# Patient Record
Sex: MALE | Race: WHITE | NOT HISPANIC OR LATINO
[De-identification: names, ages, dates, MRNs, and addresses within clinical notes are randomized per-mention and may not be internally consistent; named-entity substitution may affect disease eponyms.]

---

## 2017-01-03 ENCOUNTER — APPOINTMENT (OUTPATIENT)
Dept: ORTHOPEDIC SURGERY | Facility: CLINIC | Age: 52
End: 2017-01-03

## 2017-04-04 ENCOUNTER — EMERGENCY (EMERGENCY)
Facility: HOSPITAL | Age: 52
LOS: 1 days | Discharge: HOME | End: 2017-04-04
Attending: EMERGENCY MEDICINE
Payer: COMMERCIAL

## 2017-04-04 VITALS
RESPIRATION RATE: 20 BRPM | HEART RATE: 99 BPM | TEMPERATURE: 99 F | OXYGEN SATURATION: 96 % | SYSTOLIC BLOOD PRESSURE: 113 MMHG | DIASTOLIC BLOOD PRESSURE: 67 MMHG

## 2017-04-04 VITALS — WEIGHT: 175.05 LBS | HEIGHT: 67 IN

## 2017-04-04 DIAGNOSIS — J45.909 UNSPECIFIED ASTHMA, UNCOMPLICATED: ICD-10-CM

## 2017-04-04 DIAGNOSIS — F10.129 ALCOHOL ABUSE WITH INTOXICATION, UNSPECIFIED: ICD-10-CM

## 2017-04-04 DIAGNOSIS — R45.1 RESTLESSNESS AND AGITATION: ICD-10-CM

## 2017-04-04 DIAGNOSIS — F17.200 NICOTINE DEPENDENCE, UNSPECIFIED, UNCOMPLICATED: ICD-10-CM

## 2017-04-04 PROCEDURE — 82962 GLUCOSE BLOOD TEST: CPT

## 2017-04-04 PROCEDURE — 99285 EMERGENCY DEPT VISIT HI MDM: CPT | Mod: 25

## 2017-04-04 PROCEDURE — 99283 EMERGENCY DEPT VISIT LOW MDM: CPT

## 2017-04-04 RX ORDER — THIAMINE MONONITRATE (VIT B1) 100 MG
100 TABLET ORAL ONCE
Qty: 0 | Refills: 0 | Status: COMPLETED | OUTPATIENT
Start: 2017-04-04 | End: 2017-04-04

## 2017-04-04 RX ADMIN — Medication 100 MILLIGRAM(S): at 16:32

## 2017-04-04 RX ADMIN — Medication 1 TABLET(S): at 16:33

## 2017-04-04 NOTE — ED PROVIDER NOTE - OBJECTIVE STATEMENT
This is a 51F BIBEMS for alcohol intoxication. he was found with a pint of vodka and a bucket of chicken sitting down ont he side of the street. He admits to alcohol, denies other drugs. He denies any trauma and all physical complaints.
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
## 2017-04-04 NOTE — ED PROVIDER NOTE - PHYSICAL EXAMINATION
GEN: smells of EtOH, alert, oriented, answering questions  HEENT: NCAT, PERRL, EOMI, neck nonendter with painless ROM  CHEST: nontender, lungs CTA  ABD: soft, nontender  MSK: no spinal or extremity tenderness or deformity  SKIN: no bruising  NEURO: moving all extremities, slurring speech, unsteady c/w intoxication  PSYCH: mildly agitated but redirectable

## 2017-04-04 NOTE — ED PROVIDER NOTE - NS ED ROS FT
CONST: no fevers, no chills  EYES: no pain, no visual disturbances  ENT: no sore throat, no epistaxis, no rhinorhea, no hearing changes  CV: no chest pain, no palpitations, no orthopnea, no extremity pain or swelling  RESP: no shortness of breath, no cough, no sputum, no pleurisy, no wheezing  ABD: no abdominal pain, no nausea, no vomiting, no diarrhea, no black or bloody stool  : no dysuria, no hematuria, no frequency, no urgency  MSK: no trauma, no back pain, no neck pain, no extremity pain  NEURO: no headache  HEME: no easy bleeding or bruising

## 2017-04-04 NOTE — ED PROVIDER NOTE - MEDICAL DECISION MAKING DETAILS
Endorses EtOH, neurologically intact, mildly agitated but verbally deescalates. Eating food. No signs of trauma, benign exam. Will observe until clinically sober and reevaluate.

## 2017-04-04 NOTE — ED PROVIDER NOTE - PROGRESS NOTE DETAILS
pt sleeping comfortably. Signed out to [place]. Pt awake and alert.  Ambulatory in ED with steady gait with no acute signs of withdrawal.  Pt clinically sober for d/c at this time

## 2017-04-04 NOTE — ED ADULT NURSE REASSESSMENT NOTE - NS ED NURSE REASSESS COMMENT FT1
assumed care of pt  pt resting in hallway on stretcher, with covers over head.    answers to name  denies drinking today     yellow gown in place.   no complaints,.  cooperative pleasant currently  will cont to monitor
pt sleeping  with cover over head in hallway
pt stating he wants to go home feeling fine as per MD okay to go was given clothes to leave.
sandwich/juice given   pt has wallet (camo)_ and cell phone   "I have to be at work at 3AM"   pt on stretcher in [address], hagen=strength reassurance prov,
pt resting comfortable. pt tolerating po intake. pt in no alcohol withdrawal.

## 2017-04-06 ENCOUNTER — TRANSCRIPTION ENCOUNTER (OUTPATIENT)
Age: 52
End: 2017-04-06

## 2017-04-12 ENCOUNTER — APPOINTMENT (OUTPATIENT)
Dept: ORTHOPEDIC SURGERY | Facility: CLINIC | Age: 52
End: 2017-04-12

## 2017-05-22 ENCOUNTER — APPOINTMENT (OUTPATIENT)
Dept: ORTHOPEDIC SURGERY | Facility: CLINIC | Age: 52
End: 2017-05-22

## 2017-05-29 ENCOUNTER — EMERGENCY (EMERGENCY)
Facility: HOSPITAL | Age: 52
LOS: 1 days | Discharge: LEFT BEFORE DISCHARGE | End: 2017-05-29
Attending: EMERGENCY MEDICINE | Admitting: EMERGENCY MEDICINE
Payer: COMMERCIAL

## 2017-05-29 VITALS
HEART RATE: 89 BPM | SYSTOLIC BLOOD PRESSURE: 133 MMHG | WEIGHT: 139.99 LBS | TEMPERATURE: 98 F | DIASTOLIC BLOOD PRESSURE: 70 MMHG | RESPIRATION RATE: 16 BRPM | OXYGEN SATURATION: 97 %

## 2017-05-29 LAB — ETHANOL SERPL-MCNC: 311 MG/DL — SIGNIFICANT CHANGE UP

## 2017-05-29 PROCEDURE — 99284 EMERGENCY DEPT VISIT MOD MDM: CPT

## 2017-05-29 RX ORDER — HALOPERIDOL DECANOATE 100 MG/ML
2.5 INJECTION INTRAMUSCULAR ONCE
Qty: 0 | Refills: 0 | Status: COMPLETED | OUTPATIENT
Start: 2017-05-29 | End: 2017-05-29

## 2017-05-29 RX ADMIN — Medication 2 MILLIGRAM(S): at 20:12

## 2017-05-29 NOTE — ED PROVIDER NOTE - NS ED ROS FT
no fever  no chest pain  no SOB  no HA  + intox  no HI or SI  All other ROS negative except as per HPI
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
## 2017-05-29 NOTE — ED ADULT NURSE NOTE - CHPI ED SYMPTOMS NEG
no vomiting/no nausea/no disorientation/no confusion/no pain/no chills/no fever/no weakness/no abdominal distension/no abdominal pain
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
## 2017-05-29 NOTE — ED PROVIDER NOTE - PHYSICAL EXAMINATION
Constitutional: NAD.   ENT: Airway patent. Nose clear. Mouth with normal mucosa.   Head: Atraumatic.   Eyes: Clear bilaterally. PERRL.   Cardiac: Normal rate.   Respiratory: Breath sounds clear bilaterally.   GI: Abdomen soft, non-tender, no guarding.   : No CVA or bladder tenderness.   Musculoskeletal: FROM, no muscle or joint tenderness or swelling.   Neuro: alert and oriented, no focal deficits, no motor or sensory deficits.   Skin: Dry, intact, no rash. old abrasions noted, no active bleeding  Psych: normal mood and affect. no HI or SI
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
## 2017-05-30 VITALS
SYSTOLIC BLOOD PRESSURE: 154 MMHG | TEMPERATURE: 97 F | OXYGEN SATURATION: 97 % | DIASTOLIC BLOOD PRESSURE: 92 MMHG | RESPIRATION RATE: 24 BRPM | HEART RATE: 118 BPM

## 2017-05-30 LAB — ETHANOL SERPL-MCNC: <10 MG/DL — SIGNIFICANT CHANGE UP

## 2017-05-30 PROCEDURE — 96374 THER/PROPH/DIAG INJ IV PUSH: CPT

## 2017-05-30 PROCEDURE — 99285 EMERGENCY DEPT VISIT HI MDM: CPT | Mod: 25

## 2017-05-30 PROCEDURE — 96372 THER/PROPH/DIAG INJ SC/IM: CPT | Mod: XU

## 2017-05-30 PROCEDURE — 83735 ASSAY OF MAGNESIUM: CPT

## 2017-05-30 PROCEDURE — 96375 TX/PRO/DX INJ NEW DRUG ADDON: CPT

## 2017-05-30 PROCEDURE — 80053 COMPREHEN METABOLIC PANEL: CPT

## 2017-05-30 PROCEDURE — 80307 DRUG TEST PRSMV CHEM ANLYZR: CPT

## 2017-05-30 RX ORDER — PANTOPRAZOLE SODIUM 20 MG/1
40 TABLET, DELAYED RELEASE ORAL ONCE
Qty: 0 | Refills: 0 | Status: COMPLETED | OUTPATIENT
Start: 2017-05-30 | End: 2017-05-30

## 2017-05-30 RX ORDER — SODIUM CHLORIDE 9 MG/ML
2000 INJECTION INTRAMUSCULAR; INTRAVENOUS; SUBCUTANEOUS ONCE
Qty: 0 | Refills: 0 | Status: COMPLETED | OUTPATIENT
Start: 2017-05-30 | End: 2017-05-30

## 2017-05-30 RX ORDER — MAGNESIUM SULFATE 500 MG/ML
2 VIAL (ML) INJECTION ONCE
Qty: 0 | Refills: 0 | Status: COMPLETED | OUTPATIENT
Start: 2017-05-30 | End: 2017-05-30

## 2017-05-30 RX ADMIN — HALOPERIDOL DECANOATE 2.5 MILLIGRAM(S): 100 INJECTION INTRAMUSCULAR at 00:05

## 2017-05-30 RX ADMIN — PANTOPRAZOLE SODIUM 40 MILLIGRAM(S): 20 TABLET, DELAYED RELEASE ORAL at 09:45

## 2017-05-30 RX ADMIN — Medication 50 MILLIGRAM(S): at 08:21

## 2017-05-30 RX ADMIN — Medication 50 MILLIGRAM(S): at 06:03

## 2017-05-30 RX ADMIN — Medication 50 MILLIGRAM(S): at 05:47

## 2017-05-30 RX ADMIN — Medication 2 MILLIGRAM(S): at 00:05

## 2017-05-30 RX ADMIN — SODIUM CHLORIDE 2000 MILLILITER(S): 9 INJECTION INTRAMUSCULAR; INTRAVENOUS; SUBCUTANEOUS at 07:18

## 2017-05-30 RX ADMIN — Medication 50 GRAM(S): at 08:21

## 2017-05-30 NOTE — ED ADULT NURSE REASSESSMENT NOTE - TREMOR
4=moderate with patient's arms extended
7=severe, visible even with arms not extended
4=moderate with patient's arms extended

## 2017-05-30 NOTE — ED ADULT NURSE REASSESSMENT NOTE - NS ED NURSE REASSESS COMMENT FT1
Dr Isaac made aware of pt VSS, ordered additional librium and fluids, will hold discharge and will continue monitor.

## 2017-05-30 NOTE — ED ADULT NURSE REASSESSMENT NOTE - NS ED NURSE REASSESS COMMENT FT1
Pt received A&OX3, c/o headache.  Tachycardic, Pt received A&OX3, c/o headache.  Tachycardic.  CM in place.  CIWA of 11.  Clear bsb, abd soft nondistended, nontender, moving all ext well.  Pt requesting to go home.  Will continue to monitor.  Pt in yellow gown for safety and elopement risk.

## 2017-05-30 NOTE — ED ADULT NURSE REASSESSMENT NOTE - ORIENTATION
0=oriented and can do serial additions

## 2017-07-03 ENCOUNTER — EMERGENCY (EMERGENCY)
Facility: HOSPITAL | Age: 52
LOS: 1 days | Discharge: HOME | End: 2017-07-03
Attending: EMERGENCY MEDICINE | Admitting: EMERGENCY MEDICINE
Payer: COMMERCIAL

## 2017-07-03 VITALS
OXYGEN SATURATION: 99 % | DIASTOLIC BLOOD PRESSURE: 81 MMHG | HEART RATE: 90 BPM | RESPIRATION RATE: 18 BRPM | TEMPERATURE: 99 F | SYSTOLIC BLOOD PRESSURE: 125 MMHG

## 2017-07-03 VITALS
WEIGHT: 160.06 LBS | OXYGEN SATURATION: 98 % | TEMPERATURE: 98 F | HEART RATE: 107 BPM | RESPIRATION RATE: 16 BRPM | SYSTOLIC BLOOD PRESSURE: 131 MMHG | DIASTOLIC BLOOD PRESSURE: 87 MMHG

## 2017-07-03 PROCEDURE — 99283 EMERGENCY DEPT VISIT LOW MDM: CPT

## 2017-07-03 PROCEDURE — 99285 EMERGENCY DEPT VISIT HI MDM: CPT

## 2017-07-03 NOTE — ED ADULT NURSE REASSESSMENT NOTE - NS ED NURSE REASSESS COMMENT FT1
security at bedside, wallet and cash counted with witnesses and placed in valuables envelope. security at bedside, wallet and cash counted with witnesses and placed in valuables envelope. clothing removed and placed in belonging bag and locked up, patient still in possession of his cell phone.

## 2017-07-03 NOTE — ED PROVIDER NOTE - PROGRESS NOTE DETAILS
pt is awake and alert and oriented and not a danger to homself or others and will be dischargd pt is awake and alert and oriented and not a danger to himself or others and will be discharged

## 2017-07-03 NOTE — ED PROVIDER NOTE - OBJECTIVE STATEMENT
51 year old male with a long h/o alcohol abuse presents intoxicated. pt states that he drinks daily and did not fall. he deies any complaints

## 2017-07-03 NOTE — ED POST DISCHARGE NOTE - ADDITIONAL DOCUMENTATION
pt hemodynamically stable, refer to flowsheet and chart, pt to be discharged, pt understood discharge instructions and plan of care

## 2017-07-17 ENCOUNTER — EMERGENCY (EMERGENCY)
Facility: HOSPITAL | Age: 52
LOS: 1 days | Discharge: HOME | End: 2017-07-17
Attending: EMERGENCY MEDICINE
Payer: COMMERCIAL

## 2017-07-17 VITALS
OXYGEN SATURATION: 98 % | HEIGHT: 65 IN | HEART RATE: 78 BPM | RESPIRATION RATE: 18 BRPM | WEIGHT: 169.98 LBS | SYSTOLIC BLOOD PRESSURE: 117 MMHG | TEMPERATURE: 98 F | DIASTOLIC BLOOD PRESSURE: 76 MMHG

## 2017-07-17 LAB — ETHANOL SERPL-MCNC: 461 MG/DL — SIGNIFICANT CHANGE UP

## 2017-07-17 PROCEDURE — 96374 THER/PROPH/DIAG INJ IV PUSH: CPT

## 2017-07-17 PROCEDURE — 99284 EMERGENCY DEPT VISIT MOD MDM: CPT

## 2017-07-17 PROCEDURE — 80307 DRUG TEST PRSMV CHEM ANLYZR: CPT

## 2017-07-17 PROCEDURE — 36415 COLL VENOUS BLD VENIPUNCTURE: CPT

## 2017-07-17 PROCEDURE — 96372 THER/PROPH/DIAG INJ SC/IM: CPT | Mod: XU

## 2017-07-17 PROCEDURE — 99285 EMERGENCY DEPT VISIT HI MDM: CPT | Mod: 25

## 2017-07-17 RX ORDER — HALOPERIDOL DECANOATE 100 MG/ML
5 INJECTION INTRAMUSCULAR ONCE
Qty: 0 | Refills: 0 | Status: COMPLETED | OUTPATIENT
Start: 2017-07-17 | End: 2017-07-17

## 2017-07-17 RX ADMIN — HALOPERIDOL DECANOATE 5 MILLIGRAM(S): 100 INJECTION INTRAMUSCULAR at 21:49

## 2017-07-17 RX ADMIN — Medication 2 MILLIGRAM(S): at 21:49

## 2017-07-17 NOTE — ED PROVIDER NOTE - OBJECTIVE STATEMENT
This is a 50 y/o patient with Hx of asthma BIB EMS for alcohol intoxication. Pt is sleeping comfortably in bed, but easily arousable. Affect consistent with alcohol intoxication. No signs of respiratory distress or deformities. Pt is unable to provide a reliable history at this time. History is limited due to suspected intoxication. Pt awoke and was tearful and asking for help with possible detox.
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
## 2017-07-18 VITALS
DIASTOLIC BLOOD PRESSURE: 86 MMHG | SYSTOLIC BLOOD PRESSURE: 130 MMHG | RESPIRATION RATE: 20 BRPM | HEART RATE: 86 BPM | OXYGEN SATURATION: 100 %

## 2017-07-18 RX ORDER — HALOPERIDOL DECANOATE 100 MG/ML
5 INJECTION INTRAMUSCULAR ONCE
Qty: 0 | Refills: 0 | Status: COMPLETED | OUTPATIENT
Start: 2017-07-18 | End: 2017-07-18

## 2017-07-18 NOTE — ED ADULT NURSE REASSESSMENT NOTE - NS ED NURSE REASSESS COMMENT FT1
Pt able to ambulate safely and steadily w/out assistance, denies dizziness/weakness upon standing, belongings returned and preparing for d/c of IV and pt

## 2017-07-18 NOTE — ED ADULT NURSE REASSESSMENT NOTE - NS ED NURSE REASSESS COMMENT FT1
Pt resting comfortably in stretcher, resp even and unlabored, denies SOB, denies pain/n/v, will continue to monitor.

## 2017-08-19 ENCOUNTER — EMERGENCY (EMERGENCY)
Facility: HOSPITAL | Age: 52
LOS: 1 days | Discharge: HOME | End: 2017-08-19
Attending: EMERGENCY MEDICINE | Admitting: EMERGENCY MEDICINE
Payer: COMMERCIAL

## 2017-08-19 VITALS
HEART RATE: 97 BPM | SYSTOLIC BLOOD PRESSURE: 104 MMHG | HEIGHT: 68 IN | WEIGHT: 179.9 LBS | TEMPERATURE: 97 F | OXYGEN SATURATION: 96 % | RESPIRATION RATE: 16 BRPM | DIASTOLIC BLOOD PRESSURE: 66 MMHG

## 2017-08-19 PROCEDURE — 99285 EMERGENCY DEPT VISIT HI MDM: CPT | Mod: 25

## 2017-08-19 PROCEDURE — 99284 EMERGENCY DEPT VISIT MOD MDM: CPT

## 2017-08-19 PROCEDURE — 96372 THER/PROPH/DIAG INJ SC/IM: CPT

## 2017-08-19 RX ORDER — HALOPERIDOL DECANOATE 100 MG/ML
2.5 INJECTION INTRAMUSCULAR ONCE
Qty: 0 | Refills: 0 | Status: COMPLETED | OUTPATIENT
Start: 2017-08-19 | End: 2017-08-19

## 2017-08-19 RX ADMIN — HALOPERIDOL DECANOATE 2.5 MILLIGRAM(S): 100 INJECTION INTRAMUSCULAR at 18:55

## 2017-08-19 RX ADMIN — Medication 2 MILLIGRAM(S): at 18:55

## 2017-08-19 NOTE — ED PROVIDER NOTE - SKIN, MLM
Skin normal color for race, warm, dry and intact. No evidence of rash.except superficail abrasion rt cheek
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
## 2017-08-19 NOTE — ED ADULT NURSE NOTE - OBJECTIVE STATEMENT
pt with reports of drinking large amounts of alcohol today. crying on exam, ambulates with assistance. no obvious injury or trauma noted.

## 2017-08-20 VITALS
RESPIRATION RATE: 18 BRPM | TEMPERATURE: 98 F | HEART RATE: 71 BPM | OXYGEN SATURATION: 99 % | DIASTOLIC BLOOD PRESSURE: 71 MMHG | SYSTOLIC BLOOD PRESSURE: 117 MMHG
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
## 2017-10-01 ENCOUNTER — EMERGENCY (EMERGENCY)
Facility: HOSPITAL | Age: 52
LOS: 1 days | Discharge: HOME | End: 2017-10-01
Attending: EMERGENCY MEDICINE | Admitting: STUDENT IN AN ORGANIZED HEALTH CARE EDUCATION/TRAINING PROGRAM
Payer: MEDICAID

## 2017-10-01 VITALS
TEMPERATURE: 98 F | SYSTOLIC BLOOD PRESSURE: 140 MMHG | RESPIRATION RATE: 16 BRPM | HEART RATE: 88 BPM | OXYGEN SATURATION: 98 % | HEIGHT: 65 IN | WEIGHT: 175.05 LBS | DIASTOLIC BLOOD PRESSURE: 74 MMHG

## 2017-10-01 LAB
ANION GAP SERPL CALC-SCNC: 15 MMOL/L — SIGNIFICANT CHANGE UP (ref 5–17)
BUN SERPL-MCNC: 11 MG/DL — SIGNIFICANT CHANGE UP (ref 8–20)
CALCIUM SERPL-MCNC: 8.6 MG/DL — SIGNIFICANT CHANGE UP (ref 8.6–10.2)
CHLORIDE SERPL-SCNC: 104 MMOL/L — SIGNIFICANT CHANGE UP (ref 98–107)
CO2 SERPL-SCNC: 22 MMOL/L — SIGNIFICANT CHANGE UP (ref 22–29)
CREAT SERPL-MCNC: 0.8 MG/DL — SIGNIFICANT CHANGE UP (ref 0.5–1.3)
ETHANOL SERPL-MCNC: 415 MG/DL — SIGNIFICANT CHANGE UP
GLUCOSE SERPL-MCNC: 108 MG/DL — SIGNIFICANT CHANGE UP (ref 70–115)
POTASSIUM SERPL-MCNC: 3.6 MMOL/L — SIGNIFICANT CHANGE UP (ref 3.5–5.3)
POTASSIUM SERPL-SCNC: 3.6 MMOL/L — SIGNIFICANT CHANGE UP (ref 3.5–5.3)
SODIUM SERPL-SCNC: 141 MMOL/L — SIGNIFICANT CHANGE UP (ref 135–145)

## 2017-10-01 PROCEDURE — 99284 EMERGENCY DEPT VISIT MOD MDM: CPT

## 2017-10-01 PROCEDURE — 93010 ELECTROCARDIOGRAM REPORT: CPT

## 2017-10-01 RX ORDER — HALOPERIDOL DECANOATE 100 MG/ML
5 INJECTION INTRAMUSCULAR ONCE
Qty: 0 | Refills: 0 | Status: COMPLETED | OUTPATIENT
Start: 2017-10-01 | End: 2017-10-01

## 2017-10-01 RX ADMIN — Medication 2 MILLIGRAM(S): at 20:55

## 2017-10-01 RX ADMIN — HALOPERIDOL DECANOATE 5 MILLIGRAM(S): 100 INJECTION INTRAMUSCULAR at 20:55

## 2017-10-01 RX ADMIN — Medication 100 MILLIGRAM(S): at 20:07
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
## 2017-10-01 NOTE — ED ADULT NURSE REASSESSMENT NOTE - NS ED NURSE REASSESS COMMENT FT1
patient with a ciwa of 5, MDA, librium ordered and given, blanket and pillow given, emotional support given, patient states that he is homeless offered SW to see, patient denies

## 2017-10-01 NOTE — ED PROVIDER NOTE - UNABLE TO OBTAIN
Severe Illness/Injury ROS limited secondary to alcohol intoxication. HPI limited secondary to alcohol intoxication.

## 2017-10-01 NOTE — ED ADULT NURSE NOTE - OBJECTIVE STATEMENT
patient states that he has been drinking today and is shakey, patient is tearful when offered blanket and pillow, patient in yellow gown

## 2017-10-01 NOTE — ED ADULT NURSE REASSESSMENT NOTE - NS ED NURSE REASSESS COMMENT FT1
patient agitated, getting out of bed lying on floor, states that he is having chest pain, EKG completed normal, once patient knew he denies CP, patient given medication, still wanting to get up, urinal given

## 2017-10-01 NOTE — ED PROVIDER NOTE - PROGRESS NOTE DETAILS
Pt is crying, tearful, states he misses his mother. Pt agitated repeatedly getting up despite being able to walk straight, multiple attempts to redirect the pt has failed, pt has been medicated for pt and staff safety. Pt awake and alert.  Ambulatory in ED with steady gait.  No acute signs of EtOH withdrawal.  Pt stable for d/c

## 2017-10-01 NOTE — ED PROVIDER NOTE - PMH
Alcohol abuse    Alcohol abuse    Alcohol abuse    Asthma    GERD (gastroesophageal reflux disease)    High cholesterol    High cholesterol    HTN (hypertension)    Hypertension

## 2017-10-01 NOTE — ED PROVIDER NOTE - OBJECTIVE STATEMENT
51 y/o patient with Hx of alcohol abuse and HTN BIB EMS for alcohol intoxication. Pt is sleeping comfortably in bed, but easily arousable. Affect consistent with alcohol intoxication. No signs of respiratory distress or deformities. Pt is unable to provide a reliable history at this time. History is limited due to suspected intoxication.

## 2017-10-02 VITALS
SYSTOLIC BLOOD PRESSURE: 146 MMHG | RESPIRATION RATE: 19 BRPM | OXYGEN SATURATION: 98 % | DIASTOLIC BLOOD PRESSURE: 79 MMHG | HEART RATE: 89 BPM | TEMPERATURE: 99 F

## 2017-10-02 LAB — ETHANOL SERPL-MCNC: 228 MG/DL — SIGNIFICANT CHANGE UP

## 2017-10-02 PROCEDURE — 80307 DRUG TEST PRSMV CHEM ANLYZR: CPT

## 2017-10-02 PROCEDURE — 80048 BASIC METABOLIC PNL TOTAL CA: CPT

## 2017-10-02 PROCEDURE — 36415 COLL VENOUS BLD VENIPUNCTURE: CPT

## 2017-10-02 PROCEDURE — 93005 ELECTROCARDIOGRAM TRACING: CPT

## 2017-10-02 PROCEDURE — 96372 THER/PROPH/DIAG INJ SC/IM: CPT

## 2017-10-02 PROCEDURE — 99285 EMERGENCY DEPT VISIT HI MDM: CPT | Mod: 25

## 2017-11-01 ENCOUNTER — APPOINTMENT (OUTPATIENT)
Dept: INTERNAL MEDICINE | Facility: CLINIC | Age: 52
End: 2017-11-01
Payer: OTHER MISCELLANEOUS

## 2017-11-01 VITALS — WEIGHT: 159 LBS | BODY MASS INDEX: 27.14 KG/M2 | HEIGHT: 64 IN

## 2017-11-01 VITALS — SYSTOLIC BLOOD PRESSURE: 124 MMHG | DIASTOLIC BLOOD PRESSURE: 80 MMHG | HEART RATE: 78 BPM | RESPIRATION RATE: 12 BRPM

## 2017-11-01 DIAGNOSIS — F10.10 ALCOHOL ABUSE, UNCOMPLICATED: ICD-10-CM

## 2017-11-01 DIAGNOSIS — Z82.49 FAMILY HISTORY OF ISCHEMIC HEART DISEASE AND OTHER DISEASES OF THE CIRCULATORY SYSTEM: ICD-10-CM

## 2017-11-01 PROCEDURE — 99203 OFFICE O/P NEW LOW 30 MIN: CPT

## 2017-12-15 ENCOUNTER — EMERGENCY (EMERGENCY)
Facility: HOSPITAL | Age: 52
LOS: 1 days | Discharge: HOME | End: 2017-12-15
Attending: EMERGENCY MEDICINE
Payer: COMMERCIAL

## 2017-12-15 VITALS
DIASTOLIC BLOOD PRESSURE: 80 MMHG | HEART RATE: 90 BPM | OXYGEN SATURATION: 99 % | HEIGHT: 69 IN | TEMPERATURE: 98 F | SYSTOLIC BLOOD PRESSURE: 136 MMHG | RESPIRATION RATE: 18 BRPM | WEIGHT: 160.06 LBS

## 2017-12-15 VITALS
SYSTOLIC BLOOD PRESSURE: 122 MMHG | TEMPERATURE: 98 F | HEART RATE: 80 BPM | RESPIRATION RATE: 18 BRPM | OXYGEN SATURATION: 99 % | DIASTOLIC BLOOD PRESSURE: 80 MMHG

## 2017-12-15 PROCEDURE — 99284 EMERGENCY DEPT VISIT MOD MDM: CPT

## 2017-12-15 NOTE — ED ADULT NURSE NOTE - NS ED NURSE DC INFO COMPLEXITY
Returned Demonstration/Patient asked questions/Verbalized Understanding/Straightforward: Basic instructions, no meds, no home treatment

## 2017-12-15 NOTE — ED ADULT NURSE NOTE - OBJECTIVE STATEMENT
Pt received laying on stretcher does not want to talk. Sleeping. Pt arousabe to stimulus.. Neuro WNL. PERRLA. Lungs CTA, RR even unlabored. Ab soft non tender, + bowel sounds x 4quads. Denies Nausea, Vomiting, Diarrhea. Skin warm, dry, color appropriate for age and race.

## 2017-12-15 NOTE — ED PROVIDER NOTE - OBJECTIVE STATEMENT
51 yo wm pmh alcohol abuse comes to ed with aloob on breath denies head ,neck chest or abdominal pain

## 2017-12-15 NOTE — ED PROVIDER NOTE - PROGRESS NOTE DETAILS
Pt observed in ED.  Pt clinically sober c/o feeling anxious and shaky.  Pt with no acute signs of EtOH withdrawal.  Pt given Librium and stable for d/c
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
## 2017-12-16 PROCEDURE — 99285 EMERGENCY DEPT VISIT HI MDM: CPT

## 2017-12-16 RX ADMIN — Medication 100 MILLIGRAM(S): at 05:54

## 2017-12-17 ENCOUNTER — EMERGENCY (EMERGENCY)
Facility: HOSPITAL | Age: 52
LOS: 1 days | Discharge: HOME | End: 2017-12-17
Attending: EMERGENCY MEDICINE
Payer: COMMERCIAL

## 2017-12-17 VITALS
TEMPERATURE: 97 F | HEIGHT: 65 IN | HEART RATE: 106 BPM | WEIGHT: 164.91 LBS | DIASTOLIC BLOOD PRESSURE: 70 MMHG | RESPIRATION RATE: 16 BRPM | SYSTOLIC BLOOD PRESSURE: 118 MMHG | OXYGEN SATURATION: 96 %

## 2017-12-17 VITALS
DIASTOLIC BLOOD PRESSURE: 70 MMHG | HEART RATE: 89 BPM | SYSTOLIC BLOOD PRESSURE: 118 MMHG | OXYGEN SATURATION: 100 % | RESPIRATION RATE: 18 BRPM

## 2017-12-17 LAB
ANION GAP SERPL CALC-SCNC: 14 MMOL/L — SIGNIFICANT CHANGE UP (ref 5–17)
BASOPHILS # BLD AUTO: 0 K/UL — SIGNIFICANT CHANGE UP (ref 0–0.2)
BASOPHILS NFR BLD AUTO: 0.4 % — SIGNIFICANT CHANGE UP (ref 0–2)
BUN SERPL-MCNC: 12 MG/DL — SIGNIFICANT CHANGE UP (ref 8–20)
CALCIUM SERPL-MCNC: 8.6 MG/DL — SIGNIFICANT CHANGE UP (ref 8.6–10.2)
CHLORIDE SERPL-SCNC: 101 MMOL/L — SIGNIFICANT CHANGE UP (ref 98–107)
CO2 SERPL-SCNC: 28 MMOL/L — SIGNIFICANT CHANGE UP (ref 22–29)
CREAT SERPL-MCNC: 0.71 MG/DL — SIGNIFICANT CHANGE UP (ref 0.5–1.3)
EOSINOPHIL # BLD AUTO: 0.2 K/UL — SIGNIFICANT CHANGE UP (ref 0–0.5)
EOSINOPHIL NFR BLD AUTO: 3.2 % — SIGNIFICANT CHANGE UP (ref 0–5)
ETHANOL SERPL-MCNC: 346 MG/DL — SIGNIFICANT CHANGE UP
GLUCOSE SERPL-MCNC: 129 MG/DL — HIGH (ref 70–115)
HCT VFR BLD CALC: 36.8 % — LOW (ref 42–52)
HGB BLD-MCNC: 12.3 G/DL — LOW (ref 14–18)
LYMPHOCYTES # BLD AUTO: 1.9 K/UL — SIGNIFICANT CHANGE UP (ref 1–4.8)
LYMPHOCYTES # BLD AUTO: 35.6 % — SIGNIFICANT CHANGE UP (ref 20–55)
MCHC RBC-ENTMCNC: 28.9 PG — SIGNIFICANT CHANGE UP (ref 27–31)
MCHC RBC-ENTMCNC: 33.4 G/DL — SIGNIFICANT CHANGE UP (ref 32–36)
MCV RBC AUTO: 86.6 FL — SIGNIFICANT CHANGE UP (ref 80–94)
MONOCYTES # BLD AUTO: 0.5 K/UL — SIGNIFICANT CHANGE UP (ref 0–0.8)
MONOCYTES NFR BLD AUTO: 9.7 % — SIGNIFICANT CHANGE UP (ref 3–10)
NEUTROPHILS # BLD AUTO: 2.7 K/UL — SIGNIFICANT CHANGE UP (ref 1.8–8)
NEUTROPHILS NFR BLD AUTO: 50.9 % — SIGNIFICANT CHANGE UP (ref 37–73)
PLATELET # BLD AUTO: 238 K/UL — SIGNIFICANT CHANGE UP (ref 150–400)
POTASSIUM SERPL-MCNC: 4 MMOL/L — SIGNIFICANT CHANGE UP (ref 3.5–5.3)
POTASSIUM SERPL-SCNC: 4 MMOL/L — SIGNIFICANT CHANGE UP (ref 3.5–5.3)
RBC # BLD: 4.25 M/UL — LOW (ref 4.6–6.2)
RBC # FLD: 16.9 % — HIGH (ref 11–15.6)
SODIUM SERPL-SCNC: 143 MMOL/L — SIGNIFICANT CHANGE UP (ref 135–145)
WBC # BLD: 5.3 K/UL — SIGNIFICANT CHANGE UP (ref 4.8–10.8)
WBC # FLD AUTO: 5.3 K/UL — SIGNIFICANT CHANGE UP (ref 4.8–10.8)

## 2017-12-17 PROCEDURE — 80307 DRUG TEST PRSMV CHEM ANLYZR: CPT

## 2017-12-17 PROCEDURE — 80048 BASIC METABOLIC PNL TOTAL CA: CPT

## 2017-12-17 PROCEDURE — 36415 COLL VENOUS BLD VENIPUNCTURE: CPT

## 2017-12-17 PROCEDURE — 99284 EMERGENCY DEPT VISIT MOD MDM: CPT

## 2017-12-17 PROCEDURE — 99285 EMERGENCY DEPT VISIT HI MDM: CPT

## 2017-12-17 PROCEDURE — 85027 COMPLETE CBC AUTOMATED: CPT

## 2017-12-17 RX ORDER — ALBUTEROL 90 UG/1
2.5 AEROSOL, METERED ORAL ONCE
Qty: 0 | Refills: 0 | Status: DISCONTINUED | OUTPATIENT
Start: 2017-12-17 | End: 2017-12-21
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
## 2017-12-17 NOTE — ED PROVIDER NOTE - MEDICAL DECISION MAKING DETAILS
refusing weoth resources ambulating in naad clincally sober return to ed for intractable HA, persistent vomiting, or new onset motor/sensory deficits getting a taxi home

## 2018-02-16 ENCOUNTER — EMERGENCY (EMERGENCY)
Facility: HOSPITAL | Age: 53
LOS: 1 days | Discharge: HOME | End: 2018-02-16
Attending: EMERGENCY MEDICINE
Payer: COMMERCIAL

## 2018-02-16 VITALS
RESPIRATION RATE: 18 BRPM | TEMPERATURE: 98 F | DIASTOLIC BLOOD PRESSURE: 76 MMHG | HEART RATE: 88 BPM | HEIGHT: 67 IN | OXYGEN SATURATION: 99 % | SYSTOLIC BLOOD PRESSURE: 128 MMHG | WEIGHT: 164.91 LBS

## 2018-02-16 LAB — ETHANOL SERPL-MCNC: 365 MG/DL — SIGNIFICANT CHANGE UP

## 2018-02-16 PROCEDURE — 80307 DRUG TEST PRSMV CHEM ANLYZR: CPT

## 2018-02-16 PROCEDURE — 99284 EMERGENCY DEPT VISIT MOD MDM: CPT

## 2018-02-16 PROCEDURE — 99283 EMERGENCY DEPT VISIT LOW MDM: CPT

## 2018-02-16 PROCEDURE — 36415 COLL VENOUS BLD VENIPUNCTURE: CPT
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
## 2018-02-16 NOTE — ED PROVIDER NOTE - PROGRESS NOTE DETAILS
Pt. sleeping on stretcher. No distress. Airway intact. PT. endorsed to Dr. Herrera. Re-evaluate pt. when sober. Repeat BAL. Pt awake and alert, ambulatory with steady gait.  C/o increased nausea and vomiting, but refusing meds.  Pt stable for d/c

## 2018-02-17 VITALS
RESPIRATION RATE: 20 BRPM | SYSTOLIC BLOOD PRESSURE: 107 MMHG | HEART RATE: 85 BPM | OXYGEN SATURATION: 99 % | DIASTOLIC BLOOD PRESSURE: 74 MMHG
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
## 2018-02-17 NOTE — ED ADULT NURSE REASSESSMENT NOTE - NS ED NURSE REASSESS COMMENT FT1
Assumed care of patient at this time, patient is resting in bed with eyes closed, respirations are even and non labored. NAD noted.

## 2018-02-17 NOTE — ED ADULT NURSE REASSESSMENT NOTE - NS ED NURSE REASSESS COMMENT FT1
Patient states that he does not want the medication any more and just wants to leave to go drink. Patient dressed and ambulated out with a steady gait. No tremors or vomiting noted. NAD Noted.

## 2018-02-17 NOTE — ED ADULT NURSE REASSESSMENT NOTE - NS ED NURSE REASSESS COMMENT FT1
Patient awake and sitting on the edge of the bed, patient sticking fingers down his throat to make himself vomit. Patient states that he would like something for withdrawal. MD aware and ordering medication. NAD noted.

## 2018-02-17 NOTE — ED ADULT NURSE REASSESSMENT NOTE - NS ED NURSE REASSESS COMMENT FT1
report given to [facility], patient in stable condition, awaiting for discharge at 8am, patient aware of POC

## 2018-02-20 ENCOUNTER — CHART COPY (OUTPATIENT)
Age: 53
End: 2018-02-20

## 2018-03-01 ENCOUNTER — OUTPATIENT (OUTPATIENT)
Dept: OUTPATIENT SERVICES | Facility: HOSPITAL | Age: 53
LOS: 1 days | End: 2018-03-01

## 2018-03-15 ENCOUNTER — APPOINTMENT (OUTPATIENT)
Dept: INTERNAL MEDICINE | Facility: CLINIC | Age: 53
End: 2018-03-15
Payer: MEDICAID

## 2018-03-15 VITALS — SYSTOLIC BLOOD PRESSURE: 128 MMHG | DIASTOLIC BLOOD PRESSURE: 76 MMHG | RESPIRATION RATE: 16 BRPM | HEART RATE: 80 BPM

## 2018-03-15 PROCEDURE — 99214 OFFICE O/P EST MOD 30 MIN: CPT | Mod: 25

## 2018-03-15 RX ORDER — OMEPRAZOLE 40 MG/1
40 CAPSULE, DELAYED RELEASE ORAL
Qty: 30 | Refills: 2 | Status: DISCONTINUED | COMMUNITY
Start: 2018-03-15 | End: 2018-03-15

## 2018-03-23 ENCOUNTER — LABORATORY RESULT (OUTPATIENT)
Age: 53
End: 2018-03-23

## 2018-03-23 ENCOUNTER — APPOINTMENT (OUTPATIENT)
Dept: INTERNAL MEDICINE | Facility: CLINIC | Age: 53
End: 2018-03-23
Payer: MEDICAID

## 2018-03-23 ENCOUNTER — EMERGENCY (EMERGENCY)
Facility: HOSPITAL | Age: 53
LOS: 1 days | Discharge: HOME | End: 2018-03-23
Attending: EMERGENCY MEDICINE
Payer: COMMERCIAL

## 2018-03-23 VITALS — HEART RATE: 90 BPM | RESPIRATION RATE: 12 BRPM | SYSTOLIC BLOOD PRESSURE: 130 MMHG | DIASTOLIC BLOOD PRESSURE: 78 MMHG

## 2018-03-23 VITALS
RESPIRATION RATE: 22 BRPM | TEMPERATURE: 99 F | DIASTOLIC BLOOD PRESSURE: 96 MMHG | OXYGEN SATURATION: 96 % | SYSTOLIC BLOOD PRESSURE: 155 MMHG | HEIGHT: 65 IN | WEIGHT: 154.98 LBS | HEART RATE: 116 BPM

## 2018-03-23 VITALS — WEIGHT: 163 LBS | BODY MASS INDEX: 27.98 KG/M2

## 2018-03-23 LAB
ALBUMIN SERPL ELPH-MCNC: 4.3 G/DL — SIGNIFICANT CHANGE UP (ref 3.3–5.2)
ALP SERPL-CCNC: 86 U/L — SIGNIFICANT CHANGE UP (ref 40–120)
ALT FLD-CCNC: 20 U/L — SIGNIFICANT CHANGE UP
AMPHET UR-MCNC: POSITIVE
ANION GAP SERPL CALC-SCNC: 15 MMOL/L — SIGNIFICANT CHANGE UP (ref 5–17)
APPEARANCE UR: CLEAR — SIGNIFICANT CHANGE UP
AST SERPL-CCNC: 24 U/L — SIGNIFICANT CHANGE UP
BARBITURATES UR SCN-MCNC: NEGATIVE — SIGNIFICANT CHANGE UP
BENZODIAZ UR-MCNC: NEGATIVE — SIGNIFICANT CHANGE UP
BILIRUB SERPL-MCNC: 0.4 MG/DL — SIGNIFICANT CHANGE UP (ref 0.4–2)
BILIRUB UR-MCNC: NEGATIVE — SIGNIFICANT CHANGE UP
BUN SERPL-MCNC: 8 MG/DL — SIGNIFICANT CHANGE UP (ref 8–20)
CALCIUM SERPL-MCNC: 9.2 MG/DL — SIGNIFICANT CHANGE UP (ref 8.6–10.2)
CHLORIDE SERPL-SCNC: 97 MMOL/L — LOW (ref 98–107)
CO2 SERPL-SCNC: 24 MMOL/L — SIGNIFICANT CHANGE UP (ref 22–29)
COCAINE METAB.OTHER UR-MCNC: NEGATIVE — SIGNIFICANT CHANGE UP
COLOR SPEC: YELLOW — SIGNIFICANT CHANGE UP
CREAT SERPL-MCNC: 0.81 MG/DL — SIGNIFICANT CHANGE UP (ref 0.5–1.3)
DIFF PNL FLD: ABNORMAL
ETHANOL SERPL-MCNC: <10 MG/DL — SIGNIFICANT CHANGE UP
GLUCOSE SERPL-MCNC: 129 MG/DL — HIGH (ref 70–115)
GLUCOSE UR QL: NEGATIVE MG/DL — SIGNIFICANT CHANGE UP
HCT VFR BLD CALC: 39.9 % — LOW (ref 42–52)
HGB BLD-MCNC: 13.1 G/DL — LOW (ref 14–18)
KETONES UR-MCNC: ABNORMAL
LACTATE BLDV-MCNC: 2.2 MMOL/L — HIGH (ref 0.5–2)
LEUKOCYTE ESTERASE UR-ACNC: NEGATIVE — SIGNIFICANT CHANGE UP
LIDOCAIN IGE QN: 49 U/L — SIGNIFICANT CHANGE UP (ref 22–51)
MCHC RBC-ENTMCNC: 28.7 PG — SIGNIFICANT CHANGE UP (ref 27–31)
MCHC RBC-ENTMCNC: 32.8 G/DL — SIGNIFICANT CHANGE UP (ref 32–36)
MCV RBC AUTO: 87.5 FL — SIGNIFICANT CHANGE UP (ref 80–94)
METHADONE UR-MCNC: NEGATIVE — SIGNIFICANT CHANGE UP
NITRITE UR-MCNC: NEGATIVE — SIGNIFICANT CHANGE UP
OPIATES UR-MCNC: NEGATIVE — SIGNIFICANT CHANGE UP
PCP SPEC-MCNC: SIGNIFICANT CHANGE UP
PCP UR-MCNC: NEGATIVE — SIGNIFICANT CHANGE UP
PH UR: 6 — SIGNIFICANT CHANGE UP (ref 5–8)
PLATELET # BLD AUTO: 185 K/UL — SIGNIFICANT CHANGE UP (ref 150–400)
POTASSIUM SERPL-MCNC: 3.9 MMOL/L — SIGNIFICANT CHANGE UP (ref 3.5–5.3)
POTASSIUM SERPL-SCNC: 3.9 MMOL/L — SIGNIFICANT CHANGE UP (ref 3.5–5.3)
PROT SERPL-MCNC: 8 G/DL — SIGNIFICANT CHANGE UP (ref 6.6–8.7)
PROT UR-MCNC: 30 MG/DL
RBC # BLD: 4.56 M/UL — LOW (ref 4.6–6.2)
RBC # FLD: 16.3 % — HIGH (ref 11–15.6)
RBC CASTS # UR COMP ASSIST: SIGNIFICANT CHANGE UP /HPF (ref 0–4)
SODIUM SERPL-SCNC: 136 MMOL/L — SIGNIFICANT CHANGE UP (ref 135–145)
SP GR SPEC: 1.02 — SIGNIFICANT CHANGE UP (ref 1.01–1.02)
THC UR QL: NEGATIVE — SIGNIFICANT CHANGE UP
UROBILINOGEN FLD QL: 1 MG/DL
WBC # BLD: 11.6 K/UL — HIGH (ref 4.8–10.8)
WBC # FLD AUTO: 11.6 K/UL — HIGH (ref 4.8–10.8)
WBC UR QL: NEGATIVE — SIGNIFICANT CHANGE UP

## 2018-03-23 PROCEDURE — 99285 EMERGENCY DEPT VISIT HI MDM: CPT

## 2018-03-23 PROCEDURE — 74177 CT ABD & PELVIS W/CONTRAST: CPT | Mod: 26

## 2018-03-23 PROCEDURE — 99214 OFFICE O/P EST MOD 30 MIN: CPT | Mod: 25

## 2018-03-23 PROCEDURE — 93010 ELECTROCARDIOGRAM REPORT: CPT

## 2018-03-23 PROCEDURE — 36415 COLL VENOUS BLD VENIPUNCTURE: CPT

## 2018-03-23 RX ORDER — SODIUM CHLORIDE 9 MG/ML
2000 INJECTION INTRAMUSCULAR; INTRAVENOUS; SUBCUTANEOUS ONCE
Qty: 0 | Refills: 0 | Status: COMPLETED | OUTPATIENT
Start: 2018-03-23 | End: 2018-03-23

## 2018-03-23 RX ORDER — FAMOTIDINE 10 MG/ML
20 INJECTION INTRAVENOUS ONCE
Qty: 0 | Refills: 0 | Status: COMPLETED | OUTPATIENT
Start: 2018-03-23 | End: 2018-03-23

## 2018-03-23 RX ADMIN — Medication 30 MILLILITER(S): at 19:49

## 2018-03-23 RX ADMIN — Medication 1 MILLIGRAM(S): at 19:49

## 2018-03-23 RX ADMIN — SODIUM CHLORIDE 2000 MILLILITER(S): 9 INJECTION INTRAMUSCULAR; INTRAVENOUS; SUBCUTANEOUS at 19:49

## 2018-03-23 RX ADMIN — Medication 1 MILLIGRAM(S): at 22:24

## 2018-03-23 RX ADMIN — FAMOTIDINE 20 MILLIGRAM(S): 10 INJECTION INTRAVENOUS at 19:49

## 2018-03-23 NOTE — ED ADULT TRIAGE NOTE - CHIEF COMPLAINT QUOTE
patient was brought in because he decided to stop drinking, went to PMD, sent here for Detox, stated last drink was yesterday (a quart of vodka). patient also complaining of abdominal pain.

## 2018-03-23 NOTE — ED PROVIDER NOTE - OBJECTIVE STATEMENT
51 y/o M, with hx of EtOH abuse, HTN, HLD, GERD, and asthma, presents to the ED c/o abdominal pain, onset last night.  Pt states "it feels like my stomach is ballooning out." Associated sx include vomiting, chills, diaphoresis, and shaking, onset last night.  Also notes that he has been constipated for 1 week.  Denies CP.  Pt notes that he is a daily drinker, and drinks vodka every night until he "passes out".  States that he was seen by his PCP today for detoxification symptoms.  Pt states that he took Prilosec today with no relief.  Denies SOB, visual changes, urinary sx, fever, diarrhea, or back pain.

## 2018-03-23 NOTE — ED PROVIDER NOTE - NS ED NOTE AC HIGH RISK COUNTRIES
Patient was given standard dose last time. Does he just want to repeat that? Check with him reg what dosage he wants to do? His wife is a MD I believe.    No

## 2018-03-23 NOTE — ED PROVIDER NOTE - MEDICAL DECISION MAKING DETAILS
pt comes in c/o abdominal pain , no fatty food intake , no alcol   VSS , eatimg food pt comes in c/o abdominal pain , no fatty food intake , no alcol   VSS , eatimg food. Pt stated he drinks daily and wanted detox, but then stated he wanted to go. At the time of dc fluid speech, lucid and coherent thoughts, stable gait, no tremor or fasiculations, no sign of intox or withdrawal.

## 2018-03-23 NOTE — ED PROVIDER NOTE - CONSTITUTIONAL, MLM
normal... anxious, well nourished, awake, alert, oriented to person, place, time/situation and tremulous

## 2018-03-24 VITALS
TEMPERATURE: 98 F | DIASTOLIC BLOOD PRESSURE: 85 MMHG | RESPIRATION RATE: 20 BRPM | HEART RATE: 102 BPM | OXYGEN SATURATION: 96 % | SYSTOLIC BLOOD PRESSURE: 133 MMHG

## 2018-03-24 LAB
ALBUMIN SERPL ELPH-MCNC: 4.8 G/DL
ALP BLD-CCNC: 95 U/L
ALT SERPL-CCNC: 24 U/L
AMYLASE/CREAT SERPL: 73 U/L
ANION GAP SERPL CALC-SCNC: 20 MMOL/L
AST SERPL-CCNC: 30 U/L
BASOPHILS # BLD AUTO: 0.02 K/UL
BASOPHILS NFR BLD AUTO: 0.1 %
BILIRUB SERPL-MCNC: 0.6 MG/DL
BUN SERPL-MCNC: 9 MG/DL
CALCIUM SERPL-MCNC: 9.6 MG/DL
CHLORIDE SERPL-SCNC: 93 MMOL/L
CHOLEST SERPL-MCNC: 293 MG/DL
CHOLEST/HDLC SERPL: 2.8 RATIO
CO2 SERPL-SCNC: 23 MMOL/L
CREAT SERPL-MCNC: 1.01 MG/DL
EOSINOPHIL # BLD AUTO: 0.08 K/UL
EOSINOPHIL NFR BLD AUTO: 0.6 %
GLUCOSE SERPL-MCNC: 115 MG/DL
HBA1C MFR BLD HPLC: 5.9 %
HCT VFR BLD CALC: 40.8 %
HDLC SERPL-MCNC: 105 MG/DL
HGB BLD-MCNC: 13 G/DL
IMM GRANULOCYTES NFR BLD AUTO: 0.3 %
LACTATE BLDV-MCNC: 1.7 MMOL/L — SIGNIFICANT CHANGE UP (ref 0.5–2)
LDLC SERPL CALC-MCNC: 137 MG/DL
LPL SERPL-CCNC: 54 U/L
LYMPHOCYTES # BLD AUTO: 1.24 K/UL
LYMPHOCYTES NFR BLD AUTO: 9.1 %
MAN DIFF?: NORMAL
MCHC RBC-ENTMCNC: 28.5 PG
MCHC RBC-ENTMCNC: 31.9 GM/DL
MCV RBC AUTO: 89.5 FL
MONOCYTES # BLD AUTO: 1.5 K/UL
MONOCYTES NFR BLD AUTO: 11.1 %
NEUTROPHILS # BLD AUTO: 10.69 K/UL
NEUTROPHILS NFR BLD AUTO: 78.8 %
PLATELET # BLD AUTO: 221 K/UL
POTASSIUM SERPL-SCNC: 4.4 MMOL/L
PROT SERPL-MCNC: 8.1 G/DL
RBC # BLD: 4.56 M/UL
RBC # FLD: 16.9 %
SODIUM SERPL-SCNC: 136 MMOL/L
T4 FREE SERPL-MCNC: 0.9 NG/DL
TRIGL SERPL-MCNC: 257 MG/DL
TSH SERPL-ACNC: 2.29 UIU/ML
WBC # FLD AUTO: 13.57 K/UL

## 2018-03-24 PROCEDURE — 80053 COMPREHEN METABOLIC PANEL: CPT

## 2018-03-24 PROCEDURE — 36415 COLL VENOUS BLD VENIPUNCTURE: CPT

## 2018-03-24 PROCEDURE — 96375 TX/PRO/DX INJ NEW DRUG ADDON: CPT

## 2018-03-24 PROCEDURE — 96374 THER/PROPH/DIAG INJ IV PUSH: CPT | Mod: XU

## 2018-03-24 PROCEDURE — 83605 ASSAY OF LACTIC ACID: CPT

## 2018-03-24 PROCEDURE — 83690 ASSAY OF LIPASE: CPT

## 2018-03-24 PROCEDURE — 81001 URINALYSIS AUTO W/SCOPE: CPT

## 2018-03-24 PROCEDURE — 96376 TX/PRO/DX INJ SAME DRUG ADON: CPT

## 2018-03-24 PROCEDURE — 80307 DRUG TEST PRSMV CHEM ANLYZR: CPT

## 2018-03-24 PROCEDURE — 74177 CT ABD & PELVIS W/CONTRAST: CPT

## 2018-03-24 PROCEDURE — 99284 EMERGENCY DEPT VISIT MOD MDM: CPT | Mod: 25

## 2018-03-24 PROCEDURE — 85027 COMPLETE CBC AUTOMATED: CPT

## 2018-03-24 PROCEDURE — 93005 ELECTROCARDIOGRAM TRACING: CPT

## 2018-03-24 RX ORDER — ONDANSETRON 8 MG/1
4 TABLET, FILM COATED ORAL ONCE
Qty: 0 | Refills: 0 | Status: COMPLETED | OUTPATIENT
Start: 2018-03-24 | End: 2018-03-24

## 2018-03-24 RX ORDER — OMEPRAZOLE 10 MG/1
1 CAPSULE, DELAYED RELEASE ORAL
Qty: 30 | Refills: 0 | OUTPATIENT
Start: 2018-03-24 | End: 2018-04-22

## 2018-03-24 RX ORDER — ONDANSETRON 8 MG/1
1 TABLET, FILM COATED ORAL
Qty: 6 | Refills: 0 | OUTPATIENT
Start: 2018-03-24 | End: 2018-03-25

## 2018-03-24 RX ORDER — SODIUM CHLORIDE 9 MG/ML
1000 INJECTION INTRAMUSCULAR; INTRAVENOUS; SUBCUTANEOUS ONCE
Qty: 0 | Refills: 0 | Status: COMPLETED | OUTPATIENT
Start: 2018-03-24 | End: 2018-03-24

## 2018-03-24 RX ADMIN — Medication 1 MILLIGRAM(S): at 13:13

## 2018-03-24 RX ADMIN — Medication 1 MILLIGRAM(S): at 06:46

## 2018-03-24 RX ADMIN — SODIUM CHLORIDE 1000 MILLILITER(S): 9 INJECTION INTRAMUSCULAR; INTRAVENOUS; SUBCUTANEOUS at 13:13

## 2018-03-24 RX ADMIN — Medication 50 MILLIGRAM(S): at 13:55

## 2018-03-24 RX ADMIN — ONDANSETRON 4 MILLIGRAM(S): 8 TABLET, FILM COATED ORAL at 13:13

## 2018-03-24 NOTE — ED BEHAVIORAL HEALTH NOTE - BEHAVIORAL HEALTH NOTE
SW note: Pt. was requesting detox this am for ETOH intoxication.  Pt. clinical information was sent to [facility] for detox.  Information was reviewed and pt. was accepted.  At this time pt. stated "I have work tomorrow I want to go home".  Spoke with MD who stated she will keep pt. in ER until 7pm and will send him home with Librium if he is doing well.  [facility] called to inform them that pt. is not coming.

## 2018-03-24 NOTE — ED ADULT NURSE REASSESSMENT NOTE - COMFORT CARE
plan of care explained/repositioned/side rails up
repositioned/side rails up/plan of care explained/wait time explained/warm blanket provided
side rails up/wait time explained/warm blanket provided/darkened lights/plan of care explained/repositioned

## 2018-03-24 NOTE — ED ADULT NURSE REASSESSMENT NOTE - GENERAL PATIENT STATE
comfortable appearance
cooperative/comfortable appearance
comfortable appearance/cooperative/resting/sleeping

## 2018-03-24 NOTE — ED ADULT NURSE REASSESSMENT NOTE - NS ED NURSE REASSESS COMMENT FT1
Pt. able to walk with a steady gait and shows no signs of apparent distress at this time. Pt. is not diaphoretic or has tremors at this time. Pt. refuses paperwork and is walking out. Stopped pt. to remove IV and pt. refused to stay.
Pt A&Ox4 c/o  abd pain at this time. Pt resting comfortably, VSS, no signs of distress at this time, safety maintained, call bell in reach.
Assumed pt. care from Gwen VILCIHS. Pt. is resting in bed at this time. Pt. c/o of mild HA and nausea with mild tremors noted. Pt. denies any CP or SOB at this time. Pt. RR are even and unlabored. Pt. desires to rest and went back to sleep.
Report received from day RN Pt A&Ox4 c/o LUQ abd pain at this time, admits to ETOH abuse wants to quit last drink yesterday, Vodka. Pt resting comfortably, VSS, no signs of distress at this time, lab results pending, safety maintained, call bell in reach.

## 2018-03-26 ENCOUNTER — APPOINTMENT (OUTPATIENT)
Dept: ORTHOPEDIC SURGERY | Facility: CLINIC | Age: 53
End: 2018-03-26
Payer: OTHER MISCELLANEOUS

## 2018-03-26 ENCOUNTER — APPOINTMENT (OUTPATIENT)
Dept: INTERNAL MEDICINE | Facility: CLINIC | Age: 53
End: 2018-03-26

## 2018-03-26 DIAGNOSIS — M75.102 UNSPECIFIED ROTATOR CUFF TEAR OR RUPTURE OF LEFT SHOULDER, NOT SPECIFIED AS TRAUMATIC: ICD-10-CM

## 2018-03-26 PROCEDURE — 73030 X-RAY EXAM OF SHOULDER: CPT | Mod: LT

## 2018-03-26 PROCEDURE — 99215 OFFICE O/P EST HI 40 MIN: CPT

## 2018-04-02 ENCOUNTER — EMERGENCY (EMERGENCY)
Facility: HOSPITAL | Age: 53
LOS: 1 days | Discharge: HOME | End: 2018-04-02
Attending: EMERGENCY MEDICINE | Admitting: EMERGENCY MEDICINE
Payer: COMMERCIAL

## 2018-04-02 VITALS
WEIGHT: 154.98 LBS | HEIGHT: 65 IN | DIASTOLIC BLOOD PRESSURE: 94 MMHG | SYSTOLIC BLOOD PRESSURE: 136 MMHG | RESPIRATION RATE: 18 BRPM | HEART RATE: 88 BPM | TEMPERATURE: 98 F | OXYGEN SATURATION: 97 %

## 2018-04-02 VITALS
HEART RATE: 99 BPM | TEMPERATURE: 98 F | DIASTOLIC BLOOD PRESSURE: 82 MMHG | OXYGEN SATURATION: 99 % | RESPIRATION RATE: 19 BRPM | SYSTOLIC BLOOD PRESSURE: 119 MMHG

## 2018-04-02 LAB — ETHANOL SERPL-MCNC: 352 MG/DL — SIGNIFICANT CHANGE UP

## 2018-04-02 PROCEDURE — 80307 DRUG TEST PRSMV CHEM ANLYZR: CPT

## 2018-04-02 PROCEDURE — 99285 EMERGENCY DEPT VISIT HI MDM: CPT | Mod: 25

## 2018-04-02 PROCEDURE — 70450 CT HEAD/BRAIN W/O DYE: CPT | Mod: 26

## 2018-04-02 PROCEDURE — 36415 COLL VENOUS BLD VENIPUNCTURE: CPT

## 2018-04-02 PROCEDURE — 70450 CT HEAD/BRAIN W/O DYE: CPT

## 2018-04-02 PROCEDURE — 82962 GLUCOSE BLOOD TEST: CPT

## 2018-04-02 PROCEDURE — 96372 THER/PROPH/DIAG INJ SC/IM: CPT

## 2018-04-02 PROCEDURE — 99284 EMERGENCY DEPT VISIT MOD MDM: CPT

## 2018-04-02 RX ORDER — HALOPERIDOL DECANOATE 100 MG/ML
5 INJECTION INTRAMUSCULAR ONCE
Qty: 0 | Refills: 0 | Status: COMPLETED | OUTPATIENT
Start: 2018-04-02 | End: 2018-04-02

## 2018-04-02 RX ORDER — DIPHENHYDRAMINE HCL 50 MG
50 CAPSULE ORAL ONCE
Qty: 0 | Refills: 0 | Status: COMPLETED | OUTPATIENT
Start: 2018-04-02 | End: 2018-04-02

## 2018-04-02 RX ADMIN — Medication 50 MILLIGRAM(S): at 22:34

## 2018-04-02 RX ADMIN — HALOPERIDOL DECANOATE 5 MILLIGRAM(S): 100 INJECTION INTRAMUSCULAR at 14:08

## 2018-04-02 RX ADMIN — Medication 50 MILLIGRAM(S): at 14:38

## 2018-04-02 NOTE — ED ADULT NURSE REASSESSMENT NOTE - NS ED NURSE REASSESS COMMENT FT1
Pt found lying on ground in front of bathroom holding the back of his head.  Abrasion present, bleeding controlled. No change in mental status. Dr. Ramos present and stat 50mg IM benadryl given after Pt placed back in stretcher with side rails up.  Charge RN made aware of need for 1:1 observation

## 2018-04-02 NOTE — ED ADULT NURSE NOTE - OBJECTIVE STATEMENT
Assumed patient care at 1343.  Pt arrives admitting to drinking alcohol today.  No signs of trauma present.  Yellow gown on.  Respirations even and unlabored.

## 2018-04-02 NOTE — ED BEHAVIORAL HEALTH NOTE - BEHAVIORAL HEALTH NOTE
SW note: Pt requested transportation assistance home.  Pt. stated he has no money and no one who can pick him up.  Provided Des's taxi voucher for $11 for pt. to return to his home in [place].
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
## 2018-04-02 NOTE — ED ADULT NURSE REASSESSMENT NOTE - NS ED NURSE REASSESS COMMENT FT1
Pt witnessed standing to use urinal without difficulty, gait steady, pt calm and cooperative at this time, as per MD Ramos constant observation cancelled Pt witnessed standing to use urinal without difficulty, gait steady, pt calm and cooperative at this time, as per MD Ramos constant observation cancelled, pt moved closer to nurses station

## 2018-04-02 NOTE — ED ADULT NURSE REASSESSMENT NOTE - NS ED NURSE REASSESS COMMENT FT1
Pt walking around stretcher with 1:1 aide at bedside.  Steady gait, moving all four extremities without difficulty.  No C/O pain.  Demanding food and water.  Given food and water.  As per charge RN, pt to be taken off 1:1 observation.

## 2018-04-02 NOTE — ED ADULT NURSE REASSESSMENT NOTE - NS ED NURSE REASSESS COMMENT FT1
Pt climbing out of stretcher, using foul language and threatening staff.  Dr. Ramos at bedside.  Stat dose of IM haldol given for patient safety.  Side rails up.  Pt given meal but refused stating he has throat cancer and feels a lump in his throat.  Pt refused to try and swallow a small amount of water for evaluation.  Able to swallow saliva without difficulty. Pt climbing out of stretcher, using foul language and threatening staff.  Dr. Ramos at bedside.  Stat dose of IM haldol given for patient safety.  Side rails up.  Pt given meal but refused stating he has throat cancer and feels a lump in his throat.  Pt refused to try and swallow a small amount of water for evaluation.  Able to swallow saliva without difficulty. Calm and cooperative after injection.

## 2018-04-02 NOTE — ED ADULT NURSE NOTE - CHIEF COMPLAINT QUOTE
patient found in the [facility], intoxicated, patient belongings locked up in a locker and placed in a yellow gown, safety percautions place and railings up and in lowest position

## 2018-04-02 NOTE — ED PROVIDER NOTE - PROGRESS NOTE DETAILS
patient got up on own without supervision, ambulated to bathroom, then subsequently stumbled, fell, and struck back of head on railing of stretcher; has small abrasion to occiput of head with mild bleeding; head ct performed, results normal; 1:1 sitter initiated for safety assessed, awake alert talking, ambulating without difficulty; will get clothes, OK for d/c

## 2018-04-02 NOTE — ED ADULT NURSE REASSESSMENT NOTE - NS ED NURSE REASSESS COMMENT FT1
Pt sleeping after being medicated.  Arousable to tactile stimulation.  Placed on cardiac monitor and continuous pulse ox.  1:1 aide at bedside

## 2018-04-02 NOTE — ED ADULT TRIAGE NOTE - CHIEF COMPLAINT QUOTE
patient found in the [facility], intoxicated, patient belongings locked up in a locker and placed in a yellow gown, safety percautions place and railings up and in lowest position

## 2018-04-03 ENCOUNTER — EMERGENCY (EMERGENCY)
Facility: HOSPITAL | Age: 53
LOS: 1 days | Discharge: HOME | End: 2018-04-03
Attending: EMERGENCY MEDICINE
Payer: COMMERCIAL

## 2018-04-03 VITALS
DIASTOLIC BLOOD PRESSURE: 90 MMHG | HEART RATE: 106 BPM | SYSTOLIC BLOOD PRESSURE: 140 MMHG | RESPIRATION RATE: 20 BRPM | TEMPERATURE: 99 F | OXYGEN SATURATION: 99 % | HEIGHT: 65 IN | WEIGHT: 145.06 LBS

## 2018-04-03 DIAGNOSIS — E78.00 PURE HYPERCHOLESTEROLEMIA, UNSPECIFIED: ICD-10-CM

## 2018-04-03 DIAGNOSIS — S80.211A ABRASION, RIGHT KNEE, INITIAL ENCOUNTER: ICD-10-CM

## 2018-04-03 DIAGNOSIS — K21.9 GASTRO-ESOPHAGEAL REFLUX DISEASE WITHOUT ESOPHAGITIS: ICD-10-CM
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
## 2018-04-03 DIAGNOSIS — J45.909 UNSPECIFIED ASTHMA, UNCOMPLICATED: ICD-10-CM
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
## 2018-04-03 DIAGNOSIS — F10.129 ALCOHOL ABUSE WITH INTOXICATION, UNSPECIFIED: ICD-10-CM
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
## 2018-04-03 DIAGNOSIS — F41.9 ANXIETY DISORDER, UNSPECIFIED: ICD-10-CM

## 2018-04-03 DIAGNOSIS — I10 ESSENTIAL (PRIMARY) HYPERTENSION: ICD-10-CM
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
## 2018-04-03 LAB — ETHANOL SERPL-MCNC: 391 MG/DL — SIGNIFICANT CHANGE UP

## 2018-04-03 PROCEDURE — 99284 EMERGENCY DEPT VISIT MOD MDM: CPT

## 2018-04-03 RX ADMIN — Medication 2 MILLIGRAM(S): at 17:09

## 2018-04-03 RX ADMIN — Medication 2 MILLIGRAM(S): at 15:42

## 2018-04-03 NOTE — ED PROVIDER NOTE - OBJECTIVE STATEMENT
51 y/o M w/ PMHx of EtOH abuse, and GERD presents to ED intoxicated. He states he was drinking alcohol today and was found by EMS by a gas station on the street. Pt is c/o R knee pain s/p fall onto R knee causing abrasion. Pt has requested to be sedated to be less anxious due to his hx of anxiety. History is limited due to suspected intoxication. 53 y/o M BIBA w/ PMHx of EtOH abuse, and GERD presents to ED intoxicated. He states he was drinking alcohol today and was found by EMS by a gas station on the street. Pt is c/o R knee pain s/p fall onto R knee causing abrasion. Pt has requested to be sedated to be less anxious due to his hx of anxiety. History is limited due to suspected intoxication.

## 2018-04-03 NOTE — ED ADULT NURSE NOTE - OBJECTIVE STATEMENT
PT axox3/ intoxicated BIBA found wondering the streets? During initial assessment patient lowered himself to the floor in front of staff. PT then got back up and went into his stretcher, apparently patient has done this recent  here? PT Placed on a 1:1, PCA at bedside, and medicated with 2 mg iv push ativan for restlessness.  Through all his events patient was not aggressive with staff just uncooperative. PT has abrasion noted to right ankle

## 2018-04-03 NOTE — ED ADULT TRIAGE NOTE - CHIEF COMPLAINT QUOTE
intoxicated found sitting on the ground by a [employer] store. + lac to back of head. unknown how it happened. seen here at [facility] yesterday.

## 2018-04-03 NOTE — ED ADULT NURSE NOTE - CHIEF COMPLAINT QUOTE
intoxicated found sitting on the ground by a [employer] store. + lac to back of head. unknown how it happened. seen here at [facility] yesterday.

## 2018-04-03 NOTE — ED PROVIDER NOTE - PROGRESS NOTE DETAILS
Pt observed in ED and clinically sober at this time.  Pt mildly tremulous with no acute withdrawal s/s.  Rx Librium

## 2018-04-04 VITALS
TEMPERATURE: 98 F | DIASTOLIC BLOOD PRESSURE: 88 MMHG | SYSTOLIC BLOOD PRESSURE: 150 MMHG | OXYGEN SATURATION: 98 % | RESPIRATION RATE: 20 BRPM | HEART RATE: 88 BPM

## 2018-04-04 PROCEDURE — 96374 THER/PROPH/DIAG INJ IV PUSH: CPT

## 2018-04-04 PROCEDURE — 36415 COLL VENOUS BLD VENIPUNCTURE: CPT

## 2018-04-04 PROCEDURE — 99285 EMERGENCY DEPT VISIT HI MDM: CPT | Mod: 25

## 2018-04-04 PROCEDURE — 80307 DRUG TEST PRSMV CHEM ANLYZR: CPT

## 2018-04-04 PROCEDURE — 96376 TX/PRO/DX INJ SAME DRUG ADON: CPT

## 2018-04-04 PROCEDURE — 96375 TX/PRO/DX INJ NEW DRUG ADDON: CPT

## 2018-04-04 RX ORDER — ONDANSETRON 8 MG/1
4 TABLET, FILM COATED ORAL ONCE
Qty: 0 | Refills: 0 | Status: COMPLETED | OUTPATIENT
Start: 2018-04-04 | End: 2018-04-04

## 2018-04-04 RX ADMIN — ONDANSETRON 4 MILLIGRAM(S): 8 TABLET, FILM COATED ORAL at 02:46

## 2018-04-04 RX ADMIN — Medication 100 MILLIGRAM(S): at 06:50

## 2018-04-04 RX ADMIN — Medication 100 MILLIGRAM(S): at 02:46

## 2018-04-04 NOTE — ED ADULT NURSE REASSESSMENT NOTE - NS ED NURSE REASSESS COMMENT FT1
clothes removed and belongings placed in a bag and secured. yellow gown on pt.
Pt attemtping to get out of bed and resiting staff. PT medicated with additional 2mg iv push of ativan as per md mcdowell, pt place on . Spo2 98% on room air and sleeping. PT is arousable , respirations are equal and unlabored.
patient states not feeling well states going throught detox and needs help Dr. Herrera made aware. Zofran ordered and patient medicated with librium.  CIWA 7 patient maintained on 1:1 for safety
Pt resting comfortably with 1:1 at bedside. Will continue to monitor patient and notify md of any changes

## 2018-04-09 ENCOUNTER — EMERGENCY (EMERGENCY)
Facility: HOSPITAL | Age: 53
LOS: 1 days | Discharge: HOME | End: 2018-04-09
Attending: STUDENT IN AN ORGANIZED HEALTH CARE EDUCATION/TRAINING PROGRAM | Admitting: STUDENT IN AN ORGANIZED HEALTH CARE EDUCATION/TRAINING PROGRAM
Payer: COMMERCIAL

## 2018-04-09 VITALS
HEART RATE: 100 BPM | TEMPERATURE: 97 F | RESPIRATION RATE: 18 BRPM | SYSTOLIC BLOOD PRESSURE: 125 MMHG | DIASTOLIC BLOOD PRESSURE: 81 MMHG | OXYGEN SATURATION: 97 %

## 2018-04-09 LAB — ETHANOL SERPL-MCNC: 283 MG/DL — SIGNIFICANT CHANGE UP

## 2018-04-09 PROCEDURE — 99284 EMERGENCY DEPT VISIT MOD MDM: CPT

## 2018-04-09 RX ADMIN — Medication 2 MILLIGRAM(S): at 21:05

## 2018-04-09 NOTE — ED PROVIDER NOTE - PHYSICAL EXAMINATION
slurred speech, heart lungs   hagen abd no signs of trauma  aloof  stumbling gait  pt will be observed for sobruety

## 2018-04-09 NOTE — ED ADULT NURSE REASSESSMENT NOTE - NS ED NURSE REASSESS COMMENT FT1
Pt was ambulated to the bathroom by myself and one of the EDTechs. Pt was unsteady on his feet and unable to maintain his balance. Pt was brought with a wheelchair and assistance to toilet. Pt brought back to the stretcher with assistance and placed back in the stretcher. Pt not in any distress or discomfort at this time. Pt has an abrasion to his right knee that he does not know how he got. Pt states "it was there, I think". Pt does not have any pain in the knee.

## 2018-04-09 NOTE — ED PROVIDER NOTE - CARE PLAN
Principal Discharge DX:	Alcohol abuse Principal Discharge DX:	Alcohol abuse  Secondary Diagnosis:	GERD (gastroesophageal reflux disease)

## 2018-04-09 NOTE — ED PROVIDER NOTE - OBJECTIVE STATEMENT
51 y/o M w/ PMHx of EtOH abuse presents to ED for acute EtOH intoxication. HPI is limited secondary to intoxication. Pt is awake and alert.

## 2018-04-09 NOTE — ED PROVIDER NOTE - PROGRESS NOTE DETAILS
Mild tachycardia. Patient requesting assistance getting into detox. Will hold for SW/SBIRT in the morning. Patient declines SBIRT. Complaining of epigastric discomfort with mild tenderness. Patient tolerating oral and stable for discharge.

## 2018-04-10 ENCOUNTER — EMERGENCY (EMERGENCY)
Facility: HOSPITAL | Age: 53
LOS: 1 days | Discharge: HOME | End: 2018-04-10
Attending: EMERGENCY MEDICINE | Admitting: EMERGENCY MEDICINE
Payer: COMMERCIAL

## 2018-04-10 VITALS
SYSTOLIC BLOOD PRESSURE: 144 MMHG | HEART RATE: 105 BPM | RESPIRATION RATE: 20 BRPM | TEMPERATURE: 98 F | HEIGHT: 67 IN | OXYGEN SATURATION: 95 % | WEIGHT: 179.9 LBS | DIASTOLIC BLOOD PRESSURE: 89 MMHG

## 2018-04-10 VITALS
DIASTOLIC BLOOD PRESSURE: 72 MMHG | RESPIRATION RATE: 18 BRPM | HEART RATE: 111 BPM | OXYGEN SATURATION: 99 % | SYSTOLIC BLOOD PRESSURE: 166 MMHG

## 2018-04-10 DIAGNOSIS — R69 ILLNESS, UNSPECIFIED: ICD-10-CM

## 2018-04-10 LAB
ALBUMIN SERPL ELPH-MCNC: 4 G/DL — SIGNIFICANT CHANGE UP (ref 3.3–5.2)
ALBUMIN SERPL ELPH-MCNC: 4.1 G/DL — SIGNIFICANT CHANGE UP (ref 3.3–5.2)
ALP SERPL-CCNC: 87 U/L — SIGNIFICANT CHANGE UP (ref 40–120)
ALP SERPL-CCNC: 87 U/L — SIGNIFICANT CHANGE UP (ref 40–120)
ALT FLD-CCNC: 25 U/L — SIGNIFICANT CHANGE UP
ALT FLD-CCNC: 28 U/L — SIGNIFICANT CHANGE UP
ANION GAP SERPL CALC-SCNC: 18 MMOL/L — HIGH (ref 5–17)
ANION GAP SERPL CALC-SCNC: 21 MMOL/L — HIGH (ref 5–17)
APAP SERPL-MCNC: <7.5 UG/ML — LOW (ref 10–26)
APPEARANCE UR: CLEAR — SIGNIFICANT CHANGE UP
AST SERPL-CCNC: 32 U/L — SIGNIFICANT CHANGE UP
AST SERPL-CCNC: 51 U/L — HIGH
BILIRUB DIRECT SERPL-MCNC: 0.1 MG/DL — SIGNIFICANT CHANGE UP (ref 0–0.3)
BILIRUB INDIRECT FLD-MCNC: <0.1 MG/DL — LOW (ref 0.2–1)
BILIRUB SERPL-MCNC: <0.2 MG/DL — LOW (ref 0.4–2)
BILIRUB SERPL-MCNC: <0.2 MG/DL — LOW (ref 0.4–2)
BILIRUB UR-MCNC: NEGATIVE — SIGNIFICANT CHANGE UP
BUN SERPL-MCNC: 10 MG/DL — SIGNIFICANT CHANGE UP (ref 8–20)
BUN SERPL-MCNC: 8 MG/DL — SIGNIFICANT CHANGE UP (ref 8–20)
CALCIUM SERPL-MCNC: 8.7 MG/DL — SIGNIFICANT CHANGE UP (ref 8.6–10.2)
CALCIUM SERPL-MCNC: 8.9 MG/DL — SIGNIFICANT CHANGE UP (ref 8.6–10.2)
CHLORIDE SERPL-SCNC: 100 MMOL/L — SIGNIFICANT CHANGE UP (ref 98–107)
CHLORIDE SERPL-SCNC: 95 MMOL/L — LOW (ref 98–107)
CO2 SERPL-SCNC: 19 MMOL/L — LOW (ref 22–29)
CO2 SERPL-SCNC: 21 MMOL/L — LOW (ref 22–29)
COLOR SPEC: SIGNIFICANT CHANGE UP
CREAT SERPL-MCNC: 0.8 MG/DL — SIGNIFICANT CHANGE UP (ref 0.5–1.3)
CREAT SERPL-MCNC: 0.82 MG/DL — SIGNIFICANT CHANGE UP (ref 0.5–1.3)
DIFF PNL FLD: NEGATIVE — SIGNIFICANT CHANGE UP
ETHANOL SERPL-MCNC: 352 MG/DL — SIGNIFICANT CHANGE UP
ETHANOL SERPL-MCNC: 80 MG/DL — SIGNIFICANT CHANGE UP
GLUCOSE SERPL-MCNC: 122 MG/DL — HIGH (ref 70–115)
GLUCOSE SERPL-MCNC: 145 MG/DL — HIGH (ref 70–115)
GLUCOSE UR QL: NEGATIVE MG/DL — SIGNIFICANT CHANGE UP
KETONES UR-MCNC: NEGATIVE — SIGNIFICANT CHANGE UP
LEUKOCYTE ESTERASE UR-ACNC: NEGATIVE — SIGNIFICANT CHANGE UP
MAGNESIUM SERPL-MCNC: 2.1 MG/DL — SIGNIFICANT CHANGE UP (ref 1.6–2.6)
NITRITE UR-MCNC: NEGATIVE — SIGNIFICANT CHANGE UP
PCP SPEC-MCNC: SIGNIFICANT CHANGE UP
PH UR: 6 — SIGNIFICANT CHANGE UP (ref 5–8)
PHOSPHATE SERPL-MCNC: 3 MG/DL — SIGNIFICANT CHANGE UP (ref 2.4–4.7)
POTASSIUM SERPL-MCNC: 4.3 MMOL/L — SIGNIFICANT CHANGE UP (ref 3.5–5.3)
POTASSIUM SERPL-MCNC: 4.4 MMOL/L — SIGNIFICANT CHANGE UP (ref 3.5–5.3)
POTASSIUM SERPL-SCNC: 4.3 MMOL/L — SIGNIFICANT CHANGE UP (ref 3.5–5.3)
POTASSIUM SERPL-SCNC: 4.4 MMOL/L — SIGNIFICANT CHANGE UP (ref 3.5–5.3)
PROT SERPL-MCNC: 7.1 G/DL — SIGNIFICANT CHANGE UP (ref 6.6–8.7)
PROT SERPL-MCNC: 7.5 G/DL — SIGNIFICANT CHANGE UP (ref 6.6–8.7)
PROT UR-MCNC: NEGATIVE MG/DL — SIGNIFICANT CHANGE UP
SALICYLATES SERPL-MCNC: <0.6 MG/DL — LOW (ref 10–20)
SODIUM SERPL-SCNC: 135 MMOL/L — SIGNIFICANT CHANGE UP (ref 135–145)
SODIUM SERPL-SCNC: 139 MMOL/L — SIGNIFICANT CHANGE UP (ref 135–145)
SP GR SPEC: 1 — LOW (ref 1.01–1.02)
UROBILINOGEN FLD QL: NEGATIVE MG/DL — SIGNIFICANT CHANGE UP

## 2018-04-10 PROCEDURE — 80307 DRUG TEST PRSMV CHEM ANLYZR: CPT

## 2018-04-10 PROCEDURE — 93010 ELECTROCARDIOGRAM REPORT: CPT | Mod: 77

## 2018-04-10 PROCEDURE — 70450 CT HEAD/BRAIN W/O DYE: CPT | Mod: 26

## 2018-04-10 PROCEDURE — 93010 ELECTROCARDIOGRAM REPORT: CPT

## 2018-04-10 PROCEDURE — 36415 COLL VENOUS BLD VENIPUNCTURE: CPT

## 2018-04-10 PROCEDURE — 96372 THER/PROPH/DIAG INJ SC/IM: CPT

## 2018-04-10 PROCEDURE — 99285 EMERGENCY DEPT VISIT HI MDM: CPT | Mod: 25

## 2018-04-10 PROCEDURE — 93005 ELECTROCARDIOGRAM TRACING: CPT

## 2018-04-10 PROCEDURE — 99285 EMERGENCY DEPT VISIT HI MDM: CPT

## 2018-04-10 RX ORDER — SUCRALFATE 1 G
1 TABLET ORAL ONCE
Qty: 0 | Refills: 0 | Status: COMPLETED | OUTPATIENT
Start: 2018-04-10 | End: 2018-04-10

## 2018-04-10 RX ORDER — SODIUM CHLORIDE 9 MG/ML
1000 INJECTION, SOLUTION INTRAVENOUS
Qty: 0 | Refills: 0 | Status: COMPLETED | OUTPATIENT
Start: 2018-04-10 | End: 2018-04-10

## 2018-04-10 RX ORDER — PANTOPRAZOLE SODIUM 20 MG/1
40 TABLET, DELAYED RELEASE ORAL ONCE
Qty: 0 | Refills: 0 | Status: COMPLETED | OUTPATIENT
Start: 2018-04-10 | End: 2018-04-10

## 2018-04-10 RX ORDER — HALOPERIDOL DECANOATE 100 MG/ML
5 INJECTION INTRAMUSCULAR ONCE
Qty: 0 | Refills: 0 | Status: COMPLETED | OUTPATIENT
Start: 2018-04-10 | End: 2018-04-10

## 2018-04-10 RX ORDER — DIPHENHYDRAMINE HCL 50 MG
50 CAPSULE ORAL ONCE
Qty: 0 | Refills: 0 | Status: COMPLETED | OUTPATIENT
Start: 2018-04-10 | End: 2018-04-10

## 2018-04-10 RX ORDER — LIDOCAINE 4 G/100G
30 CREAM TOPICAL ONCE
Qty: 0 | Refills: 0 | Status: COMPLETED | OUTPATIENT
Start: 2018-04-10 | End: 2018-04-10

## 2018-04-10 RX ORDER — SODIUM CHLORIDE 9 MG/ML
2000 INJECTION INTRAMUSCULAR; INTRAVENOUS; SUBCUTANEOUS ONCE
Qty: 0 | Refills: 0 | Status: COMPLETED | OUTPATIENT
Start: 2018-04-10 | End: 2018-04-10

## 2018-04-10 RX ADMIN — Medication 50 MILLIGRAM(S): at 16:55

## 2018-04-10 RX ADMIN — Medication 100 MILLIGRAM(S): at 04:02

## 2018-04-10 RX ADMIN — Medication 1 MILLIGRAM(S): at 15:15

## 2018-04-10 RX ADMIN — Medication 2 MILLIGRAM(S): at 16:55

## 2018-04-10 RX ADMIN — PANTOPRAZOLE SODIUM 40 MILLIGRAM(S): 20 TABLET, DELAYED RELEASE ORAL at 08:41

## 2018-04-10 RX ADMIN — Medication 50 MILLIGRAM(S): at 21:30

## 2018-04-10 RX ADMIN — Medication 1 GRAM(S): at 08:41

## 2018-04-10 RX ADMIN — SODIUM CHLORIDE 125 MILLILITER(S): 9 INJECTION, SOLUTION INTRAVENOUS at 15:28

## 2018-04-10 RX ADMIN — HALOPERIDOL DECANOATE 5 MILLIGRAM(S): 100 INJECTION INTRAMUSCULAR at 16:55

## 2018-04-10 RX ADMIN — LIDOCAINE 30 MILLILITER(S): 4 CREAM TOPICAL at 08:41

## 2018-04-10 RX ADMIN — SODIUM CHLORIDE 2000 MILLILITER(S): 9 INJECTION INTRAMUSCULAR; INTRAVENOUS; SUBCUTANEOUS at 16:18

## 2018-04-10 NOTE — ED PROVIDER NOTE - PROGRESS NOTE DETAILS
pt still sleeping, is arousable with one to one at bedside bc he was aggressive and belligerent earlier is more calm states that he would like to be assessed for ETOH detox - RACQUEL elliott aware  - recommends reassessment by RACQUEL and SBIRT team in the am as he cant be placed tonight no overt dt requesting rehab evaluation etoh in am will consutl sw sbirt The patient seen by SW and SBIRT but does not want help nor detox.  The patient appears well and no tremor and the patient is eating breakfast. The patient is Alert and O x4 and has capacity of making decision and wishes to go home.

## 2018-04-10 NOTE — ED ADULT NURSE NOTE - CHIEF COMPLAINT QUOTE
Patient arrived via EMS, awake, alert, refusing to answer questions, breathing unlabored.  patient found at [facility].  patient admits to  drinking.  When asked patient states " I drank a lot".  Patient crying.  Abrasions noted to right knee and left elbow.

## 2018-04-10 NOTE — ED ADULT NURSE REASSESSMENT NOTE - NS ED NURSE REASSESS COMMENT FT1
Pt provided a meal after waking up. Pt woke up and walked down the damon stumbling and the pt was educated that he should not be getting out of bed without assistance. Pt states understanding of education and requested a meal and something to drink. Pt provided a sandwich, 2 milks and cookies per patient request. Pt is now resting comfortably back in the stretcher, asleep. Pt was awoken and asked if he was in any pain or discomfort, pt states no pain or distress at this time. Pt repositioned for comfort and patient states no pain at this time.

## 2018-04-10 NOTE — ED ADULT TRIAGE NOTE - CHIEF COMPLAINT QUOTE
Patient arrived via EMS, awake, alert, refusing to answer questions, breathing unlabored.  patient found at [facility].  patient admits to  drinking.  When asked patient states " I drank a lot".  Patient crying.  Abrasions noted to right knee and left elbow.

## 2018-04-10 NOTE — ED PROVIDER NOTE - SKIN, MLM
Skin normal color for race, warm and dry. No evidence of rash. Lacerations to the back of the head noted.

## 2018-04-10 NOTE — ED ADULT NURSE REASSESSMENT NOTE - NS ED NURSE REASSESS COMMENT FT1
pt sleeping stating 'I think I have stomach cancer'  'ever since I was a kid I had to take tums and all"     pt states drinks 1qt vodka daily for 30yrs  'I can't stop I will get really sick'   needs anticipated  waiting SBIRT

## 2018-04-10 NOTE — ED ADULT NURSE REASSESSMENT NOTE - NS ED NURSE REASSESS COMMENT FT1
Pt is asleep in the stretcher, pt is no longer exhibiting any signs or symptoms of withdrawal. Pts shaking hand has resolved and new CIWA is charted. Pt has no complaints at this time and is not in any distress as per patient and as per pts overall demeanor.

## 2018-04-10 NOTE — ED PROVIDER NOTE - OBJECTIVE STATEMENT
51 y/o M presents to ED c/o alcohol intoxication. 51 y/o M presents to ED c/o alcohol intoxication. Pt admits to drinking EtOH.

## 2018-04-10 NOTE — ED ADULT NURSE NOTE - OBJECTIVE STATEMENT
53 y/o alex/0 x1 to self. Pt. is confused and uncooperative with expressive signs of depression. Pt. denies any N/V or pain at this time. Pt. is in yellow gown with belongings removed. Pt. continues to attempt to climb out of bed, requested 1:1.

## 2018-04-11 ENCOUNTER — CHART COPY (OUTPATIENT)
Age: 53
End: 2018-04-11

## 2018-04-11 VITALS
RESPIRATION RATE: 18 BRPM | OXYGEN SATURATION: 98 % | DIASTOLIC BLOOD PRESSURE: 79 MMHG | HEART RATE: 102 BPM | SYSTOLIC BLOOD PRESSURE: 117 MMHG | TEMPERATURE: 98 F

## 2018-04-11 LAB
ANION GAP SERPL CALC-SCNC: 14 MMOL/L — SIGNIFICANT CHANGE UP (ref 5–17)
BUN SERPL-MCNC: 9 MG/DL — SIGNIFICANT CHANGE UP (ref 8–20)
CALCIUM SERPL-MCNC: 8.7 MG/DL — SIGNIFICANT CHANGE UP (ref 8.6–10.2)
CHLORIDE SERPL-SCNC: 100 MMOL/L — SIGNIFICANT CHANGE UP (ref 98–107)
CO2 SERPL-SCNC: 24 MMOL/L — SIGNIFICANT CHANGE UP (ref 22–29)
CREAT SERPL-MCNC: 0.98 MG/DL — SIGNIFICANT CHANGE UP (ref 0.5–1.3)
GLUCOSE SERPL-MCNC: 136 MG/DL — HIGH (ref 70–115)
HCT VFR BLD CALC: 34.2 % — LOW (ref 42–52)
HGB BLD-MCNC: 11.1 G/DL — LOW (ref 14–18)
MAGNESIUM SERPL-MCNC: 1.8 MG/DL — SIGNIFICANT CHANGE UP (ref 1.6–2.6)
MCHC RBC-ENTMCNC: 28.3 PG — SIGNIFICANT CHANGE UP (ref 27–31)
MCHC RBC-ENTMCNC: 32.5 G/DL — SIGNIFICANT CHANGE UP (ref 32–36)
MCV RBC AUTO: 87.2 FL — SIGNIFICANT CHANGE UP (ref 80–94)
PHOSPHATE SERPL-MCNC: 3.3 MG/DL — SIGNIFICANT CHANGE UP (ref 2.4–4.7)
PLATELET # BLD AUTO: 226 K/UL — SIGNIFICANT CHANGE UP (ref 150–400)
POTASSIUM SERPL-MCNC: 4.1 MMOL/L — SIGNIFICANT CHANGE UP (ref 3.5–5.3)
POTASSIUM SERPL-SCNC: 4.1 MMOL/L — SIGNIFICANT CHANGE UP (ref 3.5–5.3)
RBC # BLD: 3.92 M/UL — LOW (ref 4.6–6.2)
RBC # FLD: 17.2 % — HIGH (ref 11–15.6)
SODIUM SERPL-SCNC: 138 MMOL/L — SIGNIFICANT CHANGE UP (ref 135–145)
WBC # BLD: 4.2 K/UL — LOW (ref 4.8–10.8)
WBC # FLD AUTO: 4.2 K/UL — LOW (ref 4.8–10.8)

## 2018-04-11 PROCEDURE — 96372 THER/PROPH/DIAG INJ SC/IM: CPT | Mod: XU

## 2018-04-11 PROCEDURE — 70450 CT HEAD/BRAIN W/O DYE: CPT

## 2018-04-11 PROCEDURE — 85027 COMPLETE CBC AUTOMATED: CPT

## 2018-04-11 PROCEDURE — 80076 HEPATIC FUNCTION PANEL: CPT

## 2018-04-11 PROCEDURE — 83735 ASSAY OF MAGNESIUM: CPT

## 2018-04-11 PROCEDURE — 81003 URINALYSIS AUTO W/O SCOPE: CPT

## 2018-04-11 PROCEDURE — 84100 ASSAY OF PHOSPHORUS: CPT

## 2018-04-11 PROCEDURE — 96375 TX/PRO/DX INJ NEW DRUG ADDON: CPT

## 2018-04-11 PROCEDURE — 36415 COLL VENOUS BLD VENIPUNCTURE: CPT

## 2018-04-11 PROCEDURE — 80048 BASIC METABOLIC PNL TOTAL CA: CPT

## 2018-04-11 PROCEDURE — 99285 EMERGENCY DEPT VISIT HI MDM: CPT | Mod: 25

## 2018-04-11 PROCEDURE — 80307 DRUG TEST PRSMV CHEM ANLYZR: CPT

## 2018-04-11 PROCEDURE — 93005 ELECTROCARDIOGRAM TRACING: CPT

## 2018-04-11 PROCEDURE — 82962 GLUCOSE BLOOD TEST: CPT

## 2018-04-11 PROCEDURE — 96376 TX/PRO/DX INJ SAME DRUG ADON: CPT

## 2018-04-11 PROCEDURE — 96374 THER/PROPH/DIAG INJ IV PUSH: CPT

## 2018-04-11 PROCEDURE — 80053 COMPREHEN METABOLIC PANEL: CPT

## 2018-04-11 RX ADMIN — Medication 2 MILLIGRAM(S): at 09:00

## 2018-04-11 RX ADMIN — Medication 2 MILLIGRAM(S): at 01:31

## 2018-04-11 RX ADMIN — Medication 2 MILLIGRAM(S): at 09:31

## 2018-04-11 NOTE — SBIRT NOTE. - NSSBIRTSERVICES_GEN_A_ED_FT
Provided SBIRT services: Full screen positive. Referral to Treatment attempted. Screening results were reviewed with the patient and patient was provided information about healthy guidelines and potential negative consequences associated with level of risk. Motivation and readiness to reduce or stop use was discussed and goals and activities to make changes were suggested/offered.  Options discussed for further evaluation and treatment, but referral to treatment was not completed because  Patient refused  Audit Score: 37  DAST Score: 0  Duration = 15 Minutes

## 2018-04-11 NOTE — ED ADULT NURSE REASSESSMENT NOTE - NS ED NURSE REASSESS COMMENT FT1
Patient agitated, pulling at sheets, crying, verbal answering some questions, and then become unresponsive, not responding to painful stimuli.  .  Dr. Montano aware and at bedside.
IV Ativan infiltrated, IV removed and MD Smith made aware.
MD Jin made aware of pt CIWA... came to bedside for eval, pt A&OX4, resp even and unlabored no distress noted, pt restless and states that he feels anxious, medicated pt as ordered,  1:1 at bedside for safety, will continue to monitor
MD Montano made aware of CIWa medicated as ordered, provided pt with food and hydration, 1:1 at bedside for safety, will continue to monitor
Pt became verbally abusive and violent towards staff members, Dr. Montano aware. new orders given
pt LINDA Guerrero from SBIRT at bedside, pt sates he feels better wants to be dc and needs to go to work, pt states he wants to follow up with [facility]. MD Santillan made aware, MD santillan at bedside, states pt is no longer in withdraw and can be dc safely.
rcvd pt A&Ox4, pt easily irritated and keeps asking for his clothes and that he wants to go home, repeatedly went over plan of care with pt, pt wait for sobriety , provided pt with food and hydration and warm blanket, resp even and unlabored no distress noted, pt laying in stretcher and appears comfortable, VSS, will continue to monitor
Pt. is uncooperative and climbing out of bed x2. Informed MD, ordered ativan 1 mg IVP and 1:1 for bedside. Pt. is asymptomatic with no signs of injury.
pt in no apparent distress, resting comfortably in bed, states he is going through withdraw, last drink was last night unsure what time. Iv patent and flushing without difficulty, no signs of infiltration.

## 2018-04-17 ENCOUNTER — EMERGENCY (EMERGENCY)
Facility: HOSPITAL | Age: 53
LOS: 1 days | Discharge: HOME | End: 2018-04-17
Attending: STUDENT IN AN ORGANIZED HEALTH CARE EDUCATION/TRAINING PROGRAM
Payer: COMMERCIAL

## 2018-04-17 VITALS
RESPIRATION RATE: 16 BRPM | OXYGEN SATURATION: 98 % | WEIGHT: 160.06 LBS | HEART RATE: 99 BPM | TEMPERATURE: 98 F | DIASTOLIC BLOOD PRESSURE: 82 MMHG | SYSTOLIC BLOOD PRESSURE: 138 MMHG

## 2018-04-17 LAB
ALBUMIN SERPL ELPH-MCNC: 4.3 G/DL — SIGNIFICANT CHANGE UP (ref 3.3–5.2)
ALP SERPL-CCNC: 80 U/L — SIGNIFICANT CHANGE UP (ref 40–120)
ALT FLD-CCNC: 35 U/L — SIGNIFICANT CHANGE UP
ANION GAP SERPL CALC-SCNC: 17 MMOL/L — SIGNIFICANT CHANGE UP (ref 5–17)
AST SERPL-CCNC: 53 U/L — HIGH
BASOPHILS # BLD AUTO: 0 K/UL — SIGNIFICANT CHANGE UP (ref 0–0.2)
BASOPHILS NFR BLD AUTO: 0.6 % — SIGNIFICANT CHANGE UP (ref 0–2)
BILIRUB SERPL-MCNC: <0.2 MG/DL — LOW (ref 0.4–2)
BUN SERPL-MCNC: 10 MG/DL — SIGNIFICANT CHANGE UP (ref 8–20)
CALCIUM SERPL-MCNC: 9 MG/DL — SIGNIFICANT CHANGE UP (ref 8.6–10.2)
CHLORIDE SERPL-SCNC: 99 MMOL/L — SIGNIFICANT CHANGE UP (ref 98–107)
CO2 SERPL-SCNC: 23 MMOL/L — SIGNIFICANT CHANGE UP (ref 22–29)
CREAT SERPL-MCNC: 0.82 MG/DL — SIGNIFICANT CHANGE UP (ref 0.5–1.3)
EOSINOPHIL # BLD AUTO: 0.1 K/UL — SIGNIFICANT CHANGE UP (ref 0–0.5)
EOSINOPHIL NFR BLD AUTO: 1.9 % — SIGNIFICANT CHANGE UP (ref 0–5)
ETHANOL SERPL-MCNC: 172 MG/DL — SIGNIFICANT CHANGE UP
GLUCOSE SERPL-MCNC: 99 MG/DL — SIGNIFICANT CHANGE UP (ref 70–115)
HCT VFR BLD CALC: 37.6 % — LOW (ref 42–52)
HGB BLD-MCNC: 12.4 G/DL — LOW (ref 14–18)
LYMPHOCYTES # BLD AUTO: 1.7 K/UL — SIGNIFICANT CHANGE UP (ref 1–4.8)
LYMPHOCYTES # BLD AUTO: 25.8 % — SIGNIFICANT CHANGE UP (ref 20–55)
MCHC RBC-ENTMCNC: 28.8 PG — SIGNIFICANT CHANGE UP (ref 27–31)
MCHC RBC-ENTMCNC: 33 G/DL — SIGNIFICANT CHANGE UP (ref 32–36)
MCV RBC AUTO: 87.2 FL — SIGNIFICANT CHANGE UP (ref 80–94)
MONOCYTES # BLD AUTO: 0.5 K/UL — SIGNIFICANT CHANGE UP (ref 0–0.8)
MONOCYTES NFR BLD AUTO: 7.6 % — SIGNIFICANT CHANGE UP (ref 3–10)
NEUTROPHILS # BLD AUTO: 4.1 K/UL — SIGNIFICANT CHANGE UP (ref 1.8–8)
NEUTROPHILS NFR BLD AUTO: 63.6 % — SIGNIFICANT CHANGE UP (ref 37–73)
PLATELET # BLD AUTO: 220 K/UL — SIGNIFICANT CHANGE UP (ref 150–400)
POTASSIUM SERPL-MCNC: 3.8 MMOL/L — SIGNIFICANT CHANGE UP (ref 3.5–5.3)
POTASSIUM SERPL-SCNC: 3.8 MMOL/L — SIGNIFICANT CHANGE UP (ref 3.5–5.3)
PROT SERPL-MCNC: 7.8 G/DL — SIGNIFICANT CHANGE UP (ref 6.6–8.7)
RBC # BLD: 4.31 M/UL — LOW (ref 4.6–6.2)
RBC # FLD: 17.8 % — HIGH (ref 11–15.6)
SODIUM SERPL-SCNC: 139 MMOL/L — SIGNIFICANT CHANGE UP (ref 135–145)
WBC # BLD: 6.5 K/UL — SIGNIFICANT CHANGE UP (ref 4.8–10.8)
WBC # FLD AUTO: 6.5 K/UL — SIGNIFICANT CHANGE UP (ref 4.8–10.8)

## 2018-04-17 PROCEDURE — 99285 EMERGENCY DEPT VISIT HI MDM: CPT

## 2018-04-17 PROCEDURE — 70450 CT HEAD/BRAIN W/O DYE: CPT | Mod: 26

## 2018-04-17 PROCEDURE — 70450 CT HEAD/BRAIN W/O DYE: CPT

## 2018-04-17 PROCEDURE — 85027 COMPLETE CBC AUTOMATED: CPT

## 2018-04-17 PROCEDURE — 80053 COMPREHEN METABOLIC PANEL: CPT

## 2018-04-17 PROCEDURE — 99284 EMERGENCY DEPT VISIT MOD MDM: CPT

## 2018-04-17 PROCEDURE — 80307 DRUG TEST PRSMV CHEM ANLYZR: CPT

## 2018-04-17 PROCEDURE — 36415 COLL VENOUS BLD VENIPUNCTURE: CPT

## 2018-04-17 RX ORDER — ONDANSETRON 8 MG/1
4 TABLET, FILM COATED ORAL ONCE
Qty: 0 | Refills: 0 | Status: COMPLETED | OUTPATIENT
Start: 2018-04-17 | End: 2018-04-17

## 2018-04-17 RX ADMIN — ONDANSETRON 4 MILLIGRAM(S): 8 TABLET, FILM COATED ORAL at 22:45

## 2018-04-17 RX ADMIN — Medication 100 MILLIGRAM(S): at 22:43

## 2018-04-17 NOTE — ED PROVIDER NOTE - OBJECTIVE STATEMENT
This is a 53 y/o patient with PMHx of alcohol abuse, asthma, GERD, HTN and HLD presents to ED for gross alcohol intoxication. Pt is sleeping comfortably in bed, but easily arousable. Affect consistent with alcohol intoxication. No signs of respiratory distress or deformities. Pt is unable to provide a reliable history at this time. History is limited due to suspected intoxication. Patient has no complaints at this time.

## 2018-04-17 NOTE — ED PROVIDER NOTE - PROGRESS NOTE DETAILS
Case s/o to Dr Isaac at 7 PM As per sign-out patient with hx of alcoholism. Patient is awake, alert, and oriented. Bal should be less then <100 by 9pm.

## 2018-04-17 NOTE — ED PROVIDER NOTE - MEDICAL DECISION MAKING DETAILS
Due to level of alcohol intoxication, will obtain labs and a head CT to check for intracranial hemorrhage.

## 2018-04-17 NOTE — ED PROVIDER NOTE - MUSCULOSKELETAL, MLM
Spine appears normal, range of motion is not limited, no muscle or joint tenderness. Moves all extremities

## 2018-04-17 NOTE — ED ADULT NURSE REASSESSMENT NOTE - NS ED NURSE REASSESS COMMENT FT1
Pt alert and oriented dose , ambulating well in ED, eating sandwiches , requesting more milk , will continue to monitor

## 2018-04-18 VITALS
HEART RATE: 76 BPM | TEMPERATURE: 98 F | OXYGEN SATURATION: 95 % | SYSTOLIC BLOOD PRESSURE: 131 MMHG | RESPIRATION RATE: 18 BRPM | DIASTOLIC BLOOD PRESSURE: 67 MMHG

## 2018-04-26 ENCOUNTER — EMERGENCY (EMERGENCY)
Facility: HOSPITAL | Age: 53
LOS: 1 days | Discharge: HOME | End: 2018-04-26
Attending: EMERGENCY MEDICINE | Admitting: EMERGENCY MEDICINE
Payer: COMMERCIAL

## 2018-04-26 VITALS
TEMPERATURE: 98 F | OXYGEN SATURATION: 98 % | HEART RATE: 60 BPM | SYSTOLIC BLOOD PRESSURE: 152 MMHG | DIASTOLIC BLOOD PRESSURE: 87 MMHG | RESPIRATION RATE: 20 BRPM

## 2018-04-26 PROCEDURE — 99284 EMERGENCY DEPT VISIT MOD MDM: CPT

## 2018-04-26 PROCEDURE — 99285 EMERGENCY DEPT VISIT HI MDM: CPT | Mod: 25

## 2018-04-26 PROCEDURE — 96372 THER/PROPH/DIAG INJ SC/IM: CPT | Mod: XU

## 2018-04-26 PROCEDURE — 36415 COLL VENOUS BLD VENIPUNCTURE: CPT

## 2018-04-26 PROCEDURE — 96374 THER/PROPH/DIAG INJ IV PUSH: CPT

## 2018-04-26 PROCEDURE — 80307 DRUG TEST PRSMV CHEM ANLYZR: CPT

## 2018-04-26 RX ADMIN — Medication 2 MILLIGRAM(S): at 22:33

## 2018-04-26 RX ADMIN — Medication 4 MILLIGRAM(S): at 23:50

## 2018-04-26 NOTE — ED ADULT TRIAGE NOTE - CHIEF COMPLAINT QUOTE
patient josep found at [employer] sitting in the parking lot admits to drinking a "qt and a half of vodka" patient states that he has pain from his acid reflux disease.  Patient undressed and placed in yellow gown. No obvious signs of trauma

## 2018-04-26 NOTE — ED PROVIDER NOTE - PROGRESS NOTE DETAILS
Pt. became very agitated and belligerent. Pt. asking for medications to put him to sleep. IV line placed and Ativan given to relief him of his agitation. Pt awake and alert.  Ambulatory in ED with steady gait and clinically sober for d./c at this time

## 2018-04-26 NOTE — ED ADULT NURSE REASSESSMENT NOTE - NS ED NURSE REASSESS COMMENT FT1
crying, agitated, trying to get oob, medicated per md, pt moved in direct  view of nurses station for observation

## 2018-04-26 NOTE — ED ADULT NURSE REASSESSMENT NOTE - NS ED NURSE REASSESS COMMENT FT1
pt care assumed at 2300, no apparent distress noted at this time, charting as noted. pt received Alert and Oriented to person, place, situation and time standing by edge of bed. Pt told multiple times to stay in bed. pt provided blankets. Pt educated on the importance of not getting up without assistance. Pt is not being cooperative at this time.

## 2018-04-26 NOTE — ED ADULT NURSE NOTE - OBJECTIVE STATEMENT
received in [place] pt wearing hospital gown no belongings at bedside, admits to drinking alcohol tonight, maex4

## 2018-04-26 NOTE — ED ADULT NURSE NOTE - CAS ELECT INFOMATION PROVIDED
pt d/c in stable condition, no apparent distress noted at this time. pt A&Ox3. pt able to ambulate with steady gait. pt in no distress at d/c./DC instructions

## 2018-04-27 ENCOUNTER — CHART COPY (OUTPATIENT)
Age: 53
End: 2018-04-27

## 2018-04-27 VITALS
DIASTOLIC BLOOD PRESSURE: 73 MMHG | RESPIRATION RATE: 20 BRPM | TEMPERATURE: 98 F | OXYGEN SATURATION: 98 % | HEART RATE: 88 BPM | SYSTOLIC BLOOD PRESSURE: 112 MMHG

## 2018-04-27 LAB — ETHANOL SERPL-MCNC: 273 MG/DL — SIGNIFICANT CHANGE UP

## 2018-04-27 RX ORDER — OMEPRAZOLE 10 MG/1
1 CAPSULE, DELAYED RELEASE ORAL
Qty: 15 | Refills: 0
Start: 2018-04-27 | End: 2018-05-11

## 2018-04-27 NOTE — ED ADULT NURSE REASSESSMENT NOTE - NS ED NURSE REASSESS COMMENT FT1
Pt resting comfortably in stretcher, resp even and unlabored, pt continues to sleep, pt educated on plan of care.

## 2018-05-01 ENCOUNTER — OUTPATIENT (OUTPATIENT)
Dept: OUTPATIENT SERVICES | Facility: HOSPITAL | Age: 53
LOS: 1 days | End: 2018-05-01
Payer: MEDICAID

## 2018-05-02 ENCOUNTER — CHART COPY (OUTPATIENT)
Age: 53
End: 2018-05-02

## 2018-05-02 ENCOUNTER — EMERGENCY (EMERGENCY)
Facility: HOSPITAL | Age: 53
LOS: 1 days | Discharge: HOME | End: 2018-05-02
Attending: EMERGENCY MEDICINE | Admitting: EMERGENCY MEDICINE
Payer: COMMERCIAL

## 2018-05-02 ENCOUNTER — EMERGENCY (EMERGENCY)
Facility: HOSPITAL | Age: 53
LOS: 1 days | Discharge: HOME | End: 2018-05-02
Attending: EMERGENCY MEDICINE
Payer: COMMERCIAL

## 2018-05-02 VITALS
SYSTOLIC BLOOD PRESSURE: 126 MMHG | WEIGHT: 169.98 LBS | TEMPERATURE: 98 F | HEART RATE: 107 BPM | RESPIRATION RATE: 24 BRPM | DIASTOLIC BLOOD PRESSURE: 89 MMHG | OXYGEN SATURATION: 98 % | HEIGHT: 65 IN

## 2018-05-02 VITALS
HEART RATE: 104 BPM | RESPIRATION RATE: 20 BRPM | OXYGEN SATURATION: 96 % | SYSTOLIC BLOOD PRESSURE: 100 MMHG | TEMPERATURE: 99 F | DIASTOLIC BLOOD PRESSURE: 65 MMHG

## 2018-05-02 VITALS — WEIGHT: 179.9 LBS

## 2018-05-02 DIAGNOSIS — R69 ILLNESS, UNSPECIFIED: ICD-10-CM

## 2018-05-02 LAB
ALBUMIN SERPL ELPH-MCNC: 4.7 G/DL — SIGNIFICANT CHANGE UP (ref 3.3–5.2)
ALP SERPL-CCNC: 90 U/L — SIGNIFICANT CHANGE UP (ref 40–120)
ALT FLD-CCNC: 61 U/L — HIGH
AMPHET UR-MCNC: NEGATIVE — SIGNIFICANT CHANGE UP
ANION GAP SERPL CALC-SCNC: 21 MMOL/L — HIGH (ref 5–17)
AST SERPL-CCNC: 73 U/L — HIGH
BARBITURATES UR SCN-MCNC: NEGATIVE — SIGNIFICANT CHANGE UP
BASOPHILS # BLD AUTO: 0 K/UL — SIGNIFICANT CHANGE UP (ref 0–0.2)
BASOPHILS NFR BLD AUTO: 0.7 % — SIGNIFICANT CHANGE UP (ref 0–2)
BENZODIAZ UR-MCNC: POSITIVE
BILIRUB SERPL-MCNC: <0.2 MG/DL — LOW (ref 0.4–2)
BUN SERPL-MCNC: 11 MG/DL — SIGNIFICANT CHANGE UP (ref 8–20)
CALCIUM SERPL-MCNC: 9.1 MG/DL — SIGNIFICANT CHANGE UP (ref 8.6–10.2)
CHLORIDE SERPL-SCNC: 99 MMOL/L — SIGNIFICANT CHANGE UP (ref 98–107)
CO2 SERPL-SCNC: 20 MMOL/L — LOW (ref 22–29)
COCAINE METAB.OTHER UR-MCNC: NEGATIVE — SIGNIFICANT CHANGE UP
CREAT SERPL-MCNC: 0.84 MG/DL — SIGNIFICANT CHANGE UP (ref 0.5–1.3)
EOSINOPHIL # BLD AUTO: 0.1 K/UL — SIGNIFICANT CHANGE UP (ref 0–0.5)
EOSINOPHIL NFR BLD AUTO: 1.3 % — SIGNIFICANT CHANGE UP (ref 0–5)
ETHANOL SERPL-MCNC: 275 MG/DL — SIGNIFICANT CHANGE UP
GLUCOSE SERPL-MCNC: 155 MG/DL — HIGH (ref 70–115)
HCT VFR BLD CALC: 35.9 % — LOW (ref 42–52)
HGB BLD-MCNC: 11.5 G/DL — LOW (ref 14–18)
LYMPHOCYTES # BLD AUTO: 1 K/UL — SIGNIFICANT CHANGE UP (ref 1–4.8)
LYMPHOCYTES # BLD AUTO: 18.3 % — LOW (ref 20–55)
MCHC RBC-ENTMCNC: 27.5 PG — SIGNIFICANT CHANGE UP (ref 27–31)
MCHC RBC-ENTMCNC: 32 G/DL — SIGNIFICANT CHANGE UP (ref 32–36)
MCV RBC AUTO: 85.9 FL — SIGNIFICANT CHANGE UP (ref 80–94)
METHADONE UR-MCNC: NEGATIVE — SIGNIFICANT CHANGE UP
MONOCYTES # BLD AUTO: 0.5 K/UL — SIGNIFICANT CHANGE UP (ref 0–0.8)
MONOCYTES NFR BLD AUTO: 8.6 % — SIGNIFICANT CHANGE UP (ref 3–10)
NEUTROPHILS # BLD AUTO: 3.8 K/UL — SIGNIFICANT CHANGE UP (ref 1.8–8)
NEUTROPHILS NFR BLD AUTO: 70.9 % — SIGNIFICANT CHANGE UP (ref 37–73)
OPIATES UR-MCNC: NEGATIVE — SIGNIFICANT CHANGE UP
PCP SPEC-MCNC: SIGNIFICANT CHANGE UP
PCP UR-MCNC: NEGATIVE — SIGNIFICANT CHANGE UP
PLATELET # BLD AUTO: 245 K/UL — SIGNIFICANT CHANGE UP (ref 150–400)
POTASSIUM SERPL-MCNC: 3.7 MMOL/L — SIGNIFICANT CHANGE UP (ref 3.5–5.3)
POTASSIUM SERPL-SCNC: 3.7 MMOL/L — SIGNIFICANT CHANGE UP (ref 3.5–5.3)
PROT SERPL-MCNC: 7.5 G/DL — SIGNIFICANT CHANGE UP (ref 6.6–8.7)
RBC # BLD: 4.18 M/UL — LOW (ref 4.6–6.2)
RBC # FLD: 17.2 % — HIGH (ref 11–15.6)
SODIUM SERPL-SCNC: 140 MMOL/L — SIGNIFICANT CHANGE UP (ref 135–145)
THC UR QL: NEGATIVE — SIGNIFICANT CHANGE UP
WBC # BLD: 5.4 K/UL — SIGNIFICANT CHANGE UP (ref 4.8–10.8)
WBC # FLD AUTO: 5.4 K/UL — SIGNIFICANT CHANGE UP (ref 4.8–10.8)

## 2018-05-02 PROCEDURE — 73080 X-RAY EXAM OF ELBOW: CPT | Mod: 26,LT

## 2018-05-02 PROCEDURE — 80307 DRUG TEST PRSMV CHEM ANLYZR: CPT

## 2018-05-02 PROCEDURE — 72125 CT NECK SPINE W/O DYE: CPT

## 2018-05-02 PROCEDURE — 72125 CT NECK SPINE W/O DYE: CPT | Mod: 26

## 2018-05-02 PROCEDURE — 71045 X-RAY EXAM CHEST 1 VIEW: CPT

## 2018-05-02 PROCEDURE — 36415 COLL VENOUS BLD VENIPUNCTURE: CPT

## 2018-05-02 PROCEDURE — 73080 X-RAY EXAM OF ELBOW: CPT

## 2018-05-02 PROCEDURE — 71045 X-RAY EXAM CHEST 1 VIEW: CPT | Mod: 26

## 2018-05-02 PROCEDURE — 70450 CT HEAD/BRAIN W/O DYE: CPT

## 2018-05-02 PROCEDURE — 99285 EMERGENCY DEPT VISIT HI MDM: CPT

## 2018-05-02 PROCEDURE — 99284 EMERGENCY DEPT VISIT MOD MDM: CPT | Mod: 25

## 2018-05-02 PROCEDURE — 70450 CT HEAD/BRAIN W/O DYE: CPT | Mod: 26

## 2018-05-02 PROCEDURE — 82962 GLUCOSE BLOOD TEST: CPT

## 2018-05-02 PROCEDURE — 85027 COMPLETE CBC AUTOMATED: CPT

## 2018-05-02 PROCEDURE — 99284 EMERGENCY DEPT VISIT MOD MDM: CPT

## 2018-05-02 PROCEDURE — 80053 COMPREHEN METABOLIC PANEL: CPT

## 2018-05-02 RX ORDER — PANTOPRAZOLE SODIUM 20 MG/1
40 TABLET, DELAYED RELEASE ORAL ONCE
Qty: 0 | Refills: 0 | Status: COMPLETED | OUTPATIENT
Start: 2018-05-02 | End: 2018-05-02

## 2018-05-02 RX ADMIN — Medication 75 MILLIGRAM(S): at 23:59

## 2018-05-02 RX ADMIN — PANTOPRAZOLE SODIUM 40 MILLIGRAM(S): 20 TABLET, DELAYED RELEASE ORAL at 21:44

## 2018-05-02 RX ADMIN — Medication 25 MILLIGRAM(S): at 22:43

## 2018-05-02 NOTE — ED PROVIDER NOTE - PROGRESS NOTE DETAILS
patient reassessed, requiring some librium, but otherwise stable and cooperative; plan to send to [facility] for detox bed; will place D/C papers, and sign out pending transportation

## 2018-05-02 NOTE — ED PROVIDER NOTE - OBJECTIVE STATEMENT
51 yo male well known to our ed for frequent visits for alcohol intoxication; has often declined detox and other assistance; was seen here last night for fall and injury with alcohol intox; had xray and ct imaging negative for acute injury; presents intoxicated again since being discharged last night. Was seen at bedside by SBIRT and RACQUEL, outpatient ; attempting to get patient into detox

## 2018-05-02 NOTE — CHART NOTE - NSCHARTNOTEFT_GEN_A_CORE
SOCIAL WORK NOTE:  MD NOTIFIED SW THAT OUTREACH WAS AT BEDSIDE INIDCATING THAT BED WAS ACQUIRED AT [facility].  THIS WORKER PLACED CALL TO [facility] AND SPOKE WITH OLEG AT ADMISSIONS.  SHE REPORTED THE BED WAS NOT SECURED AS OF YET BUT DOES HAVE MANY AVAILABLE BEDS AND IS HAPPY TO ACCEPT THE PATIENT LATER ON THIS EVENING WHEN HE IS STABILIZED AND IS MEETING CRITERIA FOR INPATIENT DETOX.  WILL PROVIDE HANDOFF TO EVENING .

## 2018-05-02 NOTE — ED ADULT NURSE REASSESSMENT NOTE - NS ED NURSE REASSESS COMMENT FT1
pt care assumed at 2330, no apparent distress noted at this time, charting as noted. pt received Alert and Oriented to person, place, situation and time resting in bed comfortably in yellow gown. pt is awaiting blood BAL results. pt is awaiting dispo. pt educated plan of care. vital signs remain stable.

## 2018-05-02 NOTE — ED ADULT NURSE NOTE - OBJECTIVE STATEMENT
pt came to the ED for ETOH.  pt was discharged earlier from the hospital.  pt has facial abrasions, that were present during last visit.  pt is A/Ox2.

## 2018-05-02 NOTE — ED ADULT NURSE REASSESSMENT NOTE - NS ED NURSE REASSESS COMMENT FT1
pt asleep easily aroused awaiting sobriety then discharge home. pt offers no complaints at this time

## 2018-05-02 NOTE — ED PROVIDER NOTE - MUSCULOSKELETAL, MLM
Spine appears normal, range of motion is not limited, chronic appearing left elbow defomrity nml pulses

## 2018-05-02 NOTE — ED ADULT TRIAGE NOTE - CHIEF COMPLAINT QUOTE
Pt BIBA from street for EtOH intoxication. Pt admits to drinking a quart and a half of vodka tonight. Pt noted to have abrasions to face s/p trip and fall, no obvious deformities noted. Pt denies any RAYMOND, LOC, abd pain. Pt clothing removed and secured and Pt placed in yellow gown.

## 2018-05-02 NOTE — ED ADULT NURSE NOTE - OBJECTIVE STATEMENT
Pt received in b4 in yellow gown with no belongings @ bedside, BIBA a&ox3 c/o fall, admits to ETOH use tonight, pt with dried blood to florencio nares and forehead, lac noted to posterior occiput, abrasion noted to forehead, bleeding controlled, pt states "I don't know if I passed out" MAEx3, pt reports previous elbow left injury. Pt loud and agitated able to be redirected and deescalation techniques utilized, safety maintained, updated on poc, pending ct

## 2018-05-02 NOTE — ED PROVIDER NOTE - OBJECTIVE STATEMENT
pt mechanical fall s.p drinking  barsion right fforehead + chronic etoh abuse no blood thinners. probable lef tchronic elbow deformity . denies fever. denies HA or neck pain. no chest pain or sob. no abd pain. no n/v/d. no urinary f/u/d. no back pain. no motor or sensory deficits. pt mechanical fall s.p drinking  abrasion right fforehead + chronic etoh abuse no blood thinners. probable left chronic elbow deformity . denies fever. denies HA or neck pain. no chest pain or sob. no abd pain. no n/v/d. no urinary f/u/d. no back pain. no motor or sensory deficits.

## 2018-05-03 ENCOUNTER — CHART COPY (OUTPATIENT)
Age: 53
End: 2018-05-03

## 2018-05-03 VITALS
RESPIRATION RATE: 18 BRPM | SYSTOLIC BLOOD PRESSURE: 136 MMHG | OXYGEN SATURATION: 98 % | DIASTOLIC BLOOD PRESSURE: 89 MMHG | HEART RATE: 103 BPM | TEMPERATURE: 98 F

## 2018-05-03 LAB — ETHANOL SERPL-MCNC: 29 MG/DL — SIGNIFICANT CHANGE UP

## 2018-05-03 NOTE — SBIRT NOTE. - NSSBIRTSERVICES_GEN_A_ED_FT
Provided SBIRT services: Full screen positive. Referral to Treatment Performed. Screening results were reviewed with the patient and patient was provided information about healthy guidelines and potential negative consequences associated with level of risk. Motivation and readiness to reduce or stop use was discussed and goals and activities to make changes were suggested/offered.  Referral for complete assessment and level of care determination at a certified treatment facility was completed by contacting the treatment facility via phone, and add apt info as noted below:  Appt confirmed at [facility] ( [facility])   AUDIT Score: 29  DAST Score: 0  Duration = 15 Minutes

## 2018-05-24 ENCOUNTER — EMERGENCY (EMERGENCY)
Facility: HOSPITAL | Age: 53
LOS: 1 days | Discharge: HOME | End: 2018-05-24
Attending: EMERGENCY MEDICINE
Payer: COMMERCIAL

## 2018-05-24 VITALS
WEIGHT: 160.06 LBS | SYSTOLIC BLOOD PRESSURE: 137 MMHG | RESPIRATION RATE: 18 BRPM | DIASTOLIC BLOOD PRESSURE: 86 MMHG | OXYGEN SATURATION: 97 % | TEMPERATURE: 98 F | HEIGHT: 65 IN | HEART RATE: 89 BPM

## 2018-05-24 PROCEDURE — 99284 EMERGENCY DEPT VISIT MOD MDM: CPT

## 2018-05-24 PROCEDURE — 71045 X-RAY EXAM CHEST 1 VIEW: CPT | Mod: 26

## 2018-05-24 RX ORDER — ONDANSETRON 8 MG/1
4 TABLET, FILM COATED ORAL ONCE
Qty: 0 | Refills: 0 | Status: COMPLETED | OUTPATIENT
Start: 2018-05-24 | End: 2018-05-24

## 2018-05-24 NOTE — ED PROVIDER NOTE - CONSTITUTIONAL, MLM
normal... pt is arousal to verbal stimuli, following commands, slurred speech. alcohol on breath. Pt is disheveled, and malodorous

## 2018-05-24 NOTE — ED ADULT NURSE NOTE - OBJECTIVE STATEMENT
Pt received in [facility] in yellow gown with #20g iv noted to left ac, site asymp, no belongings @ bedside per policy, AOOB, pt responds to pain, purposeful movements @ this time, airway patent and maintained, side lying position, in no apparent distress @ this time Pt received in [facility] in yellow gown with #20g iv noted to left ac, site asymp, no belongings @ bedside per policy, AOOB, pt responds to pain, purposeful movements @ this time, airway patent and maintained, side lying position, in no apparent distress @ this time. Per triage note pt found on floor with AOOB and BIBA

## 2018-05-24 NOTE — ED PROVIDER NOTE - OBJECTIVE STATEMENT
51 y/o M pt with hx of EtOH abuse BIBA to ED  for alcohol intoxication. Pt admits to drinking EtOH tonight. Pt states he "had a lot to drink". Pt was found in Alcoholics Anonymous room intoxicated and vomiting on himself.

## 2018-05-24 NOTE — ED ADULT TRIAGE NOTE - CHIEF COMPLAINT QUOTE
Pt BIBA covered in vomit picked up by EMS from inside . Pt admits to drinking "too much alcohol". Pt is arousable to voice and denies any injuries. No deformities noted. Pt clothing removed, secured, labeled, and pt placed in yellow gown. EMS 18G to left FA.

## 2018-05-24 NOTE — ED PROVIDER NOTE - PROGRESS NOTE DETAILS
recd sign out, patient evaluated states has had fracture in left elbow, discussed with patient to follow up as advised, no residual vomiting in ed, close follow up w melo pmd advised

## 2018-05-24 NOTE — ED PROVIDER NOTE - MEDICAL DECISION MAKING DETAILS
Pt improved. Lucid, coherent tolerating PO, stable gait, no clinical signs of intox/withdrawal and no complaints currently

## 2018-05-25 ENCOUNTER — CHART COPY (OUTPATIENT)
Age: 53
End: 2018-05-25

## 2018-05-25 VITALS
TEMPERATURE: 98 F | DIASTOLIC BLOOD PRESSURE: 69 MMHG | RESPIRATION RATE: 18 BRPM | HEART RATE: 100 BPM | OXYGEN SATURATION: 98 % | SYSTOLIC BLOOD PRESSURE: 105 MMHG

## 2018-05-25 LAB — ETHANOL SERPL-MCNC: 280 MG/DL — SIGNIFICANT CHANGE UP

## 2018-05-25 PROCEDURE — 99285 EMERGENCY DEPT VISIT HI MDM: CPT

## 2018-05-25 PROCEDURE — 70450 CT HEAD/BRAIN W/O DYE: CPT

## 2018-05-25 PROCEDURE — 70450 CT HEAD/BRAIN W/O DYE: CPT | Mod: 26

## 2018-05-25 PROCEDURE — 80307 DRUG TEST PRSMV CHEM ANLYZR: CPT

## 2018-05-25 PROCEDURE — 71045 X-RAY EXAM CHEST 1 VIEW: CPT

## 2018-05-25 PROCEDURE — 36415 COLL VENOUS BLD VENIPUNCTURE: CPT

## 2018-05-25 NOTE — ED ADULT NURSE REASSESSMENT NOTE - NS ED NURSE REASSESS COMMENT FT1
Pt awake and alert, loud, ambulating in ED with steady gait noted, in no apparent distress @ this time, placed back in stretcher in cdu [place] [facility], rest promoted
Patient verbalized understanding of discharge instructions, need for followup with (PMD and/or specialist)without fail.  Patient also provided with signs and symptoms to return to the emergency department immediately if they present.  Patient A+Ox3, resps even and nonlabored, patient in no apparent distress upon discharge.
Pt awake and alert, ambulating in ed with steady gait appears clinically sober and in no apparent distress

## 2018-05-29 ENCOUNTER — EMERGENCY (EMERGENCY)
Facility: HOSPITAL | Age: 53
LOS: 1 days | Discharge: HOME | End: 2018-05-29
Attending: EMERGENCY MEDICINE
Payer: COMMERCIAL

## 2018-05-29 VITALS
DIASTOLIC BLOOD PRESSURE: 88 MMHG | SYSTOLIC BLOOD PRESSURE: 138 MMHG | RESPIRATION RATE: 18 BRPM | TEMPERATURE: 99 F | HEART RATE: 90 BPM | OXYGEN SATURATION: 99 %

## 2018-05-29 VITALS — HEIGHT: 66 IN | WEIGHT: 199.96 LBS

## 2018-05-29 PROCEDURE — 99285 EMERGENCY DEPT VISIT HI MDM: CPT

## 2018-05-29 PROCEDURE — 99283 EMERGENCY DEPT VISIT LOW MDM: CPT

## 2018-05-29 NOTE — ED PROVIDER NOTE - OBJECTIVE STATEMENT
52M with h/o HTN, asthma, alcoholism presenting with alcohol intoxication. Pt admits to drinking tonight, but denies any other ingestions. Denies suicidal ideation/ intent to harm himself or others.  Denies HI. PT denies any falls/ injuries or other complaints. States he was just in [facility] a few days ago

## 2018-05-29 NOTE — ED PROVIDER NOTE - MEDICAL DECISION MAKING DETAILS
Pt presents with alcohol intoxication; no evidence of acute injuries. Will monitor for sobriety. Pt presents with alcohol intoxication; no evidence of acute injuries. Will monitor for sobriety.  clincaly sober no is no hi refusing resources

## 2018-05-30 ENCOUNTER — CHART COPY (OUTPATIENT)
Age: 53
End: 2018-05-30

## 2018-05-30 ENCOUNTER — APPOINTMENT (OUTPATIENT)
Dept: INTERNAL MEDICINE | Facility: CLINIC | Age: 53
End: 2018-05-30

## 2018-06-01 PROCEDURE — G9001: CPT

## 2018-06-11 ENCOUNTER — EMERGENCY (EMERGENCY)
Facility: HOSPITAL | Age: 53
LOS: 1 days | Discharge: HOME | End: 2018-06-11
Attending: EMERGENCY MEDICINE
Payer: COMMERCIAL

## 2018-06-11 VITALS
TEMPERATURE: 98 F | SYSTOLIC BLOOD PRESSURE: 136 MMHG | OXYGEN SATURATION: 98 % | HEART RATE: 86 BPM | HEIGHT: 66 IN | WEIGHT: 164.91 LBS | RESPIRATION RATE: 18 BRPM | DIASTOLIC BLOOD PRESSURE: 78 MMHG

## 2018-06-11 LAB — ETHANOL SERPL-MCNC: 354 MG/DL — SIGNIFICANT CHANGE UP

## 2018-06-11 PROCEDURE — 99284 EMERGENCY DEPT VISIT MOD MDM: CPT

## 2018-06-11 RX ADMIN — Medication 100 MILLIGRAM(S): at 21:17

## 2018-06-11 NOTE — ED PROVIDER NOTE - MEDICAL DECISION MAKING DETAILS
Patient with acute alcohol abuse. Patient with acute alcohol abuse. Pt states he feels better. On re-examination he is alert, awake, lucid, coherent. Ambulates with steady gait and tolerated PO. Pt initially tremulous but after medication is now calm, CIWA 1, well appearing and stable for dc

## 2018-06-11 NOTE — ED PROVIDER NOTE - PROGRESS NOTE DETAILS
Patient with nausea and vomiting but otherwise stable. Will give antiemetic. PAtient with worsening symptoms. Will hydrate, order labs and medication. Then re-evaluate

## 2018-06-11 NOTE — ED ADULT TRIAGE NOTE - CHIEF COMPLAINT QUOTE
pt BIBA c/o being intoxicated, pt states he pays his bills and shouldn't be here, pt states he drank a lot of beer

## 2018-06-11 NOTE — ED PROVIDER NOTE - OBJECTIVE STATEMENT
51 yo male presents to the ED for evaluation of acute alcohol use. Patient without acute complaints.

## 2018-06-11 NOTE — ED PROVIDER NOTE - CHPI ED SYMPTOMS NEG
no abdominal pain/no vomiting/no abdominal distension/no confusion/no disorientation/no weakness/no fever

## 2018-06-11 NOTE — ED ADULT NURSE NOTE - OBJECTIVE STATEMENT
PT states "I don't know why im here im homeless and was sleeping [place] the [place] the [place] and the  brought me here, I miss my family I just want to leave here", pt AOx3 w/ slightly slurred speech, admits to drinking liquor today, denies falls/hitting head, resp even and unlabored, pt calm and cooperative, in yellow gown but left with maroon winter hat on head, pt also states "I think im withdrawing", no s/s of withdrawl noted at this time, able to ambulate safely and steadily w/out assistance

## 2018-06-12 ENCOUNTER — EMERGENCY (EMERGENCY)
Facility: HOSPITAL | Age: 53
LOS: 1 days | Discharge: HOME | End: 2018-06-12
Attending: STUDENT IN AN ORGANIZED HEALTH CARE EDUCATION/TRAINING PROGRAM
Payer: COMMERCIAL

## 2018-06-12 ENCOUNTER — CHART COPY (OUTPATIENT)
Age: 53
End: 2018-06-12

## 2018-06-12 VITALS — HEIGHT: 66 IN | WEIGHT: 195.11 LBS

## 2018-06-12 VITALS
SYSTOLIC BLOOD PRESSURE: 125 MMHG | RESPIRATION RATE: 20 BRPM | OXYGEN SATURATION: 99 % | HEART RATE: 90 BPM | DIASTOLIC BLOOD PRESSURE: 74 MMHG

## 2018-06-12 LAB
ALBUMIN SERPL ELPH-MCNC: 4.5 G/DL — SIGNIFICANT CHANGE UP (ref 3.3–5.2)
ALP SERPL-CCNC: 62 U/L — SIGNIFICANT CHANGE UP (ref 40–120)
ALT FLD-CCNC: 27 U/L — SIGNIFICANT CHANGE UP
ANION GAP SERPL CALC-SCNC: 17 MMOL/L — SIGNIFICANT CHANGE UP (ref 5–17)
AST SERPL-CCNC: 44 U/L — HIGH
BASOPHILS # BLD AUTO: 0 K/UL — SIGNIFICANT CHANGE UP (ref 0–0.2)
BASOPHILS NFR BLD AUTO: 0.6 % — SIGNIFICANT CHANGE UP (ref 0–2)
BILIRUB SERPL-MCNC: 0.3 MG/DL — LOW (ref 0.4–2)
BUN SERPL-MCNC: 11 MG/DL — SIGNIFICANT CHANGE UP (ref 8–20)
CALCIUM SERPL-MCNC: 9.3 MG/DL — SIGNIFICANT CHANGE UP (ref 8.6–10.2)
CHLORIDE SERPL-SCNC: 97 MMOL/L — LOW (ref 98–107)
CO2 SERPL-SCNC: 23 MMOL/L — SIGNIFICANT CHANGE UP (ref 22–29)
CREAT SERPL-MCNC: 0.87 MG/DL — SIGNIFICANT CHANGE UP (ref 0.5–1.3)
EOSINOPHIL # BLD AUTO: 0.1 K/UL — SIGNIFICANT CHANGE UP (ref 0–0.5)
EOSINOPHIL NFR BLD AUTO: 2 % — SIGNIFICANT CHANGE UP (ref 0–5)
ETHANOL SERPL-MCNC: 30 MG/DL — SIGNIFICANT CHANGE UP
GLUCOSE SERPL-MCNC: 113 MG/DL — SIGNIFICANT CHANGE UP (ref 70–115)
HCT VFR BLD CALC: 36.1 % — LOW (ref 42–52)
HGB BLD-MCNC: 11.5 G/DL — LOW (ref 14–18)
LIDOCAIN IGE QN: 66 U/L — HIGH (ref 22–51)
LYMPHOCYTES # BLD AUTO: 1.2 K/UL — SIGNIFICANT CHANGE UP (ref 1–4.8)
LYMPHOCYTES # BLD AUTO: 35.4 % — SIGNIFICANT CHANGE UP (ref 20–55)
MCHC RBC-ENTMCNC: 26.4 PG — LOW (ref 27–31)
MCHC RBC-ENTMCNC: 31.9 G/DL — LOW (ref 32–36)
MCV RBC AUTO: 83 FL — SIGNIFICANT CHANGE UP (ref 80–94)
MONOCYTES # BLD AUTO: 0.4 K/UL — SIGNIFICANT CHANGE UP (ref 0–0.8)
MONOCYTES NFR BLD AUTO: 12.6 % — HIGH (ref 3–10)
NEUTROPHILS # BLD AUTO: 1.7 K/UL — LOW (ref 1.8–8)
NEUTROPHILS NFR BLD AUTO: 49.1 % — SIGNIFICANT CHANGE UP (ref 37–73)
PLATELET # BLD AUTO: 139 K/UL — LOW (ref 150–400)
POTASSIUM SERPL-MCNC: 4 MMOL/L — SIGNIFICANT CHANGE UP (ref 3.5–5.3)
POTASSIUM SERPL-SCNC: 4 MMOL/L — SIGNIFICANT CHANGE UP (ref 3.5–5.3)
PROT SERPL-MCNC: 7.7 G/DL — SIGNIFICANT CHANGE UP (ref 6.6–8.7)
RBC # BLD: 4.35 M/UL — LOW (ref 4.6–6.2)
RBC # FLD: 17.8 % — HIGH (ref 11–15.6)
SODIUM SERPL-SCNC: 137 MMOL/L — SIGNIFICANT CHANGE UP (ref 135–145)
WBC # BLD: 3.5 K/UL — LOW (ref 4.8–10.8)
WBC # FLD AUTO: 3.5 K/UL — LOW (ref 4.8–10.8)

## 2018-06-12 PROCEDURE — 96372 THER/PROPH/DIAG INJ SC/IM: CPT | Mod: XU

## 2018-06-12 PROCEDURE — 99285 EMERGENCY DEPT VISIT HI MDM: CPT | Mod: 25

## 2018-06-12 PROCEDURE — 83690 ASSAY OF LIPASE: CPT

## 2018-06-12 PROCEDURE — 85027 COMPLETE CBC AUTOMATED: CPT

## 2018-06-12 PROCEDURE — 36415 COLL VENOUS BLD VENIPUNCTURE: CPT

## 2018-06-12 PROCEDURE — 80053 COMPREHEN METABOLIC PANEL: CPT

## 2018-06-12 PROCEDURE — 96374 THER/PROPH/DIAG INJ IV PUSH: CPT

## 2018-06-12 PROCEDURE — 80307 DRUG TEST PRSMV CHEM ANLYZR: CPT

## 2018-06-12 PROCEDURE — 99284 EMERGENCY DEPT VISIT MOD MDM: CPT | Mod: 25

## 2018-06-12 RX ORDER — ONDANSETRON 8 MG/1
4 TABLET, FILM COATED ORAL ONCE
Qty: 0 | Refills: 0 | Status: COMPLETED | OUTPATIENT
Start: 2018-06-12 | End: 2018-06-12

## 2018-06-12 RX ORDER — SODIUM CHLORIDE 9 MG/ML
3 INJECTION INTRAMUSCULAR; INTRAVENOUS; SUBCUTANEOUS EVERY 8 HOURS
Qty: 0 | Refills: 0 | Status: DISCONTINUED | OUTPATIENT
Start: 2018-06-12 | End: 2018-06-16

## 2018-06-12 RX ADMIN — Medication 1 MILLIGRAM(S): at 06:57

## 2018-06-12 RX ADMIN — Medication 100 MILLIGRAM(S): at 05:15

## 2018-06-12 RX ADMIN — ONDANSETRON 4 MILLIGRAM(S): 8 TABLET, FILM COATED ORAL at 09:37

## 2018-06-12 RX ADMIN — Medication 50 MILLIGRAM(S): at 09:35

## 2018-06-12 RX ADMIN — ONDANSETRON 4 MILLIGRAM(S): 8 TABLET, FILM COATED ORAL at 05:51

## 2018-06-12 RX ADMIN — Medication 1 MILLIGRAM(S): at 08:23

## 2018-06-12 RX ADMIN — ONDANSETRON 4 MILLIGRAM(S): 8 TABLET, FILM COATED ORAL at 06:11

## 2018-06-12 NOTE — ED ADULT TRIAGE NOTE - CHIEF COMPLAINT QUOTE
ETOH, patient reports withdrawal symptoms, had 1 pint and a half of vodka this AM, calm and cooperative.

## 2018-06-12 NOTE — ED ADULT NURSE REASSESSMENT NOTE - NS ED NURSE REASSESS COMMENT FT1
Pt presented w/ CIWA of 10 and vomiting, MD Isaac aware and IV and blood work ordered, 20G IV placed in right bicep, 1 mg ativan given, pt AOx3, will continue to monitor.

## 2018-06-12 NOTE — ED ADULT NURSE REASSESSMENT NOTE - NS ED NURSE REASSESS COMMENT FT1
Pt resting comfortably in stretcher, resp even and unlabored, awaiting sobriety in am, tolerated PO fluids and food well, pt calm and cooperative, pt aware of plan of care, offers no complaints at this time, will continue to monitor.

## 2018-06-12 NOTE — ED PROVIDER NOTE - OBJECTIVE STATEMENT
51 y/o male with PMHx alcohol abuse, GERD, and HTN presents to the ED for evaluation alcohol intoxication. PT states his last drink was yesterday. Per pt believes he's withdrawing. Pt reports generalized shaking, headache, and n/v. Unable to obtain HPI secondary to intoxication.

## 2018-06-12 NOTE — ED ADULT NURSE REASSESSMENT NOTE - NS ED NURSE REASSESS COMMENT FT1
Pt vomited before taking 100mg librium PO, vomited liquid, pt drank a few cartons of milk throughout the night, will medicate w/ PO zofran, pt tolerated librium s/p vomiting, MD Isaac aware.

## 2018-06-12 NOTE — ED PROVIDER NOTE - CONSTITUTIONAL, MLM
normal... Awake, alert, emotional distress, teary eyed, unkempt, disheveled, strong smell of alcohol, NAD.

## 2018-06-13 VITALS
DIASTOLIC BLOOD PRESSURE: 85 MMHG | SYSTOLIC BLOOD PRESSURE: 134 MMHG | RESPIRATION RATE: 18 BRPM | HEART RATE: 100 BPM | OXYGEN SATURATION: 96 %

## 2018-06-13 LAB
ALBUMIN SERPL ELPH-MCNC: 4.3 G/DL — SIGNIFICANT CHANGE UP (ref 3.3–5.2)
ALP SERPL-CCNC: 62 U/L — SIGNIFICANT CHANGE UP (ref 40–120)
ALT FLD-CCNC: 23 U/L — SIGNIFICANT CHANGE UP
AMPHET UR-MCNC: NEGATIVE — SIGNIFICANT CHANGE UP
ANION GAP SERPL CALC-SCNC: 22 MMOL/L — HIGH (ref 5–17)
APAP SERPL-MCNC: <7.5 UG/ML — LOW (ref 10–26)
AST SERPL-CCNC: 34 U/L — SIGNIFICANT CHANGE UP
BARBITURATES UR SCN-MCNC: NEGATIVE — SIGNIFICANT CHANGE UP
BASOPHILS # BLD AUTO: 0 K/UL — SIGNIFICANT CHANGE UP (ref 0–0.2)
BASOPHILS NFR BLD AUTO: 0.8 % — SIGNIFICANT CHANGE UP (ref 0–2)
BENZODIAZ UR-MCNC: POSITIVE
BILIRUB SERPL-MCNC: 0.2 MG/DL — LOW (ref 0.4–2)
BUN SERPL-MCNC: 8 MG/DL — SIGNIFICANT CHANGE UP (ref 8–20)
CALCIUM SERPL-MCNC: 8.8 MG/DL — SIGNIFICANT CHANGE UP (ref 8.6–10.2)
CHLORIDE SERPL-SCNC: 99 MMOL/L — SIGNIFICANT CHANGE UP (ref 98–107)
CK MB CFR SERPL CALC: 5.6 NG/ML — SIGNIFICANT CHANGE UP (ref 0–6.7)
CK SERPL-CCNC: 378 U/L — HIGH (ref 30–200)
CO2 SERPL-SCNC: 20 MMOL/L — LOW (ref 22–29)
COCAINE METAB.OTHER UR-MCNC: NEGATIVE — SIGNIFICANT CHANGE UP
CREAT SERPL-MCNC: 0.89 MG/DL — SIGNIFICANT CHANGE UP (ref 0.5–1.3)
EOSINOPHIL # BLD AUTO: 0.1 K/UL — SIGNIFICANT CHANGE UP (ref 0–0.5)
EOSINOPHIL NFR BLD AUTO: 1.8 % — SIGNIFICANT CHANGE UP (ref 0–5)
ETHANOL SERPL-MCNC: 125 MG/DL — SIGNIFICANT CHANGE UP
ETHANOL SERPL-MCNC: 287 MG/DL — SIGNIFICANT CHANGE UP
GLUCOSE SERPL-MCNC: 147 MG/DL — HIGH (ref 70–115)
HCT VFR BLD CALC: 35.3 % — LOW (ref 42–52)
HGB BLD-MCNC: 11.1 G/DL — LOW (ref 14–18)
LIDOCAIN IGE QN: 86 U/L — HIGH (ref 22–51)
LYMPHOCYTES # BLD AUTO: 2.1 K/UL — SIGNIFICANT CHANGE UP (ref 1–4.8)
LYMPHOCYTES # BLD AUTO: 43 % — SIGNIFICANT CHANGE UP (ref 20–55)
MCHC RBC-ENTMCNC: 26.2 PG — LOW (ref 27–31)
MCHC RBC-ENTMCNC: 31.4 G/DL — LOW (ref 32–36)
MCV RBC AUTO: 83.5 FL — SIGNIFICANT CHANGE UP (ref 80–94)
METHADONE UR-MCNC: NEGATIVE — SIGNIFICANT CHANGE UP
MONOCYTES # BLD AUTO: 0.7 K/UL — SIGNIFICANT CHANGE UP (ref 0–0.8)
MONOCYTES NFR BLD AUTO: 14.7 % — HIGH (ref 3–10)
NEUTROPHILS # BLD AUTO: 2 K/UL — SIGNIFICANT CHANGE UP (ref 1.8–8)
NEUTROPHILS NFR BLD AUTO: 39.5 % — SIGNIFICANT CHANGE UP (ref 37–73)
OPIATES UR-MCNC: NEGATIVE — SIGNIFICANT CHANGE UP
PCP SPEC-MCNC: SIGNIFICANT CHANGE UP
PCP UR-MCNC: NEGATIVE — SIGNIFICANT CHANGE UP
PLATELET # BLD AUTO: 147 K/UL — LOW (ref 150–400)
POTASSIUM SERPL-MCNC: 3.3 MMOL/L — LOW (ref 3.5–5.3)
POTASSIUM SERPL-SCNC: 3.3 MMOL/L — LOW (ref 3.5–5.3)
PROT SERPL-MCNC: 7.2 G/DL — SIGNIFICANT CHANGE UP (ref 6.6–8.7)
RBC # BLD: 4.23 M/UL — LOW (ref 4.6–6.2)
RBC # FLD: 17.8 % — HIGH (ref 11–15.6)
SALICYLATES SERPL-MCNC: <0.6 MG/DL — LOW (ref 10–20)
SODIUM SERPL-SCNC: 141 MMOL/L — SIGNIFICANT CHANGE UP (ref 135–145)
THC UR QL: NEGATIVE — SIGNIFICANT CHANGE UP
WBC # BLD: 5 K/UL — SIGNIFICANT CHANGE UP (ref 4.8–10.8)
WBC # FLD AUTO: 5 K/UL — SIGNIFICANT CHANGE UP (ref 4.8–10.8)

## 2018-06-13 PROCEDURE — 93005 ELECTROCARDIOGRAM TRACING: CPT

## 2018-06-13 PROCEDURE — 82553 CREATINE MB FRACTION: CPT

## 2018-06-13 PROCEDURE — 99285 EMERGENCY DEPT VISIT HI MDM: CPT | Mod: 25

## 2018-06-13 PROCEDURE — 80307 DRUG TEST PRSMV CHEM ANLYZR: CPT

## 2018-06-13 PROCEDURE — 96375 TX/PRO/DX INJ NEW DRUG ADDON: CPT

## 2018-06-13 PROCEDURE — 83690 ASSAY OF LIPASE: CPT

## 2018-06-13 PROCEDURE — 36415 COLL VENOUS BLD VENIPUNCTURE: CPT

## 2018-06-13 PROCEDURE — 96374 THER/PROPH/DIAG INJ IV PUSH: CPT

## 2018-06-13 PROCEDURE — 93010 ELECTROCARDIOGRAM REPORT: CPT

## 2018-06-13 PROCEDURE — 82550 ASSAY OF CK (CPK): CPT

## 2018-06-13 PROCEDURE — 85027 COMPLETE CBC AUTOMATED: CPT

## 2018-06-13 PROCEDURE — 80053 COMPREHEN METABOLIC PANEL: CPT

## 2018-06-13 RX ORDER — SODIUM CHLORIDE 9 MG/ML
1000 INJECTION INTRAMUSCULAR; INTRAVENOUS; SUBCUTANEOUS
Qty: 0 | Refills: 0 | Status: DISCONTINUED | OUTPATIENT
Start: 2018-06-13 | End: 2018-06-17

## 2018-06-13 RX ORDER — POTASSIUM CHLORIDE 20 MEQ
40 PACKET (EA) ORAL ONCE
Qty: 0 | Refills: 0 | Status: COMPLETED | OUTPATIENT
Start: 2018-06-13 | End: 2018-06-13

## 2018-06-13 RX ORDER — PANTOPRAZOLE SODIUM 20 MG/1
40 TABLET, DELAYED RELEASE ORAL ONCE
Qty: 0 | Refills: 0 | Status: COMPLETED | OUTPATIENT
Start: 2018-06-13 | End: 2018-06-13

## 2018-06-13 RX ORDER — SODIUM CHLORIDE 9 MG/ML
3 INJECTION INTRAMUSCULAR; INTRAVENOUS; SUBCUTANEOUS ONCE
Qty: 0 | Refills: 0 | Status: COMPLETED | OUTPATIENT
Start: 2018-06-13 | End: 2018-06-13

## 2018-06-13 RX ORDER — KETOROLAC TROMETHAMINE 30 MG/ML
15 SYRINGE (ML) INJECTION ONCE
Qty: 0 | Refills: 0 | Status: DISCONTINUED | OUTPATIENT
Start: 2018-06-13 | End: 2018-06-13

## 2018-06-13 RX ORDER — ONDANSETRON 8 MG/1
4 TABLET, FILM COATED ORAL ONCE
Qty: 0 | Refills: 0 | Status: COMPLETED | OUTPATIENT
Start: 2018-06-13 | End: 2018-06-13

## 2018-06-13 RX ADMIN — Medication 40 MILLIEQUIVALENT(S): at 06:49

## 2018-06-13 RX ADMIN — Medication 15 MILLIGRAM(S): at 01:26

## 2018-06-13 RX ADMIN — SODIUM CHLORIDE 125 MILLILITER(S): 9 INJECTION INTRAMUSCULAR; INTRAVENOUS; SUBCUTANEOUS at 00:57

## 2018-06-13 RX ADMIN — Medication 1 MILLIGRAM(S): at 01:57

## 2018-06-13 RX ADMIN — ONDANSETRON 4 MILLIGRAM(S): 8 TABLET, FILM COATED ORAL at 00:56

## 2018-06-13 RX ADMIN — SODIUM CHLORIDE 3 MILLILITER(S): 9 INJECTION INTRAMUSCULAR; INTRAVENOUS; SUBCUTANEOUS at 00:57

## 2018-06-13 RX ADMIN — Medication 100 MILLIGRAM(S): at 01:57

## 2018-06-13 RX ADMIN — Medication 15 MILLIGRAM(S): at 00:56

## 2018-06-13 RX ADMIN — PANTOPRAZOLE SODIUM 40 MILLIGRAM(S): 20 TABLET, DELAYED RELEASE ORAL at 00:56

## 2018-06-14 ENCOUNTER — CHART COPY (OUTPATIENT)
Age: 53
End: 2018-06-14

## 2018-06-14 ENCOUNTER — APPOINTMENT (OUTPATIENT)
Dept: ORTHOPEDIC SURGERY | Facility: CLINIC | Age: 53
End: 2018-06-14
Payer: MEDICAID

## 2018-06-14 VITALS
BODY MASS INDEX: 27.83 KG/M2 | SYSTOLIC BLOOD PRESSURE: 122 MMHG | HEART RATE: 85 BPM | DIASTOLIC BLOOD PRESSURE: 84 MMHG | WEIGHT: 163 LBS | HEIGHT: 64 IN

## 2018-06-14 DIAGNOSIS — M19.011 PRIMARY OSTEOARTHRITIS, RIGHT SHOULDER: ICD-10-CM

## 2018-06-14 DIAGNOSIS — M19.012 PRIMARY OSTEOARTHRITIS, LEFT SHOULDER: ICD-10-CM

## 2018-06-14 DIAGNOSIS — M25.511 PAIN IN RIGHT SHOULDER: ICD-10-CM

## 2018-06-14 PROCEDURE — 20610 DRAIN/INJ JOINT/BURSA W/O US: CPT | Mod: RT

## 2018-06-14 PROCEDURE — 73030 X-RAY EXAM OF SHOULDER: CPT | Mod: LT

## 2018-06-14 PROCEDURE — 99215 OFFICE O/P EST HI 40 MIN: CPT | Mod: 25

## 2018-06-16 ENCOUNTER — EMERGENCY (EMERGENCY)
Facility: HOSPITAL | Age: 53
LOS: 1 days | Discharge: HOME | End: 2018-06-16
Attending: EMERGENCY MEDICINE
Payer: COMMERCIAL

## 2018-06-16 VITALS
DIASTOLIC BLOOD PRESSURE: 71 MMHG | TEMPERATURE: 97 F | OXYGEN SATURATION: 98 % | SYSTOLIC BLOOD PRESSURE: 105 MMHG | RESPIRATION RATE: 18 BRPM | HEART RATE: 99 BPM

## 2018-06-16 VITALS
HEART RATE: 102 BPM | DIASTOLIC BLOOD PRESSURE: 90 MMHG | OXYGEN SATURATION: 97 % | TEMPERATURE: 98 F | RESPIRATION RATE: 16 BRPM | SYSTOLIC BLOOD PRESSURE: 134 MMHG | WEIGHT: 164.91 LBS | HEIGHT: 65 IN

## 2018-06-16 PROCEDURE — 99284 EMERGENCY DEPT VISIT MOD MDM: CPT

## 2018-06-16 PROCEDURE — 99285 EMERGENCY DEPT VISIT HI MDM: CPT

## 2018-06-16 NOTE — ED PROVIDER NOTE - OBJECTIVE STATEMENT
C/C "I DRANK ALOT"  51 YO MALE WITH ABOVE CC. FREQUENT PATIENT IN ER FOR SAME. HAS BEEN TO REHAB AND RELAPSES REPEATEDLY. NO TRAUMA TODAY. NO OTHER COMPLAINTS.  MED HX Alcohol abuse    Alcohol abuse    Alcohol abuse    Asthma    GERD (gastroesophageal reflux disease)    High cholesterol    High cholesterol    HTN (hypertension)    Hypertension

## 2018-06-16 NOTE — ED ADULT TRIAGE NOTE - CHIEF COMPLAINT QUOTE
Found sleeping next to a [employer]. Reports drinking a pint and half of vodka. Easily arousable to verbal and tactile stimuli. Undressed and placed in yellow gown and elopement precautions initiated.

## 2018-06-16 NOTE — ED ADULT NURSE REASSESSMENT NOTE - NS ED NURSE REASSESS COMMENT FT1
Pt. ambulating w/o difficulty, MD at bedside to d/c. SW at bedside to provide with transport home. provided with clothes. awaiting d/c

## 2018-06-16 NOTE — ED PROVIDER NOTE - SHIFT CHANGE DETAILS
ETOH TRIP AND FALL ON FACE  CHECK CT HEAD, NECK ,MAXILLOFACIAL BONES. TREAT AS INDICATED.  OBSERVE UNTIL SOBER AND DISPO ETOH INTOXICATION  SEEN BEFORE FOR SAME  OBSERVE TO SOBRIETY AND DISPO

## 2018-06-16 NOTE — ED ADULT NURSE NOTE - CHPI ED SYMPTOMS NEG
no weakness/no dizziness/no fever/no pain/no decreased eating/drinking/no chills/no tingling/no nausea/no vomiting/no numbness

## 2018-06-16 NOTE — CHART NOTE - NSCHARTNOTEFT_GEN_A_CORE
RACQUEL Note - pt required housing and transportation - RACQUEL called [facility] and spoke with Kaylynn. pt placed at [address] [address] and Medicaid logisticare called [employer]# [id number] and pt to be transported from ED Lobby. pt informed that he will have to go to [facility] on Monday to be compliant and be placed in permanent placement.

## 2018-06-16 NOTE — ED ADULT NURSE REASSESSMENT NOTE - NS ED NURSE REASSESS COMMENT FT1
pt status unchanged, refer to flowsheet and chart, pt safety maintained, pt hemodynamically stable, pt refused vitals

## 2018-06-17 ENCOUNTER — EMERGENCY (EMERGENCY)
Facility: HOSPITAL | Age: 53
LOS: 0 days | Discharge: HOME | End: 2018-06-17
Attending: EMERGENCY MEDICINE | Admitting: EMERGENCY MEDICINE
Payer: MEDICAID

## 2018-06-17 VITALS — HEIGHT: 65 IN | WEIGHT: 164.91 LBS

## 2018-06-17 VITALS — HEART RATE: 95 BPM

## 2018-06-17 DIAGNOSIS — F10.10 ALCOHOL ABUSE, UNCOMPLICATED: ICD-10-CM

## 2018-06-17 DIAGNOSIS — K21.9 GASTRO-ESOPHAGEAL REFLUX DISEASE WITHOUT ESOPHAGITIS: ICD-10-CM

## 2018-06-17 DIAGNOSIS — J45.909 UNSPECIFIED ASTHMA, UNCOMPLICATED: ICD-10-CM

## 2018-06-17 PROCEDURE — 99284 EMERGENCY DEPT VISIT MOD MDM: CPT

## 2018-06-17 RX ADMIN — Medication 50 MILLIGRAM(S): at 12:00

## 2018-06-17 NOTE — ED PROVIDER NOTE - OBJECTIVE STATEMENT
53 y/o male with a PMHx of GERD ETOH USE (quart and half of vodka starts in Am) and asthma presents to the ED from [facility] stating that he needs to detox and is in ETOH withdrawal. Last drink was yesterday. Pt reports headache, sweating, coldness diarrhea, and vomiting. NKDA.

## 2018-06-17 NOTE — ED ADULT TRIAGE NOTE - CHIEF COMPLAINT QUOTE
Pt. to the ED from [facility] staring that he needs to stop drinking and is in ETOH withdrawal. Pt. states he drinks 1.5 Quart of Vodka a day and last drink was yesterday-

## 2018-06-17 NOTE — ED ADULT NURSE NOTE - CHIEF COMPLAINT QUOTE
Pt. to the ED from [facility] staring that he needs to stop drinking and is in ETOH withdrawal. Pt. states he drinks 1.5 Quart of Vodka a day and last drink was yesterday-

## 2018-06-17 NOTE — ED PROVIDER NOTE - MUSCULOSKELETAL, MLM
Spine appears normal, range of motion is not limited, no muscle or joint tenderness. Compartments soft.

## 2018-06-19 ENCOUNTER — CHART COPY (OUTPATIENT)
Age: 53
End: 2018-06-19

## 2018-06-20 ENCOUNTER — MEDICATION RENEWAL (OUTPATIENT)
Age: 53
End: 2018-06-20

## 2018-06-21 ENCOUNTER — EMERGENCY (EMERGENCY)
Facility: HOSPITAL | Age: 53
LOS: 1 days | Discharge: HOME | End: 2018-06-21
Attending: EMERGENCY MEDICINE
Payer: COMMERCIAL

## 2018-06-21 ENCOUNTER — APPOINTMENT (OUTPATIENT)
Dept: INTERNAL MEDICINE | Facility: CLINIC | Age: 53
End: 2018-06-21

## 2018-06-21 VITALS
TEMPERATURE: 98 F | HEART RATE: 98 BPM | DIASTOLIC BLOOD PRESSURE: 82 MMHG | RESPIRATION RATE: 18 BRPM | WEIGHT: 179.9 LBS | SYSTOLIC BLOOD PRESSURE: 120 MMHG | OXYGEN SATURATION: 96 % | HEIGHT: 65 IN

## 2018-06-21 VITALS
OXYGEN SATURATION: 100 % | HEART RATE: 95 BPM | RESPIRATION RATE: 20 BRPM | TEMPERATURE: 98 F | SYSTOLIC BLOOD PRESSURE: 142 MMHG | DIASTOLIC BLOOD PRESSURE: 89 MMHG

## 2018-06-21 VITALS — HEART RATE: 94 BPM | SYSTOLIC BLOOD PRESSURE: 107 MMHG | OXYGEN SATURATION: 97 % | DIASTOLIC BLOOD PRESSURE: 65 MMHG

## 2018-06-21 PROCEDURE — 99285 EMERGENCY DEPT VISIT HI MDM: CPT

## 2018-06-21 PROCEDURE — 99284 EMERGENCY DEPT VISIT MOD MDM: CPT | Mod: 25

## 2018-06-21 NOTE — ED PROVIDER NOTE - OBJECTIVE STATEMENT
52y old well known to ed, recurrent alcohol abuse, presents with intox, no complaints, on arrival was sleeping, arousable.

## 2018-06-21 NOTE — ED PROVIDER NOTE - PROGRESS NOTE DETAILS
patient monitored in andre am arousable, tolerating po well, ambulatory, detox discussed, patient refused

## 2018-06-21 NOTE — ED ADULT TRIAGE NOTE - CHIEF COMPLAINT QUOTE
Pt BIBA for alcohol intoxication.  Pt was found sleeping outside behind [employer].  Pt admits to drinking 2 quarts of vodka.  Only physical complaint is being cold.  Pt is alert and awake.  Following commands.  Pt placed in yellow gown.  Belongings collected, labeled and secured.

## 2018-06-21 NOTE — ED ADULT NURSE NOTE - CHIEF COMPLAINT QUOTE
Pt BIBA for alcohol intoxication.  Pt was found sleeping outside behind [employer].  Pt admits to drinking 2 quarts of vodka.  Only physical complaint is being cold.  Pt is alert and awake.  Following commands.  Pt placed in yellow gown.  Belongings collected, labeled and secured.

## 2018-06-21 NOTE — ED PROVIDER NOTE - PHYSICAL EXAMINATION
Constitutional : Appears comfortably, talking in full sentences  no distress  Head :NC AT , no swelling  Eyes :eomi, no swelling  no oral injury  Mouth :mm moist,  Neck : supple, trachea in midline  Chest :Andrew air entry, symm chest expansion, no distress  Heart :S1 S2 distant  Abdomen :abd soft, non tender  Musc/Skel :ext no swelling, no deformity, no spine tenderness, distal pulses present  Neuro  :AAO 3 no focal deficits

## 2018-06-21 NOTE — ED ADULT NURSE REASSESSMENT NOTE - NS ED NURSE REASSESS COMMENT FT1
pt belongings returned, pt stated " I can't wear this shit it is all wet. "  pt provided with paper scrubs, pt told staff he was not going to wear them.  SW able to provide sweats and shirt for pt.  pt given dry clean clothing to wear.

## 2018-06-21 NOTE — ED ADULT TRIAGE NOTE - CHIEF COMPLAINT QUOTE
Pt BIBA intoxicated, pt admits to drinking "a lot" today and was picked up from [facility] after they called EMS. Clothing removed, labeled, and locked in . Pt placed in yellow gown.

## 2018-06-21 NOTE — ED PROVIDER NOTE - OBJECTIVE STATEMENT
53 y/o male with PMHx alcohol abuse presents to the ED via EMS for evaluation of alcohol intoxication. Pt is sleeping comfortably in bed, but easily arousable. Affect consistent with alcohol intoxication. No signs of respiratory distress or deformities. Pt is unable to provide a reliable history at this time. PT has been seen at [facility] multiple times for similar complaints. History is limited due to suspected intoxication.

## 2018-06-22 ENCOUNTER — CHART COPY (OUTPATIENT)
Age: 53
End: 2018-06-22

## 2018-06-22 ENCOUNTER — EMERGENCY (EMERGENCY)
Facility: HOSPITAL | Age: 53
LOS: 1 days | Discharge: HOME | End: 2018-06-22
Attending: EMERGENCY MEDICINE
Payer: COMMERCIAL

## 2018-06-22 VITALS
DIASTOLIC BLOOD PRESSURE: 84 MMHG | HEART RATE: 108 BPM | OXYGEN SATURATION: 96 % | TEMPERATURE: 98 F | RESPIRATION RATE: 16 BRPM | SYSTOLIC BLOOD PRESSURE: 125 MMHG

## 2018-06-22 VITALS
RESPIRATION RATE: 20 BRPM | TEMPERATURE: 98 F | DIASTOLIC BLOOD PRESSURE: 90 MMHG | SYSTOLIC BLOOD PRESSURE: 139 MMHG | OXYGEN SATURATION: 97 % | HEART RATE: 109 BPM

## 2018-06-22 LAB
ALBUMIN SERPL ELPH-MCNC: 4.3 G/DL — SIGNIFICANT CHANGE UP (ref 3.3–5.2)
ALP SERPL-CCNC: 57 U/L — SIGNIFICANT CHANGE UP (ref 40–120)
ALT FLD-CCNC: 28 U/L — SIGNIFICANT CHANGE UP
AMPHET UR-MCNC: NEGATIVE — SIGNIFICANT CHANGE UP
ANION GAP SERPL CALC-SCNC: 23 MMOL/L — HIGH (ref 5–17)
APPEARANCE UR: CLEAR — SIGNIFICANT CHANGE UP
AST SERPL-CCNC: 63 U/L — HIGH
BARBITURATES UR SCN-MCNC: NEGATIVE — SIGNIFICANT CHANGE UP
BENZODIAZ UR-MCNC: POSITIVE
BILIRUB DIRECT SERPL-MCNC: 0.1 MG/DL — SIGNIFICANT CHANGE UP (ref 0–0.3)
BILIRUB INDIRECT FLD-MCNC: <0.1 MG/DL — LOW (ref 0.2–1)
BILIRUB SERPL-MCNC: <0.2 MG/DL — LOW (ref 0.4–2)
BILIRUB UR-MCNC: NEGATIVE — SIGNIFICANT CHANGE UP
BUN SERPL-MCNC: 10 MG/DL — SIGNIFICANT CHANGE UP (ref 8–20)
CALCIUM SERPL-MCNC: 8.9 MG/DL — SIGNIFICANT CHANGE UP (ref 8.6–10.2)
CHLORIDE SERPL-SCNC: 100 MMOL/L — SIGNIFICANT CHANGE UP (ref 98–107)
CO2 SERPL-SCNC: 20 MMOL/L — LOW (ref 22–29)
COCAINE METAB.OTHER UR-MCNC: NEGATIVE — SIGNIFICANT CHANGE UP
COLOR SPEC: YELLOW — SIGNIFICANT CHANGE UP
CREAT SERPL-MCNC: 0.99 MG/DL — SIGNIFICANT CHANGE UP (ref 0.5–1.3)
DIFF PNL FLD: NEGATIVE — SIGNIFICANT CHANGE UP
EPI CELLS # UR: SIGNIFICANT CHANGE UP
ETHANOL SERPL-MCNC: 281 MG/DL — SIGNIFICANT CHANGE UP
ETHANOL SERPL-MCNC: 323 MG/DL — SIGNIFICANT CHANGE UP
GLUCOSE SERPL-MCNC: 152 MG/DL — HIGH (ref 70–115)
GLUCOSE UR QL: NEGATIVE MG/DL — SIGNIFICANT CHANGE UP
HCT VFR BLD CALC: 35.5 % — LOW (ref 42–52)
HGB BLD-MCNC: 11.4 G/DL — LOW (ref 14–18)
KETONES UR-MCNC: NEGATIVE — SIGNIFICANT CHANGE UP
LEUKOCYTE ESTERASE UR-ACNC: NEGATIVE — SIGNIFICANT CHANGE UP
MAGNESIUM SERPL-MCNC: 2.1 MG/DL — SIGNIFICANT CHANGE UP (ref 1.6–2.6)
MCHC RBC-ENTMCNC: 26.8 PG — LOW (ref 27–31)
MCHC RBC-ENTMCNC: 32.1 G/DL — SIGNIFICANT CHANGE UP (ref 32–36)
MCV RBC AUTO: 83.3 FL — SIGNIFICANT CHANGE UP (ref 80–94)
METHADONE UR-MCNC: NEGATIVE — SIGNIFICANT CHANGE UP
NITRITE UR-MCNC: NEGATIVE — SIGNIFICANT CHANGE UP
OPIATES UR-MCNC: NEGATIVE — SIGNIFICANT CHANGE UP
PCP SPEC-MCNC: SIGNIFICANT CHANGE UP
PCP UR-MCNC: NEGATIVE — SIGNIFICANT CHANGE UP
PH UR: 5 — SIGNIFICANT CHANGE UP (ref 5–8)
PHOSPHATE SERPL-MCNC: 2.5 MG/DL — SIGNIFICANT CHANGE UP (ref 2.4–4.7)
PLATELET # BLD AUTO: 185 K/UL — SIGNIFICANT CHANGE UP (ref 150–400)
POTASSIUM SERPL-MCNC: 3.7 MMOL/L — SIGNIFICANT CHANGE UP (ref 3.5–5.3)
POTASSIUM SERPL-SCNC: 3.7 MMOL/L — SIGNIFICANT CHANGE UP (ref 3.5–5.3)
PROT SERPL-MCNC: 7.7 G/DL — SIGNIFICANT CHANGE UP (ref 6.6–8.7)
PROT UR-MCNC: 30 MG/DL
RBC # BLD: 4.26 M/UL — LOW (ref 4.6–6.2)
RBC # FLD: 19.4 % — HIGH (ref 11–15.6)
SODIUM SERPL-SCNC: 143 MMOL/L — SIGNIFICANT CHANGE UP (ref 135–145)
SP GR SPEC: 1.01 — SIGNIFICANT CHANGE UP (ref 1.01–1.02)
THC UR QL: NEGATIVE — SIGNIFICANT CHANGE UP
UROBILINOGEN FLD QL: NEGATIVE MG/DL — SIGNIFICANT CHANGE UP
WBC # BLD: 4.3 K/UL — LOW (ref 4.8–10.8)
WBC # FLD AUTO: 4.3 K/UL — LOW (ref 4.8–10.8)

## 2018-06-22 PROCEDURE — 80307 DRUG TEST PRSMV CHEM ANLYZR: CPT

## 2018-06-22 PROCEDURE — 96372 THER/PROPH/DIAG INJ SC/IM: CPT

## 2018-06-22 PROCEDURE — 99285 EMERGENCY DEPT VISIT HI MDM: CPT | Mod: 25

## 2018-06-22 PROCEDURE — 82962 GLUCOSE BLOOD TEST: CPT

## 2018-06-22 PROCEDURE — 36415 COLL VENOUS BLD VENIPUNCTURE: CPT

## 2018-06-22 PROCEDURE — 99284 EMERGENCY DEPT VISIT MOD MDM: CPT

## 2018-06-22 RX ORDER — DIPHENHYDRAMINE HCL 50 MG
25 CAPSULE ORAL ONCE
Qty: 0 | Refills: 0 | Status: COMPLETED | OUTPATIENT
Start: 2018-06-22 | End: 2018-06-22

## 2018-06-22 RX ORDER — HALOPERIDOL DECANOATE 100 MG/ML
5 INJECTION INTRAMUSCULAR ONCE
Qty: 0 | Refills: 0 | Status: COMPLETED | OUTPATIENT
Start: 2018-06-22 | End: 2018-06-22

## 2018-06-22 RX ADMIN — Medication 25 MILLIGRAM(S): at 00:05

## 2018-06-22 RX ADMIN — HALOPERIDOL DECANOATE 5 MILLIGRAM(S): 100 INJECTION INTRAMUSCULAR at 00:05

## 2018-06-22 RX ADMIN — Medication 50 MILLIGRAM(S): at 23:28

## 2018-06-22 RX ADMIN — Medication 50 MILLIGRAM(S): at 22:00

## 2018-06-22 RX ADMIN — Medication 2 MILLIGRAM(S): at 00:05

## 2018-06-22 NOTE — ED ADULT NURSE REASSESSMENT NOTE - NS ED NURSE REASSESS COMMENT FT1
patient stated and screaming in front of [facility] that he wants to stick a knife in his Neck, MD Rajputustkylee aware.

## 2018-06-22 NOTE — ED ADULT NURSE NOTE - OBJECTIVE STATEMENT
Pt received sitting on stretcher screaming and verbally abusive towards staff. ALOOB pt styates he is homeless and has been drinking and his mom . Pt AOx3   Lungs CTA, RR even unlabored. Ab soft non tender, + bowel sounds x 4quads. Denies Nausea, Vomiting, Diarrhea. Skin warm, dry, color appropriate for age and race.

## 2018-06-22 NOTE — ED ADULT NURSE NOTE - CHIEF COMPLAINT QUOTE
Pt BIBA intoxicated, pt admits to drinking "a lot" today and was picked up from [facility] after they called EMS. Clothing removed, labeled, and locked in . Pt placed in yellow gown.

## 2018-06-22 NOTE — ED PROVIDER NOTE - CONSTITUTIONAL, MLM
normal... Pt is crying in ED, awake, alert, oriented to person, place, time/situation and in no apparent distress.

## 2018-06-22 NOTE — ED PROVIDER NOTE - CARE PLAN
Principal Discharge DX:	Alcohol abuse Principal Discharge DX:	Alcohol abuse  Secondary Diagnosis:	Substance induced mood disorder

## 2018-06-22 NOTE — ED ADULT NURSE NOTE - CHIEF COMPLAINT QUOTE
Pt BIBA found sitting on a curb in front of [facility]. Pt admits to drinking " a lot of alcohol." Pt drinks every day. Denies any injuries or falls. Pt undressed and placed in yellow gown, belongings labeled and secured in  by CHARLI Morales.

## 2018-06-22 NOTE — ED ADULT NURSE REASSESSMENT NOTE - NS ED NURSE REASSESS COMMENT FT1
patient received from night RN. patient awake, alert, oriented x2. patient state he does not feel good because he is de-toxing.
PT resting. calm and cooperative. awaiting dipo/

## 2018-06-22 NOTE — ED ADULT NURSE NOTE - DISCHARGE TEACHING
return to local ED or call 911 EMS if symptoms worsen or thoughts to harm self or others develop return to local ED or call 911 EMS if symptoms worsen, withdrawal symptoms return or thoughts to harm self or others develop,

## 2018-06-22 NOTE — ED BEHAVIORAL HEALTH NOTE - BEHAVIORAL HEALTH NOTE
QL- patient anxious, tremulous, reports drinking 2 quarts vodka daily, frequent visits to the ED, making suicidal statements in ED stating "give me a knife so I can cut my neck",  @ 19:58, aprylwa protocol initiated, will medicate with librium prn and plan to spend night for reassessment tomorrow when sober

## 2018-06-22 NOTE — ED PROVIDER NOTE - PROGRESS NOTE DETAILS
patient eating dinner  will observe patient signed out to Dr. Herrera pending reeval and disp signed out to am ed attedning  night was unevetful  plan a re evlaution  recd tylenol for headache, continue CIWA was 4 last recd linrbrium

## 2018-06-22 NOTE — ED ADULT TRIAGE NOTE - CHIEF COMPLAINT QUOTE
Pt BIBA found sitting on a curb in front of [facility]. Pt admits to drinking " a lot of alcohol." Pt drinks every day. Denies any injuries or falls. Pt BIBA found sitting on a curb in front of [facility]. Pt admits to drinking " a lot of alcohol." Pt drinks every day. Denies any injuries or falls. Pt undressed and placed in yellow gown, belongings labeled and secured in  by CHARLI Morales.

## 2018-06-22 NOTE — ED ADULT NURSE REASSESSMENT NOTE - CONDITION
unchanged/Pt continues to experience ETOH withdrawal. Behavior in control. Pulse 120-124. medicated with Librium 50 mg. Complains of diarrhea, mild nausea and n 3+ tremors noted. Pt is diaphoretic. H2) given. Has drank several bottles of H20. Refusing food at this time. Lights off and sleeping at this time

## 2018-06-22 NOTE — ED PROVIDER NOTE - OBJECTIVE STATEMENT
51 y/o M pt with hx of EtOH abuse, asthma, GERD, HLD, and HTN presents to ED for EtOH intoxication. Pt is well known to the ED and was discharged 6 hours ago for EtOH intoxication. Pt was found on sidewalk outside of [facility]. Pt admits to consuming EtOH. Pt drinks every day. Denies trauma or fall. No further complaints at this time.

## 2018-06-23 DIAGNOSIS — F10.10 ALCOHOL ABUSE, UNCOMPLICATED: ICD-10-CM

## 2018-06-23 DIAGNOSIS — F19.94 OTHER PSYCHOACTIVE SUBSTANCE USE, UNSPECIFIED WITH PSYCHOACTIVE SUBSTANCE-INDUCED MOOD DISORDER: ICD-10-CM

## 2018-06-23 PROCEDURE — 93010 ELECTROCARDIOGRAM REPORT: CPT

## 2018-06-23 PROCEDURE — 90792 PSYCH DIAG EVAL W/MED SRVCS: CPT

## 2018-06-23 RX ORDER — ALBUTEROL 90 UG/1
2 AEROSOL, METERED ORAL EVERY 6 HOURS
Qty: 0 | Refills: 0 | Status: DISCONTINUED | OUTPATIENT
Start: 2018-06-23 | End: 2018-06-27

## 2018-06-23 RX ORDER — PANTOPRAZOLE SODIUM 20 MG/1
40 TABLET, DELAYED RELEASE ORAL ONCE
Qty: 0 | Refills: 0 | Status: COMPLETED | OUTPATIENT
Start: 2018-06-23 | End: 2018-06-23

## 2018-06-23 RX ADMIN — Medication 50 MILLIGRAM(S): at 23:10

## 2018-06-23 RX ADMIN — Medication 50 MILLIGRAM(S): at 08:23

## 2018-06-23 RX ADMIN — ALBUTEROL 2 PUFF(S): 90 AEROSOL, METERED ORAL at 02:08

## 2018-06-23 RX ADMIN — Medication 50 MILLIGRAM(S): at 11:24

## 2018-06-23 RX ADMIN — PANTOPRAZOLE SODIUM 40 MILLIGRAM(S): 20 TABLET, DELAYED RELEASE ORAL at 04:00

## 2018-06-23 RX ADMIN — Medication 50 MILLIGRAM(S): at 01:58

## 2018-06-23 RX ADMIN — Medication 50 MILLIGRAM(S): at 15:20

## 2018-06-23 NOTE — ED ADULT NURSE REASSESSMENT NOTE - NS ED NURSE REASSESS COMMENT FT1
Patient ate 75% of his dinner.  Patient encouraged to drink fluids.  No attempts to harm self or others.

## 2018-06-23 NOTE — ED BEHAVIORAL HEALTH ASSESSMENT NOTE - HPI (INCLUDE ILLNESS QUALITY, SEVERITY, DURATION, TIMING, CONTEXT, MODIFYING FACTORS, ASSOCIATED SIGNS AND SYMPTOMS)
Patient a 53 y/o single  male, unemployed, homeless, with no prior Psychiatric Hx, no prior SI/SA, hx of ETOH abuse daily , medically has HTN; HLD; GERD with Asthma, was BIB/EMS from the street.     He was initially discharged from ED 6 hours ago, and he is back with ETOH level of 281, with associated tremors. He was given Librium 50 mg today AM and added that he was never prescribed any psych meds, he feels that he is depressed with poor sleep/appetite. He was never admitted in a psych unit, denied drug abuse other than ETOH daily, denied A/H or paranoid beliefs.    He added that he was admitted at [facility] 2 months earlier for Detox/rehab and was there for 2 weeks and continued to drink again the same day he was discharged. he endorsed that he ahs hx fo Black Out/Pass out behavior, he also had ETOH induced Seizure, but does not remember the exact date. He has been to multiple Rehab/Detox, and is homeless, and has a court date for injury he received on his arms B/L. Currently, he has shakes, and continues to receive PRN Librium for stability. He added that he started to drink since the age of 21, has poor social support, his parents , and planning to go for compensation as suggested by his . He requests if he could stay overnight and would be able to go tomorrow, he was in shelter before, but refusing  to go to a shelter. he has shakes and was given another dose of Librium 50 mg again.    He is refusing  to go for rehab/detox.

## 2018-06-23 NOTE — ED ADULT NURSE REASSESSMENT NOTE - NS ED NURSE REASSESS COMMENT FT1
Assumed care of patient at 0720.  Patient resting in bed asleep.  No distress noted and safety of patient maintained.

## 2018-06-23 NOTE — ED BEHAVIORAL HEALTH ASSESSMENT NOTE - SUMMARY
Patient a 53 y/o single  male, unemployed, homeless, with no prior Psychiatric Hx, no prior SI/SA, hx of ETOH abuse daily , medically has HTN; HLD; GERD with Asthma, was BIB/EMS from the street.     He was initially discharged from ED 6 hours ago, and he is back with ETOH level of 281, with associated tremors. He was given Librium 50 mg today AM and added that he was never prescribed any psych meds, he feels that he is depressed with poor sleep/appetite. He was never admitted in a psych unit, denied drug abuse other than ETOH daily, denied A/H or paranoid beliefs.    He added that he was admitted at [facility] 2 months earlier for Detox/rehab and was there for 2 weeks and continued to drink again the same day he was discharged. he endorsed that he ahs hx fo Black Out/Pass out behavior, he also had ETOH induced Seizure, but does not remember the exact date. He has been to multiple Rehab/Detox, and is homeless, and has a court date for injury he received on his arms B/L. Currently, he has shakes, and continues to receive PRN Librium for stability. He added that he started to drink since the age of 21, has poor social support, his parents , and planning to go for compensation as suggested by his . He requests if he could stay overnight and would be able to go tomorrow, he was in shelter before, but refusing  to go to a shelter. he has shakes and was given another dose of Librium 50 mg again.    He is refusing  to go for rehab/detox.    Discharge in AM on 2018 Patient a 53 y/o single  male, unemployed, homeless, with no prior Psychiatric Hx, no prior SI/SA, hx of ETOH abuse daily , medically has HTN; HLD; GERD with Asthma, was BIB/EMS from the street.     He was initially discharged from ED 6 hours ago, and he is back with ETOH level of 281, with associated tremors. He was given Librium 50 mg today AM and added that he was never prescribed any psych meds, he feels that he is depressed with poor sleep/appetite. He was never admitted in a psych unit, denied drug abuse other than ETOH daily, denied A/H or paranoid beliefs.    He added that he was admitted at [facility] 2 months earlier for Detox/rehab and was there for 2 weeks and continued to drink again the same day he was discharged. he endorsed that he ahs hx fo Black Out/Pass out behavior, he also had ETOH induced Seizure, but does not remember the exact date. He has been to multiple Rehab/Detox, and is homeless, and has a court date for injury he received on his arms B/L. Currently, he has shakes, and continues to receive PRN Librium for stability. He added that he started to drink since the age of 21, has poor social support, his parents , and planning to go for compensation as suggested by his . He requests if he could stay overnight and would be able to go tomorrow, he was in shelter before, but refusing  to go to a shelter. he has shakes and was given another dose of Librium 50 mg again.    He is refusing  to go for rehab/detox.    Discharge in AM on 2018.    2018: patient denying suicidal ideation/intent/plan. Patient feels fine going home. Patient safe for discharge.

## 2018-06-23 NOTE — ED ADULT NURSE REASSESSMENT NOTE - NS ED NURSE REASSESS COMMENT FT1
Patient awake in bed complaining of "not feeling well".  Patient complaint of nausea and anxiety and noted to have visible tremors in upper extremities.    CWIA score assessed as 9.  Patient offered and accepted Librium 50 mg PO at 1124 with results pending.  Safety of patient maintained.

## 2018-06-23 NOTE — ED ADULT NURSE REASSESSMENT NOTE - NS ED NURSE REASSESS COMMENT FT1
pt requesting librium, out of bed ambulating in common area, CIWA performed with score of 4, pt given ordered librium, will re-assessed.

## 2018-06-23 NOTE — ED ADULT NURSE REASSESSMENT NOTE - NS ED NURSE REASSESS COMMENT FT1
Patient complaint of feeling anxious upon waking.  Patient complaint of mild nausea and noted to have visible tremors in bilateral upper extremities.  Patient offered and accepted Librium 50mg PO for a CIWA score of 9.  Patient ate 25% of his breakfast only.  Patient aware of pending consult and safety of patient maintained.

## 2018-06-23 NOTE — ED BEHAVIORAL HEALTH NOTE - BEHAVIORAL HEALTH NOTE
SOCIAL WORK NOTE:  DISCUSSED CASE WITH MD AND RN.  PATIENT CONTINUE TO BE HELD FOR FUTHER EVALUATION.  SW WILL FOLLOW

## 2018-06-23 NOTE — ED ADULT NURSE REASSESSMENT NOTE - NS ED NURSE REASSESS COMMENT FT1
patient awake and ambulating to bathroom with steady gait - tolerated well - returned to bed. no incident

## 2018-06-23 NOTE — ED BEHAVIORAL HEALTH ASSESSMENT NOTE - DESCRIPTION
Uneventful GERD; HTN; HLD; Asthma Single, never  with no children worked as a ; Education 7 th grade

## 2018-06-23 NOTE — ED ADULT NURSE REASSESSMENT NOTE - TEMPLATE LIST FOR HEAD TO TOE ASSESSMENT
Toxicology

## 2018-06-23 NOTE — ED ADULT NURSE REASSESSMENT NOTE - CONDITION
unchanged/Pt sleeping. Offers no complaints. Tremors less. . Pt received albuterol inhaler. Relief noted. Returned to sleep

## 2018-06-23 NOTE — ED ADULT NURSE REASSESSMENT NOTE - NS ED NURSE REASSESS COMMENT FT1
Patient had a positive response to medication intervention for ETOH withdrawal current CIWA is 2. Patient accepted dinner and is currently eating with bedside table setup in room.  No attempts to harm self or others and safety maintained.

## 2018-06-23 NOTE — ED ADULT NURSE REASSESSMENT NOTE - CONDITION
tremors have returned, states " Im feeling sick. Pulse 130. Complaine of SOB. Pt appears to be wheezing on inspiration. States he has an albuteral inhaler with him in [place]. Spoke to Dr. Herrera,  Order placed. Awaiting pharmacy delivery/unchanged

## 2018-06-23 NOTE — ED ADULT NURSE REASSESSMENT NOTE - NS ED NURSE REASSESS COMMENT FT1
pt in common area requesting nourishment, food provided with good intake, pt denies nausea or abdominal pain.

## 2018-06-23 NOTE — ED ADULT NURSE REASSESSMENT NOTE - NS ED NURSE REASSESS COMMENT FT1
Patient ambulated to bathroom with a steady independent gait.   Voided without difficulty.  Patient requested and received pudding snack. Patient ambulated to bathroom with a steady independent gait.   Voided without difficulty.  Patient requested and received pudding snack.  Patient had refused Albuterol inhaler at 1500 reporting he only uses it as needed and denied any SOB or distress.

## 2018-06-24 ENCOUNTER — EMERGENCY (EMERGENCY)
Facility: HOSPITAL | Age: 53
LOS: 1 days | Discharge: HOME | End: 2018-06-24
Attending: EMERGENCY MEDICINE
Payer: COMMERCIAL

## 2018-06-24 VITALS
OXYGEN SATURATION: 97 % | RESPIRATION RATE: 20 BRPM | SYSTOLIC BLOOD PRESSURE: 130 MMHG | DIASTOLIC BLOOD PRESSURE: 94 MMHG | TEMPERATURE: 98 F | HEART RATE: 98 BPM

## 2018-06-24 VITALS
RESPIRATION RATE: 18 BRPM | DIASTOLIC BLOOD PRESSURE: 86 MMHG | HEART RATE: 91 BPM | WEIGHT: 153 LBS | SYSTOLIC BLOOD PRESSURE: 147 MMHG | OXYGEN SATURATION: 95 % | HEIGHT: 67 IN | TEMPERATURE: 97 F

## 2018-06-24 PROCEDURE — 36415 COLL VENOUS BLD VENIPUNCTURE: CPT

## 2018-06-24 PROCEDURE — 80076 HEPATIC FUNCTION PANEL: CPT

## 2018-06-24 PROCEDURE — 84100 ASSAY OF PHOSPHORUS: CPT

## 2018-06-24 PROCEDURE — 80048 BASIC METABOLIC PNL TOTAL CA: CPT

## 2018-06-24 PROCEDURE — 99285 EMERGENCY DEPT VISIT HI MDM: CPT

## 2018-06-24 PROCEDURE — 94640 AIRWAY INHALATION TREATMENT: CPT

## 2018-06-24 PROCEDURE — 81001 URINALYSIS AUTO W/SCOPE: CPT

## 2018-06-24 PROCEDURE — 93005 ELECTROCARDIOGRAM TRACING: CPT

## 2018-06-24 PROCEDURE — 83735 ASSAY OF MAGNESIUM: CPT

## 2018-06-24 PROCEDURE — 85027 COMPLETE CBC AUTOMATED: CPT

## 2018-06-24 PROCEDURE — 99284 EMERGENCY DEPT VISIT MOD MDM: CPT | Mod: 25

## 2018-06-24 PROCEDURE — 80307 DRUG TEST PRSMV CHEM ANLYZR: CPT

## 2018-06-24 RX ORDER — ACETAMINOPHEN 500 MG
650 TABLET ORAL ONCE
Qty: 0 | Refills: 0 | Status: COMPLETED | OUTPATIENT
Start: 2018-06-24 | End: 2018-06-24

## 2018-06-24 RX ADMIN — Medication 650 MILLIGRAM(S): at 07:44

## 2018-06-24 RX ADMIN — Medication 50 MILLIGRAM(S): at 08:33

## 2018-06-24 NOTE — ED ADULT NURSE REASSESSMENT NOTE - NS ED NURSE REASSESS COMMENT FT1
Patient offered and accepted Librium 50 mg PO at 0834 after being assessed with CIWA score of 9.  Patient ate 75% of breakfast and returned to bed.  Patient reports reduction in headache pain from a 9 to a 4 after receiving Tylenol. Patient offered and accepted Librium 50 mg PO at 0833 after being assessed with CIWA score of 9.  Patient ate 75% of breakfast and returned to bed.  Patient reports reduction in headache pain from a 9 to a 4 after receiving Tylenol.

## 2018-06-24 NOTE — ED ADULT NURSE REASSESSMENT NOTE - NS ED NURSE REASSESS COMMENT FT1
Assumed care of patient at 0720.  Dr. Munoz notified of patient complaint of headache pain.  Patient denies suicidal or homicidal ideation.  Patient has a CIWA score of 6.  Safety of patient maintained.

## 2018-06-24 NOTE — ED PROVIDER NOTE - SKIN, MLM
Skin right frontal forehead abrasion, skin otherwise normal color for race, warm, dry and intact. No evidence of rash.

## 2018-06-24 NOTE — ED PROVIDER NOTE - OBJECTIVE STATEMENT
51 y/o male with PMHx alcohol abuse presents to the ED via EMS for evaluation of alcohol intoxication. Pt is sleeping comfortably in bed, but easily arousable. Affect consistent with alcohol intoxication. No signs of respiratory distress or deformities. PT states "I may have fallen today." Pt has been seen at [facility] several times in the past for similar complaints. Pt is unable to provide a reliable history at this time. History is limited due to suspected intoxication.

## 2018-06-24 NOTE — ED ADULT NURSE REASSESSMENT NOTE - COMFORT CARE
ambulated to bathroom
meal provided/plan of care explained
plan of care explained
plan of care explained
darkened lights
darkened lights/meal provided
darkened lights/meal provided
darkened lights/plan of care explained
meal provided/po fluids offered
po fluids offered

## 2018-06-24 NOTE — ED ADULT NURSE REASSESSMENT NOTE - NS ED NURSE REASSESS COMMENT FT1
Patient reported one episode of vomiting post breakfast of food items which was unwitnessed.  No further complaints of nausea or vomiting.  Patient resting in bed with no distress.  No attempts to harm self or others and safety maintained.

## 2018-06-24 NOTE — ED ADULT NURSE REASSESSMENT NOTE - GENERAL PATIENT STATE
anxious
anxious
comfortable appearance/cooperative/resting/sleeping
anxious
cooperative
cooperative
cooperative/comfortable appearance/resting/sleeping
cooperative/resting/sleeping
resting/sleeping
+ s/s of withdrawal/anxious

## 2018-06-24 NOTE — ED PROVIDER NOTE - MEDICAL DECISION MAKING DETAILS
pt feeling better walking states not suicidal or hojcidal and [place] no hi no si states homeless and just want to leave he is clincally sober and comepetent to do so

## 2018-06-24 NOTE — ED BEHAVIORAL HEALTH NOTE - BEHAVIORAL HEALTH NOTE
Social Work Note: as per psych NP patient does not require inpatient mental health treatment at this time. patient given community resources and medicaid taxi home.

## 2018-06-24 NOTE — ED PROVIDER NOTE - CONSTITUTIONAL, MLM
normal... Well appearing, well nourished, awake, alert, strong smell of alcohol, NAD. Awake, alert, unkempt appearing, strong smell of alcohol, NAD.

## 2018-06-24 NOTE — ED ADULT NURSE REASSESSMENT NOTE - SYMPTOMS
nausea/headache
none
none
ETOH withdrawal
none
pain:/headache
diaphoresis/nausea/tremors
none

## 2018-06-25 ENCOUNTER — CHART COPY (OUTPATIENT)
Age: 53
End: 2018-06-25

## 2018-06-25 VITALS
OXYGEN SATURATION: 97 % | TEMPERATURE: 98 F | RESPIRATION RATE: 18 BRPM | SYSTOLIC BLOOD PRESSURE: 101 MMHG | DIASTOLIC BLOOD PRESSURE: 61 MMHG | HEART RATE: 79 BPM

## 2018-06-25 PROCEDURE — 70450 CT HEAD/BRAIN W/O DYE: CPT | Mod: 26

## 2018-06-25 PROCEDURE — 99285 EMERGENCY DEPT VISIT HI MDM: CPT

## 2018-06-25 PROCEDURE — 70450 CT HEAD/BRAIN W/O DYE: CPT

## 2018-06-25 PROCEDURE — 82962 GLUCOSE BLOOD TEST: CPT

## 2018-06-25 NOTE — ED ADULT NURSE NOTE - CHIEF COMPLAINT QUOTE
Pt admits to drinking alcohol tonight, pt with superficial lac to forehead, bleeding controlled at this time, "I might have fallen I don't know"

## 2018-06-25 NOTE — ED ADULT NURSE NOTE - OBJECTIVE STATEMENT
pt states he drank a lot tonight. pt alert and oriented calm at this time tolerating po intake. pt noted with minor lac on forehead pt reports he doesn't know how he got it.

## 2018-06-28 ENCOUNTER — EMERGENCY (EMERGENCY)
Facility: HOSPITAL | Age: 53
LOS: 1 days | Discharge: HOME | End: 2018-06-28
Attending: EMERGENCY MEDICINE | Admitting: EMERGENCY MEDICINE
Payer: COMMERCIAL

## 2018-06-28 VITALS
OXYGEN SATURATION: 100 % | RESPIRATION RATE: 20 BRPM | HEART RATE: 96 BPM | SYSTOLIC BLOOD PRESSURE: 127 MMHG | DIASTOLIC BLOOD PRESSURE: 70 MMHG

## 2018-06-28 LAB
ALBUMIN SERPL ELPH-MCNC: 4.3 G/DL — SIGNIFICANT CHANGE UP (ref 3.3–5.2)
ALP SERPL-CCNC: 65 U/L — SIGNIFICANT CHANGE UP (ref 40–120)
ALT FLD-CCNC: 36 U/L — SIGNIFICANT CHANGE UP
ANION GAP SERPL CALC-SCNC: 21 MMOL/L — HIGH (ref 5–17)
AST SERPL-CCNC: 67 U/L — HIGH
BASE EXCESS BLDV CALC-SCNC: 0.6 MMOL/L — SIGNIFICANT CHANGE UP (ref -2–2)
BASOPHILS # BLD AUTO: 0 K/UL — SIGNIFICANT CHANGE UP (ref 0–0.2)
BASOPHILS NFR BLD AUTO: 0.9 % — SIGNIFICANT CHANGE UP (ref 0–2)
BILIRUB SERPL-MCNC: 0.3 MG/DL — LOW (ref 0.4–2)
BLD GP AB SCN SERPL QL: SIGNIFICANT CHANGE UP
BUN SERPL-MCNC: 9 MG/DL — SIGNIFICANT CHANGE UP (ref 8–20)
CA-I SERPL-SCNC: 1.01 MMOL/L — LOW (ref 1.15–1.33)
CALCIUM SERPL-MCNC: 8.7 MG/DL — SIGNIFICANT CHANGE UP (ref 8.6–10.2)
CHLORIDE BLDV-SCNC: 102 MMOL/L — SIGNIFICANT CHANGE UP (ref 98–107)
CHLORIDE SERPL-SCNC: 96 MMOL/L — LOW (ref 98–107)
CO2 SERPL-SCNC: 22 MMOL/L — SIGNIFICANT CHANGE UP (ref 22–29)
CREAT SERPL-MCNC: 0.95 MG/DL — SIGNIFICANT CHANGE UP (ref 0.5–1.3)
EOSINOPHIL # BLD AUTO: 0.1 K/UL — SIGNIFICANT CHANGE UP (ref 0–0.5)
EOSINOPHIL NFR BLD AUTO: 1.7 % — SIGNIFICANT CHANGE UP (ref 0–5)
ETHANOL SERPL-MCNC: 328 MG/DL — SIGNIFICANT CHANGE UP
GAS PNL BLDV: 144 MMOL/L — SIGNIFICANT CHANGE UP (ref 135–145)
GAS PNL BLDV: SIGNIFICANT CHANGE UP
GAS PNL BLDV: SIGNIFICANT CHANGE UP
GLUCOSE BLDV-MCNC: 139 MG/DL — HIGH (ref 70–99)
GLUCOSE SERPL-MCNC: 140 MG/DL — HIGH (ref 70–115)
HCO3 BLDV-SCNC: 25 MMOL/L — SIGNIFICANT CHANGE UP (ref 20–26)
HCT VFR BLD CALC: 35.6 % — LOW (ref 42–52)
HCT VFR BLDA CALC: 37 — LOW (ref 39–50)
HGB BLD CALC-MCNC: 12.2 G/DL — LOW (ref 13–17)
HGB BLD-MCNC: 11.7 G/DL — LOW (ref 14–18)
LACTATE BLDV-MCNC: 3.8 MMOL/L — HIGH (ref 0.5–2)
LIDOCAIN IGE QN: 53 U/L — HIGH (ref 22–51)
LYMPHOCYTES # BLD AUTO: 1.7 K/UL — SIGNIFICANT CHANGE UP (ref 1–4.8)
LYMPHOCYTES # BLD AUTO: 31.8 % — SIGNIFICANT CHANGE UP (ref 20–55)
MCHC RBC-ENTMCNC: 27.3 PG — SIGNIFICANT CHANGE UP (ref 27–31)
MCHC RBC-ENTMCNC: 32.9 G/DL — SIGNIFICANT CHANGE UP (ref 32–36)
MCV RBC AUTO: 83.2 FL — SIGNIFICANT CHANGE UP (ref 80–94)
MONOCYTES # BLD AUTO: 0.7 K/UL — SIGNIFICANT CHANGE UP (ref 0–0.8)
MONOCYTES NFR BLD AUTO: 13.6 % — HIGH (ref 3–10)
NEUTROPHILS # BLD AUTO: 2.8 K/UL — SIGNIFICANT CHANGE UP (ref 1.8–8)
NEUTROPHILS NFR BLD AUTO: 51.6 % — SIGNIFICANT CHANGE UP (ref 37–73)
OTHER CELLS CSF MANUAL: 14 ML/DL — LOW (ref 18–22)
PCO2 BLDV: 40 MMHG — SIGNIFICANT CHANGE UP (ref 35–50)
PH BLDV: 7.41 — SIGNIFICANT CHANGE UP (ref 7.32–7.43)
PLATELET # BLD AUTO: 203 K/UL — SIGNIFICANT CHANGE UP (ref 150–400)
PO2 BLDV: 55 MMHG — HIGH (ref 25–45)
POTASSIUM BLDV-SCNC: 3.9 MMOL/L — SIGNIFICANT CHANGE UP (ref 3.4–4.5)
POTASSIUM SERPL-MCNC: 3.8 MMOL/L — SIGNIFICANT CHANGE UP (ref 3.5–5.3)
POTASSIUM SERPL-SCNC: 3.8 MMOL/L — SIGNIFICANT CHANGE UP (ref 3.5–5.3)
PROT SERPL-MCNC: 7.7 G/DL — SIGNIFICANT CHANGE UP (ref 6.6–8.7)
RBC # BLD: 4.28 M/UL — LOW (ref 4.6–6.2)
RBC # FLD: 19.6 % — HIGH (ref 11–15.6)
SAO2 % BLDV: 85 % — SIGNIFICANT CHANGE UP
SODIUM SERPL-SCNC: 139 MMOL/L — SIGNIFICANT CHANGE UP (ref 135–145)
TYPE + AB SCN PNL BLD: SIGNIFICANT CHANGE UP
WBC # BLD: 5.4 K/UL — SIGNIFICANT CHANGE UP (ref 4.8–10.8)
WBC # FLD AUTO: 5.4 K/UL — SIGNIFICANT CHANGE UP (ref 4.8–10.8)

## 2018-06-28 PROCEDURE — 73620 X-RAY EXAM OF FOOT: CPT | Mod: 26,RT

## 2018-06-28 PROCEDURE — 73090 X-RAY EXAM OF FOREARM: CPT | Mod: 26,LT

## 2018-06-28 PROCEDURE — 73110 X-RAY EXAM OF WRIST: CPT | Mod: 26,RT

## 2018-06-28 PROCEDURE — 71045 X-RAY EXAM CHEST 1 VIEW: CPT | Mod: 26

## 2018-06-28 PROCEDURE — 70450 CT HEAD/BRAIN W/O DYE: CPT | Mod: 26

## 2018-06-28 PROCEDURE — 99284 EMERGENCY DEPT VISIT MOD MDM: CPT

## 2018-06-28 PROCEDURE — 73130 X-RAY EXAM OF HAND: CPT | Mod: 26,RT

## 2018-06-28 PROCEDURE — 71260 CT THORAX DX C+: CPT | Mod: 26

## 2018-06-28 PROCEDURE — 74177 CT ABD & PELVIS W/CONTRAST: CPT | Mod: 26

## 2018-06-28 PROCEDURE — 72125 CT NECK SPINE W/O DYE: CPT | Mod: 26

## 2018-06-28 RX ORDER — SODIUM CHLORIDE 9 MG/ML
1000 INJECTION INTRAMUSCULAR; INTRAVENOUS; SUBCUTANEOUS ONCE
Qty: 0 | Refills: 0 | Status: COMPLETED | OUTPATIENT
Start: 2018-06-28 | End: 2018-06-28

## 2018-06-28 RX ORDER — TETANUS TOXOID, REDUCED DIPHTHERIA TOXOID AND ACELLULAR PERTUSSIS VACCINE, ADSORBED 5; 2.5; 8; 8; 2.5 [IU]/.5ML; [IU]/.5ML; UG/.5ML; UG/.5ML; UG/.5ML
0.5 SUSPENSION INTRAMUSCULAR ONCE
Qty: 0 | Refills: 0 | Status: COMPLETED | OUTPATIENT
Start: 2018-06-28 | End: 2018-06-28

## 2018-06-28 RX ORDER — CEFAZOLIN SODIUM 1 G
2000 VIAL (EA) INJECTION ONCE
Qty: 0 | Refills: 0 | Status: COMPLETED | OUTPATIENT
Start: 2018-06-28 | End: 2018-06-28

## 2018-06-28 RX ADMIN — Medication 100 MILLIGRAM(S): at 22:06

## 2018-06-28 RX ADMIN — SODIUM CHLORIDE 1000 MILLILITER(S): 9 INJECTION INTRAMUSCULAR; INTRAVENOUS; SUBCUTANEOUS at 21:34

## 2018-06-28 RX ADMIN — TETANUS TOXOID, REDUCED DIPHTHERIA TOXOID AND ACELLULAR PERTUSSIS VACCINE, ADSORBED 0.5 MILLILITER(S): 5; 2.5; 8; 8; 2.5 SUSPENSION INTRAMUSCULAR at 22:06

## 2018-06-28 NOTE — ED PROVIDER NOTE - CARDIAC, MLM
Normal rate, regular rhythm.  Heart sounds S1, S2.  No murmurs, rubs or gallops. +2 pulses in wrist and feet. No chest tenderness.

## 2018-06-28 NOTE — H&P ADULT - NSHPPHYSICALEXAM_GEN_ALL_CORE
HEENT: Normocephalic, atraumatic, WALTER, EOM wnl, no otorrhea or hemotympanum b/l, no epistaxis or d/c b/l nares, no craniofacial bony pathology or tenderness b/l  Neck: Pt in hard cervical collar at time of exam. No crepitus, no ecchymosis, no hematoma, to exam, no JVD, no tracheal deviation  Cspine/thoracolumbrosacral spine: no gross bony pathology or tenderness to exam  Cardiovascular: S1S2 Present  Chest: no gross rib pathology or tenderness to exam. No sternal pathology or tenderness to exam. No crepitus, no ecchymosis, no hematoma. No penetrating thorcoabdominal trauma  Respiratory: Rate is 18; Respiratory Effort normal; no wheezes, rales or rhonchi to exam  Abd: Soft, nontender. No guarding or rebound  Musculoskeletal: Pt has palpable b/l radial, femoral, dorsalis pedis pulses. All digits are warm and well perfused. No gross long bone pathology or tenderness to exam. Pt demonstrates grossly intact sensoromotor function. Pt has good capillary refill to digits, no calf edema or tenderness to exam. Right big toe nail avulsion  Skin: Multilpe abrasions to dorsum of right hand and digits. Ecymocchsis to right forearm.

## 2018-06-28 NOTE — H&P ADULT - ATTENDING COMMENTS
Patient seen and examined on arrival.  ABCDE intact intoxicated, trauma to right hand.  On trauma evaluation no injuries found other that hand laceration and etoh intoxification.  Dispo per Ed
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
## 2018-06-28 NOTE — ED PROVIDER NOTE - MUSCULOSKELETAL, MLM
Pain and tenderness to 2 and 3 digits. C-spine immobilized in collar, range of motion is not limited. Moving all extremities spontaneously. Pain and tenderness to RIGHT 2 and 3 digits. C-spine immobilized in collar, range of motion is not limited. Moving all extremities spontaneously.
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
## 2018-06-28 NOTE — ED PROVIDER NOTE - SKIN, MLM
Dirt and debris scattered on skin. Right evulsed nail to R toe and abrasions to wrist, hand and forearm. Contusion with several abrasions to dorsum of right hand and right foot. Dirt and debris scattered on skin. Right avulsed nail to R great toe and abrasions to wrist, hand and forearm. Contusion with several abrasions to dorsum of right hand and right foot.
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
## 2018-06-28 NOTE — ED ADULT NURSE REASSESSMENT NOTE - NS ED NURSE REASSESS COMMENT FT1
pt care assumed at 1900, no apparent distress noted at this time. pt received Alert and Oriented to person, place, situation and time laying comfortably in bed in trauma room. RN was not given report from off going nurse. RN assessed pt at 2100, vital signs remain stable. c-collar remains in place. RN escalated to ANM that report was not given to pt nor was medication given by off going RN. RN will medicate pt as per orders. as per MD MENJIVAR, pt is no longer being followed by trauma and is to remain in Emergency Department for sobriety. pt educated on plan of care, pt able to successfully teach back plan of care to RN, RN will continue to reeducate pt during hospital stay. pt care assumed at 2100, no apparent distress noted at this time. pt received Alert and Oriented to person, place, situation and time laying comfortably in bed in trauma room. RN was not given report from off going nurse. RN assessed pt at 2100, vital signs remain stable. c-collar remains in place. RN escalated to ANM that report was not given to pt nor was medication given by off going RN. RN will medicate pt as per orders. as per MD MENJIVAR, pt is no longer being followed by trauma and is to remain in Emergency Department for sobriety. pt educated on plan of care, pt able to successfully teach back plan of care to RN, RN will continue to reeducate pt during hospital stay.

## 2018-06-29 ENCOUNTER — CHART COPY (OUTPATIENT)
Age: 53
End: 2018-06-29

## 2018-06-29 ENCOUNTER — EMERGENCY (EMERGENCY)
Facility: HOSPITAL | Age: 53
LOS: 1 days | Discharge: HOME | End: 2018-06-29
Attending: EMERGENCY MEDICINE
Payer: COMMERCIAL

## 2018-06-29 VITALS
SYSTOLIC BLOOD PRESSURE: 118 MMHG | DIASTOLIC BLOOD PRESSURE: 85 MMHG | TEMPERATURE: 98 F | HEART RATE: 94 BPM | OXYGEN SATURATION: 98 % | RESPIRATION RATE: 24 BRPM

## 2018-06-29 VITALS — HEIGHT: 68 IN | WEIGHT: 169.98 LBS

## 2018-06-29 PROCEDURE — 70450 CT HEAD/BRAIN W/O DYE: CPT

## 2018-06-29 PROCEDURE — 85730 THROMBOPLASTIN TIME PARTIAL: CPT

## 2018-06-29 PROCEDURE — 74177 CT ABD & PELVIS W/CONTRAST: CPT

## 2018-06-29 PROCEDURE — 73090 X-RAY EXAM OF FOREARM: CPT

## 2018-06-29 PROCEDURE — 80307 DRUG TEST PRSMV CHEM ANLYZR: CPT

## 2018-06-29 PROCEDURE — 83605 ASSAY OF LACTIC ACID: CPT

## 2018-06-29 PROCEDURE — 72125 CT NECK SPINE W/O DYE: CPT

## 2018-06-29 PROCEDURE — 99284 EMERGENCY DEPT VISIT MOD MDM: CPT

## 2018-06-29 PROCEDURE — 71260 CT THORAX DX C+: CPT

## 2018-06-29 PROCEDURE — 83690 ASSAY OF LIPASE: CPT

## 2018-06-29 PROCEDURE — 73620 X-RAY EXAM OF FOOT: CPT

## 2018-06-29 PROCEDURE — 85027 COMPLETE CBC AUTOMATED: CPT

## 2018-06-29 PROCEDURE — 82330 ASSAY OF CALCIUM: CPT

## 2018-06-29 PROCEDURE — 90715 TDAP VACCINE 7 YRS/> IM: CPT

## 2018-06-29 PROCEDURE — 84295 ASSAY OF SERUM SODIUM: CPT

## 2018-06-29 PROCEDURE — 82803 BLOOD GASES ANY COMBINATION: CPT

## 2018-06-29 PROCEDURE — 86901 BLOOD TYPING SEROLOGIC RH(D): CPT

## 2018-06-29 PROCEDURE — 82947 ASSAY GLUCOSE BLOOD QUANT: CPT

## 2018-06-29 PROCEDURE — 80053 COMPREHEN METABOLIC PANEL: CPT

## 2018-06-29 PROCEDURE — 84132 ASSAY OF SERUM POTASSIUM: CPT

## 2018-06-29 PROCEDURE — 85014 HEMATOCRIT: CPT

## 2018-06-29 PROCEDURE — 85610 PROTHROMBIN TIME: CPT

## 2018-06-29 PROCEDURE — 86850 RBC ANTIBODY SCREEN: CPT

## 2018-06-29 PROCEDURE — 86900 BLOOD TYPING SEROLOGIC ABO: CPT

## 2018-06-29 PROCEDURE — 82435 ASSAY OF BLOOD CHLORIDE: CPT

## 2018-06-29 PROCEDURE — 73130 X-RAY EXAM OF HAND: CPT

## 2018-06-29 PROCEDURE — 73110 X-RAY EXAM OF WRIST: CPT

## 2018-06-29 PROCEDURE — 36415 COLL VENOUS BLD VENIPUNCTURE: CPT

## 2018-06-29 PROCEDURE — 90471 IMMUNIZATION ADMIN: CPT

## 2018-06-29 PROCEDURE — 71045 X-RAY EXAM CHEST 1 VIEW: CPT

## 2018-06-29 PROCEDURE — 96374 THER/PROPH/DIAG INJ IV PUSH: CPT

## 2018-06-29 PROCEDURE — 99285 EMERGENCY DEPT VISIT HI MDM: CPT | Mod: 25

## 2018-06-29 RX ADMIN — Medication 50 MILLIGRAM(S): at 04:28
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
## 2018-06-29 NOTE — ED ADULT NURSE NOTE - OBJECTIVE STATEMENT
Pt. BIBA with alcohol intoxication. Pt. presentation consistent with alcohol intoxication, received undressed and in yellow gown, sleeping but awakens to voice. respirations even and unlabored, injuries noted from previous hospital admission.

## 2018-06-29 NOTE — ED ADULT NURSE REASSESSMENT NOTE - NS ED NURSE REASSESS COMMENT FT1
Pt resting comfortably in stretcher, resp even and unlabored, pt continues to sleep. pt remains in yellow gown. no apparent distress noted.
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
## 2018-06-30 VITALS
RESPIRATION RATE: 20 BRPM | HEART RATE: 107 BPM | SYSTOLIC BLOOD PRESSURE: 150 MMHG | OXYGEN SATURATION: 100 % | DIASTOLIC BLOOD PRESSURE: 91 MMHG | TEMPERATURE: 98 F

## 2018-06-30 LAB
ALBUMIN SERPL ELPH-MCNC: 4.3 G/DL — SIGNIFICANT CHANGE UP (ref 3.3–5.2)
ALP SERPL-CCNC: 85 U/L — SIGNIFICANT CHANGE UP (ref 40–120)
ALT FLD-CCNC: 28 U/L — SIGNIFICANT CHANGE UP
ANION GAP SERPL CALC-SCNC: 21 MMOL/L — HIGH (ref 5–17)
AST SERPL-CCNC: 44 U/L — HIGH
BILIRUB SERPL-MCNC: 0.3 MG/DL — LOW (ref 0.4–2)
BUN SERPL-MCNC: 6 MG/DL — LOW (ref 8–20)
CALCIUM SERPL-MCNC: 8.4 MG/DL — LOW (ref 8.6–10.2)
CHLORIDE SERPL-SCNC: 96 MMOL/L — LOW (ref 98–107)
CO2 SERPL-SCNC: 23 MMOL/L — SIGNIFICANT CHANGE UP (ref 22–29)
CREAT SERPL-MCNC: 0.83 MG/DL — SIGNIFICANT CHANGE UP (ref 0.5–1.3)
ETHANOL SERPL-MCNC: 308 MG/DL — SIGNIFICANT CHANGE UP
GLUCOSE SERPL-MCNC: 127 MG/DL — HIGH (ref 70–115)
HCT VFR BLD CALC: 33.2 % — LOW (ref 42–52)
HGB BLD-MCNC: 10.6 G/DL — LOW (ref 14–18)
MCHC RBC-ENTMCNC: 26.8 PG — LOW (ref 27–31)
MCHC RBC-ENTMCNC: 31.9 G/DL — LOW (ref 32–36)
MCV RBC AUTO: 84.1 FL — SIGNIFICANT CHANGE UP (ref 80–94)
PLATELET # BLD AUTO: 141 K/UL — LOW (ref 150–400)
POTASSIUM SERPL-MCNC: 3.2 MMOL/L — LOW (ref 3.5–5.3)
POTASSIUM SERPL-SCNC: 3.2 MMOL/L — LOW (ref 3.5–5.3)
PROT SERPL-MCNC: 7.4 G/DL — SIGNIFICANT CHANGE UP (ref 6.6–8.7)
RBC # BLD: 3.95 M/UL — LOW (ref 4.6–6.2)
RBC # FLD: 19.5 % — HIGH (ref 11–15.6)
SODIUM SERPL-SCNC: 140 MMOL/L — SIGNIFICANT CHANGE UP (ref 135–145)
WBC # BLD: 5 K/UL — SIGNIFICANT CHANGE UP (ref 4.8–10.8)
WBC # FLD AUTO: 5 K/UL — SIGNIFICANT CHANGE UP (ref 4.8–10.8)

## 2018-06-30 PROCEDURE — 80053 COMPREHEN METABOLIC PANEL: CPT

## 2018-06-30 PROCEDURE — 85027 COMPLETE CBC AUTOMATED: CPT

## 2018-06-30 PROCEDURE — 80307 DRUG TEST PRSMV CHEM ANLYZR: CPT

## 2018-06-30 PROCEDURE — 36415 COLL VENOUS BLD VENIPUNCTURE: CPT

## 2018-06-30 PROCEDURE — 99285 EMERGENCY DEPT VISIT HI MDM: CPT
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
## 2018-08-02 PROBLEM — M79.671 RIGHT FOOT PAIN: Status: ACTIVE | Noted: 2018-08-02

## 2018-08-06 ENCOUNTER — APPOINTMENT (OUTPATIENT)
Dept: ORTHOPEDIC SURGERY | Facility: CLINIC | Age: 53
End: 2018-08-06

## 2018-08-06 DIAGNOSIS — M79.671 PAIN IN RIGHT FOOT: ICD-10-CM

## 2018-08-21 ENCOUNTER — APPOINTMENT (OUTPATIENT)
Dept: ORTHOPEDIC SURGERY | Facility: CLINIC | Age: 53
End: 2018-08-21
Payer: OTHER MISCELLANEOUS

## 2018-08-21 VITALS — DIASTOLIC BLOOD PRESSURE: 85 MMHG | HEART RATE: 71 BPM | SYSTOLIC BLOOD PRESSURE: 124 MMHG

## 2018-08-21 VITALS — WEIGHT: 168 LBS | BODY MASS INDEX: 27.99 KG/M2 | HEIGHT: 65 IN

## 2018-08-21 DIAGNOSIS — M75.101 UNSPECIFIED ROTATOR CUFF TEAR OR RUPTURE OF RIGHT SHOULDER, NOT SPECIFIED AS TRAUMATIC: ICD-10-CM

## 2018-08-21 DIAGNOSIS — M75.122 COMPLETE ROTATOR CUFF TEAR OR RUPTURE OF LEFT SHOULDER, NOT SPECIFIED AS TRAUMATIC: ICD-10-CM

## 2018-08-21 DIAGNOSIS — M12.811 UNSPECIFIED ROTATOR CUFF TEAR OR RUPTURE OF RIGHT SHOULDER, NOT SPECIFIED AS TRAUMATIC: ICD-10-CM

## 2018-08-21 PROCEDURE — 99214 OFFICE O/P EST MOD 30 MIN: CPT

## 2018-09-20 ENCOUNTER — APPOINTMENT (OUTPATIENT)
Dept: INTERNAL MEDICINE | Facility: CLINIC | Age: 53
End: 2018-09-20
Payer: MEDICAID

## 2018-09-20 VITALS
BODY MASS INDEX: 26.66 KG/M2 | HEART RATE: 72 BPM | WEIGHT: 160 LBS | RESPIRATION RATE: 14 BRPM | DIASTOLIC BLOOD PRESSURE: 82 MMHG | SYSTOLIC BLOOD PRESSURE: 124 MMHG | HEIGHT: 65 IN

## 2018-09-20 DIAGNOSIS — R06.2 WHEEZING: ICD-10-CM

## 2018-09-20 DIAGNOSIS — M25.512 PAIN IN LEFT SHOULDER: ICD-10-CM

## 2018-09-20 PROCEDURE — 90472 IMMUNIZATION ADMIN EACH ADD: CPT

## 2018-09-20 PROCEDURE — 90670 PCV13 VACCINE IM: CPT

## 2018-09-20 PROCEDURE — 36415 COLL VENOUS BLD VENIPUNCTURE: CPT

## 2018-09-20 PROCEDURE — 90686 IIV4 VACC NO PRSV 0.5 ML IM: CPT

## 2018-09-20 PROCEDURE — 99214 OFFICE O/P EST MOD 30 MIN: CPT | Mod: 25

## 2018-09-20 PROCEDURE — 90471 IMMUNIZATION ADMIN: CPT

## 2018-09-20 RX ORDER — DICLOFENAC SODIUM AND MISOPROSTOL 75; 200 MG/1; UG/1
75-0.2 TABLET, DELAYED RELEASE ORAL
Qty: 100 | Refills: 1 | Status: DISCONTINUED | COMMUNITY
Start: 2018-08-21 | End: 2018-09-20

## 2018-09-20 NOTE — ASSESSMENT
[FreeTextEntry1] : check labs\par looks good\par when all issues stable\par maybe can convince surgery of shoulder might be an option\par flu and prevnar given\par

## 2018-09-20 NOTE — PHYSICAL EXAM

## 2018-09-20 NOTE — HISTORY OF PRESENT ILLNESS
[FreeTextEntry1] : for follow up \par out of rehab\par alcohol free for 88 days [de-identified] : in alcohol abuse remission\par patient has been well\par he is in a sober house but soon will need place to live\par he has left shoulder arthropathy and rotator cuff abnormalities but does not want surgery because he is\par afaird he will die if he has surgery\par he had prediabetes on last testing\par he is working on himself and is more stable mood wise then he has been in the past

## 2018-09-20 NOTE — HEALTH RISK ASSESSMENT
[] : No [No falls in past year] : Patient reported no falls in the past year [0] : 2) Feeling down, depressed, or hopeless: Not at all (0) [de-identified] : 88 days sober

## 2018-09-21 LAB
ALBUMIN SERPL ELPH-MCNC: 4.7 G/DL
ALP BLD-CCNC: 84 U/L
ALT SERPL-CCNC: 8 U/L
ANION GAP SERPL CALC-SCNC: 20 MMOL/L
AST SERPL-CCNC: 18 U/L
BASOPHILS # BLD AUTO: 0.02 K/UL
BASOPHILS NFR BLD AUTO: 0.3 %
BILIRUB SERPL-MCNC: <0.2 MG/DL
BUN SERPL-MCNC: 12 MG/DL
CALCIUM SERPL-MCNC: 9.6 MG/DL
CHLORIDE SERPL-SCNC: 101 MMOL/L
CHOLEST SERPL-MCNC: 257 MG/DL
CHOLEST/HDLC SERPL: 6.4 RATIO
CO2 SERPL-SCNC: 19 MMOL/L
CREAT SERPL-MCNC: 1.22 MG/DL
EOSINOPHIL # BLD AUTO: 0.19 K/UL
EOSINOPHIL NFR BLD AUTO: 2.6 %
GLUCOSE SERPL-MCNC: 123 MG/DL
HBA1C MFR BLD HPLC: 6.2 %
HCT VFR BLD CALC: 37.5 %
HDLC SERPL-MCNC: 40 MG/DL
HGB BLD-MCNC: 11.7 G/DL
IMM GRANULOCYTES NFR BLD AUTO: 0.3 %
LDLC SERPL CALC-MCNC: NORMAL
LYMPHOCYTES # BLD AUTO: 1.83 K/UL
LYMPHOCYTES NFR BLD AUTO: 24.9 %
MAN DIFF?: NORMAL
MCHC RBC-ENTMCNC: 26.1 PG
MCHC RBC-ENTMCNC: 31.2 GM/DL
MCV RBC AUTO: 83.7 FL
MONOCYTES # BLD AUTO: 0.51 K/UL
MONOCYTES NFR BLD AUTO: 6.9 %
NEUTROPHILS # BLD AUTO: 4.79 K/UL
NEUTROPHILS NFR BLD AUTO: 65 %
PLATELET # BLD AUTO: 264 K/UL
POTASSIUM SERPL-SCNC: 4.2 MMOL/L
PROT SERPL-MCNC: 7.5 G/DL
RBC # BLD: 4.48 M/UL
RBC # FLD: 16.6 %
SODIUM SERPL-SCNC: 140 MMOL/L
T4 FREE SERPL-MCNC: 1.1 NG/DL
TRIGL SERPL-MCNC: 422 MG/DL
TSH SERPL-ACNC: 1.8 UIU/ML
WBC # FLD AUTO: 7.36 K/UL

## 2018-10-09 PROBLEM — M75.122 COMPLETE TEAR OF LEFT ROTATOR CUFF: Status: ACTIVE | Noted: 2017-11-01

## 2018-10-09 PROBLEM — M75.101 ROTATOR CUFF TEAR ARTHROPATHY OF RIGHT SHOULDER: Status: ACTIVE | Noted: 2018-10-09

## 2018-11-30 ENCOUNTER — CHART COPY (OUTPATIENT)
Age: 53
End: 2018-11-30

## 2018-11-30 ENCOUNTER — APPOINTMENT (OUTPATIENT)
Dept: INTERNAL MEDICINE | Facility: CLINIC | Age: 53
End: 2018-11-30

## 2018-12-01 ENCOUNTER — MEDICATION RENEWAL (OUTPATIENT)
Age: 53
End: 2018-12-01

## 2018-12-01 LAB
ALBUMIN SERPL ELPH-MCNC: 5.1 G/DL
ALP BLD-CCNC: 82 U/L
ALT SERPL-CCNC: 12 U/L
ANION GAP SERPL CALC-SCNC: 15 MMOL/L
AST SERPL-CCNC: 16 U/L
BILIRUB SERPL-MCNC: 0.2 MG/DL
BUN SERPL-MCNC: 11 MG/DL
CALCIUM SERPL-MCNC: 9.8 MG/DL
CHLORIDE SERPL-SCNC: 99 MMOL/L
CO2 SERPL-SCNC: 24 MMOL/L
CREAT SERPL-MCNC: 0.93 MG/DL
GLUCOSE SERPL-MCNC: 110 MG/DL
HBA1C MFR BLD HPLC: 6.2 %
POTASSIUM SERPL-SCNC: 3.8 MMOL/L
PROT SERPL-MCNC: 8 G/DL
SODIUM SERPL-SCNC: 138 MMOL/L

## 2018-12-10 NOTE — ED ADULT NURSE NOTE - PSH
PATIENT INFORMATION    Anticipatory guidance discussed  Minimize junk food  Seat belts; don't put in front seat    Follow-Up  - Return for your yearly well child visit.    7-8 years old Health and Safety Tips - The following hyperlinks are available to access via MyChart    [employer] 7-8 Years Old Parent Education  [facility] 7-8 Years Old Child Education    [employer]uro[employer] Brillantes 7 a 8 años de edad (Educación para los padres) - Español  [employer] 7 a 8 años de edad (Educación para los niños) - Español    Additional Educational Resources:  For additional resources regarding your symptoms, diagnosis, or further health information, please visit the Discover a Healthier You section on [email or web address] or the Online Health Resources section in [employer].  
No significant past surgical history    No significant past surgical history    No significant past surgical history

## 2018-12-28 NOTE — ED ADULT TRIAGE NOTE - WEIGHT METHOD
Product: Ventolin HFA  Lot Number: [id number]  MFG: MICKIE  NDC: 0173--0682-24  Expiration: 08/2019  Time Given: 1:34 PM  Dose Given: 4 Puffs  Administered by Montse Jones RN   Site Administered: Inhalation  Clinic stock dispensed.       stated

## 2019-01-03 ENCOUNTER — RX RENEWAL (OUTPATIENT)
Age: 54
End: 2019-01-03

## 2019-01-10 ENCOUNTER — EMERGENCY (EMERGENCY)
Facility: HOSPITAL | Age: 54
LOS: 1 days | Discharge: HOME | End: 2019-01-10
Attending: EMERGENCY MEDICINE
Payer: COMMERCIAL

## 2019-01-10 VITALS
HEART RATE: 90 BPM | HEIGHT: 66 IN | SYSTOLIC BLOOD PRESSURE: 125 MMHG | WEIGHT: 164.91 LBS | DIASTOLIC BLOOD PRESSURE: 80 MMHG | RESPIRATION RATE: 16 BRPM | TEMPERATURE: 97 F | OXYGEN SATURATION: 98 %

## 2019-01-10 LAB
ALBUMIN SERPL ELPH-MCNC: 5.2 G/DL — SIGNIFICANT CHANGE UP (ref 3.3–5.2)
ALP SERPL-CCNC: 61 U/L — SIGNIFICANT CHANGE UP (ref 40–120)
ALT FLD-CCNC: 15 U/L — SIGNIFICANT CHANGE UP
ANION GAP SERPL CALC-SCNC: 22 MMOL/L — HIGH (ref 5–17)
AST SERPL-CCNC: 28 U/L — SIGNIFICANT CHANGE UP
BASOPHILS # BLD AUTO: 0 K/UL — SIGNIFICANT CHANGE UP (ref 0–0.2)
BASOPHILS NFR BLD AUTO: 0.2 % — SIGNIFICANT CHANGE UP (ref 0–2)
BILIRUB SERPL-MCNC: 0.2 MG/DL — LOW (ref 0.4–2)
BUN SERPL-MCNC: 11 MG/DL — SIGNIFICANT CHANGE UP (ref 8–20)
CALCIUM SERPL-MCNC: 8.9 MG/DL — SIGNIFICANT CHANGE UP (ref 8.6–10.2)
CHLORIDE SERPL-SCNC: 97 MMOL/L — LOW (ref 98–107)
CO2 SERPL-SCNC: 20 MMOL/L — LOW (ref 22–29)
CREAT SERPL-MCNC: 0.79 MG/DL — SIGNIFICANT CHANGE UP (ref 0.5–1.3)
EOSINOPHIL # BLD AUTO: 0 K/UL — SIGNIFICANT CHANGE UP (ref 0–0.5)
EOSINOPHIL NFR BLD AUTO: 0.2 % — SIGNIFICANT CHANGE UP (ref 0–5)
ETHANOL SERPL-MCNC: 342 MG/DL — SIGNIFICANT CHANGE UP
GLUCOSE SERPL-MCNC: 158 MG/DL — HIGH (ref 70–115)
HCT VFR BLD CALC: 40.4 % — LOW (ref 42–52)
HGB BLD-MCNC: 13.3 G/DL — LOW (ref 14–18)
LYMPHOCYTES # BLD AUTO: 2.6 K/UL — SIGNIFICANT CHANGE UP (ref 1–4.8)
LYMPHOCYTES # BLD AUTO: 25 % — SIGNIFICANT CHANGE UP (ref 20–55)
MCHC RBC-ENTMCNC: 26.8 PG — LOW (ref 27–31)
MCHC RBC-ENTMCNC: 32.9 G/DL — SIGNIFICANT CHANGE UP (ref 32–36)
MCV RBC AUTO: 81.3 FL — SIGNIFICANT CHANGE UP (ref 80–94)
MONOCYTES # BLD AUTO: 0.6 K/UL — SIGNIFICANT CHANGE UP (ref 0–0.8)
MONOCYTES NFR BLD AUTO: 6 % — SIGNIFICANT CHANGE UP (ref 3–10)
NEUTROPHILS # BLD AUTO: 7.1 K/UL — SIGNIFICANT CHANGE UP (ref 1.8–8)
NEUTROPHILS NFR BLD AUTO: 68.2 % — SIGNIFICANT CHANGE UP (ref 37–73)
PLATELET # BLD AUTO: 242 K/UL — SIGNIFICANT CHANGE UP (ref 150–400)
POTASSIUM SERPL-MCNC: 4.7 MMOL/L — SIGNIFICANT CHANGE UP (ref 3.5–5.3)
POTASSIUM SERPL-SCNC: 4.7 MMOL/L — SIGNIFICANT CHANGE UP (ref 3.5–5.3)
PROT SERPL-MCNC: 8.1 G/DL — SIGNIFICANT CHANGE UP (ref 6.6–8.7)
RBC # BLD: 4.97 M/UL — SIGNIFICANT CHANGE UP (ref 4.6–6.2)
RBC # FLD: 17.7 % — HIGH (ref 11–15.6)
SODIUM SERPL-SCNC: 139 MMOL/L — SIGNIFICANT CHANGE UP (ref 135–145)
WBC # BLD: 10.4 K/UL — SIGNIFICANT CHANGE UP (ref 4.8–10.8)
WBC # FLD AUTO: 10.4 K/UL — SIGNIFICANT CHANGE UP (ref 4.8–10.8)

## 2019-01-10 PROCEDURE — 99284 EMERGENCY DEPT VISIT MOD MDM: CPT

## 2019-01-10 RX ORDER — ONDANSETRON 8 MG/1
4 TABLET, FILM COATED ORAL ONCE
Qty: 0 | Refills: 0 | Status: COMPLETED | OUTPATIENT
Start: 2019-01-10 | End: 2019-01-10

## 2019-01-10 RX ADMIN — ONDANSETRON 4 MILLIGRAM(S): 8 TABLET, FILM COATED ORAL at 17:42

## 2019-01-10 NOTE — ED ADULT NURSE NOTE - NSIMPLEMENTINTERV_GEN_ALL_ED
Implemented All Fall Risk Interventions:  [place] to call system. Call bell, personal items and telephone within reach. Instruct patient to call for assistance. Room bathroom lighting operational. Non-slip footwear when patient is off stretcher. Physically safe environment: no spills, clutter or unnecessary equipment. Stretcher in lowest position, wheels locked, appropriate side rails in place. Provide visual cue, wrist band, yellow gown, etc. Monitor gait and stability. Monitor for mental status changes and reorient to person, place, and time. Review medications for side effects contributing to fall risk. Reinforce activity limits and safety measures with patient and family.
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
## 2019-01-10 NOTE — ED PROVIDER NOTE - CONSTITUTIONAL, MLM
normal... Well appearing, well nourished, somnolent, and in no apparent distress.  ETOH on breath; disheveled.  Emesis on clothes.
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
## 2019-01-11 ENCOUNTER — CHART COPY (OUTPATIENT)
Age: 54
End: 2019-01-11

## 2019-01-11 VITALS
OXYGEN SATURATION: 98 % | RESPIRATION RATE: 16 BRPM | DIASTOLIC BLOOD PRESSURE: 83 MMHG | SYSTOLIC BLOOD PRESSURE: 138 MMHG | HEART RATE: 105 BPM

## 2019-01-11 LAB — ETHANOL SERPL-MCNC: 100 MG/DL — SIGNIFICANT CHANGE UP

## 2019-01-11 PROCEDURE — 70450 CT HEAD/BRAIN W/O DYE: CPT

## 2019-01-11 PROCEDURE — 70450 CT HEAD/BRAIN W/O DYE: CPT | Mod: 26

## 2019-01-11 PROCEDURE — 85027 COMPLETE CBC AUTOMATED: CPT

## 2019-01-11 PROCEDURE — 36415 COLL VENOUS BLD VENIPUNCTURE: CPT

## 2019-01-11 PROCEDURE — 96374 THER/PROPH/DIAG INJ IV PUSH: CPT

## 2019-01-11 PROCEDURE — 80307 DRUG TEST PRSMV CHEM ANLYZR: CPT

## 2019-01-11 PROCEDURE — 99285 EMERGENCY DEPT VISIT HI MDM: CPT | Mod: 25

## 2019-01-11 PROCEDURE — 80053 COMPREHEN METABOLIC PANEL: CPT

## 2019-02-01 ENCOUNTER — EMERGENCY (EMERGENCY)
Facility: HOSPITAL | Age: 54
LOS: 1 days | Discharge: HOME | End: 2019-02-01
Attending: EMERGENCY MEDICINE
Payer: COMMERCIAL

## 2019-02-01 VITALS — WEIGHT: 164.91 LBS | HEIGHT: 65 IN

## 2019-02-01 PROCEDURE — 99284 EMERGENCY DEPT VISIT MOD MDM: CPT

## 2019-02-01 NOTE — ED PROVIDER NOTE - PHYSICAL EXAMINATION
Constitutional : Appears comfortably, talking in full sentences,   Head :NC AT , no swelling  Eyes :eomi, no swelling  Mouth :mm moist,  Neck : supple, trachea in midline  Chest :Andrew air entry, symm chest expansion, no distress  Heart :S1 S2 distant  Abdomen :abd soft, non tender  Musc/Skel :ext no swelling, no deformity, no spine tenderness, distal pulses present, FROM all extremities x4.   Neuro  : answering questions   Skin:  No bruises or marks noted

## 2019-02-01 NOTE — ED ADULT TRIAGE NOTE - CHIEF COMPLAINT QUOTE
patient arrived via [facility] - found in [employer], not resposnive - patient at times with ems - aggressive - patient is known to ER. patient vomitted on self - patient states that he was drinking - patient is known to be homeless

## 2019-02-01 NOTE — ED PROVIDER NOTE - OBJECTIVE STATEMENT
52 y/o M, with hx of HTN, HLD, and ETOH abuse, BIBA to the ED for ETOH intoxication.  Pt is well known to the ED for similar presentation.  Pt states that he was drinking today. Pt is sleeping comfortably in bed, but easily arousable. Affect consistent with alcohol intoxication. No signs of respiratory distress or deformities. Pt is unable to provide a reliable history at this time. History is limited due to suspected intoxication.

## 2019-02-01 NOTE — ED ADULT NURSE NOTE - OBJECTIVE STATEMENT
pt alert and oriented x4 came in for alcohol intox. VSS afebrile. pt in yellow gown. pt responsive to touch and verbal stimuli. pt denies that he drank alcohol. respirations even unlabored. pt educated on plan of care, pt able to successfully teach back plan of care to RN, RN will continue to reeducate pt during hospital stay.
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
## 2019-02-01 NOTE — ED PROVIDER NOTE - MEDICAL DECISION MAKING DETAILS
52 y/o M, with hx of ETOH abuse, well known to the ED, cooperative today, plan to monitor in ED and reeval

## 2019-02-01 NOTE — ED ADULT NURSE NOTE - NSIMPLEMENTINTERV_GEN_ALL_ED
Implemented All Fall with Harm Risk Interventions:  [place] to call system. Call bell, personal items and telephone within reach. Instruct patient to call for assistance. Room bathroom lighting operational. Non-slip footwear when patient is off stretcher. Physically safe environment: no spills, clutter or unnecessary equipment. Stretcher in lowest position, wheels locked, appropriate side rails in place. Provide visual cue, wrist band, yellow gown, etc. Monitor gait and stability. Monitor for mental status changes and reorient to person, place, and time. Review medications for side effects contributing to fall risk. Reinforce activity limits and safety measures with patient and family. Provide visual clues: red socks.

## 2019-02-02 VITALS
RESPIRATION RATE: 18 BRPM | SYSTOLIC BLOOD PRESSURE: 111 MMHG | TEMPERATURE: 99 F | OXYGEN SATURATION: 99 % | HEART RATE: 88 BPM | DIASTOLIC BLOOD PRESSURE: 56 MMHG

## 2019-02-02 PROCEDURE — 99285 EMERGENCY DEPT VISIT HI MDM: CPT

## 2019-02-02 RX ADMIN — Medication 25 MILLIGRAM(S): at 05:00

## 2019-02-04 ENCOUNTER — CHART COPY (OUTPATIENT)
Age: 54
End: 2019-02-04

## 2019-02-28 ENCOUNTER — EMERGENCY (EMERGENCY)
Facility: HOSPITAL | Age: 54
LOS: 1 days | Discharge: HOME | End: 2019-02-28
Attending: EMERGENCY MEDICINE
Payer: COMMERCIAL

## 2019-02-28 VITALS
DIASTOLIC BLOOD PRESSURE: 66 MMHG | OXYGEN SATURATION: 99 % | TEMPERATURE: 98 F | SYSTOLIC BLOOD PRESSURE: 129 MMHG | RESPIRATION RATE: 18 BRPM | HEART RATE: 77 BPM

## 2019-02-28 PROCEDURE — 99285 EMERGENCY DEPT VISIT HI MDM: CPT

## 2019-02-28 PROCEDURE — 99284 EMERGENCY DEPT VISIT MOD MDM: CPT

## 2019-02-28 NOTE — ED PROVIDER NOTE - OBJECTIVE STATEMENT
54 y/o M, with hx of ETOH abuse, HTN, GERD, and asthma, presents to the ED by EMS for ETOH intoxication. Pt is sleeping comfortably in bed, but easily arousable. Pt states that he drank ETOH this evening.  Affect consistent with alcohol intoxication. No signs of respiratory distress or deformities. Pt is unable to provide a reliable history at this time. History is limited due to suspected intoxication. 54 y/o M, well known to ED, with hx of ETOH abuse, HTN, GERD, and asthma, presents to the ED by EMS for ETOH intoxication. Pt is sleeping comfortably in bed, but easily arousable. Pt states that he drank ETOH this evening.  Affect consistent with alcohol intoxication. No signs of respiratory distress or deformities. Pt is unable to provide a reliable history at this time. History is limited due to suspected intoxication.

## 2019-02-28 NOTE — ED PROVIDER NOTE - PHYSICAL EXAMINATION
Constitutional : Appears comfortably, talking in full sentences  Head :NC AT , no swelling  Eyes :eomi, no swelling  Mouth :mm moist,  Neck : supple, trachea in midline  Chest :Andrew air entry, symm chest expansion, no distress  Heart :S1 S2 distant  Abdomen :abd soft, non tender  Musc/Skel :ext no swelling, no deformity, no spine tenderness, distal pulses present  Neuro  :AAO 3 no focal deficits Constitutional : Appears comfortably, talking in full sentences, answers questions, follows commands  Head :NC AT , no swelling  Eyes :eomi, no swelling  Mouth :mm moist,  Neck : supple, trachea in midline  Chest :Andrew air entry, symm chest expansion, no distress  Heart :S1 S2 distant  Abdomen :abd soft, non tender  Musc/Skel :ext no swelling, no deformity, no spine tenderness, distal pulses present  Neuro  :AAO 3 no focal deficits  Skin: No signs of trauma

## 2019-03-01 ENCOUNTER — CHART COPY (OUTPATIENT)
Age: 54
End: 2019-03-01

## 2019-03-01 VITALS
TEMPERATURE: 99 F | SYSTOLIC BLOOD PRESSURE: 125 MMHG | OXYGEN SATURATION: 99 % | HEART RATE: 85 BPM | DIASTOLIC BLOOD PRESSURE: 79 MMHG | RESPIRATION RATE: 18 BRPM

## 2019-03-01 NOTE — ED ADULT NURSE NOTE - OBJECTIVE STATEMENT
received pt in yellow gown. pt unable to tell RN when last drink was or where he came from. pt sleeping, but easily arousable. no diaphoresis, hallucinations, headache, nausea or vomiting at this time. CIWA 0.
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
## 2019-03-01 NOTE — ED ADULT NURSE NOTE - INTERVENTIONS DEFINITIONS
Stretcher in lowest position, wheels locked, appropriate side rails in place/Provide visual cue, wrist band, yellow gown, etc./[place] to call system/Non-slip footwear when patient is off stretcher

## 2019-03-02 ENCOUNTER — RX RENEWAL (OUTPATIENT)
Age: 54
End: 2019-03-02

## 2019-03-04 ENCOUNTER — CHART COPY (OUTPATIENT)
Age: 54
End: 2019-03-04

## 2019-03-08 ENCOUNTER — CHART COPY (OUTPATIENT)
Age: 54
End: 2019-03-08

## 2019-03-11 ENCOUNTER — CHART COPY (OUTPATIENT)
Age: 54
End: 2019-03-11

## 2019-03-13 ENCOUNTER — CHART COPY (OUTPATIENT)
Age: 54
End: 2019-03-13

## 2019-03-15 ENCOUNTER — CHART COPY (OUTPATIENT)
Age: 54
End: 2019-03-15

## 2019-03-19 ENCOUNTER — CHART COPY (OUTPATIENT)
Age: 54
End: 2019-03-19

## 2019-04-15 ENCOUNTER — MED ADMIN CHARGE (OUTPATIENT)
Age: 54
End: 2019-04-15

## 2019-04-30 NOTE — ED PROVIDER NOTE - CARE PLAN
OFFICE VISIT 4/30/2019    Chief Complaint   Patient presents with   • Follow-up       HISTORY OF PRESENT ILLNESS:    Patient is a 56 year old male with a PMH of hypertension, sleep apnea, and diabetes.     He is being seen today for 6 month follow up. He reports doing well. Denies chest pain with exertion, reports occasional SOB due to asthma.     NM Stress Test 1/31/2017  The gated SPECT study shows normal regional wall motion and thickening throughout the left ventricle. The LVEF is 58 %. (Normal >49%)  IMPRESSION:  1.  No evidence of significant ischemia with pharmacologic stress   2.  Normal resting left ventricular systolic function.   3. Cardiac Risk Assessment: Low.    4. Comparing to previous study, dated 7/24/2014, no significant change     Echocardiogram 3/28/2013  Normal left ventricular size, systolic function and wall thickness, with no regional wall motion abnormalities.  Normal right ventricular size and systolic function.  Trivial pericardial effusion.  No signifcant valve disease  No cardiac cause of dyspnea is identified.      CHOLESTEROL (mg/dL)   Date Value   12/12/2018 179     HDL (mg/dL)   Date Value   12/12/2018 33 (L)     CHOL/HDL (no units)   Date Value   12/12/2018 5.4 (H)     TRIGLYCERIDE (mg/dL)   Date Value   12/12/2018 174 (H)     CALCULATED LDL (mg/dL)   Date Value   12/12/2018 111        Patient Active Problem List   Diagnosis   • Tobacco use disorder   • HTN (hypertension)   • Bipolar 1 disorder (CMS/HCC)   • SILVANA on CPAP   • Dermatophytosis of nail   • Back pain   • Uncomplicated asthma   • Benign neoplasm of colon   • Benign localized hyperplasia of prostate without urinary obstruction and other lower urinary tract symptoms (LUTS)   • Type 2 diabetes mellitus with complication, without long-term current use of insulin (CMS/HCC)   • Allergic rhinitis   • Hyperlipidemia   • Chronic pain of left ankle   • Major depressive disorder   • Pain medication agreement        I have personally  reviewed and updated the following EPIC sections: Current medications, Allergies, Problem list, Past Medical History, Past Surgical History, Social History and Family History    Medications:  Outpatient Medications Marked as Taking for the 4/30/19 encounter (Office Visit) with Vincent Zamudio MD   Medication Sig Dispense Refill   • gabapentin (NEURONTIN) 600 MG tablet Take 1 tab in the morning and 2 at night. 90 tablet 1   • HYDROcodone-acetaminophen (NORCO)  MG per tablet Do not start before April 29, 2019. Take 1 tab every 4 hours as needed with MAX 5 TABS DAILY 150 tablet 0   • haloperidol (HALDOL) 2 MG tablet Take 1 tablet by mouth 4 times daily.     • zolpidem (AMBIEN) 10 MG tablet Take 1 tablet by mouth nightly as needed for Sleep. 30 tablet 2   • albuterol (PROAIR HFA) 108 (90 Base) MCG/ACT inhaler Inhale 1 puff into the lungs every 4 hours as needed for Shortness of Breath or Wheezing. 1 Inhaler 11   • fluticasone (FLONASE) 50 MCG/ACT nasal spray Spray 2 sprays in each nostril daily. SHAKE WELL 1 Bottle 11   • lisinopril (ZESTRIL) 10 MG tablet Take 1 tablet by mouth 2 times daily. 180 tablet 1   • atorvastatin (LIPITOR) 20 MG tablet Take 1 tablet by mouth daily. 60 tablet 6   • metFORMIN (GLUCOPHAGE) 500 MG tablet TAKE 1 TABLET BY MOUTH TWICE DAILY WITH MEALS 60 tablet 0   • fluticasone-salmeterol (ADVAIR DISKUS) 250-50 MCG/DOSE inhaler Inhale 1 puff into the lungs 2 times daily. 60 each 11   • diclofenac (VOLTAREN) 1 % gel Apply 2 g topically 3 times daily as needed (pain). Apply to the left ankle 3 times a day as needed. 3 Tube 2   • erythromycin (THERAMYCIN) 2 % topical solution Apply topically daily. After shaving to bump prone areas and twice daily to trunk bumps 60 mL 11   • loratadine (CLARITIN) 10 MG tablet Take 1 tablet by mouth daily as needed for Allergies. 30 tablet 6   • Cholecalciferol 5000 UNITS Tab      • Omega-3 Fatty Acids (FISH OIL) 1000 MG capsule Take 1 g by mouth daily. 30 capsule  5   • diphenhydrAMINE (BENADRYL) 25 MG capsule Take 1 capsule by mouth every 6 hours as needed for Itching. 30 capsule 0   • aspirin 81 MG tablet Take 1 tablet by mouth daily.     • DULoxetine (CYMBALTA) 60 MG capsule Take 1 capsule by mouth daily.       Current Facility-Administered Medications for the 4/30/19 encounter (Office Visit) with Vincent Zamudio MD   Medication Dose Route Frequency Provider Last Rate Last Dose   • [COMPLETED] methylPREDNISolone DEPOT (DEPO-Medrol) 40 MG/ML injection 40 mg  40 mg Intra-articular Once Billy Willson MD   40 mg at 04/30/19 0926       PHYSICAL EXAMINATION:  Vitals:    04/30/19 1440   BP: 128/88   Pulse: 108       Review of Systems   Cardiovascular: Negative.    Respiratory: Negative.    Skin: Negative.        CONSTITUTION: Well-developed, well-nourished.  PSYCHIATRIC/NEUROLOGIC: Comfortable and in no apparent distress. Alert and oriented x 3. In a good mood.  SKIN: Soft, warm, and dry, without rashes or bruises.    LUNGS: Respiratory effort is unlabored. Lungs are clear without wheezing or crackles.  CARDIAC EXAM: The PMI is non-displaced. Auscultation reveals a normal S1, normal S2. No S3 or S4. No murmurs, clicks, or pericardial friction rub.   EXTREMITIES: Without clubbing, cyanosis or edema. Femoral and pedal pulses intact.     ASSESSMENT/PLAN:     Hypertension: Well controlled today on assessment. Continue on current regimen.    Diabetes mellitus, type 2: A1C 6.5 improved.     Dyslipidemia: FLP reviewed today. Continue on statin.    Sleep apnea: Uses CPAP.       I will see the patient back in 1 Year or sooner if problems arise.      On 4/30/2019, Lyubov FOURNIER, OMNTANA scribed the services personally performed by Vincent Zamudio MD.  The documentation recorded by the scribe accurately and completely reflects the service(s) I personally performed and the decisions made by me.          Principal Discharge DX:	Alcohol abuse

## 2019-05-07 ENCOUNTER — RECORD ABSTRACTING (OUTPATIENT)
Age: 54
End: 2019-05-07

## 2019-05-07 ENCOUNTER — APPOINTMENT (OUTPATIENT)
Dept: INTERNAL MEDICINE | Facility: CLINIC | Age: 54
End: 2019-05-07
Payer: MEDICAID

## 2019-05-07 VITALS — HEART RATE: 67 BPM | DIASTOLIC BLOOD PRESSURE: 70 MMHG | SYSTOLIC BLOOD PRESSURE: 130 MMHG | RESPIRATION RATE: 12 BRPM

## 2019-05-07 VITALS — BODY MASS INDEX: 27.32 KG/M2 | HEIGHT: 65 IN | WEIGHT: 164 LBS

## 2019-05-07 PROCEDURE — 99214 OFFICE O/P EST MOD 30 MIN: CPT | Mod: 25

## 2019-05-07 PROCEDURE — 36415 COLL VENOUS BLD VENIPUNCTURE: CPT

## 2019-05-07 RX ORDER — METHYLPREDNISOLONE 4 MG/1
4 TABLET ORAL
Qty: 1 | Refills: 3 | Status: DISCONTINUED | COMMUNITY
Start: 2018-03-15 | End: 2019-05-07

## 2019-05-07 RX ORDER — IBUPROFEN 800 MG/1
800 TABLET, FILM COATED ORAL 3 TIMES DAILY
Qty: 1 | Refills: 2 | Status: DISCONTINUED | COMMUNITY
End: 2019-05-07

## 2019-05-07 NOTE — ASSESSMENT
[FreeTextEntry1] : check labs\par should see psych but does not want\par has been alcohol free for 25 days\par patient has been otherwise stable

## 2019-05-07 NOTE — PHYSICAL EXAM
[Well Nourished] : well nourished [No Acute Distress] : no acute distress [Well-Appearing] : well-appearing [Normal Sclera/Conjunctiva] : normal sclera/conjunctiva [Well Developed] : well developed [PERRL] : pupils equal round and reactive to light [EOMI] : extraocular movements intact [Normal Oropharynx] : the oropharynx was normal [Normal Outer Ear/Nose] : the outer ears and nose were normal in appearance [No JVD] : no jugular venous distention [Supple] : supple [No Lymphadenopathy] : no lymphadenopathy [Clear to Auscultation] : lungs were clear to auscultation bilaterally [No Respiratory Distress] : no respiratory distress  [Thyroid Normal, No Nodules] : the thyroid was normal and there were no nodules present [No Accessory Muscle Use] : no accessory muscle use [Normal Rate] : normal rate  [Normal S1, S2] : normal S1 and S2 [No Murmur] : no murmur heard [Regular Rhythm] : with a regular rhythm [No Carotid Bruits] : no carotid bruits [No Abdominal Bruit] : a ~M bruit was not heard ~T in the abdomen [No Edema] : there was no peripheral edema [No Varicosities] : no varicosities [Pedal Pulses Present] : the pedal pulses are present [No Palpable Aorta] : no palpable aorta [Soft] : abdomen soft [No Extremity Clubbing/Cyanosis] : no extremity clubbing/cyanosis [Non Tender] : non-tender [Non-distended] : non-distended [No HSM] : no HSM [No Masses] : no abdominal mass palpated [Normal Bowel Sounds] : normal bowel sounds [Normal Anterior Cervical Nodes] : no anterior cervical lymphadenopathy [Normal Posterior Cervical Nodes] : no posterior cervical lymphadenopathy [No CVA Tenderness] : no CVA  tenderness [No Spinal Tenderness] : no spinal tenderness [Grossly Normal Strength/Tone] : grossly normal strength/tone [No Rash] : no rash [No Joint Swelling] : no joint swelling [Coordination Grossly Intact] : coordination grossly intact [Normal Gait] : normal gait [Normal Affect] : the affect was normal [No Focal Deficits] : no focal deficits [Deep Tendon Reflexes (DTR)] : deep tendon reflexes were 2+ and symmetric [Normal Insight/Judgement] : insight and judgment were intact

## 2019-05-07 NOTE — HISTORY OF PRESENT ILLNESS
[FreeTextEntry1] : follow up alcohol history [de-identified] : patient here for follow up \par patient has stopped drinking for 25 days\par  and is working\par worried about diabetes\par he has no chest pain sob nvd or palpiatins\par wants liver chol and dm checked

## 2019-05-08 ENCOUNTER — CHART COPY (OUTPATIENT)
Age: 54
End: 2019-05-08

## 2019-05-08 LAB
ALBUMIN SERPL ELPH-MCNC: 4.9 G/DL
ALP BLD-CCNC: 71 U/L
ALT SERPL-CCNC: 12 U/L
ANION GAP SERPL CALC-SCNC: 13 MMOL/L
AST SERPL-CCNC: 19 U/L
BASOPHILS # BLD AUTO: 0.05 K/UL
BASOPHILS NFR BLD AUTO: 1 %
BILIRUB SERPL-MCNC: <0.2 MG/DL
BUN SERPL-MCNC: 10 MG/DL
CALCIUM SERPL-MCNC: 9.5 MG/DL
CHLORIDE SERPL-SCNC: 105 MMOL/L
CHOLEST SERPL-MCNC: 274 MG/DL
CHOLEST/HDLC SERPL: 6.5 RATIO
CO2 SERPL-SCNC: 22 MMOL/L
CREAT SERPL-MCNC: 0.91 MG/DL
EOSINOPHIL # BLD AUTO: 0.12 K/UL
EOSINOPHIL NFR BLD AUTO: 2.4 %
ESTIMATED AVERAGE GLUCOSE: 126 MG/DL
GLUCOSE SERPL-MCNC: 107 MG/DL
HBA1C MFR BLD HPLC: 6 %
HCT VFR BLD CALC: 38.8 %
HDLC SERPL-MCNC: 42 MG/DL
HGB BLD-MCNC: 11.6 G/DL
IMM GRANULOCYTES NFR BLD AUTO: 0.2 %
LDLC SERPL CALC-MCNC: 167 MG/DL
LYMPHOCYTES # BLD AUTO: 1.51 K/UL
LYMPHOCYTES NFR BLD AUTO: 30.5 %
MAN DIFF?: NORMAL
MCHC RBC-ENTMCNC: 28.2 PG
MCHC RBC-ENTMCNC: 29.9 GM/DL
MCV RBC AUTO: 94.2 FL
MONOCYTES # BLD AUTO: 0.42 K/UL
MONOCYTES NFR BLD AUTO: 8.5 %
NEUTROPHILS # BLD AUTO: 2.84 K/UL
NEUTROPHILS NFR BLD AUTO: 57.4 %
PLATELET # BLD AUTO: 240 K/UL
POTASSIUM SERPL-SCNC: 4.8 MMOL/L
PROT SERPL-MCNC: 7.5 G/DL
RBC # BLD: 4.12 M/UL
RBC # FLD: 16.2 %
SODIUM SERPL-SCNC: 140 MMOL/L
TRIGL SERPL-MCNC: 327 MG/DL
TSH SERPL-ACNC: 0.97 UIU/ML
WBC # FLD AUTO: 4.95 K/UL

## 2019-05-10 ENCOUNTER — APPOINTMENT (OUTPATIENT)
Dept: INTERNAL MEDICINE | Facility: CLINIC | Age: 54
End: 2019-05-10
Payer: MEDICAID

## 2019-05-10 DIAGNOSIS — H61.22 IMPACTED CERUMEN, LEFT EAR: ICD-10-CM

## 2019-05-10 PROCEDURE — 99213 OFFICE O/P EST LOW 20 MIN: CPT

## 2019-05-10 NOTE — PHYSICAL EXAM
[Clear to Auscultation] : lungs were clear to auscultation bilaterally [No Respiratory Distress] : no respiratory distress  [Normal Rate] : normal rate  [Regular Rhythm] : with a regular rhythm [No Accessory Muscle Use] : no accessory muscle use [Normal S1, S2] : normal S1 and S2 [Soft] : abdomen soft [Non Tender] : non-tender [Non-distended] : non-distended [No Masses] : no abdominal mass palpated [de-identified] : left ear wx impacted

## 2019-05-10 NOTE — HISTORY OF PRESENT ILLNESS
[FreeTextEntry1] : impacted cerumen\par constipation [de-identified] : impacted cerumen\par patient has left hearing loss from the wax\par patient labs are all ok\par patient is constipated\par he has no chest pain sob nvd or palpiations

## 2019-05-14 ENCOUNTER — MED ADMIN CHARGE (OUTPATIENT)
Age: 54
End: 2019-05-14

## 2019-05-29 ENCOUNTER — MEDICATION RENEWAL (OUTPATIENT)
Age: 54
End: 2019-05-29

## 2019-05-29 DIAGNOSIS — H10.10 ACUTE ATOPIC CONJUNCTIVITIS, UNSPECIFIED EYE: ICD-10-CM

## 2019-06-12 ENCOUNTER — RX RENEWAL (OUTPATIENT)
Age: 54
End: 2019-06-12

## 2019-07-01 ENCOUNTER — OUTPATIENT (OUTPATIENT)
Dept: OUTPATIENT SERVICES | Facility: HOSPITAL | Age: 54
LOS: 1 days | End: 2019-07-01
Payer: MEDICAID

## 2019-07-01 PROCEDURE — G9001: CPT

## 2019-07-19 ENCOUNTER — CHART COPY (OUTPATIENT)
Age: 54
End: 2019-07-19

## 2019-07-21 ENCOUNTER — EMERGENCY (EMERGENCY)
Facility: HOSPITAL | Age: 54
LOS: 1 days | Discharge: HOME | End: 2019-07-21
Attending: EMERGENCY MEDICINE
Payer: COMMERCIAL

## 2019-07-21 VITALS
HEIGHT: 65 IN | DIASTOLIC BLOOD PRESSURE: 83 MMHG | SYSTOLIC BLOOD PRESSURE: 145 MMHG | TEMPERATURE: 98 F | RESPIRATION RATE: 20 BRPM | WEIGHT: 175.05 LBS | HEART RATE: 96 BPM | OXYGEN SATURATION: 97 %

## 2019-07-21 LAB
ALBUMIN SERPL ELPH-MCNC: 4.5 G/DL — SIGNIFICANT CHANGE UP (ref 3.3–5.2)
ALP SERPL-CCNC: 72 U/L — SIGNIFICANT CHANGE UP (ref 40–120)
ALT FLD-CCNC: 20 U/L — SIGNIFICANT CHANGE UP
ANION GAP SERPL CALC-SCNC: 18 MMOL/L — HIGH (ref 5–17)
AST SERPL-CCNC: 31 U/L — SIGNIFICANT CHANGE UP
BASOPHILS # BLD AUTO: 0.03 K/UL — SIGNIFICANT CHANGE UP (ref 0–0.2)
BASOPHILS NFR BLD AUTO: 0.5 % — SIGNIFICANT CHANGE UP (ref 0–2)
BILIRUB SERPL-MCNC: 0.2 MG/DL — LOW (ref 0.4–2)
BUN SERPL-MCNC: 11 MG/DL — SIGNIFICANT CHANGE UP (ref 8–20)
CALCIUM SERPL-MCNC: 9.3 MG/DL — SIGNIFICANT CHANGE UP (ref 8.6–10.2)
CHLORIDE SERPL-SCNC: 99 MMOL/L — SIGNIFICANT CHANGE UP (ref 98–107)
CO2 SERPL-SCNC: 22 MMOL/L — SIGNIFICANT CHANGE UP (ref 22–29)
CREAT SERPL-MCNC: 0.77 MG/DL — SIGNIFICANT CHANGE UP (ref 0.5–1.3)
EOSINOPHIL # BLD AUTO: 0.05 K/UL — SIGNIFICANT CHANGE UP (ref 0–0.5)
EOSINOPHIL NFR BLD AUTO: 0.8 % — SIGNIFICANT CHANGE UP (ref 0–6)
ETHANOL SERPL-MCNC: 265 MG/DL — SIGNIFICANT CHANGE UP
GLUCOSE SERPL-MCNC: 131 MG/DL — HIGH (ref 70–115)
HCT VFR BLD CALC: 35.1 % — LOW (ref 39–50)
HGB BLD-MCNC: 11.3 G/DL — LOW (ref 13–17)
IMM GRANULOCYTES NFR BLD AUTO: 0.5 % — SIGNIFICANT CHANGE UP (ref 0–1.5)
LYMPHOCYTES # BLD AUTO: 2.14 K/UL — SIGNIFICANT CHANGE UP (ref 1–3.3)
LYMPHOCYTES # BLD AUTO: 33.3 % — SIGNIFICANT CHANGE UP (ref 13–44)
MCHC RBC-ENTMCNC: 27.1 PG — SIGNIFICANT CHANGE UP (ref 27–34)
MCHC RBC-ENTMCNC: 32.2 GM/DL — SIGNIFICANT CHANGE UP (ref 32–36)
MCV RBC AUTO: 84.2 FL — SIGNIFICANT CHANGE UP (ref 80–100)
MONOCYTES # BLD AUTO: 0.58 K/UL — SIGNIFICANT CHANGE UP (ref 0–0.9)
MONOCYTES NFR BLD AUTO: 9 % — SIGNIFICANT CHANGE UP (ref 2–14)
NEUTROPHILS # BLD AUTO: 3.6 K/UL — SIGNIFICANT CHANGE UP (ref 1.8–7.4)
NEUTROPHILS NFR BLD AUTO: 55.9 % — SIGNIFICANT CHANGE UP (ref 43–77)
PLATELET # BLD AUTO: 253 K/UL — SIGNIFICANT CHANGE UP (ref 150–400)
POTASSIUM SERPL-MCNC: 2.9 MMOL/L — CRITICAL LOW (ref 3.5–5.3)
POTASSIUM SERPL-SCNC: 2.9 MMOL/L — CRITICAL LOW (ref 3.5–5.3)
PROT SERPL-MCNC: 7.4 G/DL — SIGNIFICANT CHANGE UP (ref 6.6–8.7)
RBC # BLD: 4.17 M/UL — LOW (ref 4.2–5.8)
RBC # FLD: 16.5 % — HIGH (ref 10.3–14.5)
SODIUM SERPL-SCNC: 139 MMOL/L — SIGNIFICANT CHANGE UP (ref 135–145)
WBC # BLD: 6.43 K/UL — SIGNIFICANT CHANGE UP (ref 3.8–10.5)
WBC # FLD AUTO: 6.43 K/UL — SIGNIFICANT CHANGE UP (ref 3.8–10.5)

## 2019-07-21 PROCEDURE — 99284 EMERGENCY DEPT VISIT MOD MDM: CPT

## 2019-07-21 RX ORDER — SODIUM CHLORIDE 9 MG/ML
1000 INJECTION, SOLUTION INTRAVENOUS
Refills: 0 | Status: DISCONTINUED | OUTPATIENT
Start: 2019-07-21 | End: 2019-07-26

## 2019-07-21 RX ORDER — SODIUM CHLORIDE 9 MG/ML
3 INJECTION INTRAMUSCULAR; INTRAVENOUS; SUBCUTANEOUS ONCE
Refills: 0 | Status: COMPLETED | OUTPATIENT
Start: 2019-07-21 | End: 2019-07-21

## 2019-07-21 RX ADMIN — SODIUM CHLORIDE 150 MILLILITER(S): 9 INJECTION, SOLUTION INTRAVENOUS at 23:04

## 2019-07-21 RX ADMIN — SODIUM CHLORIDE 3 MILLILITER(S): 9 INJECTION INTRAMUSCULAR; INTRAVENOUS; SUBCUTANEOUS at 23:04

## 2019-07-21 NOTE — ED ADULT TRIAGE NOTE - CHIEF COMPLAINT QUOTE
Patient presents to ER C/O ETOH intoxication, patient denies any recent injuries, patient crying in Triage, resp even/unlabored, lungs CTAB, admits to drinking.

## 2019-07-21 NOTE — ED ADULT NURSE NOTE - OBJECTIVE STATEMENT
Assumed care of patient at , Alert awake stating " I drank a lot and my mom just  and im so sad". Pt tearful and crying to RN. Emotional support provided. + ETOH, unable to obtain subjective data from patient. Pt denies pain, dirt noted to skin, patient stated he is unsure if he fell, no visible injuries noted. Awaiting MD [place].

## 2019-07-21 NOTE — ED CLERICAL - NS ED CLERK NOTE PRE-ARRIVAL INFORMATION; ADDITIONAL PRE-ARRIVAL INFORMATION
This patient is eligible for outpatient care navigation through the [facility] readmission reduction initiative. This patient will be engaged by the transitional care management team to enroll into this program. Please call the number above to facilitate this enrollment or for close follow up. This patient will be engaged by the transitional care management team to enroll into this program. Please call the number above to facilitate this enrollment or for close follow up.  This patient is eligible for (or currently enrolled in) an outpatient care management program available through [employer]. This program can coordinate outpatient follow up and assist the patient in accessing a variety of outpatient resources.  If discharged from the ED, the patient will be contacted to see if any additional resources are needed. Please call the Nurse Clinical Call Center at [phone] with any questions or for assistance in discharge planning.

## 2019-07-21 NOTE — ED ADULT NURSE NOTE - CAS DISCH CONDITION
Patient to CT with RN. Family reports patient with generalized weakness to bilateral legs and unable to stand, slurred speech, blurred vision to bilateral eyes, and L lower facial droop that was noted when patient woke up this morning at 0900. Last known normal was last night prior to bed at 2100. Stable

## 2019-07-21 NOTE — ED PROVIDER NOTE - CLINICAL SUMMARY MEDICAL DECISION MAKING FREE TEXT BOX
Alcohol intoxication, no signs of trauma, not hypoglycemic, neuro exam non-focal, will observe and reassess.

## 2019-07-21 NOTE — ED PROVIDER NOTE - OBJECTIVE STATEMENT
Pt is a 54 y/o M, with hx of alcohol abuse, asthma, GERD, HTN, HLD, BIBEMS for alcohol intoxication.. No signs of trauma. Hx limited at this time due to patient's state. Appears in no distress. He admits to "drinking.. and drinking, and drinking."

## 2019-07-22 VITALS
RESPIRATION RATE: 20 BRPM | SYSTOLIC BLOOD PRESSURE: 105 MMHG | OXYGEN SATURATION: 97 % | DIASTOLIC BLOOD PRESSURE: 55 MMHG | HEART RATE: 86 BPM

## 2019-07-22 LAB
ANION GAP SERPL CALC-SCNC: 13 MMOL/L — SIGNIFICANT CHANGE UP (ref 5–17)
BUN SERPL-MCNC: 12 MG/DL — SIGNIFICANT CHANGE UP (ref 8–20)
CALCIUM SERPL-MCNC: 9.3 MG/DL — SIGNIFICANT CHANGE UP (ref 8.6–10.2)
CHLORIDE SERPL-SCNC: 100 MMOL/L — SIGNIFICANT CHANGE UP (ref 98–107)
CO2 SERPL-SCNC: 23 MMOL/L — SIGNIFICANT CHANGE UP (ref 22–29)
CREAT SERPL-MCNC: 0.69 MG/DL — SIGNIFICANT CHANGE UP (ref 0.5–1.3)
GLUCOSE SERPL-MCNC: 115 MG/DL — SIGNIFICANT CHANGE UP (ref 70–115)
POTASSIUM SERPL-MCNC: 4.1 MMOL/L — SIGNIFICANT CHANGE UP (ref 3.5–5.3)
POTASSIUM SERPL-SCNC: 4.1 MMOL/L — SIGNIFICANT CHANGE UP (ref 3.5–5.3)
SODIUM SERPL-SCNC: 136 MMOL/L — SIGNIFICANT CHANGE UP (ref 135–145)

## 2019-07-22 PROCEDURE — 80053 COMPREHEN METABOLIC PANEL: CPT

## 2019-07-22 PROCEDURE — 36415 COLL VENOUS BLD VENIPUNCTURE: CPT

## 2019-07-22 PROCEDURE — 80307 DRUG TEST PRSMV CHEM ANLYZR: CPT

## 2019-07-22 PROCEDURE — 96361 HYDRATE IV INFUSION ADD-ON: CPT

## 2019-07-22 PROCEDURE — 85027 COMPLETE CBC AUTOMATED: CPT

## 2019-07-22 PROCEDURE — 99285 EMERGENCY DEPT VISIT HI MDM: CPT | Mod: 25

## 2019-07-22 PROCEDURE — 80048 BASIC METABOLIC PNL TOTAL CA: CPT

## 2019-07-22 PROCEDURE — 96360 HYDRATION IV INFUSION INIT: CPT

## 2019-07-22 RX ORDER — POTASSIUM CHLORIDE 20 MEQ
40 PACKET (EA) ORAL ONCE
Refills: 0 | Status: COMPLETED | OUTPATIENT
Start: 2019-07-22 | End: 2019-07-22

## 2019-07-22 RX ADMIN — Medication 40 MILLIEQUIVALENT(S): at 06:21

## 2019-07-22 RX ADMIN — SODIUM CHLORIDE 1000 MILLILITER(S): 9 INJECTION, SOLUTION INTRAVENOUS at 05:48

## 2019-07-22 RX ADMIN — Medication 40 MILLIEQUIVALENT(S): at 01:05

## 2019-07-23 DIAGNOSIS — Z71.89 OTHER SPECIFIED COUNSELING: ICD-10-CM

## 2019-07-25 ENCOUNTER — EMERGENCY (EMERGENCY)
Facility: HOSPITAL | Age: 54
LOS: 1 days | Discharge: HOME | End: 2019-07-25
Attending: STUDENT IN AN ORGANIZED HEALTH CARE EDUCATION/TRAINING PROGRAM
Payer: COMMERCIAL

## 2019-07-25 VITALS
WEIGHT: 175.05 LBS | DIASTOLIC BLOOD PRESSURE: 80 MMHG | OXYGEN SATURATION: 96 % | HEIGHT: 69 IN | HEART RATE: 87 BPM | SYSTOLIC BLOOD PRESSURE: 117 MMHG | TEMPERATURE: 98 F | RESPIRATION RATE: 15 BRPM

## 2019-07-25 LAB
BASOPHILS # BLD AUTO: 0.04 K/UL — SIGNIFICANT CHANGE UP (ref 0–0.2)
BASOPHILS NFR BLD AUTO: 0.6 % — SIGNIFICANT CHANGE UP (ref 0–2)
EOSINOPHIL # BLD AUTO: 0.07 K/UL — SIGNIFICANT CHANGE UP (ref 0–0.5)
EOSINOPHIL NFR BLD AUTO: 1 % — SIGNIFICANT CHANGE UP (ref 0–6)
HCT VFR BLD CALC: 35.5 % — LOW (ref 39–50)
HGB BLD-MCNC: 11.6 G/DL — LOW (ref 13–17)
IMM GRANULOCYTES NFR BLD AUTO: 0.4 % — SIGNIFICANT CHANGE UP (ref 0–1.5)
LYMPHOCYTES # BLD AUTO: 1.92 K/UL — SIGNIFICANT CHANGE UP (ref 1–3.3)
LYMPHOCYTES # BLD AUTO: 27.7 % — SIGNIFICANT CHANGE UP (ref 13–44)
MCHC RBC-ENTMCNC: 27.4 PG — SIGNIFICANT CHANGE UP (ref 27–34)
MCHC RBC-ENTMCNC: 32.7 GM/DL — SIGNIFICANT CHANGE UP (ref 32–36)
MCV RBC AUTO: 83.7 FL — SIGNIFICANT CHANGE UP (ref 80–100)
MONOCYTES # BLD AUTO: 0.48 K/UL — SIGNIFICANT CHANGE UP (ref 0–0.9)
MONOCYTES NFR BLD AUTO: 6.9 % — SIGNIFICANT CHANGE UP (ref 2–14)
NEUTROPHILS # BLD AUTO: 4.4 K/UL — SIGNIFICANT CHANGE UP (ref 1.8–7.4)
NEUTROPHILS NFR BLD AUTO: 63.4 % — SIGNIFICANT CHANGE UP (ref 43–77)
PLATELET # BLD AUTO: 222 K/UL — SIGNIFICANT CHANGE UP (ref 150–400)
RBC # BLD: 4.24 M/UL — SIGNIFICANT CHANGE UP (ref 4.2–5.8)
RBC # FLD: 16.7 % — HIGH (ref 10.3–14.5)
WBC # BLD: 6.94 K/UL — SIGNIFICANT CHANGE UP (ref 3.8–10.5)
WBC # FLD AUTO: 6.94 K/UL — SIGNIFICANT CHANGE UP (ref 3.8–10.5)

## 2019-07-25 PROCEDURE — 83690 ASSAY OF LIPASE: CPT

## 2019-07-25 PROCEDURE — 80053 COMPREHEN METABOLIC PANEL: CPT

## 2019-07-25 PROCEDURE — 85027 COMPLETE CBC AUTOMATED: CPT

## 2019-07-25 PROCEDURE — 99284 EMERGENCY DEPT VISIT MOD MDM: CPT

## 2019-07-25 PROCEDURE — 36415 COLL VENOUS BLD VENIPUNCTURE: CPT

## 2019-07-25 PROCEDURE — 70450 CT HEAD/BRAIN W/O DYE: CPT

## 2019-07-25 PROCEDURE — 82962 GLUCOSE BLOOD TEST: CPT

## 2019-07-25 PROCEDURE — 99285 EMERGENCY DEPT VISIT HI MDM: CPT

## 2019-07-25 RX ORDER — ONDANSETRON 8 MG/1
4 TABLET, FILM COATED ORAL ONCE
Refills: 0 | Status: COMPLETED | OUTPATIENT
Start: 2019-07-25 | End: 2019-07-25

## 2019-07-25 RX ADMIN — Medication 100 MILLIGRAM(S): at 22:54

## 2019-07-25 RX ADMIN — ONDANSETRON 4 MILLIGRAM(S): 8 TABLET, FILM COATED ORAL at 22:54

## 2019-07-25 NOTE — ED ADULT TRIAGE NOTE - CHIEF COMPLAINT QUOTE
Pt BIB[place]. Pt tried to rent a room and when he was told that he couldn't, he started rolling around on the ground.

## 2019-07-25 NOTE — ED PROVIDER NOTE - PLAN OF CARE
1) Please do not drink alcohol or ingest any mind-altering substances and drive or make important decisions.   Please cut down on your substance use/abuse.  2) You were given information about substance use/abuse.  Please review this literature.  3) If you have any worsening of symptoms please return to the ED immediately  4) Please follow-up with your primary care doctor in the next 2 days for evaluation of symptoms.

## 2019-07-25 NOTE — ED ADULT NURSE NOTE - NS ED NURSE IV DC DT
37 yo c/o diarrhea and abdominal pain. Appetite improved c/o of diarrhea.Better on reglan.appetitite improved 37 y/o diabetic male that is admitted after recent admission for recurrent colitis. 39 y/o diabetic male that is admitted after recent admission for recurrent colitis. 26-Jul-2019 07:04

## 2019-07-25 NOTE — ED PROVIDER NOTE - PROGRESS NOTE DETAILS
Patient seen and reassessed.   Patient is AAOX3, NAD, able to ambulate, non tremulous, and tolerating oral intake.   VSS.   Patient states that they feel safe going home and have a safe way to get home.   Give copy of tests performed.   Discussed need to cut down on alcohol intake, given literature.

## 2019-07-25 NOTE — ED ADULT NURSE REASSESSMENT NOTE - NS ED NURSE REASSESS COMMENT FT1
pt returned from ct scan , uncooperative with test at this time, ripping off safety belt and not laying still. md aware test not completed.

## 2019-07-25 NOTE — ED ADULT NURSE NOTE - OBJECTIVE STATEMENT
pt biba s/p found [place] at a motel intoxicated, where he was denied a room so he started to roll around on the ground. per EMS. + slurred speech, + tearful with intermittent crying spells. + restless and anxious, + sm hematoma to r forehead, denies trauma, no other obv deformities noted.

## 2019-07-25 NOTE — ED PROVIDER NOTE - TOBACCO USE
Nadira is a 37 year old female here for an obstetrics check.  She is      . Gestational age: 36w4d     She reports fetal movement  She reports contractions  She denies bleeding  She denies headaches  She denies edema  She denies abdominal pain  She denies rupture of membranes  She denies vision changes    See Prenatal Flowsheet for additional comments.  
OB vitals: Urine:  Blood:  None  Leukocytes:  None    
Patient is here for a routine OB check.  No new significant problems or changes are noted.   
Unknown if ever smoked

## 2019-07-25 NOTE — ED PROVIDER NOTE - CARE PLAN
Principal Discharge DX:	Alcohol abuse  Assessment and plan of treatment:	1) Please do not drink alcohol or ingest any mind-altering substances and drive or make important decisions.   Please cut down on your substance use/abuse.  2) You were given information about substance use/abuse.  Please review this literature.  3) If you have any worsening of symptoms please return to the ED immediately  4) Please follow-up with your primary care doctor in the next 2 days for evaluation of symptoms.

## 2019-07-25 NOTE — ED PROVIDER NOTE - OBJECTIVE STATEMENT
53M h/o ETOH abuse, HTN, HLD presents intoxicated. As per triage pt was found [place] the [place] of [facility] after he was told he couldn't rent a room. Pt admits to "drinking a lot". Denies any trauma. Is not contributing anything else to history.

## 2019-07-25 NOTE — ED PROVIDER NOTE - CLINICAL SUMMARY MEDICAL DECISION MAKING FREE TEXT BOX
Patient appears intoxicated.   - Hematoma to forehead, will obtain CTH.  - Will clear when clinically sober.

## 2019-07-26 ENCOUNTER — EMERGENCY (EMERGENCY)
Facility: HOSPITAL | Age: 54
LOS: 1 days | Discharge: HOME | End: 2019-07-26
Attending: EMERGENCY MEDICINE
Payer: COMMERCIAL

## 2019-07-26 VITALS
TEMPERATURE: 98 F | SYSTOLIC BLOOD PRESSURE: 128 MMHG | HEART RATE: 105 BPM | OXYGEN SATURATION: 96 % | WEIGHT: 130.07 LBS | HEIGHT: 60 IN | RESPIRATION RATE: 18 BRPM | DIASTOLIC BLOOD PRESSURE: 91 MMHG

## 2019-07-26 VITALS
OXYGEN SATURATION: 99 % | TEMPERATURE: 99 F | HEART RATE: 76 BPM | SYSTOLIC BLOOD PRESSURE: 110 MMHG | DIASTOLIC BLOOD PRESSURE: 66 MMHG | RESPIRATION RATE: 19 BRPM

## 2019-07-26 LAB
ALBUMIN SERPL ELPH-MCNC: 4.5 G/DL — SIGNIFICANT CHANGE UP (ref 3.3–5.2)
ALP SERPL-CCNC: 80 U/L — SIGNIFICANT CHANGE UP (ref 40–120)
ALT FLD-CCNC: 18 U/L — SIGNIFICANT CHANGE UP
AMPHET UR-MCNC: NEGATIVE — SIGNIFICANT CHANGE UP
ANION GAP SERPL CALC-SCNC: 15 MMOL/L — SIGNIFICANT CHANGE UP (ref 5–17)
ANION GAP SERPL CALC-SCNC: 17 MMOL/L — SIGNIFICANT CHANGE UP (ref 5–17)
APAP SERPL-MCNC: <7.5 UG/ML — LOW (ref 10–26)
APPEARANCE UR: CLEAR — SIGNIFICANT CHANGE UP
AST SERPL-CCNC: 24 U/L — SIGNIFICANT CHANGE UP
BARBITURATES UR SCN-MCNC: NEGATIVE — SIGNIFICANT CHANGE UP
BASOPHILS # BLD AUTO: 0.04 K/UL — SIGNIFICANT CHANGE UP (ref 0–0.2)
BASOPHILS NFR BLD AUTO: 0.5 % — SIGNIFICANT CHANGE UP (ref 0–2)
BENZODIAZ UR-MCNC: POSITIVE
BILIRUB SERPL-MCNC: 0.2 MG/DL — LOW (ref 0.4–2)
BILIRUB UR-MCNC: NEGATIVE — SIGNIFICANT CHANGE UP
BUN SERPL-MCNC: 12 MG/DL — SIGNIFICANT CHANGE UP (ref 8–20)
BUN SERPL-MCNC: 13 MG/DL — SIGNIFICANT CHANGE UP (ref 8–20)
CALCIUM SERPL-MCNC: 9.1 MG/DL — SIGNIFICANT CHANGE UP (ref 8.6–10.2)
CALCIUM SERPL-MCNC: 9.1 MG/DL — SIGNIFICANT CHANGE UP (ref 8.6–10.2)
CHLORIDE SERPL-SCNC: 100 MMOL/L — SIGNIFICANT CHANGE UP (ref 98–107)
CHLORIDE SERPL-SCNC: 100 MMOL/L — SIGNIFICANT CHANGE UP (ref 98–107)
CO2 SERPL-SCNC: 23 MMOL/L — SIGNIFICANT CHANGE UP (ref 22–29)
CO2 SERPL-SCNC: 23 MMOL/L — SIGNIFICANT CHANGE UP (ref 22–29)
COCAINE METAB.OTHER UR-MCNC: NEGATIVE — SIGNIFICANT CHANGE UP
COLOR SPEC: YELLOW — SIGNIFICANT CHANGE UP
CREAT SERPL-MCNC: 0.69 MG/DL — SIGNIFICANT CHANGE UP (ref 0.5–1.3)
CREAT SERPL-MCNC: 1.07 MG/DL — SIGNIFICANT CHANGE UP (ref 0.5–1.3)
DIFF PNL FLD: NEGATIVE — SIGNIFICANT CHANGE UP
EOSINOPHIL # BLD AUTO: 0.08 K/UL — SIGNIFICANT CHANGE UP (ref 0–0.5)
EOSINOPHIL NFR BLD AUTO: 1.1 % — SIGNIFICANT CHANGE UP (ref 0–6)
ETHANOL SERPL-MCNC: 300 MG/DL — SIGNIFICANT CHANGE UP
GLUCOSE SERPL-MCNC: 103 MG/DL — SIGNIFICANT CHANGE UP (ref 70–115)
GLUCOSE SERPL-MCNC: 115 MG/DL — SIGNIFICANT CHANGE UP (ref 70–115)
GLUCOSE UR QL: NEGATIVE MG/DL — SIGNIFICANT CHANGE UP
HCT VFR BLD CALC: 36.2 % — LOW (ref 39–50)
HGB BLD-MCNC: 11.7 G/DL — LOW (ref 13–17)
IMM GRANULOCYTES NFR BLD AUTO: 0.7 % — SIGNIFICANT CHANGE UP (ref 0–1.5)
KETONES UR-MCNC: NEGATIVE — SIGNIFICANT CHANGE UP
LEUKOCYTE ESTERASE UR-ACNC: NEGATIVE — SIGNIFICANT CHANGE UP
LIDOCAIN IGE QN: 35 U/L — SIGNIFICANT CHANGE UP (ref 22–51)
LIDOCAIN IGE QN: 43 U/L — SIGNIFICANT CHANGE UP (ref 22–51)
LYMPHOCYTES # BLD AUTO: 1.86 K/UL — SIGNIFICANT CHANGE UP (ref 1–3.3)
LYMPHOCYTES # BLD AUTO: 25.2 % — SIGNIFICANT CHANGE UP (ref 13–44)
MCHC RBC-ENTMCNC: 27.5 PG — SIGNIFICANT CHANGE UP (ref 27–34)
MCHC RBC-ENTMCNC: 32.3 GM/DL — SIGNIFICANT CHANGE UP (ref 32–36)
MCV RBC AUTO: 85.2 FL — SIGNIFICANT CHANGE UP (ref 80–100)
METHADONE UR-MCNC: NEGATIVE — SIGNIFICANT CHANGE UP
MONOCYTES # BLD AUTO: 0.59 K/UL — SIGNIFICANT CHANGE UP (ref 0–0.9)
MONOCYTES NFR BLD AUTO: 8 % — SIGNIFICANT CHANGE UP (ref 2–14)
NEUTROPHILS # BLD AUTO: 4.77 K/UL — SIGNIFICANT CHANGE UP (ref 1.8–7.4)
NEUTROPHILS NFR BLD AUTO: 64.5 % — SIGNIFICANT CHANGE UP (ref 43–77)
NITRITE UR-MCNC: NEGATIVE — SIGNIFICANT CHANGE UP
OPIATES UR-MCNC: NEGATIVE — SIGNIFICANT CHANGE UP
PCP SPEC-MCNC: SIGNIFICANT CHANGE UP
PCP UR-MCNC: NEGATIVE — SIGNIFICANT CHANGE UP
PH UR: 6.5 — SIGNIFICANT CHANGE UP (ref 5–8)
PLATELET # BLD AUTO: 235 K/UL — SIGNIFICANT CHANGE UP (ref 150–400)
POTASSIUM SERPL-MCNC: 3.6 MMOL/L — SIGNIFICANT CHANGE UP (ref 3.5–5.3)
POTASSIUM SERPL-MCNC: 4.3 MMOL/L — SIGNIFICANT CHANGE UP (ref 3.5–5.3)
POTASSIUM SERPL-SCNC: 3.6 MMOL/L — SIGNIFICANT CHANGE UP (ref 3.5–5.3)
POTASSIUM SERPL-SCNC: 4.3 MMOL/L — SIGNIFICANT CHANGE UP (ref 3.5–5.3)
PROT SERPL-MCNC: 7.5 G/DL — SIGNIFICANT CHANGE UP (ref 6.6–8.7)
PROT UR-MCNC: NEGATIVE MG/DL — SIGNIFICANT CHANGE UP
RBC # BLD: 4.25 M/UL — SIGNIFICANT CHANGE UP (ref 4.2–5.8)
RBC # FLD: 17.2 % — HIGH (ref 10.3–14.5)
SALICYLATES SERPL-MCNC: <0.6 MG/DL — LOW (ref 10–20)
SODIUM SERPL-SCNC: 138 MMOL/L — SIGNIFICANT CHANGE UP (ref 135–145)
SODIUM SERPL-SCNC: 140 MMOL/L — SIGNIFICANT CHANGE UP (ref 135–145)
SP GR SPEC: 1 — LOW (ref 1.01–1.02)
THC UR QL: NEGATIVE — SIGNIFICANT CHANGE UP
UROBILINOGEN FLD QL: NEGATIVE MG/DL — SIGNIFICANT CHANGE UP
WBC # BLD: 7.39 K/UL — SIGNIFICANT CHANGE UP (ref 3.8–10.5)
WBC # FLD AUTO: 7.39 K/UL — SIGNIFICANT CHANGE UP (ref 3.8–10.5)

## 2019-07-26 PROCEDURE — 70450 CT HEAD/BRAIN W/O DYE: CPT | Mod: 26

## 2019-07-26 PROCEDURE — 93010 ELECTROCARDIOGRAM REPORT: CPT

## 2019-07-26 PROCEDURE — 99285 EMERGENCY DEPT VISIT HI MDM: CPT

## 2019-07-26 RX ADMIN — Medication 50 MILLIGRAM(S): at 17:21

## 2019-07-26 NOTE — ED ADULT NURSE NOTE - ED STAT RN HANDOFF DETAILS
report given to lc wilson rn. pt transported by [facility] and [facility].

## 2019-07-26 NOTE — ED ADULT NURSE NOTE - OBJECTIVE STATEMENT
pt care assumed at 1415, no apparent distress noted at this time. pt received A&Ox1 resting in bed with blanket over head. pt states he has been drinking a lot today. pt denies medical complaints at this time. pt in yellow gown with no belongings at bedside. pt has ALOOB. pt has abrasion noted to right temporal region of head. HR is regular, lung sounds are wheezes b/l, abd is soft and nontender with positive bowel sounds in all four quadrants, skin is warm, dry and appropriate for age and race. pt safety maintained.

## 2019-07-26 NOTE — ED PROVIDER NOTE - PHYSICAL EXAMINATION
VITAL SIGNS: I have reviewed nursing notes and confirm.  CONSTITUTIONAL: Well-developed; well-nourished; (+) intoxicated (+) crying  SKIN: Skin exam is warm and dry, no acute rash.  HEAD: Normocephalic; atraumatic.  EYES: PERRL, EOM intact; conjunctiva and sclera clear.  ENT: No nasal discharge; airway clear. Throat clear.  NECK: Supple; non tender.    CARD: S1, S2 normal; no murmurs, gallops, or rubs. Regular rate and rhythm.  RESP: No wheezes,  no rales or rhonchi.   ABD:  soft; non-distended; non-tender;   EXT: Normal ROM. No clubbing, cyanosis or edema.  NEURO: Alert, (+) intoxicated  Grossly unremarkable. No focal deficits. no facial droop, moves all extremities,

## 2019-07-26 NOTE — ED BEHAVIORAL HEALTH NOTE - BEHAVIORAL HEALTH NOTE
Pt made a suicidal statement when intoxicated.  Please eval when sober for need for consult and if still required, consult will be conducted when Pt BAL<100

## 2019-07-26 NOTE — ED PROVIDER NOTE - PROGRESS NOTE DETAILS
BH contacted, pending sobriety and needing psych clearance for suicidal ideation recd sign out, second alcohol level checked, pln to transfer to , get ciwa q4h, plan to add librium pending pysch evaluation, sign out to am ed attedning patient sober no longer suicidal, TBДМИТРИЙ -Sanaz DO

## 2019-07-26 NOTE — ED PROVIDER NOTE - NS ED ROS FT
Review of Systems  •	CONSTITUTIONAL - no  fever, no diaphoresis, no weight change  •	SKIN - no rash  •	HEMATOLOGIC - no bleeding, no bruising  •	EYES - no eye pain, no blurred vision  •	ENT - no change in hearing, no pain  •	RESPIRATORY - no shortness of breath, no cough  •	CARDIAC - no chest pain, no palpitations  •	GI - no abd pain, no nausea, no vomiting, no diarrhea, no constipation, no bleeding  •	GENITO-URINARY - no discharge, no dysuria; no hematuria,   •	ENDO - no polydypsia, no polyurea, no heat/no cold intolerance  •	MUSCULOSKELETAL - no joint pain, no swelling, no redness  •	NEUROLOGIC - no weakness, no headache, no anesthesia, no paresthesias  •	PSYCH - no anxiety, (+) suicidal, non homicidal, no hallucination, no depression

## 2019-07-26 NOTE — ED ADULT NURSE NOTE - NSIMPLEMENTINTERV_GEN_ALL_ED
Implemented All Fall Risk Interventions:  [place] to call system. Call bell, personal items and telephone within reach. Instruct patient to call for assistance. Room bathroom lighting operational. Non-slip footwear when patient is off stretcher. Physically safe environment: no spills, clutter or unnecessary equipment. Stretcher in lowest position, wheels locked, appropriate side rails in place. Provide visual cue, wrist band, yellow gown, etc. Monitor gait and stability. Monitor for mental status changes and reorient to person, place, and time. Review medications for side effects contributing to fall risk. Reinforce activity limits and safety measures with patient and family.

## 2019-07-26 NOTE — ED PROVIDER NOTE - OBJECTIVE STATEMENT
54 yo M chronic alcoholic report suicidal ideation "I want to drink myself to death". He report hx of stomach CA that no one is taking care of. He report drinking "a quart of vodka" today. Patient started crying during examination. 1:1 at bedside.

## 2019-07-26 NOTE — ED PROVIDER NOTE - CARE PLAN
Principal Discharge DX:	Alcohol intoxication  Secondary Diagnosis:	Adjustment reaction to chronic stress

## 2019-07-27 ENCOUNTER — EMERGENCY (EMERGENCY)
Facility: HOSPITAL | Age: 54
LOS: 1 days | Discharge: HOME | End: 2019-07-27
Attending: EMERGENCY MEDICINE
Payer: COMMERCIAL

## 2019-07-27 VITALS
DIASTOLIC BLOOD PRESSURE: 68 MMHG | OXYGEN SATURATION: 100 % | SYSTOLIC BLOOD PRESSURE: 114 MMHG | TEMPERATURE: 98 F | RESPIRATION RATE: 18 BRPM | HEART RATE: 74 BPM

## 2019-07-27 VITALS
DIASTOLIC BLOOD PRESSURE: 76 MMHG | SYSTOLIC BLOOD PRESSURE: 111 MMHG | RESPIRATION RATE: 18 BRPM | OXYGEN SATURATION: 96 % | TEMPERATURE: 98 F | WEIGHT: 179.9 LBS | HEART RATE: 96 BPM | HEIGHT: 65 IN

## 2019-07-27 VITALS
DIASTOLIC BLOOD PRESSURE: 86 MMHG | HEART RATE: 78 BPM | RESPIRATION RATE: 20 BRPM | OXYGEN SATURATION: 98 % | TEMPERATURE: 98 F | SYSTOLIC BLOOD PRESSURE: 128 MMHG

## 2019-07-27 DIAGNOSIS — F10.220 ALCOHOL DEPENDENCE WITH INTOXICATION, UNCOMPLICATED: ICD-10-CM

## 2019-07-27 DIAGNOSIS — R69 ILLNESS, UNSPECIFIED: ICD-10-CM

## 2019-07-27 LAB — ETHANOL SERPL-MCNC: 20 MG/DL — SIGNIFICANT CHANGE UP

## 2019-07-27 PROCEDURE — 81003 URINALYSIS AUTO W/O SCOPE: CPT

## 2019-07-27 PROCEDURE — 99284 EMERGENCY DEPT VISIT MOD MDM: CPT

## 2019-07-27 PROCEDURE — 96372 THER/PROPH/DIAG INJ SC/IM: CPT

## 2019-07-27 PROCEDURE — 36415 COLL VENOUS BLD VENIPUNCTURE: CPT

## 2019-07-27 PROCEDURE — 99285 EMERGENCY DEPT VISIT HI MDM: CPT | Mod: 25

## 2019-07-27 PROCEDURE — 93005 ELECTROCARDIOGRAM TRACING: CPT

## 2019-07-27 PROCEDURE — 83690 ASSAY OF LIPASE: CPT

## 2019-07-27 PROCEDURE — 80048 BASIC METABOLIC PNL TOTAL CA: CPT

## 2019-07-27 PROCEDURE — 85027 COMPLETE CBC AUTOMATED: CPT

## 2019-07-27 PROCEDURE — 80307 DRUG TEST PRSMV CHEM ANLYZR: CPT

## 2019-07-27 RX ORDER — HALOPERIDOL DECANOATE 100 MG/ML
5 INJECTION INTRAMUSCULAR ONCE
Refills: 0 | Status: COMPLETED | OUTPATIENT
Start: 2019-07-27 | End: 2019-07-27

## 2019-07-27 RX ORDER — PANTOPRAZOLE SODIUM 20 MG/1
40 TABLET, DELAYED RELEASE ORAL
Refills: 0 | Status: DISCONTINUED | OUTPATIENT
Start: 2019-07-27 | End: 2019-07-31

## 2019-07-27 RX ADMIN — PANTOPRAZOLE SODIUM 40 MILLIGRAM(S): 20 TABLET, DELAYED RELEASE ORAL at 09:38

## 2019-07-27 RX ADMIN — Medication 50 MILLIGRAM(S): at 03:28

## 2019-07-27 RX ADMIN — HALOPERIDOL DECANOATE 5 MILLIGRAM(S): 100 INJECTION INTRAMUSCULAR at 13:21

## 2019-07-27 RX ADMIN — Medication 2 MILLIGRAM(S): at 03:28

## 2019-07-27 NOTE — ED BEHAVIORAL HEALTH ASSESSMENT NOTE - DESCRIPTION
GERD; HTN; HLD; Asthma unemployed  recurrent homelessness Alcohol, Blood: 300: TOXIC CONCENTRATIONS (mg/dL):                        11.7   7.39  )-----------( 235      ( 26 Jul 2019 16:04 )             36.2     07-26    138  |  100  |  12.0  ----------------------------<  115  3.6   |  23.0  |  1.07    Ca    9.1      26 Jul 2019 16:04    TPro  7.5  /  Alb  4.5  /  TBili  0.2<L>  /  DBili  x   /  AST  24  /  ALT  18  /  AlkPhos  80  07-25    LIVER FUNCTIONS - ( 25 Jul 2019 23:10 )  Alb: 4.5 g/dL / Pro: 7.5 g/dL / ALK PHOS: 80 U/L / ALT: 18 U/L / AST: 24 U/L / GGT: x

## 2019-07-27 NOTE — ED ADULT NURSE REASSESSMENT NOTE - NS ED NURSE REASSESS COMMENT FT1
Pt sleeping with VSS, no acute s/s of respiratory distress noted or reported at this time, will continue to monitor

## 2019-07-27 NOTE — ED PROVIDER NOTE - CLINICAL SUMMARY MEDICAL DECISION MAKING FREE TEXT BOX
patient well known to ED for alcohol intox, second visit today. clinically intoxicated, no sign of new trauma. denies trauma. teraful but no SI at this time. patient slightly agitated, trying to climb out of stretcher not responsible to verbal de-escalation will try IM haldol for patient safety

## 2019-07-27 NOTE — ED BEHAVIORAL HEALTH ASSESSMENT NOTE - HPI (INCLUDE ILLNESS QUALITY, SEVERITY, DURATION, TIMING, CONTEXT, MODIFYING FACTORS, ASSOCIATED SIGNS AND SYMPTOMS)
found passed out Unable to recall events leading to hospital visit BAL: 300 upon arrival Now sober Is homeless Denies mental illness Denies suicidal or violent urges Major concern was for his wallet and clothing  Declines to speak to SW or information on DSS housing

## 2019-07-27 NOTE — ED ADULT TRIAGE NOTE - CHIEF COMPLAINT QUOTE
Pt recently discharged from [facility] ER.  Pt found lying on the grass intoxicated.  Admits to drinking at a half bottle of vodka.  Undressed, placed in yellow gown, and belongings secured by SNA. Pt recently discharged from [facility] ER. Found lying on the grass intoxicated.  Admits to drinking at a half bottle of vodka.  Undressed, placed in yellow gown, and belongings secured by SNA. no obvious signs of trauma present

## 2019-07-27 NOTE — ED ADULT NURSE NOTE - CHIEF COMPLAINT QUOTE
Pt recently discharged from [facility] ER. Found lying on the grass intoxicated.  Admits to drinking at a half bottle of vodka.  Undressed, placed in yellow gown, and belongings secured by SNA. no obvious signs of trauma present

## 2019-07-27 NOTE — ED PROVIDER NOTE - OBJECTIVE STATEMENT
54yo M with chronic ETOH abuse, just D/C today ~2 hrs ago after intox and SI, cleared this morning when sober. patient went out and drank, patient tearful, states 'my mommy ' denies SI/HI at this time. history limited due to intoxication

## 2019-07-27 NOTE — ED PROVIDER NOTE - PHYSICAL EXAMINATION
Gen: smells of alcohol, slurred speech  Head: NC, healed laceration to frontal region no hematoma/bruising or signs of new trauma  HEENT: PERRL, +nystagmus, EOMI, oral mucosa moist, normal conjunctiva, neck supple  Lung: CTAB, no respiratory distress  CV: rrr, no murmur, Normal perfusion  Abd: soft, NTND  MSK: No edema, no visible deformities  Neuro: moving all extreimties equally  Skin: No rash   Psych: tearful

## 2019-07-27 NOTE — ED BEHAVIORAL HEALTH ASSESSMENT NOTE - RISK ASSESSMENT
Acute Suicide Risk  (  ) High   (  ) Moderate   ( x ) Low   (  ) Unable to determine   Rationale:     Elevated Chronic Risk   ( x ) Yes   Details: alcoholism homelessness    (  ) No

## 2019-07-27 NOTE — ED PROVIDER NOTE - PROGRESS NOTE DETAILS
patient still intoxicated, had to go to bathroom, ambulated with assistance, still intoxicated. will continue to monitor -Sanaz DO Rubio: pt oriented, ambulating with steady gait, no si/hi. clinically sober without sign withdrawal. stable for d/c.

## 2019-07-27 NOTE — ED ADULT NURSE NOTE - OBJECTIVE STATEMENT
Pt recently d/c'ed from [facility], found intoxicated, pt aggressive and unable to provide further information at this time, will continue to monitor

## 2019-07-27 NOTE — ED ADULT NURSE REASSESSMENT NOTE - NS ED NURSE REASSESS COMMENT FT1
Pt received at 0735. Report as noted. Pt is resting in bed, arousable, orientatedx3 . Pt denies pain, N/V/D. Pt VSS. Pt in NAD at this time. Pt moves all extremities equal and bilateral. Plan of care and wait time explained. Dr Rubio at Pt bedside. Pt in yellow gown.  Safety maintained.

## 2019-07-27 NOTE — ED ADULT NURSE NOTE - CAS EDN DISCHARGE ASSESSMENT
No adverse reaction to first time med in ED/Patient baseline mental status/Awake/Alert and oriented to person, place and time/Symptoms improved

## 2019-07-27 NOTE — ED ADULT NURSE REASSESSMENT NOTE - NS ED NURSE REASSESS COMMENT FT1
pt received on stretcher awake and alert and oriented x3, medically cleared by the ED attending,  assessed for suicidal thoughts which pt denies having any. pt also denies hi, avh, and does not appear to have paranoia, pt appears to have linear thinking, denies any depression, pt states that "I drank too much yesterday" pt known to ED for etoh abuse in the past with no psychiatric hx.   pt was assessed in ED for etoh withdrawals and scored 10 on a ciwa scale, on report pt received librium 50mg and Ativan 2mg im. pt does not exhibit any withdrawal symptoms at this time. pt received on stretcher awake and alert and oriented x3, medically cleared by the ED attending who is requesting a psychiatric evaluation for this pt. pt assessed for suicidal thoughts which pt denies having any at this time. pt also denies hi, avh, and does not appear to have paranoia, pt appears to have linear thinking, denies any depression, pt states that "I drank too much yesterday" pt known to ED for etoh abuse in the past with no psychiatric hx.   pt was assessed in ED for etoh withdrawals and scored 10 on a ciwa scale, on report pt received librium 50mg and Ativan 2mg im. pt does not exhibit any withdrawal symptoms at this time. pt received on stretcher awake and alert and oriented x3, medically cleared by the ED attending who is requesting a psychiatric evaluation for this pt. pt assessed for suicidal thoughts which pt denies having any at this time. pt also denies hi, avh, and does not appear to have paranoia, pt appears to have linear thinking, denies any depression, pt states that "I drank too much yesterday" pt known to ED for etoh abuse in the past with no psychiatric hx.   pt was assessed in ED for etoh withdrawals and scored 10 on a ciwa scale, on report pt received librium 50mg and Ativan 2mg im. pt does not exhibit any withdrawal symptoms at this time.  Plan of care discussed and reviewed with pt.

## 2019-07-28 ENCOUNTER — EMERGENCY (EMERGENCY)
Facility: HOSPITAL | Age: 54
LOS: 1 days | Discharge: HOME | End: 2019-07-28
Attending: EMERGENCY MEDICINE
Payer: COMMERCIAL

## 2019-07-28 VITALS
WEIGHT: 154.98 LBS | HEART RATE: 115 BPM | HEIGHT: 65 IN | DIASTOLIC BLOOD PRESSURE: 72 MMHG | SYSTOLIC BLOOD PRESSURE: 109 MMHG | OXYGEN SATURATION: 96 % | RESPIRATION RATE: 18 BRPM | TEMPERATURE: 98 F

## 2019-07-28 PROCEDURE — 93010 ELECTROCARDIOGRAM REPORT: CPT

## 2019-07-28 PROCEDURE — 99284 EMERGENCY DEPT VISIT MOD MDM: CPT

## 2019-07-28 NOTE — ED PROVIDER NOTE - CLINICAL SUMMARY MEDICAL DECISION MAKING FREE TEXT BOX
pt rectal exam showing no melena or brbpr stable hemoglobin from prior visits hr nml in nad in room no si no hi advised need cards and pcp f/u and to abstain from etoh abuse. not on blood thinners. return to ed for intractable cp sob worsening rectal bleeding. pt agrees to plan of care

## 2019-07-28 NOTE — ED PROVIDER NOTE - OBJECTIVE STATEMENT
A 53 year old male pt with a hx of alcohol abuse presents to the ED c/o medical evaluation. Pt has a hx of alcohol abuse, last drank this morning, and states that over the past 3 days he has had intermittent episodes of blood in his stool. He denies any hematemesis or hx of similar symptoms in the past. Pt also states that he has had achy, non-radiating, left sided CP over past few days. denies fever. denies HA or neck pain. no sob. no abd pain. no n/v/d. no urinary f/u/d. no back pain. no motor or sensory deficits. denies illicit drug use. no recent travel. no rash. no other acute issues symptoms or concerns

## 2019-07-28 NOTE — ED ADULT TRIAGE NOTE - CHIEF COMPLAINT QUOTE
Im having chest pain and vomiting blood, appears comfortable, NAD noted pt presents unkept, dirt all over face and body, reports im bleeding out of my butt and stomach on and off for weeks now

## 2019-07-28 NOTE — ED CLERICAL - NS ED CLERK NOTE PRE-ARRIVAL INFORMATION; ADDITIONAL PRE-ARRIVAL INFORMATION
This patient is eligible for outpatient care navigation through the [facility] readmission reduction initiative. This patient will be engaged by the transitional care management team to enroll into this program. Please call the number above to facilitate this enrollment or for close follow up.  ]

## 2019-07-29 VITALS
DIASTOLIC BLOOD PRESSURE: 90 MMHG | RESPIRATION RATE: 20 BRPM | SYSTOLIC BLOOD PRESSURE: 134 MMHG | HEART RATE: 91 BPM | OXYGEN SATURATION: 98 %

## 2019-07-29 LAB
ALBUMIN SERPL ELPH-MCNC: 4.4 G/DL — SIGNIFICANT CHANGE UP (ref 3.3–5.2)
ALP SERPL-CCNC: 69 U/L — SIGNIFICANT CHANGE UP (ref 40–120)
ALT FLD-CCNC: 19 U/L — SIGNIFICANT CHANGE UP
ANION GAP SERPL CALC-SCNC: 17 MMOL/L — SIGNIFICANT CHANGE UP (ref 5–17)
APTT BLD: 32.9 SEC — SIGNIFICANT CHANGE UP (ref 27.5–36.3)
AST SERPL-CCNC: 31 U/L — SIGNIFICANT CHANGE UP
BASOPHILS # BLD AUTO: 0.05 K/UL — SIGNIFICANT CHANGE UP (ref 0–0.2)
BASOPHILS NFR BLD AUTO: 0.9 % — SIGNIFICANT CHANGE UP (ref 0–2)
BILIRUB SERPL-MCNC: <0.2 MG/DL — LOW (ref 0.4–2)
BLD GP AB SCN SERPL QL: SIGNIFICANT CHANGE UP
BUN SERPL-MCNC: 12 MG/DL — SIGNIFICANT CHANGE UP (ref 8–20)
CALCIUM SERPL-MCNC: 8.9 MG/DL — SIGNIFICANT CHANGE UP (ref 8.6–10.2)
CHLORIDE SERPL-SCNC: 100 MMOL/L — SIGNIFICANT CHANGE UP (ref 98–107)
CK MB CFR SERPL CALC: 7.8 NG/ML — HIGH (ref 0–6.7)
CK SERPL-CCNC: 959 U/L — HIGH (ref 30–200)
CO2 SERPL-SCNC: 23 MMOL/L — SIGNIFICANT CHANGE UP (ref 22–29)
CREAT SERPL-MCNC: 0.78 MG/DL — SIGNIFICANT CHANGE UP (ref 0.5–1.3)
EOSINOPHIL # BLD AUTO: 0.1 K/UL — SIGNIFICANT CHANGE UP (ref 0–0.5)
EOSINOPHIL NFR BLD AUTO: 1.8 % — SIGNIFICANT CHANGE UP (ref 0–6)
GLUCOSE SERPL-MCNC: 116 MG/DL — HIGH (ref 70–115)
HCT VFR BLD CALC: 31.7 % — LOW (ref 39–50)
HGB BLD-MCNC: 10.4 G/DL — LOW (ref 13–17)
IMM GRANULOCYTES NFR BLD AUTO: 0.7 % — SIGNIFICANT CHANGE UP (ref 0–1.5)
INR BLD: 0.94 RATIO — SIGNIFICANT CHANGE UP (ref 0.88–1.16)
LIDOCAIN IGE QN: 32 U/L — SIGNIFICANT CHANGE UP (ref 22–51)
LYMPHOCYTES # BLD AUTO: 2.12 K/UL — SIGNIFICANT CHANGE UP (ref 1–3.3)
LYMPHOCYTES # BLD AUTO: 38.1 % — SIGNIFICANT CHANGE UP (ref 13–44)
MAGNESIUM SERPL-MCNC: 1.8 MG/DL — SIGNIFICANT CHANGE UP (ref 1.6–2.6)
MCHC RBC-ENTMCNC: 27.7 PG — SIGNIFICANT CHANGE UP (ref 27–34)
MCHC RBC-ENTMCNC: 32.8 GM/DL — SIGNIFICANT CHANGE UP (ref 32–36)
MCV RBC AUTO: 84.5 FL — SIGNIFICANT CHANGE UP (ref 80–100)
MONOCYTES # BLD AUTO: 0.67 K/UL — SIGNIFICANT CHANGE UP (ref 0–0.9)
MONOCYTES NFR BLD AUTO: 12.1 % — SIGNIFICANT CHANGE UP (ref 2–14)
NEUTROPHILS # BLD AUTO: 2.58 K/UL — SIGNIFICANT CHANGE UP (ref 1.8–7.4)
NEUTROPHILS NFR BLD AUTO: 46.4 % — SIGNIFICANT CHANGE UP (ref 43–77)
PLATELET # BLD AUTO: 215 K/UL — SIGNIFICANT CHANGE UP (ref 150–400)
POTASSIUM SERPL-MCNC: 4.1 MMOL/L — SIGNIFICANT CHANGE UP (ref 3.5–5.3)
POTASSIUM SERPL-SCNC: 4.1 MMOL/L — SIGNIFICANT CHANGE UP (ref 3.5–5.3)
PROT SERPL-MCNC: 7 G/DL — SIGNIFICANT CHANGE UP (ref 6.6–8.7)
PROTHROM AB SERPL-ACNC: 10.8 SEC — SIGNIFICANT CHANGE UP (ref 10–12.9)
RBC # BLD: 3.75 M/UL — LOW (ref 4.2–5.8)
RBC # FLD: 17.6 % — HIGH (ref 10.3–14.5)
SODIUM SERPL-SCNC: 140 MMOL/L — SIGNIFICANT CHANGE UP (ref 135–145)
WBC # BLD: 5.56 K/UL — SIGNIFICANT CHANGE UP (ref 3.8–10.5)
WBC # FLD AUTO: 5.56 K/UL — SIGNIFICANT CHANGE UP (ref 3.8–10.5)

## 2019-07-29 PROCEDURE — 71045 X-RAY EXAM CHEST 1 VIEW: CPT

## 2019-07-29 PROCEDURE — 36415 COLL VENOUS BLD VENIPUNCTURE: CPT

## 2019-07-29 PROCEDURE — 85730 THROMBOPLASTIN TIME PARTIAL: CPT

## 2019-07-29 PROCEDURE — 82553 CREATINE MB FRACTION: CPT

## 2019-07-29 PROCEDURE — 80053 COMPREHEN METABOLIC PANEL: CPT

## 2019-07-29 PROCEDURE — 82550 ASSAY OF CK (CPK): CPT

## 2019-07-29 PROCEDURE — 86901 BLOOD TYPING SEROLOGIC RH(D): CPT

## 2019-07-29 PROCEDURE — 71045 X-RAY EXAM CHEST 1 VIEW: CPT | Mod: 26

## 2019-07-29 PROCEDURE — 85610 PROTHROMBIN TIME: CPT

## 2019-07-29 PROCEDURE — 84484 ASSAY OF TROPONIN QUANT: CPT

## 2019-07-29 PROCEDURE — 83735 ASSAY OF MAGNESIUM: CPT

## 2019-07-29 PROCEDURE — 86900 BLOOD TYPING SEROLOGIC ABO: CPT

## 2019-07-29 PROCEDURE — 83690 ASSAY OF LIPASE: CPT

## 2019-07-29 PROCEDURE — 86850 RBC ANTIBODY SCREEN: CPT

## 2019-07-29 PROCEDURE — 85027 COMPLETE CBC AUTOMATED: CPT

## 2019-07-29 PROCEDURE — 93005 ELECTROCARDIOGRAM TRACING: CPT

## 2019-07-29 PROCEDURE — 99284 EMERGENCY DEPT VISIT MOD MDM: CPT | Mod: 25

## 2019-07-29 NOTE — ED ADULT NURSE NOTE - OBJECTIVE STATEMENT
pt presents to ed dirty and with poor hygiene. pt c/o chest pain and coughing up blood. pt presents a&ox3 and appears to be in no acute distress. pt maintaining airway on room air. pt placed on cardiac monitor.

## 2019-07-29 NOTE — ED ADULT NURSE NOTE - IS THE PATIENT ABLE TO BE SCREENED?
PRESCRIPTION REFILL POLICY [facility] Statement to Patients April 1, 2014 In an effort to ensure the large volume of patient prescription refills is processed in the most efficient and expeditious manner, we are asking our patients to assist us by calling your Pharmacy for all prescription refills, this will include also your  Mail Order Pharmacy. The pharmacy will contact our office electronically to continue the refill process. Please do not wait until the last minute to call your pharmacy. We need at least 48 hours (2days) to fill prescriptions. We also encourage you to call your pharmacy before going to  your prescription to make sure it is ready. With regard to controlled substance prescription refill requests (narcotic refills) that need to be picked up at our office, we ask your cooperation by providing us with at least 72 hours (3days) notice that you will need a refill. We will not refill narcotic prescription refill requests after 4:00pm on any weekday, Monday through Thursday, or after 2:00pm on Fridays, or on the weekends. We encourage everyone to explore another way of getting your prescription refill request processed using [employer], our patient web portal through our electronic medical record system. [employer] is an efficient and effective way to communicate your medication request directly to the office and  downloadable as an emily on your smart phone . [employer] also features a review functionality that allows you to view your medication list as well as leave messages for your physician. Are you ready to get connected? If so please review the attatched instructions or speak to any of our staff to get you set up right away! Thank you so much for your cooperation. Should you have any questions please contact our Practice Administrator. The Physicians and Staff,  [facility] Arabella Torres [phone] What is a living will? A living will is a legal form you use to write down the kind of care you want at the end of your life. It is used by the health professionals who will treat you if you aren't able to decide for yourself. If you put your wishes in writing, your loved ones and others will know what kind of care you want. They won't need to guess. This can ease your mind and be helpful to others. A living will is not the same as an estate or property will. An estate will explains what you want to happen with your money and property after you die. Is a living will a legal document? A living will is a legal document. Each state has its own laws about living leonard. If you move to another state, make sure that your living will is legal in the state where you now live. Or you might use a universal form that has been approved by many states. This kind of form can sometimes be completed and stored online. Your electronic copy will then be available wherever you have a connection to the Internet. In most cases, doctors will respect your wishes even if you have a form from a different state. · You don't need an  to complete a living will. But legal advice can be helpful if your state's laws are unclear, your health history is complicated, or your family can't agree on what should be in your living will. · You can change your living will at any time. Some people find that their wishes about end-of-life care change as their health changes. · In addition to making a living will, think about completing a medical power of  form. This form lets you name the person you want to make end-of-life treatment decisions for you (your \"health care agent\") if you're not able to. Many hospitals and nursing homes will give you the forms you need to complete a living will and a medical power of .  
· Your living will is used only if you can't make or communicate decisions for yourself anymore. If you become able to make decisions again, you can accept or refuse any treatment, no matter what you wrote in your living will. · Your state may offer an online registry. This is a place where you can store your living will online so the doctors and nurses who need to treat you can find it right away. What should you think about when creating a living will? Talk about your end-of-life wishes with your family members and your doctor. Let them know what you want. That way the people making decisions for you won't be surprised by your choices. Think about these questions as you make your living will: · Do you know enough about life support methods that might be used? If not, talk to your doctor so you know what might be done if you can't breathe on your own, your heart stops, or you're unable to swallow. · What things would you still want to be able to do after you receive life-support methods? Would you want to be able to walk? To speak? To eat on your own? To live without the help of machines? · If you have a choice, where do you want to be cared for? In your home? At a hospital or nursing home? · Do you want certain Yazidism practices performed if you become very ill? · If you have a choice at the end of your life, where would you prefer to die? At home? In a hospital or nursing home? Somewhere else? · Would you prefer to be buried or cremated? · Do you want your organs to be donated after you die? What should you do with your living will? · Make sure that your family members and your health care agent have copies of your living will. · Give your doctor a copy of your living will to keep in your medical record. If you have more than one doctor, make sure that each one has a copy. · You may want to put a copy of your living will where it can be easily found. Where can you learn more? Go to http://tip-kosta.info/. Enter B216 in the search box to learn more about \"Learning About Living Maribel Rey. \" Current as of: April 18, 2018 Content Version: 11.9 © 6324-9106 [employer]. Care instructions adapted under license by [employer] (which disclaims liability or warranty for this information). If you have questions about a medical condition or this instruction, always ask your healthcare professional. [place] [address] any warranty or liability for your use of this information. History reviewed and see if this is a case of idiopathic neuropathy. We will get the previous neurologist notes on the office visits investigation etc. revisit here 6 months. Yes

## 2019-07-29 NOTE — ED ADULT NURSE REASSESSMENT NOTE - NS ED NURSE REASSESS COMMENT FT1
pt resting comfortably on stretcher, easily arousable. pt asking for ginger ale. pt given ginger ale, tolerated po intake well. no vomting blood seen. pt has no complaints at this time. vitals stable. maintaining airway on room air. will continue to monitor.

## 2019-07-30 ENCOUNTER — EMERGENCY (EMERGENCY)
Facility: HOSPITAL | Age: 54
LOS: 1 days | Discharge: OTHER INSTITUTION | End: 2019-07-30
Attending: EMERGENCY MEDICINE
Payer: COMMERCIAL

## 2019-07-30 VITALS
DIASTOLIC BLOOD PRESSURE: 66 MMHG | HEART RATE: 70 BPM | TEMPERATURE: 98 F | OXYGEN SATURATION: 98 % | SYSTOLIC BLOOD PRESSURE: 115 MMHG | RESPIRATION RATE: 20 BRPM

## 2019-07-30 DIAGNOSIS — F33.8 OTHER RECURRENT DEPRESSIVE DISORDERS: ICD-10-CM

## 2019-07-30 LAB
ALBUMIN SERPL ELPH-MCNC: 4.5 G/DL — SIGNIFICANT CHANGE UP (ref 3.3–5.2)
ALP SERPL-CCNC: 87 U/L — SIGNIFICANT CHANGE UP (ref 40–120)
ALT FLD-CCNC: 20 U/L — SIGNIFICANT CHANGE UP
ANION GAP SERPL CALC-SCNC: 19 MMOL/L — HIGH (ref 5–17)
APAP SERPL-MCNC: <7.5 UG/ML — LOW (ref 10–26)
APPEARANCE UR: CLEAR — SIGNIFICANT CHANGE UP
AST SERPL-CCNC: 25 U/L — SIGNIFICANT CHANGE UP
BASOPHILS # BLD AUTO: 0.04 K/UL — SIGNIFICANT CHANGE UP (ref 0–0.2)
BASOPHILS NFR BLD AUTO: 0.5 % — SIGNIFICANT CHANGE UP (ref 0–2)
BILIRUB SERPL-MCNC: 0.3 MG/DL — LOW (ref 0.4–2)
BILIRUB UR-MCNC: NEGATIVE — SIGNIFICANT CHANGE UP
BUN SERPL-MCNC: 10 MG/DL — SIGNIFICANT CHANGE UP (ref 8–20)
CALCIUM SERPL-MCNC: 8.9 MG/DL — SIGNIFICANT CHANGE UP (ref 8.6–10.2)
CHLORIDE SERPL-SCNC: 98 MMOL/L — SIGNIFICANT CHANGE UP (ref 98–107)
CK SERPL-CCNC: 473 U/L — HIGH (ref 30–200)
CO2 SERPL-SCNC: 23 MMOL/L — SIGNIFICANT CHANGE UP (ref 22–29)
COLOR SPEC: YELLOW — SIGNIFICANT CHANGE UP
CREAT SERPL-MCNC: 0.89 MG/DL — SIGNIFICANT CHANGE UP (ref 0.5–1.3)
DIFF PNL FLD: NEGATIVE — SIGNIFICANT CHANGE UP
EOSINOPHIL # BLD AUTO: 0.08 K/UL — SIGNIFICANT CHANGE UP (ref 0–0.5)
EOSINOPHIL NFR BLD AUTO: 1 % — SIGNIFICANT CHANGE UP (ref 0–6)
ETHANOL SERPL-MCNC: 202 MG/DL — SIGNIFICANT CHANGE UP
GLUCOSE SERPL-MCNC: 124 MG/DL — HIGH (ref 70–115)
GLUCOSE UR QL: NEGATIVE MG/DL — SIGNIFICANT CHANGE UP
HCT VFR BLD CALC: 34.6 % — LOW (ref 39–50)
HGB BLD-MCNC: 11.3 G/DL — LOW (ref 13–17)
IMM GRANULOCYTES NFR BLD AUTO: 1 % — SIGNIFICANT CHANGE UP (ref 0–1.5)
KETONES UR-MCNC: ABNORMAL
LEUKOCYTE ESTERASE UR-ACNC: NEGATIVE — SIGNIFICANT CHANGE UP
LIDOCAIN IGE QN: 29 U/L — SIGNIFICANT CHANGE UP (ref 22–51)
LYMPHOCYTES # BLD AUTO: 1.91 K/UL — SIGNIFICANT CHANGE UP (ref 1–3.3)
LYMPHOCYTES # BLD AUTO: 23.3 % — SIGNIFICANT CHANGE UP (ref 13–44)
MCHC RBC-ENTMCNC: 27.6 PG — SIGNIFICANT CHANGE UP (ref 27–34)
MCHC RBC-ENTMCNC: 32.7 GM/DL — SIGNIFICANT CHANGE UP (ref 32–36)
MCV RBC AUTO: 84.6 FL — SIGNIFICANT CHANGE UP (ref 80–100)
MONOCYTES # BLD AUTO: 0.66 K/UL — SIGNIFICANT CHANGE UP (ref 0–0.9)
MONOCYTES NFR BLD AUTO: 8 % — SIGNIFICANT CHANGE UP (ref 2–14)
NEUTROPHILS # BLD AUTO: 5.44 K/UL — SIGNIFICANT CHANGE UP (ref 1.8–7.4)
NEUTROPHILS NFR BLD AUTO: 66.2 % — SIGNIFICANT CHANGE UP (ref 43–77)
NITRITE UR-MCNC: NEGATIVE — SIGNIFICANT CHANGE UP
PCP SPEC-MCNC: SIGNIFICANT CHANGE UP
PH UR: 6 — SIGNIFICANT CHANGE UP (ref 5–8)
PLATELET # BLD AUTO: 199 K/UL — SIGNIFICANT CHANGE UP (ref 150–400)
POTASSIUM SERPL-MCNC: 3.6 MMOL/L — SIGNIFICANT CHANGE UP (ref 3.5–5.3)
POTASSIUM SERPL-SCNC: 3.6 MMOL/L — SIGNIFICANT CHANGE UP (ref 3.5–5.3)
PROT SERPL-MCNC: 7.8 G/DL — SIGNIFICANT CHANGE UP (ref 6.6–8.7)
PROT UR-MCNC: 15 MG/DL
RBC # BLD: 4.09 M/UL — LOW (ref 4.2–5.8)
RBC # FLD: 17.6 % — HIGH (ref 10.3–14.5)
SALICYLATES SERPL-MCNC: <0.6 MG/DL — LOW (ref 10–20)
SODIUM SERPL-SCNC: 140 MMOL/L — SIGNIFICANT CHANGE UP (ref 135–145)
SP GR SPEC: 1.01 — SIGNIFICANT CHANGE UP (ref 1.01–1.02)
TROPONIN T SERPL-MCNC: <0.01 NG/ML — SIGNIFICANT CHANGE UP (ref 0–0.06)
UROBILINOGEN FLD QL: NEGATIVE MG/DL — SIGNIFICANT CHANGE UP
WBC # BLD: 8.21 K/UL — SIGNIFICANT CHANGE UP (ref 3.8–10.5)
WBC # FLD AUTO: 8.21 K/UL — SIGNIFICANT CHANGE UP (ref 3.8–10.5)

## 2019-07-30 PROCEDURE — 99284 EMERGENCY DEPT VISIT MOD MDM: CPT

## 2019-07-30 PROCEDURE — 93010 ELECTROCARDIOGRAM REPORT: CPT

## 2019-07-30 PROCEDURE — 99285 EMERGENCY DEPT VISIT HI MDM: CPT

## 2019-07-30 RX ADMIN — Medication 100 MILLIGRAM(S): at 23:27

## 2019-07-30 RX ADMIN — Medication 50 MILLIGRAM(S): at 17:26

## 2019-07-30 NOTE — ED PROVIDER NOTE - OBJECTIVE STATEMENT
This patient is a 53 year old man with hx of alcohol abuse BIBA for suspected alcohol intoxication.  Patient was inside of the bank yelling and disruptive.  Patient stating that he feels depressed and suicidal.  Patient also reports chronic burning substernal chest pain similar to previous ER visits over the past week.  He denies drug use.

## 2019-07-30 NOTE — ED PROVIDER NOTE - CARE PLAN
Principal Discharge DX:	Alcohol abuse  Secondary Diagnosis:	Other recurrent depressive disorders  Secondary Diagnosis:	Suicidal thoughts

## 2019-07-30 NOTE — ED BEHAVIORAL HEALTH ASSESSMENT NOTE - DESCRIPTION
GERD; HTN; HLD; Asthma unemployed  recurrent homelessness reports living on streets Vital Signs Last 24 Hrs  T(C): 36.9 (30 Jul 2019 13:12), Max: 36.9 (30 Jul 2019 13:12)  T(F): 98.4 (30 Jul 2019 13:12), Max: 98.4 (30 Jul 2019 13:12)  HR: 70 (30 Jul 2019 13:12) (70 - 70)  BP: 115/66 (30 Jul 2019 13:12) (115/66 - 115/66)  BP(mean): --  RR: 20 (30 Jul 2019 13:12) (20 - 20)  SpO2: 98% (30 Jul 2019 13:12) (98% - 98%)

## 2019-07-30 NOTE — ED BEHAVIORAL HEALTH ASSESSMENT NOTE - RISK ASSESSMENT
elevated  3. Have you been thinking about how you might kill yourself?  [ x ]Yes, [  ]No, [  Unable to Assess  Details    4. Have you had these thoughts and had some intention of acting on them?  [  xYes, [  ]No, [  ]Unable to Assess  Details see hpi  5. Have you started to work out or worked out the details of how to kill yourself? Do you intend o carry out this plan?  [ x]Yes, [  ]No, [  ]Unable to Assess  Details   6. Have you ever done anything, started to do anything, or prepared to do anything to end your life? If so, was it in the past 3 months?  [ ]Yes, [  x]No, [  ]Unable to Assess  Details    Additional Suicide Risk Factors (select all that apply)  [  ]Access to lethal means including firearms  [  ]Family history of suicide  [  ]Impulsivity  [x ] Current or past mood disorder  [  ] Current or past psychotic disorder  [  ] Current or past PTSD  [  ] Current or past ADHD  [  ] Current or past TBI  [  ] Current or past cluster B personality disorder or traits  [  ] Current or past conduct problems  [  ] Recent onset of current or past psychiatric disorder  [  ] Family history of psychiatric diagnoses requiring hospitalization  Additional Activating Events (select all that apply)  [  ]Perceived burden on family or others  [  ]Current sexual or physical abuse  [  ]Substance intoxication or withdrawal  [ x]Inadequate social supports  [  ]Hopeless about or dissatisfied with current provider or treatment   Additional Protective Factors (select all that apply)  [  ] Future plans  [  ] Rastafarian beliefs  [   Beloved pets      Acute Suicide Risk  (  ) High   (  ) Moderate   ( x ) Low   (  ) Unable to determine   Rationale:     Elevated Chronic Risk   ( x ) Yes   Details: alcoholism homelessness    (  ) No

## 2019-07-30 NOTE — ED ADULT NURSE NOTE - NSIMPLEMENTINTERV_GEN_ALL_ED
Implemented All Fall with Harm Risk Interventions:  [place] to call system. Call bell, personal items and telephone within reach. Instruct patient to call for assistance. Room bathroom lighting operational. Non-slip footwear when patient is off stretcher. Physically safe environment: no spills, clutter or unnecessary equipment. Stretcher in lowest position, wheels locked, appropriate side rails in place. Provide visual cue, wrist band, yellow gown, etc. Monitor gait and stability. Monitor for mental status changes and reorient to person, place, and time. Review medications for side effects contributing to fall risk. Reinforce activity limits and safety measures with patient and family. Provide visual clues: red socks.

## 2019-07-30 NOTE — ED BEHAVIORAL HEALTH ASSESSMENT NOTE - SUMMARY
53 male with chronic alcoholism reporting suicidal ideation with plan of jumping into traffic reports intent, agreeable to inpatient admission

## 2019-07-30 NOTE — ED BEHAVIORAL HEALTH ASSESSMENT NOTE - HPI (INCLUDE ILLNESS QUALITY, SEVERITY, DURATION, TIMING, CONTEXT, MODIFYING FACTORS, ASSOCIATED SIGNS AND SYMPTOMS)
54 yo man, hx of alcoholism no psychiatric hx or suicide attempts, homeless BIBEMS reporting suicidal ideation  upon arrival to eD patient told staff he wants to 'end it all" and made gestures of cutting his throat    patient seen 2 days ago by psychiatric in ED was discharged after becoming sober and retracting suicidal thoughts    Patient states he "can't take it anymore" that he wants to die, sees no reason to be alive, thinks about jumping into traffic reports he feels depressed with anhedonia. he reports he has lost everything in his life, that he has gone downhill since girlfriend  by accidental drug overdose 1 year ago. he denies AH, VH paranoia and homicidal ideation.

## 2019-07-30 NOTE — ED BEHAVIORAL HEALTH ASSESSMENT NOTE - DIFFERENTIAL
C-SSRS Screener   1. Have you ever wished to be dead or wished you could go to sleep and not wake up?  [ x ]Yes, [  ]No, [  ]Unable to Assess  Details:     2. Have you actually had any thoughts of killing yourself?   [ x ]Yes, [  ]No, [  ]Unable to Assess  Details:

## 2019-07-30 NOTE — ED PROVIDER NOTE - CLINICAL SUMMARY MEDICAL DECISION MAKING FREE TEXT BOX
53 year old hx of alcohol abuse with chronic complaints of chest pain and abdominal pain benign physical exam.  Lipase normal EKG non-ischemic and troponin negative.  Patient also suicidal seen by psychiatry with plan to admit.  Patient signed out to overnight physician pending psych placement.

## 2019-07-31 VITALS
TEMPERATURE: 98 F | OXYGEN SATURATION: 97 % | HEART RATE: 93 BPM | DIASTOLIC BLOOD PRESSURE: 72 MMHG | SYSTOLIC BLOOD PRESSURE: 112 MMHG | RESPIRATION RATE: 18 BRPM

## 2019-07-31 PROCEDURE — 83690 ASSAY OF LIPASE: CPT

## 2019-07-31 PROCEDURE — 82553 CREATINE MB FRACTION: CPT

## 2019-07-31 PROCEDURE — 82550 ASSAY OF CK (CPK): CPT

## 2019-07-31 PROCEDURE — 36415 COLL VENOUS BLD VENIPUNCTURE: CPT

## 2019-07-31 PROCEDURE — 85027 COMPLETE CBC AUTOMATED: CPT

## 2019-07-31 PROCEDURE — 80307 DRUG TEST PRSMV CHEM ANLYZR: CPT

## 2019-07-31 PROCEDURE — 84484 ASSAY OF TROPONIN QUANT: CPT

## 2019-07-31 PROCEDURE — 81001 URINALYSIS AUTO W/SCOPE: CPT

## 2019-07-31 PROCEDURE — 80053 COMPREHEN METABOLIC PANEL: CPT

## 2019-07-31 PROCEDURE — 93005 ELECTROCARDIOGRAM TRACING: CPT

## 2019-07-31 PROCEDURE — 99285 EMERGENCY DEPT VISIT HI MDM: CPT

## 2019-07-31 RX ORDER — PANTOPRAZOLE SODIUM 20 MG/1
40 TABLET, DELAYED RELEASE ORAL
Refills: 0 | Status: DISCONTINUED | OUTPATIENT
Start: 2019-07-31 | End: 2019-08-05

## 2019-07-31 RX ADMIN — Medication 25 MILLIGRAM(S): at 06:42

## 2019-07-31 RX ADMIN — PANTOPRAZOLE SODIUM 40 MILLIGRAM(S): 20 TABLET, DELAYED RELEASE ORAL at 09:23

## 2019-07-31 NOTE — ED ADULT NURSE REASSESSMENT NOTE - NS ED NURSE REASSESS COMMENT FT1
Pt having CWIA of 6 patient medciated with Libruim 25 mg po.
patient medicated with Libruim 00mg po for cwia of 12
patient sleeping NAD noted safety maintained
pt sleeping at this time, respirations even and unlabored, pt appears calm. will continue to monitor.
assumed care of patient. m patient rec'd in yellow gown from er with belongings locked in security closet. patient states that he is very depressed that [id number] a few weeks ago and getting worse. patient states he does not remember how he got to er. patient states "does not want to live any more" denies any plan. denies any prior  psych history.  Pt states drinks 1/2 gallon of vodka daily. patient contracted for safety and oriented to unit
Assumed care of patient at 0720.  Patient resting in bed with no distress.  Safety of patient maintained.

## 2019-07-31 NOTE — ED BEHAVIORAL HEALTH NOTE - BEHAVIORAL HEALTH NOTE
SW Note: Plan is to transfer pt to [facility] for inpt psychiatric tx. Accepting MD, Dr. Shaikh. Met with pt and he will be transferred on a voluntary status. Ambulance arranged with [facility]. Insurance precert completed. Spoke with Sasha at [employer] [phone]. Auth approved for 2 days 7/31/19 & 8/1/19. Auth# [id number]. Info forwarded to [facility] dept.

## 2019-08-01 ENCOUNTER — EMERGENCY (EMERGENCY)
Facility: HOSPITAL | Age: 54
LOS: 1 days | Discharge: HOME | End: 2019-08-01
Attending: EMERGENCY MEDICINE
Payer: COMMERCIAL

## 2019-08-01 VITALS
HEART RATE: 100 BPM | TEMPERATURE: 98 F | OXYGEN SATURATION: 99 % | RESPIRATION RATE: 16 BRPM | SYSTOLIC BLOOD PRESSURE: 123 MMHG | HEIGHT: 66 IN | DIASTOLIC BLOOD PRESSURE: 72 MMHG | WEIGHT: 175.05 LBS

## 2019-08-01 VITALS — DIASTOLIC BLOOD PRESSURE: 85 MMHG | SYSTOLIC BLOOD PRESSURE: 124 MMHG | RESPIRATION RATE: 18 BRPM | HEART RATE: 88 BPM

## 2019-08-01 LAB
ALBUMIN SERPL ELPH-MCNC: 4 G/DL — SIGNIFICANT CHANGE UP (ref 3.3–5.2)
ALP SERPL-CCNC: 73 U/L — SIGNIFICANT CHANGE UP (ref 40–120)
ALT FLD-CCNC: 18 U/L — SIGNIFICANT CHANGE UP
ANION GAP SERPL CALC-SCNC: 13 MMOL/L — SIGNIFICANT CHANGE UP (ref 5–17)
AST SERPL-CCNC: 27 U/L — SIGNIFICANT CHANGE UP
BILIRUB SERPL-MCNC: <0.2 MG/DL — LOW (ref 0.4–2)
BUN SERPL-MCNC: 15 MG/DL — SIGNIFICANT CHANGE UP (ref 8–20)
CALCIUM SERPL-MCNC: 9.5 MG/DL — SIGNIFICANT CHANGE UP (ref 8.6–10.2)
CHLORIDE SERPL-SCNC: 99 MMOL/L — SIGNIFICANT CHANGE UP (ref 98–107)
CO2 SERPL-SCNC: 24 MMOL/L — SIGNIFICANT CHANGE UP (ref 22–29)
CREAT SERPL-MCNC: 0.86 MG/DL — SIGNIFICANT CHANGE UP (ref 0.5–1.3)
GLUCOSE SERPL-MCNC: 138 MG/DL — HIGH (ref 70–115)
HCT VFR BLD CALC: 33.1 % — LOW (ref 39–50)
HGB BLD-MCNC: 10.6 G/DL — LOW (ref 13–17)
MCHC RBC-ENTMCNC: 27.7 PG — SIGNIFICANT CHANGE UP (ref 27–34)
MCHC RBC-ENTMCNC: 32 GM/DL — SIGNIFICANT CHANGE UP (ref 32–36)
MCV RBC AUTO: 86.6 FL — SIGNIFICANT CHANGE UP (ref 80–100)
PLATELET # BLD AUTO: 184 K/UL — SIGNIFICANT CHANGE UP (ref 150–400)
POTASSIUM SERPL-MCNC: 4.5 MMOL/L — SIGNIFICANT CHANGE UP (ref 3.5–5.3)
POTASSIUM SERPL-SCNC: 4.5 MMOL/L — SIGNIFICANT CHANGE UP (ref 3.5–5.3)
PROT SERPL-MCNC: 6.8 G/DL — SIGNIFICANT CHANGE UP (ref 6.6–8.7)
RBC # BLD: 3.82 M/UL — LOW (ref 4.2–5.8)
RBC # FLD: 17.5 % — HIGH (ref 10.3–14.5)
SODIUM SERPL-SCNC: 136 MMOL/L — SIGNIFICANT CHANGE UP (ref 135–145)
WBC # BLD: 5.55 K/UL — SIGNIFICANT CHANGE UP (ref 3.8–10.5)
WBC # FLD AUTO: 5.55 K/UL — SIGNIFICANT CHANGE UP (ref 3.8–10.5)

## 2019-08-01 PROCEDURE — 85027 COMPLETE CBC AUTOMATED: CPT

## 2019-08-01 PROCEDURE — 80053 COMPREHEN METABOLIC PANEL: CPT

## 2019-08-01 PROCEDURE — 96374 THER/PROPH/DIAG INJ IV PUSH: CPT

## 2019-08-01 PROCEDURE — 99284 EMERGENCY DEPT VISIT MOD MDM: CPT

## 2019-08-01 PROCEDURE — 94640 AIRWAY INHALATION TREATMENT: CPT

## 2019-08-01 PROCEDURE — 36415 COLL VENOUS BLD VENIPUNCTURE: CPT

## 2019-08-01 PROCEDURE — 71045 X-RAY EXAM CHEST 1 VIEW: CPT | Mod: 26

## 2019-08-01 PROCEDURE — 71045 X-RAY EXAM CHEST 1 VIEW: CPT

## 2019-08-01 PROCEDURE — 99284 EMERGENCY DEPT VISIT MOD MDM: CPT | Mod: 25

## 2019-08-01 RX ORDER — ALBUTEROL 90 UG/1
2.5 AEROSOL, METERED ORAL ONCE
Refills: 0 | Status: COMPLETED | OUTPATIENT
Start: 2019-08-01 | End: 2019-08-01

## 2019-08-01 RX ORDER — SODIUM CHLORIDE 9 MG/ML
3 INJECTION INTRAMUSCULAR; INTRAVENOUS; SUBCUTANEOUS ONCE
Refills: 0 | Status: COMPLETED | OUTPATIENT
Start: 2019-08-01 | End: 2019-08-01

## 2019-08-01 RX ORDER — IPRATROPIUM/ALBUTEROL SULFATE 18-103MCG
3 AEROSOL WITH ADAPTER (GRAM) INHALATION ONCE
Refills: 0 | Status: COMPLETED | OUTPATIENT
Start: 2019-08-01 | End: 2019-08-01

## 2019-08-01 RX ADMIN — Medication 125 MILLIGRAM(S): at 03:34

## 2019-08-01 RX ADMIN — ALBUTEROL 2.5 MILLIGRAM(S): 90 AEROSOL, METERED ORAL at 03:34

## 2019-08-01 RX ADMIN — SODIUM CHLORIDE 3 MILLILITER(S): 9 INJECTION INTRAMUSCULAR; INTRAVENOUS; SUBCUTANEOUS at 02:53

## 2019-08-01 RX ADMIN — Medication 3 MILLILITER(S): at 03:34

## 2019-08-01 NOTE — ED ADULT NURSE REASSESSMENT NOTE - NS ED NURSE REASSESS COMMENT FT1
pt status unchanged, refer to flowsheet and chart, pt safety maintained, pt hemodynamically stable. Pt breathing equal and unlabored, satting wdl. Pt resting comfortably.

## 2019-08-01 NOTE — ED ADULT NURSE REASSESSMENT NOTE - NS ED NURSE REASSESS COMMENT FT1
pt hemodynamically stable, PIV removed, refer to flowsheet and chart, pt to be discharged, pt understood discharge instructions and plan of care. Medically cleared by MD Herrera.

## 2019-08-01 NOTE — ED PROVIDER NOTE - OBJECTIVE STATEMENT
54 y/o M pt with significant PMHx of GERD, HTN, EtOH abuse, asthma, HLD, presents to the ED c/o worsening difficulty breathing, onset 3 days ago. Patient was sent to ED by [place] for concerns of tachycardia, HTN and increasing lethargy. Pt feels that it's been increasing in difficulty to breathe. Denies choking and vomiting. Currently non smoker. NKDA. No further acute complaints at this time.

## 2019-08-01 NOTE — ED PROVIDER NOTE - PROGRESS NOTE DETAILS
Labs as noted. CXR EDI.  Pt improved with IV steroids and neb treatment.  Pt sleeping in NAD, no tachypnea or wheezes, pulse ox 94-95%.  Case d/w Dr. Juarez at [facility] and pt stable for d/c back to [facility].  Rx Prednisone and Combivent MDI

## 2019-08-01 NOTE — ED ADULT NURSE NOTE - OBJECTIVE STATEMENT
Pt is oriented x3. Pt keeps dumping urine out of the hat into the toilet. This RN reiteriated the importance of a urine sample to pt and gave step by step directions to pt on how to pee into the hat and not to empty it. Pt stated understanding and will call out. [place] will notify staff if pt goes into restroom. Will continue to monitor. Pt c/o SOB at [facility]. Pt states "I felt like I couldn't breath." Nurses at [facility] believe he may have aspirated. Pt denies any other symptoms.

## 2019-08-01 NOTE — ED ADULT NURSE NOTE - CHIEF COMPLAINT QUOTE
sent back from [facility] for tachycardia, hypertension, and increased lethargy. d/c from [facility] today to [facility]. lethargic in triage but awakens to voice. no IV access in place

## 2019-08-02 ENCOUNTER — CHART COPY (OUTPATIENT)
Age: 54
End: 2019-08-02

## 2019-08-08 ENCOUNTER — INPATIENT (INPATIENT)
Facility: HOSPITAL | Age: 54
LOS: 11 days | Discharge: HOME | DRG: 913 | End: 2019-08-20
Attending: HOSPITALIST | Admitting: HOSPITALIST
Payer: COMMERCIAL

## 2019-08-08 VITALS
SYSTOLIC BLOOD PRESSURE: 112 MMHG | RESPIRATION RATE: 19 BRPM | OXYGEN SATURATION: 92 % | HEIGHT: 63 IN | HEART RATE: 106 BPM | TEMPERATURE: 98 F | WEIGHT: 149.91 LBS | DIASTOLIC BLOOD PRESSURE: 79 MMHG

## 2019-08-08 LAB
ALBUMIN SERPL ELPH-MCNC: 4.9 G/DL — SIGNIFICANT CHANGE UP (ref 3.3–5.2)
ALP SERPL-CCNC: 66 U/L — SIGNIFICANT CHANGE UP (ref 40–120)
ALT FLD-CCNC: 22 U/L — SIGNIFICANT CHANGE UP
ANION GAP SERPL CALC-SCNC: 15 MMOL/L — SIGNIFICANT CHANGE UP (ref 5–17)
ANISOCYTOSIS BLD QL: SLIGHT — SIGNIFICANT CHANGE UP
APAP SERPL-MCNC: <7.5 UG/ML — LOW (ref 10–26)
APTT BLD: 29.3 SEC — SIGNIFICANT CHANGE UP (ref 27.5–36.3)
AST SERPL-CCNC: 20 U/L — SIGNIFICANT CHANGE UP
BASOPHILS # BLD AUTO: 0 K/UL — SIGNIFICANT CHANGE UP (ref 0–0.2)
BASOPHILS NFR BLD AUTO: 0 % — SIGNIFICANT CHANGE UP (ref 0–2)
BILIRUB SERPL-MCNC: <0.2 MG/DL — LOW (ref 0.4–2)
BUN SERPL-MCNC: 22 MG/DL — HIGH (ref 8–20)
CALCIUM SERPL-MCNC: 9.4 MG/DL — SIGNIFICANT CHANGE UP (ref 8.6–10.2)
CHLORIDE SERPL-SCNC: 100 MMOL/L — SIGNIFICANT CHANGE UP (ref 98–107)
CO2 SERPL-SCNC: 22 MMOL/L — SIGNIFICANT CHANGE UP (ref 22–29)
CREAT SERPL-MCNC: 0.82 MG/DL — SIGNIFICANT CHANGE UP (ref 0.5–1.3)
EOSINOPHIL # BLD AUTO: 0 K/UL — SIGNIFICANT CHANGE UP (ref 0–0.5)
EOSINOPHIL NFR BLD AUTO: 0 % — SIGNIFICANT CHANGE UP (ref 0–6)
ETHANOL SERPL-MCNC: 271 MG/DL — SIGNIFICANT CHANGE UP
GIANT PLATELETS BLD QL SMEAR: PRESENT — SIGNIFICANT CHANGE UP
GLUCOSE SERPL-MCNC: 140 MG/DL — HIGH (ref 70–115)
HCT VFR BLD CALC: 36.6 % — LOW (ref 39–50)
HGB BLD-MCNC: 11.4 G/DL — LOW (ref 13–17)
INR BLD: 0.85 RATIO — LOW (ref 0.88–1.16)
LACTATE BLDV-MCNC: 2.5 MMOL/L — HIGH (ref 0.5–2)
LYMPHOCYTES # BLD AUTO: 20.5 % — SIGNIFICANT CHANGE UP (ref 13–44)
LYMPHOCYTES # BLD AUTO: 3.28 K/UL — SIGNIFICANT CHANGE UP (ref 1–3.3)
MACROCYTES BLD QL: SLIGHT — SIGNIFICANT CHANGE UP
MANUAL SMEAR VERIFICATION: SIGNIFICANT CHANGE UP
MCHC RBC-ENTMCNC: 27.5 PG — SIGNIFICANT CHANGE UP (ref 27–34)
MCHC RBC-ENTMCNC: 31.1 GM/DL — LOW (ref 32–36)
MCV RBC AUTO: 88.4 FL — SIGNIFICANT CHANGE UP (ref 80–100)
MONOCYTES # BLD AUTO: 1.42 K/UL — HIGH (ref 0–0.9)
MONOCYTES NFR BLD AUTO: 8.9 % — SIGNIFICANT CHANGE UP (ref 2–14)
MYELOCYTES NFR BLD: 3.6 % — HIGH (ref 0–0)
NEUTROPHILS # BLD AUTO: 10.56 K/UL — HIGH (ref 1.8–7.4)
NEUTROPHILS NFR BLD AUTO: 65.2 % — SIGNIFICANT CHANGE UP (ref 43–77)
NEUTS BAND # BLD: 0.9 % — SIGNIFICANT CHANGE UP (ref 0–8)
OVALOCYTES BLD QL SMEAR: SIGNIFICANT CHANGE UP
PLAT MORPH BLD: NORMAL — SIGNIFICANT CHANGE UP
PLATELET # BLD AUTO: 362 K/UL — SIGNIFICANT CHANGE UP (ref 150–400)
POIKILOCYTOSIS BLD QL AUTO: SIGNIFICANT CHANGE UP
POLYCHROMASIA BLD QL SMEAR: SLIGHT — SIGNIFICANT CHANGE UP
POTASSIUM SERPL-MCNC: 4.4 MMOL/L — SIGNIFICANT CHANGE UP (ref 3.5–5.3)
POTASSIUM SERPL-SCNC: 4.4 MMOL/L — SIGNIFICANT CHANGE UP (ref 3.5–5.3)
PROT SERPL-MCNC: 8 G/DL — SIGNIFICANT CHANGE UP (ref 6.6–8.7)
PROTHROM AB SERPL-ACNC: 9.7 SEC — LOW (ref 10–12.9)
RBC # BLD: 4.14 M/UL — LOW (ref 4.2–5.8)
RBC # FLD: 17.7 % — HIGH (ref 10.3–14.5)
RBC BLD AUTO: ABNORMAL
SALICYLATES SERPL-MCNC: <0.6 MG/DL — LOW (ref 10–20)
SCHISTOCYTES BLD QL AUTO: SLIGHT — SIGNIFICANT CHANGE UP
SODIUM SERPL-SCNC: 137 MMOL/L — SIGNIFICANT CHANGE UP (ref 135–145)
VARIANT LYMPHS # BLD: 0.9 % — SIGNIFICANT CHANGE UP (ref 0–6)
WBC # BLD: 15.98 K/UL — HIGH (ref 3.8–10.5)
WBC # FLD AUTO: 15.98 K/UL — HIGH (ref 3.8–10.5)

## 2019-08-08 PROCEDURE — 71250 CT THORAX DX C-: CPT | Mod: 26

## 2019-08-08 PROCEDURE — 70450 CT HEAD/BRAIN W/O DYE: CPT | Mod: 26

## 2019-08-08 PROCEDURE — 99285 EMERGENCY DEPT VISIT HI MDM: CPT

## 2019-08-08 RX ORDER — PIPERACILLIN AND TAZOBACTAM 4; .5 G/20ML; G/20ML
3.38 INJECTION, POWDER, LYOPHILIZED, FOR SOLUTION INTRAVENOUS ONCE
Refills: 0 | Status: COMPLETED | OUTPATIENT
Start: 2019-08-08 | End: 2019-08-08

## 2019-08-08 RX ORDER — ONDANSETRON 8 MG/1
4 TABLET, FILM COATED ORAL ONCE
Refills: 0 | Status: COMPLETED | OUTPATIENT
Start: 2019-08-08 | End: 2019-08-08

## 2019-08-08 RX ADMIN — PIPERACILLIN AND TAZOBACTAM 200 GRAM(S): 4; .5 INJECTION, POWDER, LYOPHILIZED, FOR SOLUTION INTRAVENOUS at 19:50

## 2019-08-08 NOTE — ED ADULT NURSE NOTE - NSIMPLEMENTINTERV_GEN_ALL_ED
Implemented All Fall Risk Interventions:  [place] to call system. Call bell, personal items and telephone within reach. Instruct patient to call for assistance. Room bathroom lighting operational. Non-slip footwear when patient is off stretcher. Physically safe environment: no spills, clutter or unnecessary equipment. Stretcher in lowest position, wheels locked, appropriate side rails in place. Provide visual cue, wrist band, yellow gown, etc. Monitor gait and stability. Monitor for mental status changes and reorient to person, place, and time. Review medications for side effects contributing to fall risk. Reinforce activity limits and safety measures with patient and family.

## 2019-08-08 NOTE — ED ADULT TRIAGE NOTE - BP NONINVASIVE DIASTOLIC (MM HG)
[FreeTextEntry1] : IgG Lamda Multiple Myeloma with lytic bone lesions, anemia and hypercalcemia  on presentation, normal LDH,  Beta 2 7.2. Repeat levels were as follows , Albumin was 4.0. repeat Beta 2- 4.8\par  FISH- hyperploidy and gain of CCN1 which puts her at  standard risk. \par   PET/CT multiple FDG avid lytic lesion consistent with myeloma.\par -- On VRd. So far has been tolerating well and is stable. No more bony pains. Gait is stable.  We lowered her dose to 16 mg weekly of dexa \par \par \par Plan:\par -will continue with   cycle 8  VRD Velcade 1.3 mg/m2 SC weekly, Dexa 16 mg weekly ( we like to avoid psychosis) and Revlimid 15 mg  daily 2 weeks on 1 week off. No plan to increase the Revlimid dose for now. Her myeloma is responding with fall in Mspikeand Lamda continues\par -Zometa 3.5 mg now GFR is better monthly \par -continue calcium and Vitamin D\par -on ASA 81 mg\par --on Acyclovir 400 mg BID\par -will check Myeloma panel   and  CMP today \par -will start Maintenance Revla or velcade  once she hits a CR or ramo \par -she has dry eyes and sty, that is better with eye wash\par \par Pathologic Right Humerus fracture s/p fixation - Dr. Glenn Salter\par -on physical therapy\par \par \par Dementia - most likely Alzheimers \par -Neurology follow up Dr. Fields.\par -stable on SSRI now na [place]? daughter will bring in meds next visit\par -will check CT Brain around June July as requested\par \par \par Anemia due to MM  \par -stable\par \par Hypercalcemia from MM\par -resolved \par \par Edema\par -most likely from Revlimid mild\par -she is now on Lasix 20 mg daily \par \par \par RTC  3 weeks,  79

## 2019-08-08 NOTE — ED PROVIDER NOTE - CLINICAL SUMMARY MEDICAL DECISION MAKING FREE TEXT BOX
Pt with active vomiting and AMS who mentioned to EMS he is suicidal. Will evaluate for aspiration pneumonia and observe for sobriety and if SI continues.

## 2019-08-08 NOTE — ED ADULT NURSE NOTE - CHIEF COMPLAINT QUOTE
pt arrive by ambulance with reports of SI, aggressive behavior, attempt to jump in front of cars. pt found by [employer] across the street from [facility]. given versed 10mg IM by [employer] medic. MD Isaac to bedside for eval

## 2019-08-08 NOTE — ED PROVIDER NOTE - OBJECTIVE STATEMENT
52 y/o M with PMHx of HTN, HLD and EtOH abuse presents to ED BIBA, restrained c/o suicidal attempt. As per EMS, pt was found in the parking lot across from [facility]. Pt was found saying suicidal thoughts and jumping in front of cars, ALOOB, and found with alcohol. Pt was agressive on ambulance, and given 10 Verset via IM. Hx unavailable bc pt unresponsive.

## 2019-08-08 NOTE — ED PROVIDER NOTE - CARE PLAN
Principal Discharge DX:	Pneumonitis due to inhalation of food or vomitus  Secondary Diagnosis:	Alcohol abuse  Secondary Diagnosis:	Suicidal ideation

## 2019-08-08 NOTE — ED ADULT TRIAGE NOTE - CHIEF COMPLAINT QUOTE
pt arrive by ambulance with reports of SI, aggressive behavior, attempt to jump in front of cars. pt found by [employer] across the street from [facility]. given versed 10mg IM by [employer] medic. MD Isaac to bedside for eval

## 2019-08-08 NOTE — ED PROVIDER NOTE - PROGRESS NOTE DETAILS
Pt is still experiencing SI, stating he wants to jump into traffic. At this time, we will continue to observe and check lactic acidosis. If improved, pt will obtain psychiatric evaluation. Patient with bronchospasm. treated with albuterol with resolution. Patient pending repeat lactate and psych consultation. Repeat lactate is as noted. Patient otherwise stable. Will admit due to concern of progressing pneumonitis.

## 2019-08-08 NOTE — ED ADULT NURSE NOTE - OBJECTIVE STATEMENT
Received patient at 2100 in yellow gown with belongings secured in  with 1:1 at bedside. Pt a&ox3 states "I want to jump in front of a train and die because everyone I love " Pt denies any hi, visual/auditory disturbances. Pt denies any drug or alcohol use although patient smells of alcohol. Pt denies any pain/discomfort, n/v/d, fever, chills, urinary symptoms. Resp spontaneous even and unlabored, lungs clear. JOLLY x 4, PERRL

## 2019-08-08 NOTE — ED CLERICAL - NS ED CLERK NOTE PRE-ARRIVAL INFORMATION; ADDITIONAL PRE-ARRIVAL INFORMATION
This patient is eligible for (or currently enrolled in) an outpatient care management program available through [employer]. This program can coordinate outpatient follow up and assist the patient in accessing a variety of outpatient resources.  If discharged from the ED, the patient will be contacted to see if any additional resources are needed. Please call the Nurse Clinical Call Center at [phone] with any questions or for assistance in discharge planning.

## 2019-08-09 DIAGNOSIS — F32.9 MAJOR DEPRESSIVE DISORDER, SINGLE EPISODE, UNSPECIFIED: ICD-10-CM

## 2019-08-09 DIAGNOSIS — J69.0 PNEUMONITIS DUE TO INHALATION OF FOOD AND VOMIT: ICD-10-CM

## 2019-08-09 DIAGNOSIS — F10.10 ALCOHOL ABUSE, UNCOMPLICATED: ICD-10-CM

## 2019-08-09 LAB
ALBUMIN SERPL ELPH-MCNC: 4.2 G/DL — SIGNIFICANT CHANGE UP (ref 3.3–5.2)
ALP SERPL-CCNC: 53 U/L — SIGNIFICANT CHANGE UP (ref 40–120)
ALT FLD-CCNC: 20 U/L — SIGNIFICANT CHANGE UP
ANION GAP SERPL CALC-SCNC: 12 MMOL/L — SIGNIFICANT CHANGE UP (ref 5–17)
AST SERPL-CCNC: 20 U/L — SIGNIFICANT CHANGE UP
BASOPHILS # BLD AUTO: 0.06 K/UL — SIGNIFICANT CHANGE UP (ref 0–0.2)
BASOPHILS NFR BLD AUTO: 0.4 % — SIGNIFICANT CHANGE UP (ref 0–2)
BILIRUB DIRECT SERPL-MCNC: 0 MG/DL — SIGNIFICANT CHANGE UP (ref 0–0.3)
BILIRUB INDIRECT FLD-MCNC: <0.2 MG/DL — SIGNIFICANT CHANGE UP (ref 0.2–1)
BILIRUB SERPL-MCNC: <0.2 MG/DL — LOW (ref 0.4–2)
BUN SERPL-MCNC: 22 MG/DL — HIGH (ref 8–20)
CALCIUM SERPL-MCNC: 8.9 MG/DL — SIGNIFICANT CHANGE UP (ref 8.6–10.2)
CHLORIDE SERPL-SCNC: 98 MMOL/L — SIGNIFICANT CHANGE UP (ref 98–107)
CHOLEST SERPL-MCNC: 287 MG/DL — HIGH (ref 110–199)
CK MB CFR SERPL CALC: 3 NG/ML — SIGNIFICANT CHANGE UP (ref 0–6.7)
CK SERPL-CCNC: 264 U/L — HIGH (ref 30–200)
CO2 SERPL-SCNC: 25 MMOL/L — SIGNIFICANT CHANGE UP (ref 22–29)
CREAT SERPL-MCNC: 0.94 MG/DL — SIGNIFICANT CHANGE UP (ref 0.5–1.3)
EOSINOPHIL # BLD AUTO: 0.05 K/UL — SIGNIFICANT CHANGE UP (ref 0–0.5)
EOSINOPHIL NFR BLD AUTO: 0.3 % — SIGNIFICANT CHANGE UP (ref 0–6)
GLUCOSE BLDC GLUCOMTR-MCNC: 134 MG/DL — HIGH (ref 70–99)
GLUCOSE BLDC GLUCOMTR-MCNC: 144 MG/DL — HIGH (ref 70–99)
GLUCOSE SERPL-MCNC: 129 MG/DL — HIGH (ref 70–115)
HBA1C BLD-MCNC: 5.9 % — HIGH (ref 4–5.6)
HCT VFR BLD CALC: 32.1 % — LOW (ref 39–50)
HDLC SERPL-MCNC: 69 MG/DL — SIGNIFICANT CHANGE UP
HGB BLD-MCNC: 9.8 G/DL — LOW (ref 13–17)
IMM GRANULOCYTES NFR BLD AUTO: 6.8 % — HIGH (ref 0–1.5)
LACTATE BLDV-MCNC: 2.4 MMOL/L — HIGH (ref 0.5–2)
LACTATE BLDV-MCNC: 2.8 MMOL/L — HIGH (ref 0.5–2)
LACTATE BLDV-MCNC: 2.8 MMOL/L — HIGH (ref 0.5–2)
LIPID PNL WITH DIRECT LDL SERPL: 181 MG/DL — SIGNIFICANT CHANGE UP
LYMPHOCYTES # BLD AUTO: 1.37 K/UL — SIGNIFICANT CHANGE UP (ref 1–3.3)
LYMPHOCYTES # BLD AUTO: 9.1 % — LOW (ref 13–44)
MAGNESIUM SERPL-MCNC: 2 MG/DL — SIGNIFICANT CHANGE UP (ref 1.6–2.6)
MCHC RBC-ENTMCNC: 27 PG — SIGNIFICANT CHANGE UP (ref 27–34)
MCHC RBC-ENTMCNC: 30.5 GM/DL — LOW (ref 32–36)
MCV RBC AUTO: 88.4 FL — SIGNIFICANT CHANGE UP (ref 80–100)
MONOCYTES # BLD AUTO: 1.01 K/UL — HIGH (ref 0–0.9)
MONOCYTES NFR BLD AUTO: 6.7 % — SIGNIFICANT CHANGE UP (ref 2–14)
NEUTROPHILS # BLD AUTO: 11.61 K/UL — HIGH (ref 1.8–7.4)
NEUTROPHILS NFR BLD AUTO: 76.7 % — SIGNIFICANT CHANGE UP (ref 43–77)
NT-PROBNP SERPL-SCNC: 21 PG/ML — SIGNIFICANT CHANGE UP (ref 0–300)
PHOSPHATE SERPL-MCNC: 4.2 MG/DL — SIGNIFICANT CHANGE UP (ref 2.4–4.7)
PLATELET # BLD AUTO: 285 K/UL — SIGNIFICANT CHANGE UP (ref 150–400)
POTASSIUM SERPL-MCNC: 4.3 MMOL/L — SIGNIFICANT CHANGE UP (ref 3.5–5.3)
POTASSIUM SERPL-SCNC: 4.3 MMOL/L — SIGNIFICANT CHANGE UP (ref 3.5–5.3)
PROCALCITONIN SERPL-MCNC: 0.1 NG/ML — SIGNIFICANT CHANGE UP (ref 0.02–0.1)
PROT SERPL-MCNC: 6.9 G/DL — SIGNIFICANT CHANGE UP (ref 6.6–8.7)
RBC # BLD: 3.63 M/UL — LOW (ref 4.2–5.8)
RBC # FLD: 17.8 % — HIGH (ref 10.3–14.5)
SODIUM SERPL-SCNC: 135 MMOL/L — SIGNIFICANT CHANGE UP (ref 135–145)
TOTAL CHOLESTEROL/HDL RATIO MEASUREMENT: 4 RATIO — SIGNIFICANT CHANGE UP (ref 3.4–9.6)
TRIGL SERPL-MCNC: 186 MG/DL — SIGNIFICANT CHANGE UP (ref 10–200)
TROPONIN T SERPL-MCNC: <0.01 NG/ML — SIGNIFICANT CHANGE UP (ref 0–0.06)
WBC # BLD: 15.13 K/UL — HIGH (ref 3.8–10.5)
WBC # FLD AUTO: 15.13 K/UL — HIGH (ref 3.8–10.5)

## 2019-08-09 PROCEDURE — 99223 1ST HOSP IP/OBS HIGH 75: CPT

## 2019-08-09 PROCEDURE — 93010 ELECTROCARDIOGRAM REPORT: CPT

## 2019-08-09 PROCEDURE — 71045 X-RAY EXAM CHEST 1 VIEW: CPT | Mod: 26

## 2019-08-09 PROCEDURE — 99222 1ST HOSP IP/OBS MODERATE 55: CPT

## 2019-08-09 PROCEDURE — 93306 TTE W/DOPPLER COMPLETE: CPT | Mod: 26

## 2019-08-09 RX ORDER — SODIUM CHLORIDE 9 MG/ML
500 INJECTION INTRAMUSCULAR; INTRAVENOUS; SUBCUTANEOUS ONCE
Refills: 0 | Status: COMPLETED | OUTPATIENT
Start: 2019-08-09 | End: 2019-08-09

## 2019-08-09 RX ORDER — SODIUM CHLORIDE 9 MG/ML
1000 INJECTION, SOLUTION INTRAVENOUS
Refills: 0 | Status: DISCONTINUED | OUTPATIENT
Start: 2019-08-09 | End: 2019-08-10

## 2019-08-09 RX ORDER — IPRATROPIUM/ALBUTEROL SULFATE 18-103MCG
3 AEROSOL WITH ADAPTER (GRAM) INHALATION EVERY 6 HOURS
Refills: 0 | Status: DISCONTINUED | OUTPATIENT
Start: 2019-08-09 | End: 2019-08-12

## 2019-08-09 RX ORDER — PANTOPRAZOLE SODIUM 20 MG/1
40 TABLET, DELAYED RELEASE ORAL
Refills: 0 | Status: DISCONTINUED | OUTPATIENT
Start: 2019-08-09 | End: 2019-08-16

## 2019-08-09 RX ORDER — SODIUM CHLORIDE 9 MG/ML
1000 INJECTION, SOLUTION INTRAVENOUS
Refills: 0 | Status: DISCONTINUED | OUTPATIENT
Start: 2019-08-09 | End: 2019-08-20

## 2019-08-09 RX ORDER — IPRATROPIUM/ALBUTEROL SULFATE 18-103MCG
3 AEROSOL WITH ADAPTER (GRAM) INHALATION ONCE
Refills: 0 | Status: COMPLETED | OUTPATIENT
Start: 2019-08-09 | End: 2019-08-09

## 2019-08-09 RX ORDER — DEXTROSE 50 % IN WATER 50 %
25 SYRINGE (ML) INTRAVENOUS ONCE
Refills: 0 | Status: DISCONTINUED | OUTPATIENT
Start: 2019-08-09 | End: 2019-08-20

## 2019-08-09 RX ORDER — THIAMINE MONONITRATE (VIT B1) 100 MG
100 TABLET ORAL DAILY
Refills: 0 | Status: DISCONTINUED | OUTPATIENT
Start: 2019-08-09 | End: 2019-08-20

## 2019-08-09 RX ORDER — SODIUM CHLORIDE 9 MG/ML
2000 INJECTION, SOLUTION INTRAVENOUS ONCE
Refills: 0 | Status: COMPLETED | OUTPATIENT
Start: 2019-08-09 | End: 2019-08-09

## 2019-08-09 RX ORDER — GLUCAGON INJECTION, SOLUTION 0.5 MG/.1ML
1 INJECTION, SOLUTION SUBCUTANEOUS ONCE
Refills: 0 | Status: DISCONTINUED | OUTPATIENT
Start: 2019-08-09 | End: 2019-08-20

## 2019-08-09 RX ORDER — DEXTROSE 50 % IN WATER 50 %
12.5 SYRINGE (ML) INTRAVENOUS ONCE
Refills: 0 | Status: DISCONTINUED | OUTPATIENT
Start: 2019-08-09 | End: 2019-08-20

## 2019-08-09 RX ORDER — PIPERACILLIN AND TAZOBACTAM 4; .5 G/20ML; G/20ML
3.38 INJECTION, POWDER, LYOPHILIZED, FOR SOLUTION INTRAVENOUS EVERY 8 HOURS
Refills: 0 | Status: DISCONTINUED | OUTPATIENT
Start: 2019-08-09 | End: 2019-08-12

## 2019-08-09 RX ORDER — FOLIC ACID 0.8 MG
1 TABLET ORAL DAILY
Refills: 0 | Status: DISCONTINUED | OUTPATIENT
Start: 2019-08-09 | End: 2019-08-20

## 2019-08-09 RX ORDER — MIRTAZAPINE 45 MG/1
30 TABLET, ORALLY DISINTEGRATING ORAL AT BEDTIME
Refills: 0 | Status: DISCONTINUED | OUTPATIENT
Start: 2019-08-09 | End: 2019-08-14

## 2019-08-09 RX ORDER — ENOXAPARIN SODIUM 100 MG/ML
40 INJECTION SUBCUTANEOUS DAILY
Refills: 0 | Status: DISCONTINUED | OUTPATIENT
Start: 2019-08-09 | End: 2019-08-20

## 2019-08-09 RX ORDER — FUROSEMIDE 40 MG
40 TABLET ORAL DAILY
Refills: 0 | Status: DISCONTINUED | OUTPATIENT
Start: 2019-08-09 | End: 2019-08-11

## 2019-08-09 RX ORDER — INSULIN LISPRO 100/ML
VIAL (ML) SUBCUTANEOUS
Refills: 0 | Status: DISCONTINUED | OUTPATIENT
Start: 2019-08-09 | End: 2019-08-20

## 2019-08-09 RX ORDER — DEXTROSE 50 % IN WATER 50 %
15 SYRINGE (ML) INTRAVENOUS ONCE
Refills: 0 | Status: DISCONTINUED | OUTPATIENT
Start: 2019-08-09 | End: 2019-08-20

## 2019-08-09 RX ADMIN — SODIUM CHLORIDE 100 MILLILITER(S): 9 INJECTION, SOLUTION INTRAVENOUS at 20:41

## 2019-08-09 RX ADMIN — Medication 100 MILLIGRAM(S): at 11:48

## 2019-08-09 RX ADMIN — PANTOPRAZOLE SODIUM 40 MILLIGRAM(S): 20 TABLET, DELAYED RELEASE ORAL at 11:48

## 2019-08-09 RX ADMIN — Medication 3 MILLILITER(S): at 21:33

## 2019-08-09 RX ADMIN — SODIUM CHLORIDE 1000 MILLILITER(S): 9 INJECTION INTRAMUSCULAR; INTRAVENOUS; SUBCUTANEOUS at 19:36

## 2019-08-09 RX ADMIN — Medication 40 MILLIGRAM(S): at 11:47

## 2019-08-09 RX ADMIN — Medication 50 MILLIGRAM(S): at 07:54

## 2019-08-09 RX ADMIN — SODIUM CHLORIDE 1000 MILLILITER(S): 9 INJECTION, SOLUTION INTRAVENOUS at 01:53

## 2019-08-09 RX ADMIN — Medication 1 MILLIGRAM(S): at 11:48

## 2019-08-09 RX ADMIN — Medication 40 MILLIGRAM(S): at 22:26

## 2019-08-09 RX ADMIN — Medication 3 MILLILITER(S): at 05:00

## 2019-08-09 RX ADMIN — ENOXAPARIN SODIUM 40 MILLIGRAM(S): 100 INJECTION SUBCUTANEOUS at 11:48

## 2019-08-09 RX ADMIN — Medication 50 MILLIGRAM(S): at 07:52

## 2019-08-09 RX ADMIN — Medication 2 MILLIGRAM(S): at 22:26

## 2019-08-09 RX ADMIN — Medication 1 TABLET(S): at 11:48

## 2019-08-09 NOTE — BEHAVIORAL HEALTH ASSESSMENT NOTE - RISK ASSESSMENT
Acute Suicide Risk  (x  ) High   (  ) Moderate   (  ) Low   (  ) Unable to determine   Rationale: substance abuse, wants to be hit by train   Elevated Chronic Risk   ( x ) Yes   Details: substance abuse, history of inpatient psychiatric care   (  ) No

## 2019-08-09 NOTE — BEHAVIORAL HEALTH ASSESSMENT NOTE - NSBHCHARTREVIEWVS_PSY_A_CORE FT
Vital Signs Last 24 Hrs  T(C): 36.7 (09 Aug 2019 08:17), Max: 37.1 (09 Aug 2019 05:14)  T(F): 98 (09 Aug 2019 08:17), Max: 98.7 (09 Aug 2019 05:14)  HR: 85 (09 Aug 2019 08:17) (85 - 109)  BP: 101/71 (09 Aug 2019 08:17) (101/71 - 113/72)  BP(mean): --  RR: 18 (09 Aug 2019 08:17) (16 - 20)  SpO2: 98% (09 Aug 2019 08:17) (92% - 98%)

## 2019-08-09 NOTE — H&P ADULT - HISTORY OF PRESENT ILLNESS
ER HPI: "52 y/o M with PMHx of HTN, HLD, ?CHF and EtOH abuse presents to ED BIBA, restrained c/o suicidal attempt. As per EMS, pt was found in the parking lot across from [facility]. Pt was found saying suicidal thoughts and jumping in front of cars, ALOOB, and found with alcohol. Pt was agressive on ambulance, and given 10 Verset via IM. Hx unavailable bc pt unresponsive."     above HPI reviewed with the patient and admits the same. patient was discharged form [facility] on 8/8/2019 after stabilization, admitted with SI, depression. He has multiple ER visit at the end of with different complaints [ SOB, lethargy, BRBPR] and was discharged after stabilization. 8/1 discharged from ER to [facility] with steroid, neb for BA exacerbation. patient current reported depression anhedonia, SI. no HI. He drinks half galloon of vodka everyday, denied smoking cig or drug use. he is homeless. In ER, WBC was elevated with elevated lactate. CT chest neg for infection, but showed air/fluid level, better than before. CT head neg.  s/p 2L LR and zosyn and admission was called for ?pneumonitis and SI    Admit t ER HPI: "54 y/o M with PMHx of HTN, HLD, ?CHF and EtOH abuse presents to ED BIBA, restrained c/o suicidal attempt. As per EMS, pt was found in the parking lot across from [facility]. Pt was found saying suicidal thoughts and jumping in front of cars, ALOOB, and found with alcohol. Pt was agressive on ambulance, and given 10 Verset via IM. Hx unavailable bc pt unresponsive."     above HPI reviewed with the patient and admits the same. patient was discharged form [facility] on 8/8/2019 after stabilization, admitted with SI, depression. He has multiple ER visit at the end of with different complaints [ SOB, lethargy, BRBPR] and was discharged after stabilization. 8/1 discharged from ER to [facility] with steroid, neb for BA exacerbation. patient current reported depression anhedonia, SI. no HI. He drinks half galloon of vodka everyday, denied smoking cig or drug use. he is homeless. In ER, WBC was elevated with elevated lactate. CT chest neg for infection, but showed air/fluid level, better than before. CT head neg.  s/p 2L LR and zosyn and admission was called for ?pneumonitis and SI

## 2019-08-09 NOTE — H&P ADULT - ASSESSMENT
54 y/o M with PMHx of HTN, HLD, ?CHF and EtOH abuse presents to ED BIBA, restrained c/o suicidal attempt. As per EMS, pt was found in the parking lot across from [facility]. Pt was found saying suicidal thoughts and jumping in front of cars, ALOOB, and found with alcohol. Pt was agressive on ambulance, and given 10 Verset via IM. Hx unavailable bc pt unresponsive. patient was discharged form [facility] on 8/8/2019 after stabilization, admitted with SI, depression. He has multiple ER visit at the end of with different complaints [ SOB, lethargy, BRBPR] and was discharged after stabilization. 8/1 discharged from ER to [facility] with steroid, neb for BA exacerbation. patient current reported depression anhedonia, SI. no HI. He drinks half galloon of vodka everyday, denied smoking cig or drug use. he is homeless. In ER, WBC was elevated with elevated lactate. CT chest neg for infection, but showed air/fluid level, better than before. CT head neg.  s/p 2L LR and zosyn and admission was called for ?pneumonitis and SI. found tohave diffuse wheezing and SOB.     SOB/wheezing: recently treated for BA exacerbation and ?CHF, received 2L LR in ED  BA exacerbation due to ?pneumonitis vs fluid overload/CHF exacerbation  Admit to any monitored bed with   lasix 40 IV stat and daily for now  neb, steroid, zithromax  Echo  CXR [ received fluid before CT]  Ce and EKG *1  wbc and lactate elevated. could be due to recent steroid use/ETOH intoxication respectively  Avoid IVF   repeat lactate, procal      depression/SI:   c/w Remeron   psych cx  c/w 1:1  haldol PRN for agitation    ETOH abuse:  no sign of withdrawal at this time  SW eval    HTN: not on meds    ?DM: ISS, accucheck, A1c,lipid    DVT-P: lovenox  aspiration precautions  fall precaution 52 y/o M with PMHx of HTN, HLD, ?CHF and EtOH abuse presents to ED BIBA, restrained c/o suicidal attempt. As per EMS, pt was found in the parking lot across from [facility]. Pt was found saying suicidal thoughts and jumping in front of cars, ALOOB, and found with alcohol. Pt was agressive on ambulance, and given 10 Verset via IM. Hx unavailable bc pt unresponsive. patient was discharged form [facility] on 8/8/2019 after stabilization, admitted with SI, depression. He has multiple ER visit at the end of with different complaints [ SOB, lethargy, BRBPR] and was discharged after stabilization. 8/1 discharged from ER to [facility] with steroid, neb for BA exacerbation. patient current reported depression anhedonia, SI. no HI. He drinks half galloon of vodka everyday, denied smoking cig or drug use. he is homeless. In ER, WBC was elevated with elevated lactate. CT chest neg for infection, but showed air/fluid level, better than before. CT head neg.  s/p 2L LR and zosyn and admission was called for ?pneumonitis and SI. found tohave diffuse wheezing and SOB.     SOB/wheezing: recently treated for BA exacerbation and ?CHF, received 2L LR in ED  likely BA exacerbation due to ?pneumonitis vs fluid overload. r/o CHF exacerbation. papers from  home chart, no h/o CHF. however patient reported personal and family h/o heart disease,?HF  Admit to any monitored bed with   lasix 40 IV stat and daily for now. Dc if no fluid overload or CHF  neb, steroid, zithromax  Echo  CXR [ received fluid before CT]  Ce and EKG *1  wbc and lactate elevated. could be due to recent steroid use/ETOH intoxication respectively  Avoid IVF   repeat lactate, procal      depression/SI:   c/w Remeron   psych cx  c/w 1:1  haldol PRN for agitation    ETOH abuse:  no sign of withdrawal at this time  SW eval    HTN: not on meds    ?DM: ISS, accucheck, A1c,lipid    DVT-P: lovenox  aspiration precautions  fall precaution

## 2019-08-09 NOTE — H&P ADULT - NSHPLABSRESULTS_GEN_ALL_CORE
11.4   15.98 )-----------( 362      ( 08 Aug 2019 19:33 )             36.6       08-08    137  |  100  |  22.0<H>  ----------------------------<  140<H>  4.4   |  22.0  |  0.82    Ca    9.4      08 Aug 2019 19:33    TPro  8.0  /  Alb  4.9  /  TBili  <0.2<L>  /  DBili  x   /  AST  20  /  ALT  22  /  AlkPhos  66  08-08    EKG:NSR    < from: CT Head No Cont (08.08.19 @ 21:55) >    IMPRESSION:    No intracranial hemorrhage, mass effect or large acute cortical infarct.    < end of copied text >        < from: CT Chest No Cont (08.08.19 @ 21:55) >      IMPRESSION:     No acute findings in the thorax.  No pneumomediastinum or pneumothorax.  The esophagus is slightly distended with air and fluid, less severe   compared to the previous study of June 28, 2018.    < end of copied text >

## 2019-08-09 NOTE — BEHAVIORAL HEALTH ASSESSMENT NOTE - DIFFERENTIAL
C-SSRS Screener  1. Have you ever wished to be dead or wished you could go to sleep and not wake up?  [ x ]Yes, [  ]No, [  ]Unable to Assess  Details: wants to get hit by train, was found in the street   2. Have you actually had any thoughts of killing yourself?   [x  ]Yes, [  ]No, [  ]Unable to Assess  Details: see HPI  If answer is “No” for 1 and 2, stop here. If answer is “Yes” to 1 or 2, proceed to 3.  3. Have you been thinking about how you might kill yourself?  [ x ]Yes, [  ]No, [  ]Unable to Assess  Details:  4. Have you had these thoughts and had some intention of acting on them?  [ x ]Yes, [  ]No, [  ]Unable to Assess  Details:  5. Have you started to work out or worked out the details of how to kill yourself? Do you intend to carry out this plan?  [ x ]Yes, [  ]No, [  ]Unable to Assess  Details:  6. Have you ever done anything, started to do anything, or prepared to do anything to end your life? If so, was it in the past 3 months?  [ x ]Yes, [  ]No, [  ]Unable to Assess  Details:  Additional Suicide Risk Factors (select all that apply)  [  ]Access to lethal means including firearms  [  ]Family history of suicide  [  ]Impulsivity  [  ] Current or past mood disorder  [  ] Current or past psychotic disorder  [  ] Current or past PTSD  [  ] Current or past ADHD  [  ] Current or past TBI  [  ] Current or past cluster B personality disorder or traits  [  ] Current or past conduct problems  [  ] Recent onset of current or past psychiatric disorder  [  ] Family history of psychiatric diagnoses requiring hospitalization  Additional Activating Events (select all that apply)  [  ]Perceived burden on family or others  [  ]Current sexual or physical abuse  [x  ]Substance intoxication or withdrawal  [ x ]Inadequate social supports  [  ]Hopeless about or dissatisfied with current provider or treatment   Additional Protective Factors (select all that apply)  [  ] Future plans  [  ] Nondenominational beliefs  [  ] Beloved pets

## 2019-08-09 NOTE — BEHAVIORAL HEALTH ASSESSMENT NOTE - PAST PSYCHOTROPIC MEDICATION
Home Medications:  mirtazapine 30 mg oral tablet, disintegratin tab(s) orally once a day (at bedtime) (09 Aug 2019 08:52)

## 2019-08-09 NOTE — BEHAVIORAL HEALTH ASSESSMENT NOTE - NSBHCHARTREVIEWLAB_PSY_A_CORE FT
9.8    15.13 )-----------( 285      ( 09 Aug 2019 10:02 )             32.1   08-09  135  |  98  |  22.0<H>  ----------------------------<  129<H>  4.3   |  25.0  |  0.94  Ca    8.9      09 Aug 2019 10:02  Phos  4.2     08-09  Mg     2.0     08-09  TPro  6.9  /  Alb  4.2  /  TBili  <0.2<L>  /  DBili  0.0  /  AST  20  /  ALT  20  /  AlkPhos  53  08-09  LIVER FUNCTIONS - ( 09 Aug 2019 10:02 )  Alb: 4.2 g/dL / Pro: 6.9 g/dL / ALK PHOS: 53 U/L / ALT: 20 U/L / AST: 20 U/L / GGT: x         PT/INR - ( 08 Aug 2019 19:33 )   PT: 9.7 sec;   INR: 0.85 ratio     PTT - ( 08 Aug 2019 19:33 )  PTT:29.3 sec

## 2019-08-09 NOTE — H&P ADULT - NSHPREVIEWOFSYSTEMS_GEN_ALL_CORE
CONSTITUTIONAL:  No distress. no fever/chills/fatigue/weight loss  HEENT:  Eyes:  No diplopia or blurred vision.   CARDIOVASCULAR:  No pressure, squeezing, tightness, heaviness or aching about the chest; no palpitations. no leg swelling, no orthopnea or PND  RESPIRATORY:  + SOB. + wheezing. + cough or sputum.  No hemoptysis  GI: no abd pain, no nausea, no vomiting, no diarrhea, + constipation, last BM No hematochezia or melena  EXT: no leg or calf pain  SKIN: no skin break or ulcer. No skin rash  CNS: No headaches. No weakness.no numbness. + depression or anxiety. + SI

## 2019-08-09 NOTE — H&P ADULT - NSHPPHYSICALEXAM_GEN_ALL_CORE
Vital Signs Last 24 Hrs  T(C): 36.7 (09 Aug 2019 08:17), Max: 37.1 (09 Aug 2019 05:14)  T(F): 98 (09 Aug 2019 08:17), Max: 98.7 (09 Aug 2019 05:14)  HR: 85 (09 Aug 2019 08:17) (85 - 109)  BP: 101/71 (09 Aug 2019 08:17) (101/71 - 113/72)  BP(mean): --  RR: 18 (09 Aug 2019 08:17) (16 - 20)  SpO2: 98% (09 Aug 2019 08:17) (92% - 98%)    CAPILLARY BLOOD GLUCOSE      I&O's Detail    08 Aug 2019 07:01  -  09 Aug 2019 07:00  --------------------------------------------------------  IN:  Total IN: 0 mL    OUT:    Voided: 650 mL  Total OUT: 650 mL    Total NET: -650 mL      GENERAL: Not in distress. Alert    HEENT:  Normocephalic and atraumatic. PEARLA,EOMI  NECK: Supple.  No JVD.    CARDIOVASCULAR: RRR S1, S2. No murmur/rubs/gallop  LUNGS: BLAE+, + rales, + wheezing, no rhonchi.    ABDOMEN: ND. Soft,  NT, no guarding / rebound / rigidity. BS normoactive. No CVA tenderness.    BACK: No spine tenderness.  EXTREMITIES: no cyanosis, no clubbing, no edema.   SKIN: no rash. No skin break or ulcer. No cellulitis.  NEUROLOGIC: AAO*3.strength is symmetric, sensation intact, speech fluent.    PSYCHIATRIC: Calm.  No agitation.

## 2019-08-09 NOTE — BEHAVIORAL HEALTH ASSESSMENT NOTE - DETAILS
was released from [facility] 08/08/19 NA pt states he wants to get hit by train pt SOB, on oxygen

## 2019-08-09 NOTE — H&P ADULT - NSICDXPASTMEDICALHX_GEN_ALL_CORE_FT
PAST MEDICAL HISTORY:  Alcohol abuse     Alcohol abuse     Alcohol abuse     Asthma     GERD (gastroesophageal reflux disease)     High cholesterol     High cholesterol     HTN (hypertension)     Hypertension

## 2019-08-09 NOTE — H&P ADULT - NSICDXPASTSURGICALHX_GEN_ALL_CORE_FT
PAST SURGICAL HISTORY:  No significant past surgical history     No significant past surgical history     No significant past surgical history

## 2019-08-09 NOTE — BEHAVIORAL HEALTH ASSESSMENT NOTE - SUMMARY
pt is 53 year old male who presented to ED via EMS after police found pt trying to jump in front of cars. pt has history of alcohol abuse and inpatient psychiatric care. pt released yesterday from [facility] and was supposed to go to a shelter. pt states he would like to get hit by train.

## 2019-08-09 NOTE — BEHAVIORAL HEALTH ASSESSMENT NOTE - HPI (INCLUDE ILLNESS QUALITY, SEVERITY, DURATION, TIMING, CONTEXT, MODIFYING FACTORS, ASSOCIATED SIGNS AND SYMPTOMS)
pt was discharged from [facility] yesterday, 08/08/19. pt stated after discharge he was supposed to go to a shelter but stated "Im not going there." pt admits to drinking after discharge. pt was found by police across the street from [facility] in the middle of the street. pt states he "wants to get hit by a train."

## 2019-08-09 NOTE — ED ADULT NURSE REASSESSMENT NOTE - NS ED NURSE REASSESS COMMENT FT1
Assumed care of patient at 0715, patient is resting in bed on 1:1 observation. Patient is calm and cooperative. Patient to go to CDU. NAD noted. Respirations are even and non labored.

## 2019-08-09 NOTE — ED ADULT NURSE REASSESSMENT NOTE - NS ED NURSE REASSESS COMMENT FT1
pt asleep, arouses to voice. denies any pain/discomfort. iv fluids infusing, 1:1 at bedside. pending further tx plan. updated on plan of care, verbalize understanding. call bell in reach

## 2019-08-09 NOTE — BEHAVIORAL HEALTH ASSESSMENT NOTE - NSBHCHARTREVIEWIMAGING_PSY_A_CORE FT
< from: Xray Chest 1 View-PORTABLE IMMEDIATE (08.09.19 @ 00:48) >      IMPRESSION: No acute cardiopulmonary disease process.    < end of copied text >

## 2019-08-10 LAB
ANION GAP SERPL CALC-SCNC: 15 MMOL/L — SIGNIFICANT CHANGE UP (ref 5–17)
APPEARANCE UR: CLEAR — SIGNIFICANT CHANGE UP
BACTERIA # UR AUTO: ABNORMAL
BASOPHILS # BLD AUTO: 0.04 K/UL — SIGNIFICANT CHANGE UP (ref 0–0.2)
BASOPHILS NFR BLD AUTO: 0.3 % — SIGNIFICANT CHANGE UP (ref 0–2)
BILIRUB UR-MCNC: NEGATIVE — SIGNIFICANT CHANGE UP
BUN SERPL-MCNC: 23 MG/DL — HIGH (ref 8–20)
CALCIUM SERPL-MCNC: 9.3 MG/DL — SIGNIFICANT CHANGE UP (ref 8.6–10.2)
CHLORIDE SERPL-SCNC: 96 MMOL/L — LOW (ref 98–107)
CO2 SERPL-SCNC: 24 MMOL/L — SIGNIFICANT CHANGE UP (ref 22–29)
COLOR SPEC: YELLOW — SIGNIFICANT CHANGE UP
CREAT SERPL-MCNC: 0.95 MG/DL — SIGNIFICANT CHANGE UP (ref 0.5–1.3)
DIFF PNL FLD: NEGATIVE — SIGNIFICANT CHANGE UP
EOSINOPHIL # BLD AUTO: 0.01 K/UL — SIGNIFICANT CHANGE UP (ref 0–0.5)
EOSINOPHIL NFR BLD AUTO: 0.1 % — SIGNIFICANT CHANGE UP (ref 0–6)
EPI CELLS # UR: SIGNIFICANT CHANGE UP
GLUCOSE BLDC GLUCOMTR-MCNC: 172 MG/DL — HIGH (ref 70–99)
GLUCOSE BLDC GLUCOMTR-MCNC: 174 MG/DL — HIGH (ref 70–99)
GLUCOSE BLDC GLUCOMTR-MCNC: 177 MG/DL — HIGH (ref 70–99)
GLUCOSE BLDC GLUCOMTR-MCNC: 205 MG/DL — HIGH (ref 70–99)
GLUCOSE SERPL-MCNC: 175 MG/DL — HIGH (ref 70–115)
GLUCOSE UR QL: NEGATIVE MG/DL — SIGNIFICANT CHANGE UP
HBA1C BLD-MCNC: 6.2 % — HIGH (ref 4–5.6)
HCT VFR BLD CALC: 35.2 % — LOW (ref 39–50)
HGB BLD-MCNC: 11 G/DL — LOW (ref 13–17)
IMM GRANULOCYTES NFR BLD AUTO: 5.7 % — HIGH (ref 0–1.5)
KETONES UR-MCNC: NEGATIVE — SIGNIFICANT CHANGE UP
LACTATE BLDV-MCNC: 3.1 MMOL/L — HIGH (ref 0.5–2)
LACTATE BLDV-MCNC: 3.2 MMOL/L — HIGH (ref 0.5–2)
LACTATE BLDV-MCNC: 4.7 MMOL/L — CRITICAL HIGH (ref 0.5–2)
LACTATE BLDV-MCNC: 5.5 MMOL/L — CRITICAL HIGH (ref 0.5–2)
LEUKOCYTE ESTERASE UR-ACNC: NEGATIVE — SIGNIFICANT CHANGE UP
LYMPHOCYTES # BLD AUTO: 0.87 K/UL — LOW (ref 1–3.3)
LYMPHOCYTES # BLD AUTO: 6.1 % — LOW (ref 13–44)
MAGNESIUM SERPL-MCNC: 2.3 MG/DL — SIGNIFICANT CHANGE UP (ref 1.8–2.6)
MCHC RBC-ENTMCNC: 27.3 PG — SIGNIFICANT CHANGE UP (ref 27–34)
MCHC RBC-ENTMCNC: 31.3 GM/DL — LOW (ref 32–36)
MCV RBC AUTO: 87.3 FL — SIGNIFICANT CHANGE UP (ref 80–100)
MONOCYTES # BLD AUTO: 0.44 K/UL — SIGNIFICANT CHANGE UP (ref 0–0.9)
MONOCYTES NFR BLD AUTO: 3.1 % — SIGNIFICANT CHANGE UP (ref 2–14)
NEUTROPHILS # BLD AUTO: 12.18 K/UL — HIGH (ref 1.8–7.4)
NEUTROPHILS NFR BLD AUTO: 84.7 % — HIGH (ref 43–77)
NITRITE UR-MCNC: NEGATIVE — SIGNIFICANT CHANGE UP
PH UR: 6 — SIGNIFICANT CHANGE UP (ref 5–8)
PHOSPHATE SERPL-MCNC: 4.3 MG/DL — SIGNIFICANT CHANGE UP (ref 2.4–4.7)
PLATELET # BLD AUTO: 335 K/UL — SIGNIFICANT CHANGE UP (ref 150–400)
POTASSIUM SERPL-MCNC: 4.4 MMOL/L — SIGNIFICANT CHANGE UP (ref 3.5–5.3)
POTASSIUM SERPL-SCNC: 4.4 MMOL/L — SIGNIFICANT CHANGE UP (ref 3.5–5.3)
PROT UR-MCNC: NEGATIVE MG/DL — SIGNIFICANT CHANGE UP
RBC # BLD: 4.03 M/UL — LOW (ref 4.2–5.8)
RBC # FLD: 17.3 % — HIGH (ref 10.3–14.5)
RBC CASTS # UR COMP ASSIST: SIGNIFICANT CHANGE UP /HPF (ref 0–4)
SODIUM SERPL-SCNC: 135 MMOL/L — SIGNIFICANT CHANGE UP (ref 135–145)
SP GR SPEC: 1.01 — SIGNIFICANT CHANGE UP (ref 1.01–1.02)
UROBILINOGEN FLD QL: NEGATIVE MG/DL — SIGNIFICANT CHANGE UP
WBC # BLD: 14.36 K/UL — HIGH (ref 3.8–10.5)
WBC # FLD AUTO: 14.36 K/UL — HIGH (ref 3.8–10.5)
WBC UR QL: SIGNIFICANT CHANGE UP

## 2019-08-10 PROCEDURE — 93010 ELECTROCARDIOGRAM REPORT: CPT

## 2019-08-10 PROCEDURE — 99233 SBSQ HOSP IP/OBS HIGH 50: CPT

## 2019-08-10 PROCEDURE — 71045 X-RAY EXAM CHEST 1 VIEW: CPT | Mod: 26

## 2019-08-10 RX ORDER — SODIUM CHLORIDE 9 MG/ML
1000 INJECTION, SOLUTION INTRAVENOUS
Refills: 0 | Status: DISCONTINUED | OUTPATIENT
Start: 2019-08-10 | End: 2019-08-11

## 2019-08-10 RX ORDER — SODIUM CHLORIDE 9 MG/ML
500 INJECTION INTRAMUSCULAR; INTRAVENOUS; SUBCUTANEOUS ONCE
Refills: 0 | Status: COMPLETED | OUTPATIENT
Start: 2019-08-10 | End: 2019-08-10

## 2019-08-10 RX ORDER — SODIUM CHLORIDE 9 MG/ML
500 INJECTION INTRAMUSCULAR; INTRAVENOUS; SUBCUTANEOUS ONCE
Refills: 0 | Status: DISCONTINUED | OUTPATIENT
Start: 2019-08-10 | End: 2019-08-10

## 2019-08-10 RX ORDER — SODIUM CHLORIDE 9 MG/ML
1000 INJECTION, SOLUTION INTRAVENOUS
Refills: 0 | Status: DISCONTINUED | OUTPATIENT
Start: 2019-08-10 | End: 2019-08-10

## 2019-08-10 RX ORDER — VANCOMYCIN HCL 1 G
1000 VIAL (EA) INTRAVENOUS EVERY 8 HOURS
Refills: 0 | Status: DISCONTINUED | OUTPATIENT
Start: 2019-08-10 | End: 2019-08-11

## 2019-08-10 RX ORDER — SODIUM CHLORIDE 9 MG/ML
1000 INJECTION INTRAMUSCULAR; INTRAVENOUS; SUBCUTANEOUS ONCE
Refills: 0 | Status: COMPLETED | OUTPATIENT
Start: 2019-08-10 | End: 2019-08-10

## 2019-08-10 RX ORDER — VANCOMYCIN HCL 1 G
1000 VIAL (EA) INTRAVENOUS ONCE
Refills: 0 | Status: COMPLETED | OUTPATIENT
Start: 2019-08-10 | End: 2019-08-10

## 2019-08-10 RX ADMIN — Medication 100 MILLIGRAM(S): at 11:31

## 2019-08-10 RX ADMIN — Medication 4: at 17:29

## 2019-08-10 RX ADMIN — Medication 40 MILLIGRAM(S): at 14:10

## 2019-08-10 RX ADMIN — Medication 2: at 11:46

## 2019-08-10 RX ADMIN — PIPERACILLIN AND TAZOBACTAM 25 GRAM(S): 4; .5 INJECTION, POWDER, LYOPHILIZED, FOR SOLUTION INTRAVENOUS at 14:10

## 2019-08-10 RX ADMIN — Medication 3 MILLILITER(S): at 21:13

## 2019-08-10 RX ADMIN — PIPERACILLIN AND TAZOBACTAM 25 GRAM(S): 4; .5 INJECTION, POWDER, LYOPHILIZED, FOR SOLUTION INTRAVENOUS at 00:00

## 2019-08-10 RX ADMIN — SODIUM CHLORIDE 2000 MILLILITER(S): 9 INJECTION INTRAMUSCULAR; INTRAVENOUS; SUBCUTANEOUS at 11:22

## 2019-08-10 RX ADMIN — SODIUM CHLORIDE 150 MILLILITER(S): 9 INJECTION, SOLUTION INTRAVENOUS at 14:10

## 2019-08-10 RX ADMIN — Medication 2 MILLIGRAM(S): at 21:18

## 2019-08-10 RX ADMIN — Medication 3 MILLILITER(S): at 09:43

## 2019-08-10 RX ADMIN — Medication 1 MILLIGRAM(S): at 11:31

## 2019-08-10 RX ADMIN — ENOXAPARIN SODIUM 40 MILLIGRAM(S): 100 INJECTION SUBCUTANEOUS at 11:31

## 2019-08-10 RX ADMIN — Medication 1 TABLET(S): at 11:31

## 2019-08-10 RX ADMIN — Medication 40 MILLIGRAM(S): at 05:57

## 2019-08-10 RX ADMIN — Medication 2 MILLIGRAM(S): at 11:32

## 2019-08-10 RX ADMIN — Medication 250 MILLIGRAM(S): at 11:43

## 2019-08-10 RX ADMIN — Medication 2: at 08:32

## 2019-08-10 RX ADMIN — PANTOPRAZOLE SODIUM 40 MILLIGRAM(S): 20 TABLET, DELAYED RELEASE ORAL at 06:04

## 2019-08-10 RX ADMIN — SODIUM CHLORIDE 150 MILLILITER(S): 9 INJECTION, SOLUTION INTRAVENOUS at 20:02

## 2019-08-10 RX ADMIN — MIRTAZAPINE 30 MILLIGRAM(S): 45 TABLET, ORALLY DISINTEGRATING ORAL at 00:01

## 2019-08-10 RX ADMIN — PIPERACILLIN AND TAZOBACTAM 25 GRAM(S): 4; .5 INJECTION, POWDER, LYOPHILIZED, FOR SOLUTION INTRAVENOUS at 21:17

## 2019-08-10 RX ADMIN — SODIUM CHLORIDE 1000 MILLILITER(S): 9 INJECTION INTRAMUSCULAR; INTRAVENOUS; SUBCUTANEOUS at 17:30

## 2019-08-10 RX ADMIN — Medication 40 MILLIGRAM(S): at 21:17

## 2019-08-10 RX ADMIN — Medication 250 MILLIGRAM(S): at 21:17

## 2019-08-10 RX ADMIN — SODIUM CHLORIDE 1000 MILLILITER(S): 9 INJECTION INTRAMUSCULAR; INTRAVENOUS; SUBCUTANEOUS at 19:53

## 2019-08-10 RX ADMIN — MIRTAZAPINE 30 MILLIGRAM(S): 45 TABLET, ORALLY DISINTEGRATING ORAL at 21:17

## 2019-08-10 RX ADMIN — PIPERACILLIN AND TAZOBACTAM 25 GRAM(S): 4; .5 INJECTION, POWDER, LYOPHILIZED, FOR SOLUTION INTRAVENOUS at 05:58

## 2019-08-10 NOTE — PROGRESS NOTE ADULT - SUBJECTIVE AND OBJECTIVE BOX
Dr. Rivera Hospitalist Progress Note  NISREEN MARTINEZ [id number]    Patient is a 53y old  Male who presents with a chief complaint of SI, ETOH intoxication. ?pneumonittis (09 Aug 2019 09:02)    in    HPI:  ER HPI: "52 y/o M with PMHx of HTN, HLD, ?CHF and EtOH abuse presents to ED BIBA, restrained c/o suicidal attempt. As per EMS, pt was found in the parking lot across from [facility]. Pt was found saying suicidal thoughts and jumping in front of cars, ALOOB, and found with alcohol. Pt was agressive on ambulance, and given 10 Verset via IM. Hx unavailable bc pt unresponsive." above HPI reviewed with the patient and admits the same. patient was discharged form [facility] on 2019 after stabilization, admitted with SI, depression. He has multiple ER visit at the end of with different complaints [ SOB, lethargy, BRBPR] and was discharged after stabilization.  discharged from ER to [facility] with steroid, neb for BA exacerbation. patient current reported depression anhedonia, SI. no HI. He drinks half galloon of vodka everyday, denied smoking cig or drug use. he is homeless. In ER, WBC was elevated with elevated lactate. CT chest neg for infection, but showed air/fluid level inesophagous, better than before. CT head neg.  s/p 2L LR and zosyn and admission was called for ?pneumonitis and SI (09 Aug 2019 09:02). patient remains on zosyn since admission as his lactate has been persistently elevated comes down with fluid bolus and backs up. got one dose of vanco. no ssx of infection. UA neg. BC and UC pending.     Interval: seen at bedside today, patient any complaints. WBC/lactate still elevated. remains 1:1 for suicidal risk      ROS:  CONSTITUTIONAL:  No distress.no fever/chills  HEENT:  Eyes:  No diplopia or blurred vision.   CARDIOVASCULAR:  No pressure, squeezing, tightness, heaviness or aching about the chest; no palpitations.no leg swelling, no orthopnea  RESPIRATORY:  no SOB. no wheezing. dry cough, patient links it to BA.  No hemoptysis  GI: no abd pain, no nausea, no vomiting, no diarrhea, no constipation. No hematochezia or melena  EXT:No joint pain or joint swelling or redness.no leg or calf Pain  SKIN: no skin break or ulcer. No rash  CNS: No headaches. No weakness.no numbness. + depression or anxiety. No SI    T(C): 36.9 (08-10-19 @ 16:44), Max: 37 (08-10-19 @ 11:45)  HR: 115 (08-10-19 @ 16:44) (73 - 117)  BP: 137/65 (08-10-19 @ 16:44) (115/74 - 137/65)  RR: 18 (08-10-19 @ 16:44) (18 - 22)  SpO2: 94% (08-10-19 @ 16:44) (92% - 100%)  CAPILLARY BLOOD GLUCOSE      POCT Blood Glucose.: 205 mg/dL (10 Aug 2019 16:59)  POCT Blood Glucose.: 172 mg/dL (10 Aug 2019 11:45)  POCT Blood Glucose.: 177 mg/dL (10 Aug 2019 08:27)      Physical Exam:  GENERAL: Not in distress. Alert    HEENT:  Normocephalic and atraumatic.   NECK: Supple.    CARDIOVASCULAR: RRR S1, S2. No murmur/rubs/gallop  LUNGS: BLAE+, no rales, no wheezing, no rhonchi.    ABDOMEN: ND. Soft,  NT, no guarding / rebound / rigidity. BS normoactive. No CVA tenderness.    BACK: No spine tenderness.  EXTREMITIES: no edema.   SKIN: warm and dry  NEUROLOGIC: AAO*3. grossly intact  PSYCHIATRIC: Calm.  No agitation.    Labs                        11.0   14.36 )-----------( 335      ( 10 Aug 2019 06:26 )             35.2     08-10    135  |  96<L>  |  23.0<H>  ----------------------------<  175<H>  4.4   |  24.0  |  0.95    Ca    9.3      10 Aug 2019 06:26  Phos  4.3     08-10  Mg     2.3     08-10    TPro  6.9  /  Alb  4.2  /  TBili  <0.2<L>  /  DBili  0.0  /  AST  20  /  ALT  20  /  AlkPhos  53  08-09   Urinalysis Basic - ( 10 Aug 2019 17:33 )    Color: Yellow / Appearance: Clear / S.010 / pH: x  Gluc: x / Ketone: Negative  / Bili: Negative / Urobili: Negative mg/dL   Blood: x / Protein: Negative mg/dL / Nitrite: Negative   Leuk Esterase: Negative / RBC: 0-2 /HPF / WBC 0-2   Sq Epi: x / Non Sq Epi: Occasional / Bacteria: Occasional    PT/INR - ( 08 Aug 2019 19:33 )   PT: 9.7 sec;   INR: 0.85 ratio         PTT - ( 08 Aug 2019 19:33 )  PTT:29.3 sec  MEDICATIONS  (STANDING):  ALBUTerol/ipratropium for Nebulization 3 milliLiter(s) Nebulizer every 6 hours  dextrose 5%. 1000 milliLiter(s) (50 mL/Hr) IV Continuous <Continuous>  dextrose 50% Injectable 12.5 Gram(s) IV Push once  dextrose 50% Injectable 25 Gram(s) IV Push once  dextrose 50% Injectable 25 Gram(s) IV Push once  enoxaparin Injectable 40 milliGRAM(s) SubCutaneous daily  folic acid 1 milliGRAM(s) Oral daily  furosemide   Injectable 40 milliGRAM(s) IV Push daily  insulin lispro (HumaLOG) corrective regimen sliding scale   SubCutaneous three times a day before meals  methylPREDNISolone sodium succinate Injectable 40 milliGRAM(s) IV Push every 8 hours  mirtazapine 30 milliGRAM(s) Oral at bedtime  multiple electrolytes Injection Type 1 1000 milliLiter(s) (150 mL/Hr) IV Continuous <Continuous>  multivitamin 1 Tablet(s) Oral daily  pantoprazole    Tablet 40 milliGRAM(s) Oral before breakfast  piperacillin/tazobactam IVPB.. 3.375 Gram(s) IV Intermittent every 8 hours  sodium chloride 0.9% Bolus 500 milliLiter(s) IV Bolus once  thiamine 100 milliGRAM(s) Oral daily    MEDICATIONS  (PRN):  dextrose 40% Gel 15 Gram(s) Oral once PRN Blood Glucose LESS THAN 70 milliGRAM(s)/deciliter  glucagon  Injectable 1 milliGRAM(s) IntraMuscular once PRN Glucose LESS THAN 70 milligrams/deciliter  LORazepam   Injectable 2 milliGRAM(s) IV Push every 2 hours PRN CIWA-Ar score increase by 2 points and a total score of 7 or less  LORazepam   Injectable 2 milliGRAM(s) IV Push every 1 hour PRN CIWA-Ar score 8 or greater    < from: TTE Echo Complete w/Doppler (19 @ 15:39) >  Summary:   1. Normal global left ventricular systolic function.   2. Left ventricular ejection fraction, by visual estimation, is 65 to   70%.   3. Spectral Doppler shows impaired relaxation pattern of left   ventricular myocardial filling (Grade I diastolic dysfunction).   4. Sclerotic aortic valve with normal opening.   5. Mild mitral annular calcification.    < end of copied text >    Hemoglobin A1C, Whole Blood (08.10.19 @ 06:26)    Hemoglobin A1C, Whole Blood: 6.2 %    Lipid Profile (19 @ 10:02)    Total Cholesterol/HDL Ratio Measurement: 4.0 Ratio    Cholesterol, Serum: 287 mg/dL    Triglycerides, Serum: 186 mg/dL    HDL Cholesterol, Serum: 69: HDL Levels >/= 60 mg/dL are considered beneficial and a "negative" risk  factor.  Effective 08/15/2018: New reference range and interpretive comment. mg/dL    Direct LDL: 181: LDL Cholesterol (mg/dL) --- Interpretive Comment (for adults 18 and over)  Optimal LDL Level may vary based on clinical situation  Below 70                   Ideal for people at very high risk of heart  disease  Below 100                  Ideal for people at risk of heart disease  100 - 129                    [place]  130 - 159                    Borderline high  160 - 189                    High  190 and Above           Very high mg/dL

## 2019-08-10 NOTE — PROGRESS NOTE ADULT - ASSESSMENT
54 y/o M with PMHx of HTN, HLD, ?CHF and EtOH abuse presents to ED BIBA, restrained c/o suicidal attempt. As per EMS, pt was found in the parking lot across from [facility]. Pt was found saying suicidal thoughts and jumping in front of cars, ALOOB, and found with alcohol. Pt was agressive on ambulance, and given 10 Verset via IM. Hx unavailable bc pt unresponsive. patient was discharged form [facility] on 8/8/2019 after stabilization, admitted with SI, depression. He has multiple ER visit at the end of with different complaints [ SOB, lethargy, BRBPR] and was discharged after stabilization. 8/1 discharged from ER to [facility] with steroid, neb for BA exacerbation. patient current reported depression anhedonia, SI. no HI. He drinks half galloon of vodka everyday, denied smoking cig or drug use. he is homeless. In ER, WBC was elevated with elevated lactate. CT chest neg for infection, but showed air/fluid level, better than before. CT head neg.  s/p 2L LR and zosyn and admission was called for ?pneumonitis and SI. found tohave diffuse wheezing and SOB.     SOB/wheezing: recently treated for BA exacerbation and ?CHF, received 2L LR in ED  likely BA exacerbation due to ?pneumonitis vs fluid overload. r/o CHF exacerbation. papers from  home chart, no h/o CHF. however patient reported personal and family h/o heart disease,?HF  Admit to any monitored bed with   lasix 40 IV stat and daily for now. Dc if no fluid overload or CHF  neb, steroid,   Echo: grade 1 DD, Ef 65-70%  CXR: clear on adission, rpeat CXR ordered today  Ce and EKG *1 neg    wbc and lactate elevated. could be due to recent steroid use/ETOH intoxication respectively   not hypoxic  WbC slowly trending down. lactate elevated.   procal slightly elevated  s/p 1L bolus in am, give 500 mls now/ repeat lactate in 1 hr  c/w maintenance fluid.  watch for volume overload  uA neg, f/u UC, Bc  c/w zosyn and start vanco at this time. ? occult infection  f/u repeat CXR  consider ID eval      depression/SI:   c/w Remeron   psych cx appreciated  c/w 1:1  haldol PRN for agitation    ETOH abuse:  no sign of withdrawal at this time  SW eval    HTN: not on meds    ?DM: ISS, accucheck, A1c 6.2.   start lipitor     DVT-P: lovenox  aspiration precautions  fall precaution 52 y/o M with PMHx of HTN, HLD, ?CHF and EtOH abuse presents to ED BIBA, restrained c/o suicidal attempt. As per EMS, pt was found in the parking lot across from [facility]. Pt was found saying suicidal thoughts and jumping in front of cars, ALOOB, and found with alcohol. Pt was agressive on ambulance, and given 10 Verset via IM. Hx unavailable bc pt unresponsive. patient was discharged form [facility] on 8/8/2019 after stabilization, admitted with SI, depression. He has multiple ER visit at the end of with different complaints [ SOB, lethargy, BRBPR] and was discharged after stabilization. 8/1 discharged from ER to [facility] with steroid, neb for BA exacerbation. patient current reported depression anhedonia, SI. no HI. He drinks half galloon of vodka everyday, denied smoking cig or drug use. he is homeless. In ER, WBC was elevated with elevated lactate. CT chest neg for infection, but showed air/fluid level, better than before. CT head neg.  s/p 2L LR and zosyn and admission was called for ?pneumonitis and SI. found tohave diffuse wheezing and SOB.     SOB/wheezing: recently treated for BA exacerbation and ?CHF, received 2L LR in ED  likely BA exacerbation due to ?pneumonitis vs fluid overload. r/o CHF exacerbation. papers from  home chart, no h/o CHF. however patient reported personal and family h/o heart disease,?HF  Admit to any monitored bed with   lasix 40 IV stat and daily for now. Dc if no fluid overload or CHF  neb, steroid,   Echo: grade 1 DD, Ef 65-70%  CXR: clear on adission, rpeat CXR ordered today  Ce and EKG *1 neg    wbc and lactate elevated. could be due to recent steroid use/ETOH intoxication respectively   not hypoxic  WbC slowly trending down. lactate elevated.   bUn high, creat normal:-> dehydration  procal slightly elevated  s/p 1L bolus in am, give 500 mls now/ repeat lactate in 1 hr  c/w maintenance fluid.  watch for volume overload  uA neg, f/u UC, Bc  c/w zosyn and start vanco at this time. ? occult infection  f/u repeat CXR  consider ID eval      depression/SI:   c/w Remeron   psych cx appreciated  c/w 1:1  haldol PRN for agitation    ETOH abuse:  no sign of withdrawal at this time  SW eval    HTN: not on meds    ?DM: ISS, accucheck, A1c 6.2.   start lipitor     DVT-P: lovenox  aspiration precautions  fall precaution

## 2019-08-11 LAB
ALBUMIN SERPL ELPH-MCNC: 4.1 G/DL — SIGNIFICANT CHANGE UP (ref 3.3–5.2)
ALBUMIN SERPL ELPH-MCNC: 4.2 G/DL — SIGNIFICANT CHANGE UP (ref 3.3–5.2)
ALP SERPL-CCNC: 53 U/L — SIGNIFICANT CHANGE UP (ref 40–120)
ALP SERPL-CCNC: 55 U/L — SIGNIFICANT CHANGE UP (ref 40–120)
ALT FLD-CCNC: 17 U/L — SIGNIFICANT CHANGE UP
ALT FLD-CCNC: 17 U/L — SIGNIFICANT CHANGE UP
ANION GAP SERPL CALC-SCNC: 14 MMOL/L — SIGNIFICANT CHANGE UP (ref 5–17)
ANION GAP SERPL CALC-SCNC: 14 MMOL/L — SIGNIFICANT CHANGE UP (ref 5–17)
AST SERPL-CCNC: 12 U/L — SIGNIFICANT CHANGE UP
AST SERPL-CCNC: 12 U/L — SIGNIFICANT CHANGE UP
BASOPHILS # BLD AUTO: 0.04 K/UL — SIGNIFICANT CHANGE UP (ref 0–0.2)
BASOPHILS NFR BLD AUTO: 0.2 % — SIGNIFICANT CHANGE UP (ref 0–2)
BILIRUB DIRECT SERPL-MCNC: 0.1 MG/DL — SIGNIFICANT CHANGE UP (ref 0–0.3)
BILIRUB INDIRECT FLD-MCNC: <0.1 MG/DL — LOW (ref 0.2–1)
BILIRUB SERPL-MCNC: <0.2 MG/DL — LOW (ref 0.4–2)
BILIRUB SERPL-MCNC: <0.2 MG/DL — LOW (ref 0.4–2)
BUN SERPL-MCNC: 17 MG/DL — SIGNIFICANT CHANGE UP (ref 8–20)
BUN SERPL-MCNC: 22 MG/DL — HIGH (ref 8–20)
CALCIUM SERPL-MCNC: 9 MG/DL — SIGNIFICANT CHANGE UP (ref 8.6–10.2)
CALCIUM SERPL-MCNC: 9.3 MG/DL — SIGNIFICANT CHANGE UP (ref 8.6–10.2)
CHLORIDE SERPL-SCNC: 95 MMOL/L — LOW (ref 98–107)
CHLORIDE SERPL-SCNC: 97 MMOL/L — LOW (ref 98–107)
CO2 SERPL-SCNC: 25 MMOL/L — SIGNIFICANT CHANGE UP (ref 22–29)
CO2 SERPL-SCNC: 26 MMOL/L — SIGNIFICANT CHANGE UP (ref 22–29)
CREAT SERPL-MCNC: 0.82 MG/DL — SIGNIFICANT CHANGE UP (ref 0.5–1.3)
CREAT SERPL-MCNC: 0.88 MG/DL — SIGNIFICANT CHANGE UP (ref 0.5–1.3)
EOSINOPHIL # BLD AUTO: 0 K/UL — SIGNIFICANT CHANGE UP (ref 0–0.5)
EOSINOPHIL NFR BLD AUTO: 0 % — SIGNIFICANT CHANGE UP (ref 0–6)
GLUCOSE BLDC GLUCOMTR-MCNC: 156 MG/DL — HIGH (ref 70–99)
GLUCOSE BLDC GLUCOMTR-MCNC: 171 MG/DL — HIGH (ref 70–99)
GLUCOSE BLDC GLUCOMTR-MCNC: 223 MG/DL — HIGH (ref 70–99)
GLUCOSE BLDC GLUCOMTR-MCNC: 237 MG/DL — HIGH (ref 70–99)
GLUCOSE SERPL-MCNC: 182 MG/DL — HIGH (ref 70–115)
GLUCOSE SERPL-MCNC: 220 MG/DL — HIGH (ref 70–115)
HCT VFR BLD CALC: 32.7 % — LOW (ref 39–50)
HGB BLD-MCNC: 10.3 G/DL — LOW (ref 13–17)
IMM GRANULOCYTES NFR BLD AUTO: 6.9 % — HIGH (ref 0–1.5)
LACTATE BLDV-MCNC: 2.8 MMOL/L — HIGH (ref 0.5–2)
LACTATE SERPL-SCNC: 2.5 MMOL/L — HIGH (ref 0.5–2)
LACTATE SERPL-SCNC: 3 MMOL/L — HIGH (ref 0.5–2)
LACTATE SERPL-SCNC: 3.2 MMOL/L — HIGH (ref 0.5–2)
LYMPHOCYTES # BLD AUTO: 0.84 K/UL — LOW (ref 1–3.3)
LYMPHOCYTES # BLD AUTO: 5 % — LOW (ref 13–44)
MAGNESIUM SERPL-MCNC: 2.2 MG/DL — SIGNIFICANT CHANGE UP (ref 1.6–2.6)
MAGNESIUM SERPL-MCNC: 2.2 MG/DL — SIGNIFICANT CHANGE UP (ref 1.6–2.6)
MCHC RBC-ENTMCNC: 27.5 PG — SIGNIFICANT CHANGE UP (ref 27–34)
MCHC RBC-ENTMCNC: 31.5 GM/DL — LOW (ref 32–36)
MCV RBC AUTO: 87.4 FL — SIGNIFICANT CHANGE UP (ref 80–100)
MONOCYTES # BLD AUTO: 0.78 K/UL — SIGNIFICANT CHANGE UP (ref 0–0.9)
MONOCYTES NFR BLD AUTO: 4.7 % — SIGNIFICANT CHANGE UP (ref 2–14)
NEUTROPHILS # BLD AUTO: 13.83 K/UL — HIGH (ref 1.8–7.4)
NEUTROPHILS NFR BLD AUTO: 83.2 % — HIGH (ref 43–77)
PHOSPHATE SERPL-MCNC: 3.3 MG/DL — SIGNIFICANT CHANGE UP (ref 2.4–4.7)
PLATELET # BLD AUTO: 347 K/UL — SIGNIFICANT CHANGE UP (ref 150–400)
POTASSIUM SERPL-MCNC: 4.2 MMOL/L — SIGNIFICANT CHANGE UP (ref 3.5–5.3)
POTASSIUM SERPL-MCNC: 4.4 MMOL/L — SIGNIFICANT CHANGE UP (ref 3.5–5.3)
POTASSIUM SERPL-SCNC: 4.2 MMOL/L — SIGNIFICANT CHANGE UP (ref 3.5–5.3)
POTASSIUM SERPL-SCNC: 4.4 MMOL/L — SIGNIFICANT CHANGE UP (ref 3.5–5.3)
PROCALCITONIN SERPL-MCNC: 0.11 NG/ML — HIGH (ref 0.02–0.1)
PROT SERPL-MCNC: 6.9 G/DL — SIGNIFICANT CHANGE UP (ref 6.6–8.7)
PROT SERPL-MCNC: 6.9 G/DL — SIGNIFICANT CHANGE UP (ref 6.6–8.7)
RAPID RVP RESULT: SIGNIFICANT CHANGE UP
RBC # BLD: 3.74 M/UL — LOW (ref 4.2–5.8)
RBC # FLD: 17.4 % — HIGH (ref 10.3–14.5)
SODIUM SERPL-SCNC: 134 MMOL/L — LOW (ref 135–145)
SODIUM SERPL-SCNC: 137 MMOL/L — SIGNIFICANT CHANGE UP (ref 135–145)
WBC # BLD: 16.64 K/UL — HIGH (ref 3.8–10.5)
WBC # FLD AUTO: 16.64 K/UL — HIGH (ref 3.8–10.5)

## 2019-08-11 PROCEDURE — 99233 SBSQ HOSP IP/OBS HIGH 50: CPT

## 2019-08-11 RX ORDER — POLYETHYLENE GLYCOL 3350 17 G/17G
17 POWDER, FOR SOLUTION ORAL DAILY
Refills: 0 | Status: DISCONTINUED | OUTPATIENT
Start: 2019-08-11 | End: 2019-08-20

## 2019-08-11 RX ORDER — ZOLPIDEM TARTRATE 10 MG/1
5 TABLET ORAL AT BEDTIME
Refills: 0 | Status: DISCONTINUED | OUTPATIENT
Start: 2019-08-11 | End: 2019-08-14

## 2019-08-11 RX ORDER — SODIUM CHLORIDE 9 MG/ML
1000 INJECTION INTRAMUSCULAR; INTRAVENOUS; SUBCUTANEOUS ONCE
Refills: 0 | Status: COMPLETED | OUTPATIENT
Start: 2019-08-11 | End: 2019-08-11

## 2019-08-11 RX ORDER — SENNA PLUS 8.6 MG/1
2 TABLET ORAL AT BEDTIME
Refills: 0 | Status: DISCONTINUED | OUTPATIENT
Start: 2019-08-11 | End: 2019-08-20

## 2019-08-11 RX ORDER — FAMOTIDINE 10 MG/ML
20 INJECTION INTRAVENOUS
Refills: 0 | Status: DISCONTINUED | OUTPATIENT
Start: 2019-08-11 | End: 2019-08-17

## 2019-08-11 RX ORDER — DOCUSATE SODIUM 100 MG
100 CAPSULE ORAL
Refills: 0 | Status: DISCONTINUED | OUTPATIENT
Start: 2019-08-11 | End: 2019-08-20

## 2019-08-11 RX ORDER — SODIUM CHLORIDE 9 MG/ML
500 INJECTION INTRAMUSCULAR; INTRAVENOUS; SUBCUTANEOUS ONCE
Refills: 0 | Status: COMPLETED | OUTPATIENT
Start: 2019-08-11 | End: 2019-08-11

## 2019-08-11 RX ORDER — SODIUM CHLORIDE 9 MG/ML
1000 INJECTION INTRAMUSCULAR; INTRAVENOUS; SUBCUTANEOUS
Refills: 0 | Status: DISCONTINUED | OUTPATIENT
Start: 2019-08-11 | End: 2019-08-12

## 2019-08-11 RX ADMIN — Medication 4: at 12:29

## 2019-08-11 RX ADMIN — ENOXAPARIN SODIUM 40 MILLIGRAM(S): 100 INJECTION SUBCUTANEOUS at 12:30

## 2019-08-11 RX ADMIN — Medication 100 MILLIGRAM(S): at 12:29

## 2019-08-11 RX ADMIN — Medication 50 MILLIGRAM(S): at 23:07

## 2019-08-11 RX ADMIN — PIPERACILLIN AND TAZOBACTAM 25 GRAM(S): 4; .5 INJECTION, POWDER, LYOPHILIZED, FOR SOLUTION INTRAVENOUS at 21:23

## 2019-08-11 RX ADMIN — Medication 100 MILLIGRAM(S): at 17:05

## 2019-08-11 RX ADMIN — Medication 50 MILLIGRAM(S): at 17:05

## 2019-08-11 RX ADMIN — MIRTAZAPINE 30 MILLIGRAM(S): 45 TABLET, ORALLY DISINTEGRATING ORAL at 21:22

## 2019-08-11 RX ADMIN — POLYETHYLENE GLYCOL 3350 17 GRAM(S): 17 POWDER, FOR SOLUTION ORAL at 14:07

## 2019-08-11 RX ADMIN — Medication 1 MILLIGRAM(S): at 12:29

## 2019-08-11 RX ADMIN — SODIUM CHLORIDE 1000 MILLILITER(S): 9 INJECTION INTRAMUSCULAR; INTRAVENOUS; SUBCUTANEOUS at 05:25

## 2019-08-11 RX ADMIN — SODIUM CHLORIDE 150 MILLILITER(S): 9 INJECTION, SOLUTION INTRAVENOUS at 05:25

## 2019-08-11 RX ADMIN — Medication 40 MILLIGRAM(S): at 05:25

## 2019-08-11 RX ADMIN — FAMOTIDINE 20 MILLIGRAM(S): 10 INJECTION INTRAVENOUS at 17:05

## 2019-08-11 RX ADMIN — PANTOPRAZOLE SODIUM 40 MILLIGRAM(S): 20 TABLET, DELAYED RELEASE ORAL at 05:25

## 2019-08-11 RX ADMIN — Medication 60 MILLIGRAM(S): at 14:07

## 2019-08-11 RX ADMIN — PIPERACILLIN AND TAZOBACTAM 25 GRAM(S): 4; .5 INJECTION, POWDER, LYOPHILIZED, FOR SOLUTION INTRAVENOUS at 14:06

## 2019-08-11 RX ADMIN — Medication 250 MILLIGRAM(S): at 06:47

## 2019-08-11 RX ADMIN — Medication 3 MILLILITER(S): at 10:07

## 2019-08-11 RX ADMIN — Medication 50 MILLIGRAM(S): at 14:06

## 2019-08-11 RX ADMIN — Medication 2: at 17:05

## 2019-08-11 RX ADMIN — Medication 1 TABLET(S): at 12:29

## 2019-08-11 RX ADMIN — Medication 60 MILLIGRAM(S): at 21:22

## 2019-08-11 RX ADMIN — SODIUM CHLORIDE 1000 MILLILITER(S): 9 INJECTION INTRAMUSCULAR; INTRAVENOUS; SUBCUTANEOUS at 01:00

## 2019-08-11 RX ADMIN — PIPERACILLIN AND TAZOBACTAM 25 GRAM(S): 4; .5 INJECTION, POWDER, LYOPHILIZED, FOR SOLUTION INTRAVENOUS at 05:26

## 2019-08-11 NOTE — PROGRESS NOTE ADULT - ASSESSMENT
54 y/o M with PMHx of HTN, HLD, ?CHF and EtOH abuse presents to ED BIBA, restrained c/o suicidal attempt. As per EMS, pt was found in the parking lot across from [facility]. Pt was found saying suicidal thoughts and jumping in front of cars, ALOOB, and found with alcohol. Pt was agressive on ambulance, and given 10 Verset via IM. Hx unavailable bc pt unresponsive. patient was discharged form [facility] on 8/8/2019 after stabilization, admitted with SI, depression. He has multiple ER visit at the end of with different complaints [ SOB, lethargy, BRBPR] and was discharged after stabilization. 8/1 discharged from ER to [facility] with steroid, neb for BA exacerbation. patient current reported depression anhedonia, SI. no HI. He drinks half galloon of vodka everyday, denied smoking cig or drug use. he is homeless. In ER, WBC was elevated with elevated lactate. CT chest neg for infection, but showed air/fluid level, better than before. CT head neg.  s/p 2L LR and zosyn and admission was called for ?pneumonitis and SI. found tohave diffuse wheezing and SOB.         SOB/wheezing likely from Asthma exacerbation-  Pt appears euvolemic, CXR clear, No leg edema, PND or orthopnea. No hx of CHF. Will d/c IV lasix  c/w neb, increase steroids, O2 prn  Echo: grade 1 DD, Ef 65-70%  CXR: clear on admission repeat CXR ordered today  ABG ordered  Leukocytosis & elevated BUN likely from recent steroid use  procal slightly elevated  lactic acidosis likely from asthma exacerbation, c/w maintenance fluid.        Leukocytosis likely from recent steroid use-  UA neg, Bc NGTD  CXR -ve  RVP ordered  c/w zosyn, d/c vanco  f/u repeat CXR  Pt has been afebrile, if repeat CXR -ve then will d/c zosyn        depression/SI:   c/w Remeron   psych cx appreciated, Patient needs further psychiatric safety assessment  c/w 1:1  haldol PRN for agitation    ETOH withdrawal:  CIWA protocol  Thiamine/ folate  SW eval    HTN: not on meds    ?DM: ISS, accucheck, A1c 6.2.   start lipitor     DVT-P: lovenox  aspiration precautions  fall precaution

## 2019-08-11 NOTE — PROGRESS NOTE ADULT - SUBJECTIVE AND OBJECTIVE BOX
Dr. Rivera Hospitalist Progress Note  NISREEN MARTINEZ [id number]    Patient is a 53y old  Male who presents with a chief complaint of SI, ETOH intoxication. ?pneumonittis (09 Aug 2019 09:02)    in    HPI:  ER HPI: "52 y/o M with PMHx of HTN, HLD, ?CHF and EtOH abuse presents to ED BIBA, restrained c/o suicidal attempt. As per EMS, pt was found in the parking lot across from [facility]. Pt was found saying suicidal thoughts and jumping in front of cars, ALOOB, and found with alcohol. Pt was agressive on ambulance, and given 10 Verset via IM. Hx unavailable bc pt unresponsive." above HPI reviewed with the patient and admits the same. patient was discharged form [facility] on 2019 after stabilization, admitted with SI, depression. He has multiple ER visit at the end of with different complaints [ SOB, lethargy, BRBPR] and was discharged after stabilization.  discharged from ER to [facility] with steroid, neb for BA exacerbation. patient current reported depression anhedonia, SI. no HI. He drinks half galloon of vodka everyday, denied smoking cig or drug use. he is homeless. In ER, WBC was elevated with elevated lactate. CT chest neg for infection, but showed air/fluid level inesophagous, better than before. CT head neg.  s/p 2L LR and zosyn and admission was called for ?pneumonitis and SI (09 Aug 2019 09:02). patient remains on zosyn since admission as his lactate has been persistently elevated comes down with fluid bolus and backs up. got one dose of vanco. no ssx of infection. UA neg. BC and UC pending.     Interval: seen at bedside today, patient complaints of SOB & wheezing with no improvement. No leg edema, PND or orthopnea. No hx of CHF.  WBC/lactate still elevated. remains 1:1 for suicidal risk. No chest pain, palpitations, light headedness/dizziness, cough, fevers/chills, abdominal pain, n/v, diarrhea/constipation, dysuria or increased urinary frequency.       ROS:  CONSTITUTIONAL:  No distress.no fever/chills  HEENT:  Eyes:  No diplopia or blurred vision.   CARDIOVASCULAR:  No pressure, squeezing, tightness, heaviness or aching about the chest; no palpitations.no leg swelling, no orthopnea  RESPIRATORY:  no SOB. no wheezing. dry cough, patient links it to BA.  No hemoptysis  GI: no abd pain, no nausea, no vomiting, no diarrhea, no constipation. No hematochezia or melena  EXT:No joint pain or joint swelling or redness.no leg or calf Pain  SKIN: no skin break or ulcer. No rash  CNS: No headaches. No weakness.no numbness. + depression or anxiety. No SI    T(C): 36.9 (08-10-19 @ 16:44), Max: 37 (08-10-19 @ 11:45)  HR: 115 (08-10-19 @ 16:44) (73 - 117)  BP: 137/65 (08-10-19 @ 16:44) (115/74 - 137/65)  RR: 18 (08-10-19 @ 16:44) (18 - 22)  SpO2: 94% (08-10-19 @ 16:44) (92% - 100%)  CAPILLARY BLOOD GLUCOSE      POCT Blood Glucose.: 205 mg/dL (10 Aug 2019 16:59)  POCT Blood Glucose.: 172 mg/dL (10 Aug 2019 11:45)  POCT Blood Glucose.: 177 mg/dL (10 Aug 2019 08:27)      Physical Exam:  GENERAL: Not in distress. Alert    HEENT:  Normocephalic and atraumatic.   NECK: Supple.    CARDIOVASCULAR: RRR S1, S2. No murmur/rubs/gallop  LUNGS: b/l diffuse wheezing  ABDOMEN: ND. Soft,  NT, no guarding / rebound / rigidity. BS normoactive. No CVA tenderness.    EXTREMITIES: no edema.   SKIN: warm and dry  NEUROLOGIC: AAO*3. grossly intact  PSYCHIATRIC: Calm.  No agitation.    Labs                        11.0   14.36 )-----------( 335      ( 10 Aug 2019 06:26 )             35.2     08-10    135  |  96<L>  |  23.0<H>  ----------------------------<  175<H>  4.4   |  24.0  |  0.95    Ca    9.3      10 Aug 2019 06:26  Phos  4.3     08-10  Mg     2.3     08-10    TPro  6.9  /  Alb  4.2  /  TBili  <0.2<L>  /  DBili  0.0  /  AST  20  /  ALT  20  /  AlkPhos  53     Urinalysis Basic - ( 10 Aug 2019 17:33 )    Color: Yellow / Appearance: Clear / S.010 / pH: x  Gluc: x / Ketone: Negative  / Bili: Negative / Urobili: Negative mg/dL   Blood: x / Protein: Negative mg/dL / Nitrite: Negative   Leuk Esterase: Negative / RBC: 0-2 /HPF / WBC 0-2   Sq Epi: x / Non Sq Epi: Occasional / Bacteria: Occasional    PT/INR - ( 08 Aug 2019 19:33 )   PT: 9.7 sec;   INR: 0.85 ratio         PTT - ( 08 Aug 2019 19:33 )  PTT:29.3 sec  MEDICATIONS  (STANDING):  ALBUTerol/ipratropium for Nebulization 3 milliLiter(s) Nebulizer every 6 hours  dextrose 5%. 1000 milliLiter(s) (50 mL/Hr) IV Continuous <Continuous>  dextrose 50% Injectable 12.5 Gram(s) IV Push once  dextrose 50% Injectable 25 Gram(s) IV Push once  dextrose 50% Injectable 25 Gram(s) IV Push once  enoxaparin Injectable 40 milliGRAM(s) SubCutaneous daily  folic acid 1 milliGRAM(s) Oral daily  furosemide   Injectable 40 milliGRAM(s) IV Push daily  insulin lispro (HumaLOG) corrective regimen sliding scale   SubCutaneous three times a day before meals  methylPREDNISolone sodium succinate Injectable 40 milliGRAM(s) IV Push every 8 hours  mirtazapine 30 milliGRAM(s) Oral at bedtime  multiple electrolytes Injection Type 1 1000 milliLiter(s) (150 mL/Hr) IV Continuous <Continuous>  multivitamin 1 Tablet(s) Oral daily  pantoprazole    Tablet 40 milliGRAM(s) Oral before breakfast  piperacillin/tazobactam IVPB.. 3.375 Gram(s) IV Intermittent every 8 hours  sodium chloride 0.9% Bolus 500 milliLiter(s) IV Bolus once  thiamine 100 milliGRAM(s) Oral daily    MEDICATIONS  (PRN):  dextrose 40% Gel 15 Gram(s) Oral once PRN Blood Glucose LESS THAN 70 milliGRAM(s)/deciliter  glucagon  Injectable 1 milliGRAM(s) IntraMuscular once PRN Glucose LESS THAN 70 milligrams/deciliter  LORazepam   Injectable 2 milliGRAM(s) IV Push every 2 hours PRN CIWA-Ar score increase by 2 points and a total score of 7 or less  LORazepam   Injectable 2 milliGRAM(s) IV Push every 1 hour PRN CIWA-Ar score 8 or greater    < from: TTE Echo Complete w/Doppler (19 @ 15:39) >  Summary:   1. Normal global left ventricular systolic function.   2. Left ventricular ejection fraction, by visual estimation, is 65 to   70%.   3. Spectral Doppler shows impaired relaxation pattern of left   ventricular myocardial filling (Grade I diastolic dysfunction).   4. Sclerotic aortic valve with normal opening.   5. Mild mitral annular calcification.    < end of copied text >    Hemoglobin A1C, Whole Blood (08.10.19 @ 06:26)    Hemoglobin A1C, Whole Blood: 6.2 %    Lipid Profile (19 @ 10:02)    Total Cholesterol/HDL Ratio Measurement: 4.0 Ratio    Cholesterol, Serum: 287 mg/dL    Triglycerides, Serum: 186 mg/dL    HDL Cholesterol, Serum: 69: HDL Levels >/= 60 mg/dL are considered beneficial and a "negative" risk  factor.  Effective 08/15/2018: New reference range and interpretive comment. mg/dL    Direct LDL: 181: LDL Cholesterol (mg/dL) --- Interpretive Comment (for adults 18 and over)  Optimal LDL Level may vary based on clinical situation  Below 70                   Ideal for people at very high risk of heart  disease  Below 100                  Ideal for people at risk of heart disease  100 - 129                    [place]  130 - 159                    Borderline high  160 - 189                    High  190 and Above           Very high mg/dL

## 2019-08-12 LAB
ANION GAP SERPL CALC-SCNC: 11 MMOL/L — SIGNIFICANT CHANGE UP (ref 5–17)
ANISOCYTOSIS BLD QL: SLIGHT — SIGNIFICANT CHANGE UP
BASE EXCESS BLDA CALC-SCNC: -0.6 MMOL/L — SIGNIFICANT CHANGE UP (ref -2–2)
BASOPHILS # BLD AUTO: 0 K/UL — SIGNIFICANT CHANGE UP (ref 0–0.2)
BASOPHILS NFR BLD AUTO: 0 % — SIGNIFICANT CHANGE UP (ref 0–2)
BUN SERPL-MCNC: 26 MG/DL — HIGH (ref 8–20)
BURR CELLS BLD QL SMEAR: PRESENT — SIGNIFICANT CHANGE UP
CALCIUM SERPL-MCNC: 9.4 MG/DL — SIGNIFICANT CHANGE UP (ref 8.6–10.2)
CHLORIDE SERPL-SCNC: 99 MMOL/L — SIGNIFICANT CHANGE UP (ref 98–107)
CO2 SERPL-SCNC: 26 MMOL/L — SIGNIFICANT CHANGE UP (ref 22–29)
CREAT SERPL-MCNC: 0.78 MG/DL — SIGNIFICANT CHANGE UP (ref 0.5–1.3)
EOSINOPHIL # BLD AUTO: 0.17 K/UL — SIGNIFICANT CHANGE UP (ref 0–0.5)
EOSINOPHIL NFR BLD AUTO: 0.9 % — SIGNIFICANT CHANGE UP (ref 0–6)
GAS PNL BLDA: SIGNIFICANT CHANGE UP
GLUCOSE BLDC GLUCOMTR-MCNC: 169 MG/DL — HIGH (ref 70–99)
GLUCOSE BLDC GLUCOMTR-MCNC: 184 MG/DL — HIGH (ref 70–99)
GLUCOSE BLDC GLUCOMTR-MCNC: 191 MG/DL — HIGH (ref 70–99)
GLUCOSE BLDC GLUCOMTR-MCNC: 256 MG/DL — HIGH (ref 70–99)
GLUCOSE SERPL-MCNC: 177 MG/DL — HIGH (ref 70–115)
HCO3 BLDA-SCNC: 24 MMOL/L — SIGNIFICANT CHANGE UP (ref 20–26)
HCT VFR BLD CALC: 32.6 % — LOW (ref 39–50)
HGB BLD-MCNC: 10.2 G/DL — LOW (ref 13–17)
HOROWITZ INDEX BLDA+IHG-RTO: 0.21 — SIGNIFICANT CHANGE UP
LACTATE SERPL-SCNC: 2 MMOL/L — SIGNIFICANT CHANGE UP (ref 0.5–2)
LYMPHOCYTES # BLD AUTO: 1.16 K/UL — SIGNIFICANT CHANGE UP (ref 1–3.3)
LYMPHOCYTES # BLD AUTO: 6.2 % — LOW (ref 13–44)
MAGNESIUM SERPL-MCNC: 2.1 MG/DL — SIGNIFICANT CHANGE UP (ref 1.6–2.6)
MANUAL SMEAR VERIFICATION: SIGNIFICANT CHANGE UP
MCHC RBC-ENTMCNC: 27.5 PG — SIGNIFICANT CHANGE UP (ref 27–34)
MCHC RBC-ENTMCNC: 31.3 GM/DL — LOW (ref 32–36)
MCV RBC AUTO: 87.9 FL — SIGNIFICANT CHANGE UP (ref 80–100)
METAMYELOCYTES # FLD: 0.9 % — HIGH (ref 0–0)
MONOCYTES # BLD AUTO: 1.16 K/UL — HIGH (ref 0–0.9)
MONOCYTES NFR BLD AUTO: 6.2 % — SIGNIFICANT CHANGE UP (ref 2–14)
MYELOCYTES NFR BLD: 2.7 % — HIGH (ref 0–0)
NEUTROPHILS # BLD AUTO: 15.53 K/UL — HIGH (ref 1.8–7.4)
NEUTROPHILS NFR BLD AUTO: 79.6 % — HIGH (ref 43–77)
NEUTS BAND # BLD: 3.5 % — SIGNIFICANT CHANGE UP (ref 0–8)
NRBC # BLD: 1 /100 — HIGH (ref 0–0)
OVALOCYTES BLD QL SMEAR: SLIGHT — SIGNIFICANT CHANGE UP
PCO2 BLDA: 36 MMHG — SIGNIFICANT CHANGE UP (ref 35–45)
PH BLDA: 7.42 — SIGNIFICANT CHANGE UP (ref 7.35–7.45)
PHOSPHATE SERPL-MCNC: 3.6 MG/DL — SIGNIFICANT CHANGE UP (ref 2.4–4.7)
PLAT MORPH BLD: NORMAL — SIGNIFICANT CHANGE UP
PLATELET # BLD AUTO: 373 K/UL — SIGNIFICANT CHANGE UP (ref 150–400)
PO2 BLDA: 80 MMHG — LOW (ref 83–108)
POLYCHROMASIA BLD QL SMEAR: SIGNIFICANT CHANGE UP
POTASSIUM SERPL-MCNC: 4.7 MMOL/L — SIGNIFICANT CHANGE UP (ref 3.5–5.3)
POTASSIUM SERPL-SCNC: 4.7 MMOL/L — SIGNIFICANT CHANGE UP (ref 3.5–5.3)
PROCALCITONIN SERPL-MCNC: 0.1 NG/ML — SIGNIFICANT CHANGE UP (ref 0.02–0.1)
RBC # BLD: 3.71 M/UL — LOW (ref 4.2–5.8)
RBC # FLD: 17.5 % — HIGH (ref 10.3–14.5)
RBC BLD AUTO: ABNORMAL
SAO2 % BLDA: 96 % — SIGNIFICANT CHANGE UP (ref 95–99)
SODIUM SERPL-SCNC: 136 MMOL/L — SIGNIFICANT CHANGE UP (ref 135–145)
VANCOMYCIN TROUGH SERPL-MCNC: <4 UG/ML — LOW (ref 10–20)
WBC # BLD: 18.69 K/UL — HIGH (ref 3.8–10.5)
WBC # FLD AUTO: 18.69 K/UL — HIGH (ref 3.8–10.5)

## 2019-08-12 PROCEDURE — 99232 SBSQ HOSP IP/OBS MODERATE 35: CPT

## 2019-08-12 PROCEDURE — 71045 X-RAY EXAM CHEST 1 VIEW: CPT | Mod: 26

## 2019-08-12 RX ORDER — BUDESONIDE AND FORMOTEROL FUMARATE DIHYDRATE 160; 4.5 UG/1; UG/1
2 AEROSOL RESPIRATORY (INHALATION)
Refills: 0 | Status: DISCONTINUED | OUTPATIENT
Start: 2019-08-12 | End: 2019-08-20

## 2019-08-12 RX ORDER — IPRATROPIUM/ALBUTEROL SULFATE 18-103MCG
3 AEROSOL WITH ADAPTER (GRAM) INHALATION EVERY 6 HOURS
Refills: 0 | Status: DISCONTINUED | OUTPATIENT
Start: 2019-08-12 | End: 2019-08-13

## 2019-08-12 RX ADMIN — PANTOPRAZOLE SODIUM 40 MILLIGRAM(S): 20 TABLET, DELAYED RELEASE ORAL at 05:18

## 2019-08-12 RX ADMIN — Medication 100 MILLIGRAM(S): at 05:17

## 2019-08-12 RX ADMIN — Medication 2: at 08:40

## 2019-08-12 RX ADMIN — PIPERACILLIN AND TAZOBACTAM 25 GRAM(S): 4; .5 INJECTION, POWDER, LYOPHILIZED, FOR SOLUTION INTRAVENOUS at 05:17

## 2019-08-12 RX ADMIN — Medication 50 MILLIGRAM(S): at 21:11

## 2019-08-12 RX ADMIN — Medication 6: at 17:20

## 2019-08-12 RX ADMIN — FAMOTIDINE 20 MILLIGRAM(S): 10 INJECTION INTRAVENOUS at 05:17

## 2019-08-12 RX ADMIN — Medication 60 MILLIGRAM(S): at 21:11

## 2019-08-12 RX ADMIN — Medication 3 MILLILITER(S): at 17:05

## 2019-08-12 RX ADMIN — ENOXAPARIN SODIUM 40 MILLIGRAM(S): 100 INJECTION SUBCUTANEOUS at 11:58

## 2019-08-12 RX ADMIN — BUDESONIDE AND FORMOTEROL FUMARATE DIHYDRATE 2 PUFF(S): 160; 4.5 AEROSOL RESPIRATORY (INHALATION) at 20:14

## 2019-08-12 RX ADMIN — MIRTAZAPINE 30 MILLIGRAM(S): 45 TABLET, ORALLY DISINTEGRATING ORAL at 21:11

## 2019-08-12 RX ADMIN — Medication 100 MILLIGRAM(S): at 11:58

## 2019-08-12 RX ADMIN — Medication 50 MILLIGRAM(S): at 05:17

## 2019-08-12 RX ADMIN — Medication 60 MILLIGRAM(S): at 05:17

## 2019-08-12 RX ADMIN — SODIUM CHLORIDE 75 MILLILITER(S): 9 INJECTION INTRAMUSCULAR; INTRAVENOUS; SUBCUTANEOUS at 05:23

## 2019-08-12 RX ADMIN — Medication 60 MILLIGRAM(S): at 13:42

## 2019-08-12 RX ADMIN — Medication 50 MILLIGRAM(S): at 13:42

## 2019-08-12 RX ADMIN — Medication 2: at 11:59

## 2019-08-12 RX ADMIN — Medication 1 MILLIGRAM(S): at 11:58

## 2019-08-12 RX ADMIN — FAMOTIDINE 20 MILLIGRAM(S): 10 INJECTION INTRAVENOUS at 17:20

## 2019-08-12 RX ADMIN — Medication 1 TABLET(S): at 11:58

## 2019-08-12 NOTE — PROGRESS NOTE ADULT - SUBJECTIVE AND OBJECTIVE BOX
Dr. Rivera Hospitalist Progress Note  NISREEN MARTINEZ [id number]    Patient is a 53y old  Male who presents with a chief complaint of SI, ETOH intoxication. ?pneumonittis (09 Aug 2019 09:02)    in    HPI:  ER HPI: "54 y/o M with PMHx of HTN, HLD, ?CHF and EtOH abuse presents to ED BIBA, restrained c/o suicidal attempt. As per EMS, pt was found in the parking lot across from [facility]. Pt was found saying suicidal thoughts and jumping in front of cars, ALOOB, and found with alcohol. Pt was agressive on ambulance, and given 10 Verset via IM. Hx unavailable bc pt unresponsive." above HPI reviewed with the patient and admits the same. patient was discharged form [facility] on 2019 after stabilization, admitted with SI, depression. He has multiple ER visit at the end of with different complaints [ SOB, lethargy, BRBPR] and was discharged after stabilization.  discharged from ER to [facility] with steroid, neb for BA exacerbation. patient current reported depression anhedonia, SI. no HI. He drinks half galloon of vodka everyday, denied smoking cig or drug use. he is homeless. In ER, WBC was elevated with elevated lactate. CT chest neg for infection, but showed air/fluid level inesophagous, better than before. CT head neg.  s/p 2L LR and zosyn and admission was called for ?pneumonitis and SI (09 Aug 2019 09:02). patient remains on zosyn since admission as his lactate has been persistently elevated comes down with fluid bolus and backs up. got one dose of vanco. no ssx of infection. UA neg. BC and UC pending.     Interval: seen at bedside today, patient complaints of SOB & wheezing with not much improvement. remains 1:1 for suicidal risk. No chest pain, palpitations, light headedness/dizziness, cough, fevers/chills, abdominal pain, n/v, diarrhea/constipation, dysuria or increased urinary frequency.       ROS:  CONSTITUTIONAL:  No distress.no fever/chills  HEENT:  Eyes:  No diplopia or blurred vision.   CARDIOVASCULAR:  No pressure, squeezing, tightness, heaviness or aching about the chest; no palpitations.no leg swelling, no orthopnea  RESPIRATORY:  no SOB. no wheezing. dry cough, patient links it to BA.  No hemoptysis  GI: no abd pain, no nausea, no vomiting, no diarrhea, no constipation. No hematochezia or melena  EXT:No joint pain or joint swelling or redness.no leg or calf Pain  SKIN: no skin break or ulcer. No rash  CNS: No headaches. No weakness.no numbness. + depression or anxiety. No SI    T(C): 36.9 (08-10-19 @ 16:44), Max: 37 (08-10-19 @ 11:45)  HR: 115 (08-10-19 @ 16:44) (73 - 117)  BP: 137/65 (08-10-19 @ 16:44) (115/74 - 137/65)  RR: 18 (08-10-19 @ 16:44) (18 - 22)  SpO2: 94% (08-10-19 @ 16:44) (92% - 100%)  CAPILLARY BLOOD GLUCOSE      POCT Blood Glucose.: 205 mg/dL (10 Aug 2019 16:59)  POCT Blood Glucose.: 172 mg/dL (10 Aug 2019 11:45)  POCT Blood Glucose.: 177 mg/dL (10 Aug 2019 08:27)      Physical Exam:  GENERAL: Not in distress. Alert    HEENT:  Normocephalic and atraumatic.   NECK: Supple.    CARDIOVASCULAR: RRR S1, S2. No murmur/rubs/gallop  LUNGS: b/l diffuse wheezing  ABDOMEN: ND. Soft,  NT, no guarding / rebound / rigidity. BS normoactive. No CVA tenderness.    EXTREMITIES: no edema.   SKIN: warm and dry  NEUROLOGIC: AAO*3. grossly intact  PSYCHIATRIC: Calm.  No agitation.    Labs                        11.0   14.36 )-----------( 335      ( 10 Aug 2019 06:26 )             35.2     08-10    135  |  96<L>  |  23.0<H>  ----------------------------<  175<H>  4.4   |  24.0  |  0.95    Ca    9.3      10 Aug 2019 06:26  Phos  4.3     08-10  Mg     2.3     -10    TPro  6.9  /  Alb  4.2  /  TBili  <0.2<L>  /  DBili  0.0  /  AST  20  /  ALT  20  /  AlkPhos  53     Urinalysis Basic - ( 10 Aug 2019 17:33 )    Color: Yellow / Appearance: Clear / S.010 / pH: x  Gluc: x / Ketone: Negative  / Bili: Negative / Urobili: Negative mg/dL   Blood: x / Protein: Negative mg/dL / Nitrite: Negative   Leuk Esterase: Negative / RBC: 0-2 /HPF / WBC 0-2   Sq Epi: x / Non Sq Epi: Occasional / Bacteria: Occasional    PT/INR - ( 08 Aug 2019 19:33 )   PT: 9.7 sec;   INR: 0.85 ratio         PTT - ( 08 Aug 2019 19:33 )  PTT:29.3 sec  MEDICATIONS  (STANDING):  ALBUTerol/ipratropium for Nebulization 3 milliLiter(s) Nebulizer every 6 hours  dextrose 5%. 1000 milliLiter(s) (50 mL/Hr) IV Continuous <Continuous>  dextrose 50% Injectable 12.5 Gram(s) IV Push once  dextrose 50% Injectable 25 Gram(s) IV Push once  dextrose 50% Injectable 25 Gram(s) IV Push once  enoxaparin Injectable 40 milliGRAM(s) SubCutaneous daily  folic acid 1 milliGRAM(s) Oral daily  furosemide   Injectable 40 milliGRAM(s) IV Push daily  insulin lispro (HumaLOG) corrective regimen sliding scale   SubCutaneous three times a day before meals  methylPREDNISolone sodium succinate Injectable 40 milliGRAM(s) IV Push every 8 hours  mirtazapine 30 milliGRAM(s) Oral at bedtime  multiple electrolytes Injection Type 1 1000 milliLiter(s) (150 mL/Hr) IV Continuous <Continuous>  multivitamin 1 Tablet(s) Oral daily  pantoprazole    Tablet 40 milliGRAM(s) Oral before breakfast  piperacillin/tazobactam IVPB.. 3.375 Gram(s) IV Intermittent every 8 hours  sodium chloride 0.9% Bolus 500 milliLiter(s) IV Bolus once  thiamine 100 milliGRAM(s) Oral daily    MEDICATIONS  (PRN):  dextrose 40% Gel 15 Gram(s) Oral once PRN Blood Glucose LESS THAN 70 milliGRAM(s)/deciliter  glucagon  Injectable 1 milliGRAM(s) IntraMuscular once PRN Glucose LESS THAN 70 milligrams/deciliter  LORazepam   Injectable 2 milliGRAM(s) IV Push every 2 hours PRN CIWA-Ar score increase by 2 points and a total score of 7 or less  LORazepam   Injectable 2 milliGRAM(s) IV Push every 1 hour PRN CIWA-Ar score 8 or greater    < from: TTE Echo Complete w/Doppler (19 @ 15:39) >  Summary:   1. Normal global left ventricular systolic function.   2. Left ventricular ejection fraction, by visual estimation, is 65 to   70%.   3. Spectral Doppler shows impaired relaxation pattern of left   ventricular myocardial filling (Grade I diastolic dysfunction).   4. Sclerotic aortic valve with normal opening.   5. Mild mitral annular calcification.    < end of copied text >    Hemoglobin A1C, Whole Blood (08.10.19 @ 06:26)    Hemoglobin A1C, Whole Blood: 6.2 %    Lipid Profile (19 @ 10:02)    Total Cholesterol/HDL Ratio Measurement: 4.0 Ratio    Cholesterol, Serum: 287 mg/dL    Triglycerides, Serum: 186 mg/dL    HDL Cholesterol, Serum: 69: HDL Levels >/= 60 mg/dL are considered beneficial and a "negative" risk  factor.  Effective 08/15/2018: New reference range and interpretive comment. mg/dL    Direct LDL: 181: LDL Cholesterol (mg/dL) --- Interpretive Comment (for adults 18 and over)  Optimal LDL Level may vary based on clinical situation  Below 70                   Ideal for people at very high risk of heart  disease  Below 100                  Ideal for people at risk of heart disease  100 - 129                    [place]  130 - 159                    Borderline high  160 - 189                    High  190 and Above           Very high mg/dL

## 2019-08-12 NOTE — PROGRESS NOTE ADULT - ASSESSMENT
52 y/o M with PMHx of HTN, HLD, ?CHF and EtOH abuse presents to ED BIBA, restrained c/o suicidal attempt. As per EMS, pt was found in the parking lot across from [facility]. Pt was found saying suicidal thoughts and jumping in front of cars, ALOOB, and found with alcohol. Pt was agressive on ambulance, and given 10 Verset via IM. Hx unavailable bc pt unresponsive. patient was discharged form [facility] on 8/8/2019 after stabilization, admitted with SI, depression. He has multiple ER visit at the end of with different complaints [ SOB, lethargy, BRBPR] and was discharged after stabilization. 8/1 discharged from ER to [facility] with steroid, neb for BA exacerbation. patient current reported depression anhedonia, SI. no HI. He drinks half galloon of vodka everyday, denied smoking cig or drug use. he is homeless. In ER, WBC was elevated with elevated lactate. CT chest neg for infection, but showed air/fluid level, better than before. CT head neg.  s/p 2L LR and zosyn and admission was called for ?pneumonitis and SI. found tohave diffuse wheezing and SOB.         SOB/wheezing likely from Asthma exacerbation-  Pt appears euvolemic, CXR clear, No leg edema, PND or orthopnea. No hx of CHF. IV lasix was d/padmini  c/w neb, c/w steroids IV, O2 prn  Inhaled steroids added  Echo: grade 1 DD, Ef 65-70%  CXR: clear on admission repeat CXR -ve  ABG ordered  Leukocytosis & elevated BUN likely from recent steroid use  procal slightly elevated  lactic acidosis likely from asthma exacerbation, s/p IVF, lactate normalized  Daily PEF  Pulm consult called        Leukocytosis likely from recent steroid use vs ?pneumonitis-  UA neg, Bc NGTD  CXR -ve  RVP -ve  c/w zosyn, d/c vanco  Procal wnl  Pt has been afebrile, will d/c zosyn received 5 days already)        depression/SI:   c/w Remeron   psych cx appreciated, Patient needs further psychiatric safety assessment- [facility] on d/c  c/w 1:1  haldol PRN for agitation    ETOH withdrawal:  CIWA protocol (CIWA 4 today)  Thiamine/ folate  SW eval    HTN: not on meds    ?DM: ISS, accucheck, A1c 6.2.   start lipitor     DVT-P: lovenox  aspiration precautions  fall precaution

## 2019-08-13 DIAGNOSIS — J45.909 UNSPECIFIED ASTHMA, UNCOMPLICATED: ICD-10-CM

## 2019-08-13 DIAGNOSIS — J98.01 ACUTE BRONCHOSPASM: ICD-10-CM

## 2019-08-13 DIAGNOSIS — K21.9 GASTRO-ESOPHAGEAL REFLUX DISEASE WITHOUT ESOPHAGITIS: ICD-10-CM

## 2019-08-13 LAB
ANION GAP SERPL CALC-SCNC: 12 MMOL/L — SIGNIFICANT CHANGE UP (ref 5–17)
BASOPHILS # BLD AUTO: 0.04 K/UL — SIGNIFICANT CHANGE UP (ref 0–0.2)
BASOPHILS NFR BLD AUTO: 0.2 % — SIGNIFICANT CHANGE UP (ref 0–2)
BUN SERPL-MCNC: 25 MG/DL — HIGH (ref 8–20)
CALCIUM SERPL-MCNC: 9.7 MG/DL — SIGNIFICANT CHANGE UP (ref 8.6–10.2)
CHLORIDE SERPL-SCNC: 98 MMOL/L — SIGNIFICANT CHANGE UP (ref 98–107)
CO2 SERPL-SCNC: 26 MMOL/L — SIGNIFICANT CHANGE UP (ref 22–29)
CREAT SERPL-MCNC: 0.85 MG/DL — SIGNIFICANT CHANGE UP (ref 0.5–1.3)
CULTURE RESULTS: SIGNIFICANT CHANGE UP
CULTURE RESULTS: SIGNIFICANT CHANGE UP
EOSINOPHIL # BLD AUTO: 0 K/UL — SIGNIFICANT CHANGE UP (ref 0–0.5)
EOSINOPHIL NFR BLD AUTO: 0 % — SIGNIFICANT CHANGE UP (ref 0–6)
GLUCOSE BLDC GLUCOMTR-MCNC: 163 MG/DL — HIGH (ref 70–99)
GLUCOSE BLDC GLUCOMTR-MCNC: 167 MG/DL — HIGH (ref 70–99)
GLUCOSE BLDC GLUCOMTR-MCNC: 213 MG/DL — HIGH (ref 70–99)
GLUCOSE BLDC GLUCOMTR-MCNC: 275 MG/DL — HIGH (ref 70–99)
GLUCOSE SERPL-MCNC: 196 MG/DL — HIGH (ref 70–115)
HCT VFR BLD CALC: 32.9 % — LOW (ref 39–50)
HGB BLD-MCNC: 10.3 G/DL — LOW (ref 13–17)
IMM GRANULOCYTES NFR BLD AUTO: 7.1 % — HIGH (ref 0–1.5)
LYMPHOCYTES # BLD AUTO: 1.16 K/UL — SIGNIFICANT CHANGE UP (ref 1–3.3)
LYMPHOCYTES # BLD AUTO: 7 % — LOW (ref 13–44)
MAGNESIUM SERPL-MCNC: 2 MG/DL — SIGNIFICANT CHANGE UP (ref 1.6–2.6)
MCHC RBC-ENTMCNC: 27.3 PG — SIGNIFICANT CHANGE UP (ref 27–34)
MCHC RBC-ENTMCNC: 31.3 GM/DL — LOW (ref 32–36)
MCV RBC AUTO: 87.3 FL — SIGNIFICANT CHANGE UP (ref 80–100)
MONOCYTES # BLD AUTO: 0.87 K/UL — SIGNIFICANT CHANGE UP (ref 0–0.9)
MONOCYTES NFR BLD AUTO: 5.2 % — SIGNIFICANT CHANGE UP (ref 2–14)
NEUTROPHILS # BLD AUTO: 13.43 K/UL — HIGH (ref 1.8–7.4)
NEUTROPHILS NFR BLD AUTO: 80.5 % — HIGH (ref 43–77)
PHOSPHATE SERPL-MCNC: 4.8 MG/DL — HIGH (ref 2.4–4.7)
PLATELET # BLD AUTO: 350 K/UL — SIGNIFICANT CHANGE UP (ref 150–400)
POTASSIUM SERPL-MCNC: 4.5 MMOL/L — SIGNIFICANT CHANGE UP (ref 3.5–5.3)
POTASSIUM SERPL-SCNC: 4.5 MMOL/L — SIGNIFICANT CHANGE UP (ref 3.5–5.3)
RBC # BLD: 3.77 M/UL — LOW (ref 4.2–5.8)
RBC # FLD: 17.7 % — HIGH (ref 10.3–14.5)
SODIUM SERPL-SCNC: 136 MMOL/L — SIGNIFICANT CHANGE UP (ref 135–145)
SPECIMEN SOURCE: SIGNIFICANT CHANGE UP
SPECIMEN SOURCE: SIGNIFICANT CHANGE UP
WBC # BLD: 16.69 K/UL — HIGH (ref 3.8–10.5)
WBC # FLD AUTO: 16.69 K/UL — HIGH (ref 3.8–10.5)

## 2019-08-13 PROCEDURE — 99232 SBSQ HOSP IP/OBS MODERATE 35: CPT

## 2019-08-13 PROCEDURE — 99222 1ST HOSP IP/OBS MODERATE 55: CPT

## 2019-08-13 RX ORDER — IPRATROPIUM/ALBUTEROL SULFATE 18-103MCG
3 AEROSOL WITH ADAPTER (GRAM) INHALATION EVERY 6 HOURS
Refills: 0 | Status: DISCONTINUED | OUTPATIENT
Start: 2019-08-13 | End: 2019-08-14

## 2019-08-13 RX ADMIN — FAMOTIDINE 20 MILLIGRAM(S): 10 INJECTION INTRAVENOUS at 05:12

## 2019-08-13 RX ADMIN — ZOLPIDEM TARTRATE 5 MILLIGRAM(S): 10 TABLET ORAL at 23:50

## 2019-08-13 RX ADMIN — BUDESONIDE AND FORMOTEROL FUMARATE DIHYDRATE 2 PUFF(S): 160; 4.5 AEROSOL RESPIRATORY (INHALATION) at 20:23

## 2019-08-13 RX ADMIN — FAMOTIDINE 20 MILLIGRAM(S): 10 INJECTION INTRAVENOUS at 17:22

## 2019-08-13 RX ADMIN — Medication 100 MILLIGRAM(S): at 17:22

## 2019-08-13 RX ADMIN — BUDESONIDE AND FORMOTEROL FUMARATE DIHYDRATE 2 PUFF(S): 160; 4.5 AEROSOL RESPIRATORY (INHALATION) at 09:04

## 2019-08-13 RX ADMIN — Medication 100 MILLIGRAM(S): at 11:33

## 2019-08-13 RX ADMIN — Medication 60 MILLIGRAM(S): at 14:38

## 2019-08-13 RX ADMIN — Medication 1 MILLIGRAM(S): at 11:33

## 2019-08-13 RX ADMIN — Medication 2: at 11:41

## 2019-08-13 RX ADMIN — Medication 2: at 07:48

## 2019-08-13 RX ADMIN — PANTOPRAZOLE SODIUM 40 MILLIGRAM(S): 20 TABLET, DELAYED RELEASE ORAL at 05:12

## 2019-08-13 RX ADMIN — Medication 60 MILLIGRAM(S): at 05:11

## 2019-08-13 RX ADMIN — Medication 25 MILLIGRAM(S): at 21:27

## 2019-08-13 RX ADMIN — Medication 1 TABLET(S): at 11:33

## 2019-08-13 RX ADMIN — Medication 4: at 16:36

## 2019-08-13 RX ADMIN — Medication 50 MILLIGRAM(S): at 05:12

## 2019-08-13 RX ADMIN — POLYETHYLENE GLYCOL 3350 17 GRAM(S): 17 POWDER, FOR SOLUTION ORAL at 11:33

## 2019-08-13 RX ADMIN — Medication 60 MILLIGRAM(S): at 21:28

## 2019-08-13 RX ADMIN — ENOXAPARIN SODIUM 40 MILLIGRAM(S): 100 INJECTION SUBCUTANEOUS at 11:33

## 2019-08-13 RX ADMIN — MIRTAZAPINE 30 MILLIGRAM(S): 45 TABLET, ORALLY DISINTEGRATING ORAL at 21:27

## 2019-08-13 NOTE — CONSULT NOTE ADULT - ASSESSMENT
asthmatic bronchitis, former smoker  CT  chest negative for infection, bronchial thickening c/w chronic airways dz  continue medrol, nebs, prn abx, anti reflux  needs controller medications ( generic advair 250) and home neb as out pt if able  PFts as out pt  ETOH , psych precautions in effect  prognosis guarded asthmatic bronchitis, former smoker  CT  chest negative for infection, bronchial thickening c/w chronic airways dz  chronic reflux contributing  continue medrol, nebs, prn abx, anti reflux, HOB elevated  needs controller medications ( generic advair 250) and home neb as out pt if able  PFts as out pt  ETOH , psych precautions in effect  prognosis guarded

## 2019-08-13 NOTE — PROGRESS NOTE ADULT - SUBJECTIVE AND OBJECTIVE BOX
Dr. Rivera Hospitalist Progress Note  NISREEN MARTINEZ [id number]    Patient is a 53y old  Male who presents with a chief complaint of SI, ETOH intoxication. ?pneumonittis (09 Aug 2019 09:02)    in    HPI:  ER HPI: "52 y/o M with PMHx of HTN, HLD, ?CHF and EtOH abuse presents to ED BIBA, restrained c/o suicidal attempt. As per EMS, pt was found in the parking lot across from [facility]. Pt was found saying suicidal thoughts and jumping in front of cars, ALOOB, and found with alcohol. Pt was agressive on ambulance, and given 10 Verset via IM. Hx unavailable bc pt unresponsive." above HPI reviewed with the patient and admits the same. patient was discharged form [facility] on 2019 after stabilization, admitted with SI, depression. He has multiple ER visit at the end of with different complaints [ SOB, lethargy, BRBPR] and was discharged after stabilization.  discharged from ER to [facility] with steroid, neb for BA exacerbation. patient current reported depression anhedonia, SI. no HI. He drinks half galloon of vodka everyday, denied smoking cig or drug use. he is homeless. In ER, WBC was elevated with elevated lactate. CT chest neg for infection, but showed air/fluid level inesophagous, better than before. CT head neg.  s/p 2L LR and zosyn and admission was called for ?pneumonitis and SI (09 Aug 2019 09:02). patient remains on zosyn since admission as his lactate has been persistently elevated comes down with fluid bolus and backs up. got one dose of vanco. no ssx of infection. UA neg. BC and UC pending.     Interval: seen at bedside today, patient complaints of SOB & wheezing with not much improvement. remains 1:1 for suicidal risk. No chest pain, palpitations, light headedness/dizziness, cough, fevers/chills, abdominal pain, n/v, diarrhea/constipation, dysuria or increased urinary frequency.       ROS:  CONSTITUTIONAL:  No distress.no fever/chills  HEENT:  Eyes:  No diplopia or blurred vision.   CARDIOVASCULAR:  No pressure, squeezing, tightness, heaviness or aching about the chest; no palpitations.no leg swelling, no orthopnea  RESPIRATORY:  no SOB. no wheezing. dry cough, patient links it to BA.  No hemoptysis  GI: no abd pain, no nausea, no vomiting, no diarrhea, no constipation. No hematochezia or melena  EXT:No joint pain or joint swelling or redness.no leg or calf Pain  SKIN: no skin break or ulcer. No rash  CNS: No headaches. No weakness.no numbness. + depression or anxiety. No SI    T(C): 36.9 (08-10-19 @ 16:44), Max: 37 (08-10-19 @ 11:45)  HR: 115 (08-10-19 @ 16:44) (73 - 117)  BP: 137/65 (08-10-19 @ 16:44) (115/74 - 137/65)  RR: 18 (08-10-19 @ 16:44) (18 - 22)  SpO2: 94% (08-10-19 @ 16:44) (92% - 100%)  CAPILLARY BLOOD GLUCOSE      POCT Blood Glucose.: 205 mg/dL (10 Aug 2019 16:59)  POCT Blood Glucose.: 172 mg/dL (10 Aug 2019 11:45)  POCT Blood Glucose.: 177 mg/dL (10 Aug 2019 08:27)      Physical Exam:  GENERAL: Not in distress. Alert    HEENT:  Normocephalic and atraumatic.   NECK: Supple.    CARDIOVASCULAR: RRR S1, S2. No murmur/rubs/gallop  LUNGS: b/l diffuse wheezing  ABDOMEN: ND. Soft,  NT, no guarding / rebound / rigidity. BS normoactive. No CVA tenderness.    EXTREMITIES: no edema.   SKIN: warm and dry  NEUROLOGIC: AAO*3. grossly intact  PSYCHIATRIC: Calm.  No agitation.    Labs                        11.0   14.36 )-----------( 335      ( 10 Aug 2019 06:26 )             35.2     08-10    135  |  96<L>  |  23.0<H>  ----------------------------<  175<H>  4.4   |  24.0  |  0.95    Ca    9.3      10 Aug 2019 06:26  Phos  4.3     08-10  Mg     2.3     -10    TPro  6.9  /  Alb  4.2  /  TBili  <0.2<L>  /  DBili  0.0  /  AST  20  /  ALT  20  /  AlkPhos  53     Urinalysis Basic - ( 10 Aug 2019 17:33 )    Color: Yellow / Appearance: Clear / S.010 / pH: x  Gluc: x / Ketone: Negative  / Bili: Negative / Urobili: Negative mg/dL   Blood: x / Protein: Negative mg/dL / Nitrite: Negative   Leuk Esterase: Negative / RBC: 0-2 /HPF / WBC 0-2   Sq Epi: x / Non Sq Epi: Occasional / Bacteria: Occasional    PT/INR - ( 08 Aug 2019 19:33 )   PT: 9.7 sec;   INR: 0.85 ratio         PTT - ( 08 Aug 2019 19:33 )  PTT:29.3 sec  MEDICATIONS  (STANDING):  ALBUTerol/ipratropium for Nebulization 3 milliLiter(s) Nebulizer every 6 hours  dextrose 5%. 1000 milliLiter(s) (50 mL/Hr) IV Continuous <Continuous>  dextrose 50% Injectable 12.5 Gram(s) IV Push once  dextrose 50% Injectable 25 Gram(s) IV Push once  dextrose 50% Injectable 25 Gram(s) IV Push once  enoxaparin Injectable 40 milliGRAM(s) SubCutaneous daily  folic acid 1 milliGRAM(s) Oral daily  furosemide   Injectable 40 milliGRAM(s) IV Push daily  insulin lispro (HumaLOG) corrective regimen sliding scale   SubCutaneous three times a day before meals  methylPREDNISolone sodium succinate Injectable 40 milliGRAM(s) IV Push every 8 hours  mirtazapine 30 milliGRAM(s) Oral at bedtime  multiple electrolytes Injection Type 1 1000 milliLiter(s) (150 mL/Hr) IV Continuous <Continuous>  multivitamin 1 Tablet(s) Oral daily  pantoprazole    Tablet 40 milliGRAM(s) Oral before breakfast  piperacillin/tazobactam IVPB.. 3.375 Gram(s) IV Intermittent every 8 hours  sodium chloride 0.9% Bolus 500 milliLiter(s) IV Bolus once  thiamine 100 milliGRAM(s) Oral daily    MEDICATIONS  (PRN):  dextrose 40% Gel 15 Gram(s) Oral once PRN Blood Glucose LESS THAN 70 milliGRAM(s)/deciliter  glucagon  Injectable 1 milliGRAM(s) IntraMuscular once PRN Glucose LESS THAN 70 milligrams/deciliter  LORazepam   Injectable 2 milliGRAM(s) IV Push every 2 hours PRN CIWA-Ar score increase by 2 points and a total score of 7 or less  LORazepam   Injectable 2 milliGRAM(s) IV Push every 1 hour PRN CIWA-Ar score 8 or greater    < from: TTE Echo Complete w/Doppler (19 @ 15:39) >  Summary:   1. Normal global left ventricular systolic function.   2. Left ventricular ejection fraction, by visual estimation, is 65 to   70%.   3. Spectral Doppler shows impaired relaxation pattern of left   ventricular myocardial filling (Grade I diastolic dysfunction).   4. Sclerotic aortic valve with normal opening.   5. Mild mitral annular calcification.    < end of copied text >    Hemoglobin A1C, Whole Blood (08.10.19 @ 06:26)    Hemoglobin A1C, Whole Blood: 6.2 %    Lipid Profile (19 @ 10:02)    Total Cholesterol/HDL Ratio Measurement: 4.0 Ratio    Cholesterol, Serum: 287 mg/dL    Triglycerides, Serum: 186 mg/dL    HDL Cholesterol, Serum: 69: HDL Levels >/= 60 mg/dL are considered beneficial and a "negative" risk  factor.  Effective 08/15/2018: New reference range and interpretive comment. mg/dL    Direct LDL: 181: LDL Cholesterol (mg/dL) --- Interpretive Comment (for adults 18 and over)  Optimal LDL Level may vary based on clinical situation  Below 70                   Ideal for people at very high risk of heart  disease  Below 100                  Ideal for people at risk of heart disease  100 - 129                    [place]  130 - 159                    Borderline high  160 - 189                    High  190 and Above           Very high mg/dL

## 2019-08-13 NOTE — PROGRESS NOTE ADULT - ASSESSMENT
52 y/o M with PMHx of HTN, HLD, ?CHF and EtOH abuse presents to ED BIBA, restrained c/o suicidal attempt. As per EMS, pt was found in the parking lot across from [facility]. Pt was found saying suicidal thoughts and jumping in front of cars, ALOOB, and found with alcohol. Pt was agressive on ambulance, and given 10 Verset via IM. Hx unavailable bc pt unresponsive. patient was discharged form [facility] on 8/8/2019 after stabilization, admitted with SI, depression. He has multiple ER visit at the end of with different complaints [ SOB, lethargy, BRBPR] and was discharged after stabilization. 8/1 discharged from ER to [facility] with steroid, neb for BA exacerbation. patient current reported depression anhedonia, SI. no HI. He drinks half galloon of vodka everyday, denied smoking cig or drug use. he is homeless. In ER, WBC was elevated with elevated lactate. CT chest neg for infection, but showed air/fluid level, better than before. CT head neg.  s/p 2L LR and zosyn and admission was called for ?pneumonitis and SI. found tohave diffuse wheezing and SOB.         SOB/wheezing likely from Asthma exacerbation-  Pt appears euvolemic, CXR clear, No leg edema, PND or orthopnea. No hx of CHF. IV lasix was d/padmini  c/w neb, c/w steroids IV, O2 prn  Inhaled steroids added  Echo: grade 1 DD, Ef 65-70%  CXR: clear on admission repeat CXR -ve  ABG noted  Leukocytosis & elevated BUN likely from recent steroid use  procal slightly elevated  lactic acidosis likely from asthma exacerbation, s/p IVF, lactate normalized  Daily PEF  Pulm consult called        Leukocytosis likely from recent steroid use vs ?pneumonitis-  UA neg, Bc NGTD  CXR -ve  RVP -ve  c/w zosyn, d/c vanco  Procal wnl  Pt has been afebrile, will d/c zosyn received 5 days already        depression/SI:   c/w Remeron   psych cx appreciated, Patient needs further psychiatric safety assessment- [facility] on d/c  c/w 1:1  haldol PRN for agitation    ETOH withdrawal:  CIWA protocol (CIWA 4 today)  Thiamine/ folate  SW eval    HTN: not on meds    ?DM: ISS, accucheck, A1c 6.2.   start lipitor     DVT-P: lovenox  aspiration precautions  fall precaution

## 2019-08-14 LAB
ALBUMIN SERPL ELPH-MCNC: 4.1 G/DL — SIGNIFICANT CHANGE UP (ref 3.3–5.2)
ALP SERPL-CCNC: 48 U/L — SIGNIFICANT CHANGE UP (ref 40–120)
ALT FLD-CCNC: 15 U/L — SIGNIFICANT CHANGE UP
ANION GAP SERPL CALC-SCNC: 13 MMOL/L — SIGNIFICANT CHANGE UP (ref 5–17)
ANION GAP SERPL CALC-SCNC: 14 MMOL/L — SIGNIFICANT CHANGE UP (ref 5–17)
AST SERPL-CCNC: 10 U/L — SIGNIFICANT CHANGE UP
BASOPHILS # BLD AUTO: 0.06 K/UL — SIGNIFICANT CHANGE UP (ref 0–0.2)
BASOPHILS NFR BLD AUTO: 0.3 % — SIGNIFICANT CHANGE UP (ref 0–2)
BILIRUB DIRECT SERPL-MCNC: 0 MG/DL — SIGNIFICANT CHANGE UP (ref 0–0.3)
BILIRUB INDIRECT FLD-MCNC: <0.2 MG/DL — SIGNIFICANT CHANGE UP (ref 0.2–1)
BILIRUB SERPL-MCNC: <0.2 MG/DL — LOW (ref 0.4–2)
BUN SERPL-MCNC: 30 MG/DL — HIGH (ref 8–20)
BUN SERPL-MCNC: 31 MG/DL — HIGH (ref 8–20)
CALCIUM SERPL-MCNC: 9.3 MG/DL — SIGNIFICANT CHANGE UP (ref 8.6–10.2)
CALCIUM SERPL-MCNC: 9.5 MG/DL — SIGNIFICANT CHANGE UP (ref 8.6–10.2)
CHLORIDE SERPL-SCNC: 96 MMOL/L — LOW (ref 98–107)
CHLORIDE SERPL-SCNC: 99 MMOL/L — SIGNIFICANT CHANGE UP (ref 98–107)
CO2 SERPL-SCNC: 25 MMOL/L — SIGNIFICANT CHANGE UP (ref 22–29)
CO2 SERPL-SCNC: 26 MMOL/L — SIGNIFICANT CHANGE UP (ref 22–29)
CREAT SERPL-MCNC: 0.92 MG/DL — SIGNIFICANT CHANGE UP (ref 0.5–1.3)
CREAT SERPL-MCNC: 0.93 MG/DL — SIGNIFICANT CHANGE UP (ref 0.5–1.3)
EOSINOPHIL # BLD AUTO: 0 K/UL — SIGNIFICANT CHANGE UP (ref 0–0.5)
EOSINOPHIL NFR BLD AUTO: 0 % — SIGNIFICANT CHANGE UP (ref 0–6)
GLUCOSE BLDC GLUCOMTR-MCNC: 206 MG/DL — HIGH (ref 70–99)
GLUCOSE BLDC GLUCOMTR-MCNC: 208 MG/DL — HIGH (ref 70–99)
GLUCOSE BLDC GLUCOMTR-MCNC: 251 MG/DL — HIGH (ref 70–99)
GLUCOSE BLDC GLUCOMTR-MCNC: 261 MG/DL — HIGH (ref 70–99)
GLUCOSE SERPL-MCNC: 210 MG/DL — HIGH (ref 70–115)
GLUCOSE SERPL-MCNC: 219 MG/DL — HIGH (ref 70–115)
HCT VFR BLD CALC: 32.7 % — LOW (ref 39–50)
HGB BLD-MCNC: 10.4 G/DL — LOW (ref 13–17)
IMM GRANULOCYTES NFR BLD AUTO: 7.1 % — HIGH (ref 0–1.5)
LYMPHOCYTES # BLD AUTO: 0.92 K/UL — LOW (ref 1–3.3)
LYMPHOCYTES # BLD AUTO: 5.3 % — LOW (ref 13–44)
MAGNESIUM SERPL-MCNC: 2.2 MG/DL — SIGNIFICANT CHANGE UP (ref 1.6–2.6)
MAGNESIUM SERPL-MCNC: 2.2 MG/DL — SIGNIFICANT CHANGE UP (ref 1.8–2.6)
MCHC RBC-ENTMCNC: 27.8 PG — SIGNIFICANT CHANGE UP (ref 27–34)
MCHC RBC-ENTMCNC: 31.8 GM/DL — LOW (ref 32–36)
MCV RBC AUTO: 87.4 FL — SIGNIFICANT CHANGE UP (ref 80–100)
MONOCYTES # BLD AUTO: 0.64 K/UL — SIGNIFICANT CHANGE UP (ref 0–0.9)
MONOCYTES NFR BLD AUTO: 3.7 % — SIGNIFICANT CHANGE UP (ref 2–14)
NEUTROPHILS # BLD AUTO: 14.58 K/UL — HIGH (ref 1.8–7.4)
NEUTROPHILS NFR BLD AUTO: 83.6 % — HIGH (ref 43–77)
PHOSPHATE SERPL-MCNC: 4.4 MG/DL — SIGNIFICANT CHANGE UP (ref 2.4–4.7)
PHOSPHATE SERPL-MCNC: 4.4 MG/DL — SIGNIFICANT CHANGE UP (ref 2.4–4.7)
PLATELET # BLD AUTO: 370 K/UL — SIGNIFICANT CHANGE UP (ref 150–400)
POTASSIUM SERPL-MCNC: 4.9 MMOL/L — SIGNIFICANT CHANGE UP (ref 3.5–5.3)
POTASSIUM SERPL-MCNC: 4.9 MMOL/L — SIGNIFICANT CHANGE UP (ref 3.5–5.3)
POTASSIUM SERPL-SCNC: 4.9 MMOL/L — SIGNIFICANT CHANGE UP (ref 3.5–5.3)
POTASSIUM SERPL-SCNC: 4.9 MMOL/L — SIGNIFICANT CHANGE UP (ref 3.5–5.3)
PROT SERPL-MCNC: 7.1 G/DL — SIGNIFICANT CHANGE UP (ref 6.6–8.7)
RBC # BLD: 3.74 M/UL — LOW (ref 4.2–5.8)
RBC # FLD: 17.2 % — HIGH (ref 10.3–14.5)
SODIUM SERPL-SCNC: 136 MMOL/L — SIGNIFICANT CHANGE UP (ref 135–145)
SODIUM SERPL-SCNC: 137 MMOL/L — SIGNIFICANT CHANGE UP (ref 135–145)
WBC # BLD: 17.44 K/UL — HIGH (ref 3.8–10.5)
WBC # FLD AUTO: 17.44 K/UL — HIGH (ref 3.8–10.5)

## 2019-08-14 PROCEDURE — 99233 SBSQ HOSP IP/OBS HIGH 50: CPT

## 2019-08-14 PROCEDURE — 99232 SBSQ HOSP IP/OBS MODERATE 35: CPT

## 2019-08-14 RX ORDER — BENZOCAINE AND MENTHOL 5; 1 G/100ML; G/100ML
1 LIQUID ORAL ONCE
Refills: 0 | Status: COMPLETED | OUTPATIENT
Start: 2019-08-14 | End: 2019-08-14

## 2019-08-14 RX ORDER — ATORVASTATIN CALCIUM 80 MG/1
20 TABLET, FILM COATED ORAL AT BEDTIME
Refills: 0 | Status: DISCONTINUED | OUTPATIENT
Start: 2019-08-14 | End: 2019-08-20

## 2019-08-14 RX ORDER — TRAZODONE HCL 50 MG
50 TABLET ORAL AT BEDTIME
Refills: 0 | Status: DISCONTINUED | OUTPATIENT
Start: 2019-08-14 | End: 2019-08-15

## 2019-08-14 RX ORDER — BENZOCAINE AND MENTHOL 5; 1 G/100ML; G/100ML
1 LIQUID ORAL
Refills: 0 | Status: DISCONTINUED | OUTPATIENT
Start: 2019-08-14 | End: 2019-08-20

## 2019-08-14 RX ORDER — LORATADINE 10 MG/1
10 TABLET ORAL DAILY
Refills: 0 | Status: DISCONTINUED | OUTPATIENT
Start: 2019-08-14 | End: 2019-08-20

## 2019-08-14 RX ORDER — ALBUTEROL 90 UG/1
2 AEROSOL, METERED ORAL EVERY 6 HOURS
Refills: 0 | Status: DISCONTINUED | OUTPATIENT
Start: 2019-08-14 | End: 2019-08-17

## 2019-08-14 RX ORDER — MIRTAZAPINE 45 MG/1
45 TABLET, ORALLY DISINTEGRATING ORAL AT BEDTIME
Refills: 0 | Status: DISCONTINUED | OUTPATIENT
Start: 2019-08-14 | End: 2019-08-15

## 2019-08-14 RX ADMIN — Medication 100 MILLIGRAM(S): at 11:27

## 2019-08-14 RX ADMIN — Medication 1 TABLET(S): at 11:27

## 2019-08-14 RX ADMIN — POLYETHYLENE GLYCOL 3350 17 GRAM(S): 17 POWDER, FOR SOLUTION ORAL at 11:27

## 2019-08-14 RX ADMIN — ATORVASTATIN CALCIUM 20 MILLIGRAM(S): 80 TABLET, FILM COATED ORAL at 21:53

## 2019-08-14 RX ADMIN — BENZOCAINE AND MENTHOL 1 LOZENGE: 5; 1 LIQUID ORAL at 11:34

## 2019-08-14 RX ADMIN — Medication 60 MILLIGRAM(S): at 05:16

## 2019-08-14 RX ADMIN — SENNA PLUS 2 TABLET(S): 8.6 TABLET ORAL at 21:53

## 2019-08-14 RX ADMIN — FAMOTIDINE 20 MILLIGRAM(S): 10 INJECTION INTRAVENOUS at 05:16

## 2019-08-14 RX ADMIN — Medication 100 MILLIGRAM(S): at 05:16

## 2019-08-14 RX ADMIN — ALBUTEROL 2 PUFF(S): 90 AEROSOL, METERED ORAL at 20:33

## 2019-08-14 RX ADMIN — Medication 6: at 16:40

## 2019-08-14 RX ADMIN — Medication 60 MILLIGRAM(S): at 14:41

## 2019-08-14 RX ADMIN — BENZOCAINE AND MENTHOL 1 LOZENGE: 5; 1 LIQUID ORAL at 17:13

## 2019-08-14 RX ADMIN — Medication 50 MILLIGRAM(S): at 21:53

## 2019-08-14 RX ADMIN — Medication 1 MILLIGRAM(S): at 11:27

## 2019-08-14 RX ADMIN — Medication 4: at 07:46

## 2019-08-14 RX ADMIN — LORATADINE 10 MILLIGRAM(S): 10 TABLET ORAL at 11:33

## 2019-08-14 RX ADMIN — Medication 100 MILLIGRAM(S): at 17:13

## 2019-08-14 RX ADMIN — Medication 1200 MILLIGRAM(S): at 17:13

## 2019-08-14 RX ADMIN — Medication 4: at 11:37

## 2019-08-14 RX ADMIN — FAMOTIDINE 20 MILLIGRAM(S): 10 INJECTION INTRAVENOUS at 17:13

## 2019-08-14 RX ADMIN — PANTOPRAZOLE SODIUM 40 MILLIGRAM(S): 20 TABLET, DELAYED RELEASE ORAL at 05:16

## 2019-08-14 RX ADMIN — BENZOCAINE AND MENTHOL 1 LOZENGE: 5; 1 LIQUID ORAL at 05:17

## 2019-08-14 RX ADMIN — ENOXAPARIN SODIUM 40 MILLIGRAM(S): 100 INJECTION SUBCUTANEOUS at 11:27

## 2019-08-14 RX ADMIN — Medication 60 MILLIGRAM(S): at 21:52

## 2019-08-14 RX ADMIN — BUDESONIDE AND FORMOTEROL FUMARATE DIHYDRATE 2 PUFF(S): 160; 4.5 AEROSOL RESPIRATORY (INHALATION) at 20:32

## 2019-08-14 RX ADMIN — MIRTAZAPINE 45 MILLIGRAM(S): 45 TABLET, ORALLY DISINTEGRATING ORAL at 21:53

## 2019-08-14 NOTE — PROGRESS NOTE ADULT - SUBJECTIVE AND OBJECTIVE BOX
PULMONARY PROGRESS NOTE      [id number]    Patient is a 53y old  Male who presents with a chief complaint of SI, ETOH intoxication. ?pneumonittis (13 Aug 2019 16:45)      INTERVAL HPI/OVERNIGHT EVENTS:comfortable, NAD    MEDICATIONS  (STANDING):  ALBUTerol/ipratropium for Nebulization 3 milliLiter(s) Nebulizer every 6 hours  benzocaine 15 mG/menthol 3.6 mG (Sugar-Free) Lozenge 1 Lozenge Oral four times a day  buDESOnide 160 MICROgram(s)/formoterol 4.5 MICROgram(s) Inhaler 2 Puff(s) Inhalation two times a day  dextrose 5%. 1000 milliLiter(s) (50 mL/Hr) IV Continuous <Continuous>  dextrose 50% Injectable 12.5 Gram(s) IV Push once  dextrose 50% Injectable 25 Gram(s) IV Push once  dextrose 50% Injectable 25 Gram(s) IV Push once  docusate sodium 100 milliGRAM(s) Oral two times a day  doxycycline hyclate Capsule 100 milliGRAM(s) Oral every 12 hours  enoxaparin Injectable 40 milliGRAM(s) SubCutaneous daily  famotidine    Tablet 20 milliGRAM(s) Oral two times a day  folic acid 1 milliGRAM(s) Oral daily  guaiFENesin ER 1200 milliGRAM(s) Oral every 12 hours  insulin lispro (HumaLOG) corrective regimen sliding scale   SubCutaneous three times a day before meals  loratadine 10 milliGRAM(s) Oral daily  methylPREDNISolone sodium succinate Injectable 60 milliGRAM(s) IV Push every 8 hours  mirtazapine 45 milliGRAM(s) Oral at bedtime  multivitamin 1 Tablet(s) Oral daily  pantoprazole    Tablet 40 milliGRAM(s) Oral before breakfast  polyethylene glycol 3350 17 Gram(s) Oral daily  senna 2 Tablet(s) Oral at bedtime  thiamine 100 milliGRAM(s) Oral daily      MEDICATIONS  (PRN):  dextrose 40% Gel 15 Gram(s) Oral once PRN Blood Glucose LESS THAN 70 milliGRAM(s)/deciliter  glucagon  Injectable 1 milliGRAM(s) IntraMuscular once PRN Glucose LESS THAN 70 milligrams/deciliter  LORazepam   Injectable 2 milliGRAM(s) IV Push every 1 hour PRN CIWA-Ar score 8 or greater  traZODone 50 milliGRAM(s) Oral at bedtime PRN insomnia      Allergies    No Known Allergies    Intolerances        PAST MEDICAL & SURGICAL HISTORY:  Asthma  Alcohol abuse  High cholesterol  HTN (hypertension)  GERD (gastroesophageal reflux disease)  Alcohol abuse  High cholesterol  Hypertension  Alcohol abuse  No significant past surgical history  No significant past surgical history  No significant past surgical history      SOCIAL HISTORY  Smoking History:       REVIEW OF SYSTEMS:  unable--lethargic     Vital Signs Last 24 Hrs  T(C): 36.5 (14 Aug 2019 09:53), Max: 36.6 (13 Aug 2019 16:03)  T(F): 97.7 (14 Aug 2019 09:53), Max: 97.8 (13 Aug 2019 16:03)  HR: 55 (14 Aug 2019 09:53) (55 - 104)  BP: 142/89 (14 Aug 2019 09:53) (133/78 - 154/95)  BP(mean): --  RR: 20 (14 Aug 2019 09:53) (18 - 20)  SpO2: 95% (13 Aug 2019 20:24) (95% - 96%)    PHYSICAL EXAMINATION:    GENERAL: The patient is awake and alert in no apparent distress.     HEENT: Head is normocephalic and atraumatic. Extraocular muscles are intact. Mucous membranes are moist.    NECK: Supple.    LUNGS: Clear to auscultation without wheezing, rales or rhonchi; respirations unlabored    HEART: Regular rate and rhythm without murmur.    ABDOMEN: Soft, nontender, and nondistended.      EXTREMITIES: Without any cyanosis, clubbing, rash, lesions or edema.    NEUROLOGIC: Grossly intact.    SKIN: No ulceration or induration present.      LABS:                        10.4   17.44 )-----------( 370      ( 14 Aug 2019 08:02 )             32.7     08-14    137  |  99  |  31.0<H>  ----------------------------<  219<H>  4.9   |  25.0  |  0.92    Ca    9.3      14 Aug 2019 08:02  Phos  4.4     08-14  Mg     2.2     08-14          ABG - ( 12 Aug 2019 12:59 )  pH, Arterial: 7.42  pH, Blood: x     /  pCO2: 36    /  pO2: 80    / HCO3: 24    / Base Excess: -0.6  /  SaO2: 96                          Procalcitonin, Serum: 0.10 ng/mL (08-12-19 @ 11:37)      MICROBIOLOGY:    RADIOLOGY & ADDITIONAL STUDIES:

## 2019-08-14 NOTE — CHART NOTE - NSCHARTNOTEFT_GEN_A_CORE
on rounds this am it was discussed that pt has been refusing tx but he is wheezing . encouraged pt but he still refusing . spoke with rounding team to change nebs to MID that we might get better compliance. Pt agrees that he will take inhaler. awaiting Order

## 2019-08-14 NOTE — PROGRESS NOTE ADULT - SUBJECTIVE AND OBJECTIVE BOX
CHIEF COMPLAINT/INTERVAL HISTORY:    Patient is a 53y old  Male who presents with a chief complaint of SI, ETOH intoxication. ?pneumonittis (13 Aug 2019 16:45)      HPI:  ER HPI: "54 y/o M with PMHx of HTN, HLD, ?CHF and EtOH abuse presents to ED BIBA, restrained c/o suicidal attempt. As per EMS, pt was found in the parking lot across from [facility]. Pt was found saying suicidal thoughts and jumping in front of cars, ALOOB, and found with alcohol. Pt was agressive on ambulance, and given 10 Verset via IM. Hx unavailable bc pt unresponsive."     above HPI reviewed with the patient and admits the same. patient was discharged form [facility] on 8/8/2019 after stabilization, admitted with SI, depression. He has multiple ER visit at the end of with different complaints [ SOB, lethargy, BRBPR] and was discharged after stabilization. 8/1 discharged from ER to [facility] with steroid, neb for BA exacerbation. patient current reported depression anhedonia, SI. no HI. He drinks half galloon of vodka everyday, denied smoking cig or drug use. he is homeless. In ER, WBC was elevated with elevated lactate. CT chest neg for infection, but showed air/fluid level, better than before. CT head neg.  s/p 2L LR and zosyn and admission was called for ?pneumonitis and SI (09 Aug 2019 09:02)      SUBJECTIVE & OBJECTIVE/ ROS: Pt seen and examined at bedside. Insomnia improved. Pt denies visual & auditory hallucinations. Mild tremors & anxiety. He is yet SOB with new onset productive cough & sore throat. No chest pain, palpitations, light headedness/dizziness,  fevers/chills, abdominal pain, n/v, diarrhea/constipation, dysuria or increased urinary frequency.     ICU Vital Signs Last 24 Hrs  T(C): 36.5 (14 Aug 2019 09:53), Max: 36.6 (13 Aug 2019 16:03)  T(F): 97.7 (14 Aug 2019 09:53), Max: 97.8 (13 Aug 2019 16:03)  HR: 55 (14 Aug 2019 09:53) (55 - 104)  BP: 142/89 (14 Aug 2019 09:53) (133/78 - 154/95)  BP(mean): --  ABP: --  ABP(mean): --  RR: 20 (14 Aug 2019 09:53) (18 - 20)  SpO2: 95% (13 Aug 2019 20:24) (95% - 96%)        MEDICATIONS  (STANDING):  ALBUTerol/ipratropium for Nebulization 3 milliLiter(s) Nebulizer every 6 hours  benzocaine 15 mG/menthol 3.6 mG (Sugar-Free) Lozenge 1 Lozenge Oral four times a day  buDESOnide 160 MICROgram(s)/formoterol 4.5 MICROgram(s) Inhaler 2 Puff(s) Inhalation two times a day  dextrose 5%. 1000 milliLiter(s) (50 mL/Hr) IV Continuous <Continuous>  dextrose 50% Injectable 12.5 Gram(s) IV Push once  dextrose 50% Injectable 25 Gram(s) IV Push once  dextrose 50% Injectable 25 Gram(s) IV Push once  docusate sodium 100 milliGRAM(s) Oral two times a day  doxycycline hyclate Capsule 100 milliGRAM(s) Oral every 12 hours  enoxaparin Injectable 40 milliGRAM(s) SubCutaneous daily  famotidine    Tablet 20 milliGRAM(s) Oral two times a day  folic acid 1 milliGRAM(s) Oral daily  guaiFENesin ER 1200 milliGRAM(s) Oral every 12 hours  insulin lispro (HumaLOG) corrective regimen sliding scale   SubCutaneous three times a day before meals  loratadine 10 milliGRAM(s) Oral daily  methylPREDNISolone sodium succinate Injectable 60 milliGRAM(s) IV Push every 8 hours  mirtazapine 45 milliGRAM(s) Oral at bedtime  multivitamin 1 Tablet(s) Oral daily  pantoprazole    Tablet 40 milliGRAM(s) Oral before breakfast  polyethylene glycol 3350 17 Gram(s) Oral daily  senna 2 Tablet(s) Oral at bedtime  thiamine 100 milliGRAM(s) Oral daily    MEDICATIONS  (PRN):  dextrose 40% Gel 15 Gram(s) Oral once PRN Blood Glucose LESS THAN 70 milliGRAM(s)/deciliter  glucagon  Injectable 1 milliGRAM(s) IntraMuscular once PRN Glucose LESS THAN 70 milligrams/deciliter  LORazepam   Injectable 2 milliGRAM(s) IV Push every 1 hour PRN CIWA-Ar score 8 or greater  traZODone 50 milliGRAM(s) Oral at bedtime PRN insomnia      LABS:                        10.4   17.44 )-----------( 370      ( 14 Aug 2019 08:02 )             32.7     08-14    137  |  99  |  31.0<H>  ----------------------------<  219<H>  4.9   |  25.0  |  0.92    Ca    9.3      14 Aug 2019 08:02  Phos  4.4     08-14  Mg     2.2     08-14            CAPILLARY BLOOD GLUCOSE      POCT Blood Glucose.: 208 mg/dL (14 Aug 2019 07:44)  POCT Blood Glucose.: 275 mg/dL (13 Aug 2019 21:27)  POCT Blood Glucose.: 213 mg/dL (13 Aug 2019 16:35)  POCT Blood Glucose.: 167 mg/dL (13 Aug 2019 11:39)      RECENT CULTURES:      RADIOLOGY & ADDITIONAL TESTS:      PHYSICAL EXAM:      GENERAL: Not in distress. Alert    HEENT:  Normocephalic and atraumatic.   NECK: Supple.    CARDIOVASCULAR: RRR S1, S2. No murmur/rubs/gallop  LUNGS: b/l diffuse wheezing  ABDOMEN: ND. Soft,  NT, no guarding / rebound / rigidity. BS normoactive. No CVA tenderness.    EXTREMITIES: no edema.   SKIN: warm and dry  NEUROLOGIC: AAO*3. grossly intact  PSYCHIATRIC: Calm.  No agitation.

## 2019-08-14 NOTE — PROGRESS NOTE BEHAVIORAL HEALTH - NSBHCONSULTRECOMMENDOTHER_PSY_A_CORE FT
taper Librium
Mirtazepine dose increased due to patient inability to sleep and depression. continue all psychiatric medications. continue one to one aide.

## 2019-08-14 NOTE — PROGRESS NOTE ADULT - ASSESSMENT
54 y/o M with PMHx of HTN, HLD, ?CHF and EtOH abuse presents to ED BIBA, restrained c/o suicidal attempt. As per EMS, pt was found in the parking lot across from [facility]. Pt was found saying suicidal thoughts and jumping in front of cars, ALOOB, and found with alcohol. Pt was agressive on ambulance, and given 10 Verset via IM. Hx unavailable bc pt unresponsive. patient was discharged form [facility] on 8/8/2019 after stabilization, admitted with SI, depression. He has multiple ER visit at the end of with different complaints [ SOB, lethargy, BRBPR] and was discharged after stabilization. 8/1 discharged from ER to [facility] with steroid, neb for BA exacerbation. patient current reported depression anhedonia, SI. no HI. He drinks half galloon of vodka everyday, denied smoking cig or drug use. he is homeless. In ER, WBC was elevated with elevated lactate. CT chest neg for infection, but showed air/fluid level, better than before. CT head neg.  s/p 2L LR and zosyn and admission was called for ?pneumonitis and SI. found tohave diffuse wheezing and SOB.         SOB/wheezing likely from Asthma exacerbation/ asthmatic bronchitis-  Pt appears euvolemic, CXR clear, No leg edema, PND or orthopnea. No hx of CHF. IV lasix was d/padmini  c/w neb, c/w steroids IV 60 q6, O2 prn  Add doxycycline PO x 5 days for bronchitis  Mucinex & zyrtec for productive cough  c/w Inhaled steroids  needs controller medications ( generic advair 250) and home neb as out pt if able   PPI & H2  blockers  Echo: grade 1 DD, Ef 65-70%  CXR: clear on admission repeat CXR -ve  Ct chest: No acute findings in the thorax.No pneumomediastinum or pneumothorax.The esophagus is slightly distended with air and fluid, less severe compared to the previous study of June 28, 2018.  ABG noted  Leukocytosis & elevated BUN likely from recent steroid use  procal slightly elevated  lactic acidosis likely from asthma exacerbation, s/p IVF, lactate normalized  Daily PEF  Pulm consult appreciated  PFTs as out pt          Leukocytosis likely from recent steroid use vs ?pneumonitis/ bronchitis-  UA neg, Bc NGTD  CXR -ve  RVP -ve  Procal wnl  Add doxycyline for bronchitis      GERD- PPI & H2  blockers        depression/SI:   Add Remeron & trazodone per psych  psych cx appreciated, Patient needs further psychiatric safety assessment- [facility] on d/c  c/w 1:1  haldol PRN for agitation    ETOH withdrawal:  CIWA protocol   Thiamine/ folate  SW eval    HTN: not on meds    ?DM: ISS, accucheck, A1c 6.2.   start lipitor     DVT-P: lovenox  aspiration precautions  fall precaution

## 2019-08-15 DIAGNOSIS — F10.20 ALCOHOL DEPENDENCE, UNCOMPLICATED: ICD-10-CM

## 2019-08-15 LAB
ANION GAP SERPL CALC-SCNC: 15 MMOL/L — SIGNIFICANT CHANGE UP (ref 5–17)
BASOPHILS # BLD AUTO: 0.05 K/UL — SIGNIFICANT CHANGE UP (ref 0–0.2)
BASOPHILS NFR BLD AUTO: 0.3 % — SIGNIFICANT CHANGE UP (ref 0–2)
BUN SERPL-MCNC: 32 MG/DL — HIGH (ref 8–20)
CALCIUM SERPL-MCNC: 9.6 MG/DL — SIGNIFICANT CHANGE UP (ref 8.6–10.2)
CHLORIDE SERPL-SCNC: 97 MMOL/L — LOW (ref 98–107)
CO2 SERPL-SCNC: 24 MMOL/L — SIGNIFICANT CHANGE UP (ref 22–29)
CREAT SERPL-MCNC: 0.8 MG/DL — SIGNIFICANT CHANGE UP (ref 0.5–1.3)
EOSINOPHIL # BLD AUTO: 0 K/UL — SIGNIFICANT CHANGE UP (ref 0–0.5)
EOSINOPHIL NFR BLD AUTO: 0 % — SIGNIFICANT CHANGE UP (ref 0–6)
GLUCOSE BLDC GLUCOMTR-MCNC: 194 MG/DL — HIGH (ref 70–99)
GLUCOSE BLDC GLUCOMTR-MCNC: 264 MG/DL — HIGH (ref 70–99)
GLUCOSE BLDC GLUCOMTR-MCNC: 275 MG/DL — HIGH (ref 70–99)
GLUCOSE BLDC GLUCOMTR-MCNC: 288 MG/DL — HIGH (ref 70–99)
GLUCOSE SERPL-MCNC: 207 MG/DL — HIGH (ref 70–115)
HCT VFR BLD CALC: 32.6 % — LOW (ref 39–50)
HGB BLD-MCNC: 10.2 G/DL — LOW (ref 13–17)
IMM GRANULOCYTES NFR BLD AUTO: 6.6 % — HIGH (ref 0–1.5)
LYMPHOCYTES # BLD AUTO: 1.06 K/UL — SIGNIFICANT CHANGE UP (ref 1–3.3)
LYMPHOCYTES # BLD AUTO: 6.2 % — LOW (ref 13–44)
MAGNESIUM SERPL-MCNC: 2 MG/DL — SIGNIFICANT CHANGE UP (ref 1.6–2.6)
MCHC RBC-ENTMCNC: 27.3 PG — SIGNIFICANT CHANGE UP (ref 27–34)
MCHC RBC-ENTMCNC: 31.3 GM/DL — LOW (ref 32–36)
MCV RBC AUTO: 87.2 FL — SIGNIFICANT CHANGE UP (ref 80–100)
MONOCYTES # BLD AUTO: 0.95 K/UL — HIGH (ref 0–0.9)
MONOCYTES NFR BLD AUTO: 5.6 % — SIGNIFICANT CHANGE UP (ref 2–14)
NEUTROPHILS # BLD AUTO: 13.89 K/UL — HIGH (ref 1.8–7.4)
NEUTROPHILS NFR BLD AUTO: 81.3 % — HIGH (ref 43–77)
PHOSPHATE SERPL-MCNC: 5.4 MG/DL — HIGH (ref 2.4–4.7)
PLATELET # BLD AUTO: 366 K/UL — SIGNIFICANT CHANGE UP (ref 150–400)
POTASSIUM SERPL-MCNC: 4.6 MMOL/L — SIGNIFICANT CHANGE UP (ref 3.5–5.3)
POTASSIUM SERPL-SCNC: 4.6 MMOL/L — SIGNIFICANT CHANGE UP (ref 3.5–5.3)
RBC # BLD: 3.74 M/UL — LOW (ref 4.2–5.8)
RBC # FLD: 17.2 % — HIGH (ref 10.3–14.5)
SODIUM SERPL-SCNC: 136 MMOL/L — SIGNIFICANT CHANGE UP (ref 135–145)
WBC # BLD: 17.08 K/UL — HIGH (ref 3.8–10.5)
WBC # FLD AUTO: 17.08 K/UL — HIGH (ref 3.8–10.5)

## 2019-08-15 PROCEDURE — 99233 SBSQ HOSP IP/OBS HIGH 50: CPT

## 2019-08-15 PROCEDURE — 99232 SBSQ HOSP IP/OBS MODERATE 35: CPT

## 2019-08-15 RX ORDER — MAGNESIUM SULFATE 500 MG/ML
2 VIAL (ML) INJECTION ONCE
Refills: 0 | Status: COMPLETED | OUTPATIENT
Start: 2019-08-15 | End: 2019-08-15

## 2019-08-15 RX ORDER — TRAZODONE HCL 50 MG
100 TABLET ORAL AT BEDTIME
Refills: 0 | Status: DISCONTINUED | OUTPATIENT
Start: 2019-08-15 | End: 2019-08-16

## 2019-08-15 RX ADMIN — ALBUTEROL 2 PUFF(S): 90 AEROSOL, METERED ORAL at 14:44

## 2019-08-15 RX ADMIN — ATORVASTATIN CALCIUM 20 MILLIGRAM(S): 80 TABLET, FILM COATED ORAL at 22:01

## 2019-08-15 RX ADMIN — BENZOCAINE AND MENTHOL 1 LOZENGE: 5; 1 LIQUID ORAL at 16:49

## 2019-08-15 RX ADMIN — Medication 100 MILLIGRAM(S): at 16:49

## 2019-08-15 RX ADMIN — SENNA PLUS 2 TABLET(S): 8.6 TABLET ORAL at 22:01

## 2019-08-15 RX ADMIN — Medication 1200 MILLIGRAM(S): at 16:48

## 2019-08-15 RX ADMIN — Medication 1 TABLET(S): at 11:20

## 2019-08-15 RX ADMIN — Medication 100 MILLIGRAM(S): at 11:20

## 2019-08-15 RX ADMIN — BUDESONIDE AND FORMOTEROL FUMARATE DIHYDRATE 2 PUFF(S): 160; 4.5 AEROSOL RESPIRATORY (INHALATION) at 20:35

## 2019-08-15 RX ADMIN — Medication 6: at 16:47

## 2019-08-15 RX ADMIN — ENOXAPARIN SODIUM 40 MILLIGRAM(S): 100 INJECTION SUBCUTANEOUS at 11:23

## 2019-08-15 RX ADMIN — Medication 100 MILLIGRAM(S): at 16:48

## 2019-08-15 RX ADMIN — Medication 100 MILLIGRAM(S): at 05:39

## 2019-08-15 RX ADMIN — Medication 6: at 11:21

## 2019-08-15 RX ADMIN — FAMOTIDINE 20 MILLIGRAM(S): 10 INJECTION INTRAVENOUS at 05:40

## 2019-08-15 RX ADMIN — LORATADINE 10 MILLIGRAM(S): 10 TABLET ORAL at 11:20

## 2019-08-15 RX ADMIN — Medication 60 MILLIGRAM(S): at 05:40

## 2019-08-15 RX ADMIN — Medication 2: at 07:35

## 2019-08-15 RX ADMIN — BENZOCAINE AND MENTHOL 1 LOZENGE: 5; 1 LIQUID ORAL at 05:40

## 2019-08-15 RX ADMIN — BENZOCAINE AND MENTHOL 1 LOZENGE: 5; 1 LIQUID ORAL at 11:21

## 2019-08-15 RX ADMIN — BENZOCAINE AND MENTHOL 1 LOZENGE: 5; 1 LIQUID ORAL at 00:38

## 2019-08-15 RX ADMIN — Medication 1 MILLIGRAM(S): at 11:20

## 2019-08-15 RX ADMIN — ALBUTEROL 2 PUFF(S): 90 AEROSOL, METERED ORAL at 20:35

## 2019-08-15 RX ADMIN — Medication 100 MILLIGRAM(S): at 05:40

## 2019-08-15 RX ADMIN — PANTOPRAZOLE SODIUM 40 MILLIGRAM(S): 20 TABLET, DELAYED RELEASE ORAL at 05:40

## 2019-08-15 RX ADMIN — Medication 1200 MILLIGRAM(S): at 05:39

## 2019-08-15 RX ADMIN — Medication 1 MILLIGRAM(S): at 23:59

## 2019-08-15 RX ADMIN — Medication 60 MILLIGRAM(S): at 13:29

## 2019-08-15 RX ADMIN — ALBUTEROL 2 PUFF(S): 90 AEROSOL, METERED ORAL at 08:57

## 2019-08-15 RX ADMIN — Medication 100 MILLIGRAM(S): at 22:37

## 2019-08-15 RX ADMIN — BUDESONIDE AND FORMOTEROL FUMARATE DIHYDRATE 2 PUFF(S): 160; 4.5 AEROSOL RESPIRATORY (INHALATION) at 08:57

## 2019-08-15 RX ADMIN — Medication 50 GRAM(S): at 13:29

## 2019-08-15 RX ADMIN — FAMOTIDINE 20 MILLIGRAM(S): 10 INJECTION INTRAVENOUS at 16:49

## 2019-08-15 RX ADMIN — Medication 1 MILLIGRAM(S): at 00:37

## 2019-08-15 RX ADMIN — Medication 60 MILLIGRAM(S): at 22:01

## 2019-08-15 NOTE — PROGRESS NOTE ADULT - SUBJECTIVE AND OBJECTIVE BOX
CHIEF COMPLAINT/INTERVAL HISTORY:    Patient is a 53y old  Male who presents with a chief complaint of SI, ETOH intoxication. ?pneumonittis (13 Aug 2019 16:45)      HPI:  ER HPI: "54 y/o M with PMHx of HTN, HLD, ?CHF and EtOH abuse presents to ED BIBA, restrained c/o suicidal attempt. As per EMS, pt was found in the parking lot across from [facility]. Pt was found saying suicidal thoughts and jumping in front of cars, ALOOB, and found with alcohol. Pt was agressive on ambulance, and given 10 Verset via IM. Hx unavailable bc pt unresponsive."     above HPI reviewed with the patient and admits the same. patient was discharged form [facility] on 8/8/2019 after stabilization, admitted with SI, depression. He has multiple ER visit at the end of with different complaints [ SOB, lethargy, BRBPR] and was discharged after stabilization. 8/1 discharged from ER to [facility] with steroid, neb for BA exacerbation. patient current reported depression anhedonia, SI. no HI. He drinks half galloon of vodka everyday, denied smoking cig or drug use. he is homeless. In ER, WBC was elevated with elevated lactate. CT chest neg for infection, but showed air/fluid level, better than before. CT head neg.  s/p 2L LR and zosyn and admission was called for ?pneumonitis and SI (09 Aug 2019 09:02)      SUBJECTIVE & OBJECTIVE/ ROS: Pt seen and examined at bedside. Insomnia improved. Pt denies visual & auditory hallucinations. Mild tremors & anxiety. He is yet SOB which is worse than before. As per him he had a rough night as his ambien was d/padmini & trazodone did not help.  No chest pain, palpitations, light headedness/dizziness,  fevers/chills, abdominal pain, n/v, diarrhea/constipation, dysuria or increased urinary frequency.     ICU Vital Signs Last 24 Hrs  T(C): 36.5 (14 Aug 2019 09:53), Max: 36.6 (13 Aug 2019 16:03)  T(F): 97.7 (14 Aug 2019 09:53), Max: 97.8 (13 Aug 2019 16:03)  HR: 55 (14 Aug 2019 09:53) (55 - 104)  BP: 142/89 (14 Aug 2019 09:53) (133/78 - 154/95)  BP(mean): --  ABP: --  ABP(mean): --  RR: 20 (14 Aug 2019 09:53) (18 - 20)  SpO2: 95% (13 Aug 2019 20:24) (95% - 96%)        MEDICATIONS  (STANDING):  ALBUTerol/ipratropium for Nebulization 3 milliLiter(s) Nebulizer every 6 hours  benzocaine 15 mG/menthol 3.6 mG (Sugar-Free) Lozenge 1 Lozenge Oral four times a day  buDESOnide 160 MICROgram(s)/formoterol 4.5 MICROgram(s) Inhaler 2 Puff(s) Inhalation two times a day  dextrose 5%. 1000 milliLiter(s) (50 mL/Hr) IV Continuous <Continuous>  dextrose 50% Injectable 12.5 Gram(s) IV Push once  dextrose 50% Injectable 25 Gram(s) IV Push once  dextrose 50% Injectable 25 Gram(s) IV Push once  docusate sodium 100 milliGRAM(s) Oral two times a day  doxycycline hyclate Capsule 100 milliGRAM(s) Oral every 12 hours  enoxaparin Injectable 40 milliGRAM(s) SubCutaneous daily  famotidine    Tablet 20 milliGRAM(s) Oral two times a day  folic acid 1 milliGRAM(s) Oral daily  guaiFENesin ER 1200 milliGRAM(s) Oral every 12 hours  insulin lispro (HumaLOG) corrective regimen sliding scale   SubCutaneous three times a day before meals  loratadine 10 milliGRAM(s) Oral daily  methylPREDNISolone sodium succinate Injectable 60 milliGRAM(s) IV Push every 8 hours  mirtazapine 45 milliGRAM(s) Oral at bedtime  multivitamin 1 Tablet(s) Oral daily  pantoprazole    Tablet 40 milliGRAM(s) Oral before breakfast  polyethylene glycol 3350 17 Gram(s) Oral daily  senna 2 Tablet(s) Oral at bedtime  thiamine 100 milliGRAM(s) Oral daily    MEDICATIONS  (PRN):  dextrose 40% Gel 15 Gram(s) Oral once PRN Blood Glucose LESS THAN 70 milliGRAM(s)/deciliter  glucagon  Injectable 1 milliGRAM(s) IntraMuscular once PRN Glucose LESS THAN 70 milligrams/deciliter  LORazepam   Injectable 2 milliGRAM(s) IV Push every 1 hour PRN CIWA-Ar score 8 or greater  traZODone 50 milliGRAM(s) Oral at bedtime PRN insomnia      LABS:                        10.4   17.44 )-----------( 370      ( 14 Aug 2019 08:02 )             32.7     08-14    137  |  99  |  31.0<H>  ----------------------------<  219<H>  4.9   |  25.0  |  0.92    Ca    9.3      14 Aug 2019 08:02  Phos  4.4     08-14  Mg     2.2     08-14            CAPILLARY BLOOD GLUCOSE      POCT Blood Glucose.: 208 mg/dL (14 Aug 2019 07:44)  POCT Blood Glucose.: 275 mg/dL (13 Aug 2019 21:27)  POCT Blood Glucose.: 213 mg/dL (13 Aug 2019 16:35)  POCT Blood Glucose.: 167 mg/dL (13 Aug 2019 11:39)      RECENT CULTURES:      RADIOLOGY & ADDITIONAL TESTS:      PHYSICAL EXAM:      GENERAL: Not in distress. Alert    HEENT:  Normocephalic and atraumatic.   NECK: Supple.    CARDIOVASCULAR: RRR S1, S2. No murmur/rubs/gallop  LUNGS: b/l diffuse wheezing  ABDOMEN: ND. Soft,  NT, no guarding / rebound / rigidity. BS normoactive. No CVA tenderness.    EXTREMITIES: no edema.   SKIN: warm and dry  NEUROLOGIC: AAO*3. grossly intact  PSYCHIATRIC: Calm.  mild agitation.

## 2019-08-15 NOTE — SWALLOW BEDSIDE ASSESSMENT ADULT - ASR SWALLOW ASPIRATION MONITOR
oral hygiene/position upright (90Y)/change of breathing pattern/cough/gurgly voice/upper respiratory infection/fever/pneumonia/throat clearing

## 2019-08-15 NOTE — SWALLOW BEDSIDE ASSESSMENT ADULT - COMMENTS
As per MD note:   "54 y/o M with PMHx of HTN, HLD, ?CHF and EtOH abuse presents to ED BIBA, restrained c/o suicidal attempt. As per EMS, pt was found in the parking lot across from [facility]. Pt was found saying suicidal thoughts and jumping in front of cars, ALOOB, and found with alcohol. Pt was agressive on ambulance, and given 10 Verset via IM. Hx unavailable bc pt unresponsive."

## 2019-08-15 NOTE — SWALLOW BEDSIDE ASSESSMENT ADULT - SWALLOW EVAL: DIAGNOSIS
Oral & pharyngeal stage of swallow clinically judged to be unremarkable for puree, regular, & thin liquids, with no overt s/s of penetration & aspiration.

## 2019-08-15 NOTE — PROGRESS NOTE ADULT - ASSESSMENT
Recurrent bronchospasm with possible achalasia on CCT and aspiration    Plan:  No change steroids  magnesium  swallow eval  aspiration precautions  GI eval

## 2019-08-15 NOTE — PROGRESS NOTE ADULT - ASSESSMENT
54 y/o M with PMHx of HTN, HLD, ?CHF and EtOH abuse presents to ED BIBA, restrained c/o suicidal attempt. As per EMS, pt was found in the parking lot across from [facility]. Pt was found saying suicidal thoughts and jumping in front of cars, ALOOB, and found with alcohol. Pt was agressive on ambulance, and given 10 Verset via IM. Hx unavailable bc pt unresponsive. patient was discharged form [facility] on 8/8/2019 after stabilization, admitted with SI, depression. He has multiple ER visit at the end of with different complaints [ SOB, lethargy, BRBPR] and was discharged after stabilization. 8/1 discharged from ER to [facility] with steroid, neb for BA exacerbation. patient current reported depression anhedonia, SI. no HI. He drinks half galloon of vodka everyday, denied smoking cig or drug use. he is homeless. In ER, WBC was elevated with elevated lactate. CT chest neg for infection, but showed air/fluid level, better than before. CT head neg.  s/p 2L LR and zosyn and admission was called for ?pneumonitis and SI. found tohave diffuse wheezing and SOB.         SOB/wheezing likely from Asthma exacerbation/ asthmatic bronchitis  r/o microaspiration-  Pt appears euvolemic, CXR clear, No leg edema, PND or orthopnea. No hx of CHF. IV lasix was d/padmini  Yet with wheezing r/o microaspiration  (The esophagus is slightly distended with air and fluid on CT chest)  S & S eval called, elevated HOB, c/w PPI & H2 blockers. Disccussed with Dr. Dover  c/w neb, c/w steroids IV 60 q6, O2 prn  c/w doxycycline PO x 5 days for bronchitis  Mucinex & zyrtec for productive cough  c/w Inhaled steroids  needs controller medications ( generic advair 250) and home neb as out pt if able   Echo: grade 1 DD, Ef 65-70%  CXR: clear on admission repeat CXR -ve  Ct chest: No acute findings in the thorax.No pneumomediastinum or pneumothorax.The esophagus is slightly distended with air and fluid, less severe compared to the previous study of June 28, 2018.  ABG noted  Leukocytosis & elevated BUN likely from recent steroid use  procal slightly elevated  lactic acidosis likely from asthma exacerbation, s/p IVF, lactate normalized  Daily PEF  Pulm consult appreciated  PFTs as out pt          Leukocytosis likely from recent steroid use vs ?pneumonitis/ bronchitis-  UA neg, Bc NGTD  CXR -ve  RVP -ve  Procal wnl  Added doxycyline for bronchitis      GERD- PPI & H2  blockers        depression/SI:   Disccussed with Dr. Pacheco, Increased Remeron & trazodone per psych  psych cx appreciated, Patient needs further psychiatric safety assessment- [facility] on d/c  c/w 1:1  haldol PRN for agitation    ETOH abuse:  Pt was in [facility] for weeks & detoxed there, got discharged & had etoh prior to arrival but do not suspect withdrawal as per psych  No need for librium as per psych  Thiamine/ folate  SW eval    HTN: not on meds    ?DM: ISS, accucheck, A1c 6.2.   start lipitor     DVT-P: lovenox  aspiration precautions  fall precaution

## 2019-08-15 NOTE — SWALLOW BEDSIDE ASSESSMENT ADULT - SLP GENERAL OBSERVATIONS
Pt received & seen seated upright, +awake/alert, +fully oriented, +baseline expiratory wheezing, +0/10 pain, +1:1 present.

## 2019-08-15 NOTE — SWALLOW BEDSIDE ASSESSMENT ADULT - SWALLOW EVAL: RECOMMENDED FEEDING/EATING TECHNIQUES
oral hygiene/small sips/bites/crush medication (when feasible)/position upright (90 degrees)/maintain upright posture during/after eating for 30 mins

## 2019-08-15 NOTE — PROGRESS NOTE ADULT - SUBJECTIVE AND OBJECTIVE BOX
PULMONARY PROGRESS NOTE      NISREEN MARTINEZYENNY-[id number]    Patient is a 53y old  Male who presents with a chief complaint of SI, ETOH intoxication. ?pneumonittis (15 Aug 2019 11:07)      INTERVAL HPI/OVERNIGHT EVENTS:  c/o acute onset of wheeze this am without noted aspiration or pain  Increased SOB    MEDICATIONS  (STANDING):  ALBUTerol    90 MICROgram(s) HFA Inhaler 2 Puff(s) Inhalation every 6 hours  atorvastatin 20 milliGRAM(s) Oral at bedtime  benzocaine 15 mG/menthol 3.6 mG (Sugar-Free) Lozenge 1 Lozenge Oral four times a day  buDESOnide 160 MICROgram(s)/formoterol 4.5 MICROgram(s) Inhaler 2 Puff(s) Inhalation two times a day  dextrose 5%. 1000 milliLiter(s) (50 mL/Hr) IV Continuous <Continuous>  dextrose 50% Injectable 12.5 Gram(s) IV Push once  dextrose 50% Injectable 25 Gram(s) IV Push once  dextrose 50% Injectable 25 Gram(s) IV Push once  docusate sodium 100 milliGRAM(s) Oral two times a day  doxycycline hyclate Capsule 100 milliGRAM(s) Oral every 12 hours  enoxaparin Injectable 40 milliGRAM(s) SubCutaneous daily  famotidine    Tablet 20 milliGRAM(s) Oral two times a day  folic acid 1 milliGRAM(s) Oral daily  guaiFENesin ER 1200 milliGRAM(s) Oral every 12 hours  insulin lispro (HumaLOG) corrective regimen sliding scale   SubCutaneous three times a day before meals  loratadine 10 milliGRAM(s) Oral daily  methylPREDNISolone sodium succinate Injectable 60 milliGRAM(s) IV Push every 8 hours  multivitamin 1 Tablet(s) Oral daily  pantoprazole    Tablet 40 milliGRAM(s) Oral before breakfast  polyethylene glycol 3350 17 Gram(s) Oral daily  senna 2 Tablet(s) Oral at bedtime  thiamine 100 milliGRAM(s) Oral daily  traZODone 100 milliGRAM(s) Oral at bedtime      MEDICATIONS  (PRN):  dextrose 40% Gel 15 Gram(s) Oral once PRN Blood Glucose LESS THAN 70 milliGRAM(s)/deciliter  glucagon  Injectable 1 milliGRAM(s) IntraMuscular once PRN Glucose LESS THAN 70 milligrams/deciliter  LORazepam   Injectable 2 milliGRAM(s) IV Push every 1 hour PRN CIWA-Ar score 8 or greater      Allergies    No Known Allergies    Intolerances        PAST MEDICAL & SURGICAL HISTORY:  Asthma  Alcohol abuse  High cholesterol  HTN (hypertension)  GERD (gastroesophageal reflux disease)  Alcohol abuse  High cholesterol  Hypertension  Alcohol abuse  No significant past surgical history  No significant past surgical history  No significant past surgical history      SOCIAL HISTORY  Smoking History:       REVIEW OF SYSTEMS:    CONSTITUTIONAL:  No distress    HEENT:  Eyes:  No diplopia or blurred vision. ENT:  No earache, sore throat or runny nose.    CARDIOVASCULAR:  No pressure, squeezing, tightness, heaviness or aching about the chest; no palpitations.    RESPIRATORY:  above    GASTROINTESTINAL:  No nausea, vomiting or diarrhea.    GENITOURINARY:  No dysuria, frequency or urgency.    NEUROLOGIC:  No paresthesias, fasciculations, seizures or weakness.    Extremities: No cyanosis, clubbing or edema    PSYCHIATRIC:  No disorder of thought or mood.    Vital Signs Last 24 Hrs  T(C): 37 (15 Aug 2019 07:56), Max: 37 (15 Aug 2019 07:56)  T(F): 98.6 (15 Aug 2019 07:56), Max: 98.6 (15 Aug 2019 07:56)  HR: 96 (15 Aug 2019 08:58) (77 - 113)  BP: 120/79 (15 Aug 2019 07:56) (107/70 - 126/83)  BP(mean): --  RR: 18 (15 Aug 2019 08:00) (18 - 20)  SpO2: 97% (15 Aug 2019 08:58) (93% - 98%)    PHYSICAL EXAMINATION:    GENERAL: The patient is awake and alert in no apparent distress.     HEENT: Head is normocephalic and atraumatic. Extraocular muscles are intact. Mucous membranes are moist.    NECK: Supple. some localized wheeze with change with position    LUNGS: Scattered wheezes with decreased BS florencio    HEART: Regular rate and rhythm without murmur.    ABDOMEN: Soft, nontender, and nondistended.  obese    EXTREMITIES: Without any cyanosis, clubbing, rash, lesions or edema.    NEUROLOGIC: Grossly intact.    LABS:                        10.2   17.08 )-----------( 366      ( 15 Aug 2019 06:42 )             32.6     08-15    136  |  97<L>  |  32.0<H>  ----------------------------<  207<H>  4.6   |  24.0  |  0.80    Ca    9.6      15 Aug 2019 06:42  Phos  5.4     08-15  Mg     2.0     08-15    TPro  7.1  /  Alb  4.1  /  TBili  <0.2<L>  /  DBili  0.0  /  AST  10  /  ALT  15  /  AlkPhos  48  08-14                        MICROBIOLOGY:  Rapid Respiratory Viral Panel (08.11.19 @ 20:39)    Rapid RVP Result: NotDete: This Respiratory Panel uses polymerase chain reaction (PCR) to detect for  adenovirus; coronavirus (HKU1, NL63, 229E, OC43); human metapneumovirus  (hMPV); human enterovirus/rhinovirus (Entero/RV); influenza A; influenza  A/H1; influenza A/H3; influenza A/H1-2009; influenza B; parainfluenza  viruses 1, 2, 3, 4; respiratory syncytial virus; Mycoplasma pneumoniae;  and Chlamydophila pneumoniae.    < from: CT Chest No Cont (08.08.19 @ 21:55) >     EXAM:  CT CHEST                          PROCEDURE DATE:  08/08/2019          INTERPRETATION:  CT of the chest    Indication: vomiting    Technique: Axial images were obtained from the thoracic inlet through   lung bases without oral or IV contrast. Reformatted coronal and sagittal   images were submitted.    Comparison: CT of 6/28/18    FINDINGS:    Evaluation of the solid abdominal organs is limited without contrast.    The visualized neck, axilla and subcutaneous tissues are unremarkable.    The tracheobronchial tree is patent centrally.  There is no mediastinal   hematoma.  The great vessels are not enlarged. Coronary atherosclerosis.   There is no significant mediastinal or hilar adenopathy.    Esophagus is slightly distended with airand fluid.  The heart is not enlarged.  There is no pericardial effusion.    Lungs: Dependent atelectatic changes at the lung bases    Pleura: Unremarkable    Bones: Unremarkable.  Subcutaneous tissues: Unremarkable.  Upper abdomen:  Small hiatal hernia.    IMPRESSION:     No acute findings in the thorax.  No pneumomediastinum or pneumothorax.  The esophagus is slightly distended with air and fluid, less severe   compared to the previous study of June 28, 2018.                BLAKE EDWARD M.D, ATTENDING RADIOLOGIST  This document has been electronically signed. Aug  8 2019 10:25PM    < end of copied text >      RADIOLOGY & ADDITIONAL STUDIES:

## 2019-08-16 LAB
ANION GAP SERPL CALC-SCNC: 13 MMOL/L — SIGNIFICANT CHANGE UP (ref 5–17)
BASOPHILS # BLD AUTO: 0.06 K/UL — SIGNIFICANT CHANGE UP (ref 0–0.2)
BASOPHILS NFR BLD AUTO: 0.4 % — SIGNIFICANT CHANGE UP (ref 0–2)
BUN SERPL-MCNC: 29 MG/DL — HIGH (ref 8–20)
CALCIUM SERPL-MCNC: 9.5 MG/DL — SIGNIFICANT CHANGE UP (ref 8.6–10.2)
CHLORIDE SERPL-SCNC: 95 MMOL/L — LOW (ref 98–107)
CO2 SERPL-SCNC: 25 MMOL/L — SIGNIFICANT CHANGE UP (ref 22–29)
CREAT SERPL-MCNC: 0.82 MG/DL — SIGNIFICANT CHANGE UP (ref 0.5–1.3)
EOSINOPHIL # BLD AUTO: 0 K/UL — SIGNIFICANT CHANGE UP (ref 0–0.5)
EOSINOPHIL NFR BLD AUTO: 0 % — SIGNIFICANT CHANGE UP (ref 0–6)
GLUCOSE BLDC GLUCOMTR-MCNC: 181 MG/DL — HIGH (ref 70–99)
GLUCOSE BLDC GLUCOMTR-MCNC: 237 MG/DL — HIGH (ref 70–99)
GLUCOSE BLDC GLUCOMTR-MCNC: 246 MG/DL — HIGH (ref 70–99)
GLUCOSE BLDC GLUCOMTR-MCNC: 253 MG/DL — HIGH (ref 70–99)
GLUCOSE SERPL-MCNC: 189 MG/DL — HIGH (ref 70–115)
HCT VFR BLD CALC: 31.7 % — LOW (ref 39–50)
HGB BLD-MCNC: 10 G/DL — LOW (ref 13–17)
IMM GRANULOCYTES NFR BLD AUTO: 9 % — HIGH (ref 0–1.5)
LYMPHOCYTES # BLD AUTO: 0.85 K/UL — LOW (ref 1–3.3)
LYMPHOCYTES # BLD AUTO: 5.3 % — LOW (ref 13–44)
MAGNESIUM SERPL-MCNC: 2.2 MG/DL — SIGNIFICANT CHANGE UP (ref 1.6–2.6)
MCHC RBC-ENTMCNC: 27.2 PG — SIGNIFICANT CHANGE UP (ref 27–34)
MCHC RBC-ENTMCNC: 31.5 GM/DL — LOW (ref 32–36)
MCV RBC AUTO: 86.4 FL — SIGNIFICANT CHANGE UP (ref 80–100)
MONOCYTES # BLD AUTO: 1.02 K/UL — HIGH (ref 0–0.9)
MONOCYTES NFR BLD AUTO: 6.3 % — SIGNIFICANT CHANGE UP (ref 2–14)
NEUTROPHILS # BLD AUTO: 12.72 K/UL — HIGH (ref 1.8–7.4)
NEUTROPHILS NFR BLD AUTO: 79 % — HIGH (ref 43–77)
PHOSPHATE SERPL-MCNC: 4.9 MG/DL — HIGH (ref 2.4–4.7)
PLATELET # BLD AUTO: 366 K/UL — SIGNIFICANT CHANGE UP (ref 150–400)
POTASSIUM SERPL-MCNC: 4.8 MMOL/L — SIGNIFICANT CHANGE UP (ref 3.5–5.3)
POTASSIUM SERPL-SCNC: 4.8 MMOL/L — SIGNIFICANT CHANGE UP (ref 3.5–5.3)
RBC # BLD: 3.67 M/UL — LOW (ref 4.2–5.8)
RBC # FLD: 17.2 % — HIGH (ref 10.3–14.5)
SODIUM SERPL-SCNC: 133 MMOL/L — LOW (ref 135–145)
WBC # BLD: 16.1 K/UL — HIGH (ref 3.8–10.5)
WBC # FLD AUTO: 16.1 K/UL — HIGH (ref 3.8–10.5)

## 2019-08-16 PROCEDURE — 99232 SBSQ HOSP IP/OBS MODERATE 35: CPT

## 2019-08-16 PROCEDURE — 74220 X-RAY XM ESOPHAGUS 1CNTRST: CPT | Mod: 26

## 2019-08-16 PROCEDURE — 99233 SBSQ HOSP IP/OBS HIGH 50: CPT

## 2019-08-16 PROCEDURE — 99222 1ST HOSP IP/OBS MODERATE 55: CPT

## 2019-08-16 RX ORDER — PANTOPRAZOLE SODIUM 20 MG/1
40 TABLET, DELAYED RELEASE ORAL
Refills: 0 | Status: DISCONTINUED | OUTPATIENT
Start: 2019-08-16 | End: 2019-08-17

## 2019-08-16 RX ORDER — QUETIAPINE FUMARATE 200 MG/1
100 TABLET, FILM COATED ORAL
Refills: 0 | Status: DISCONTINUED | OUTPATIENT
Start: 2019-08-16 | End: 2019-08-18

## 2019-08-16 RX ORDER — MIRTAZAPINE 45 MG/1
45 TABLET, ORALLY DISINTEGRATING ORAL AT BEDTIME
Refills: 0 | Status: DISCONTINUED | OUTPATIENT
Start: 2019-08-16 | End: 2019-08-20

## 2019-08-16 RX ORDER — LANOLIN ALCOHOL/MO/W.PET/CERES
3 CREAM (GRAM) TOPICAL AT BEDTIME
Refills: 0 | Status: DISCONTINUED | OUTPATIENT
Start: 2019-08-16 | End: 2019-08-16

## 2019-08-16 RX ADMIN — PANTOPRAZOLE SODIUM 40 MILLIGRAM(S): 20 TABLET, DELAYED RELEASE ORAL at 16:44

## 2019-08-16 RX ADMIN — BUDESONIDE AND FORMOTEROL FUMARATE DIHYDRATE 2 PUFF(S): 160; 4.5 AEROSOL RESPIRATORY (INHALATION) at 09:06

## 2019-08-16 RX ADMIN — QUETIAPINE FUMARATE 100 MILLIGRAM(S): 200 TABLET, FILM COATED ORAL at 20:01

## 2019-08-16 RX ADMIN — ALBUTEROL 2 PUFF(S): 90 AEROSOL, METERED ORAL at 20:35

## 2019-08-16 RX ADMIN — Medication 1 TABLET(S): at 11:09

## 2019-08-16 RX ADMIN — BUDESONIDE AND FORMOTEROL FUMARATE DIHYDRATE 2 PUFF(S): 160; 4.5 AEROSOL RESPIRATORY (INHALATION) at 20:39

## 2019-08-16 RX ADMIN — Medication 4: at 11:10

## 2019-08-16 RX ADMIN — BENZOCAINE AND MENTHOL 1 LOZENGE: 5; 1 LIQUID ORAL at 05:35

## 2019-08-16 RX ADMIN — PANTOPRAZOLE SODIUM 40 MILLIGRAM(S): 20 TABLET, DELAYED RELEASE ORAL at 05:34

## 2019-08-16 RX ADMIN — Medication 60 MILLIGRAM(S): at 05:35

## 2019-08-16 RX ADMIN — ALBUTEROL 2 PUFF(S): 90 AEROSOL, METERED ORAL at 15:17

## 2019-08-16 RX ADMIN — Medication 1 MILLIGRAM(S): at 11:09

## 2019-08-16 RX ADMIN — Medication 1200 MILLIGRAM(S): at 16:44

## 2019-08-16 RX ADMIN — Medication 60 MILLIGRAM(S): at 13:51

## 2019-08-16 RX ADMIN — Medication 60 MILLIGRAM(S): at 21:04

## 2019-08-16 RX ADMIN — Medication 100 MILLIGRAM(S): at 05:34

## 2019-08-16 RX ADMIN — BENZOCAINE AND MENTHOL 1 LOZENGE: 5; 1 LIQUID ORAL at 16:44

## 2019-08-16 RX ADMIN — BENZOCAINE AND MENTHOL 1 LOZENGE: 5; 1 LIQUID ORAL at 11:10

## 2019-08-16 RX ADMIN — Medication 2: at 07:29

## 2019-08-16 RX ADMIN — ATORVASTATIN CALCIUM 20 MILLIGRAM(S): 80 TABLET, FILM COATED ORAL at 21:04

## 2019-08-16 RX ADMIN — ENOXAPARIN SODIUM 40 MILLIGRAM(S): 100 INJECTION SUBCUTANEOUS at 11:12

## 2019-08-16 RX ADMIN — FAMOTIDINE 20 MILLIGRAM(S): 10 INJECTION INTRAVENOUS at 05:34

## 2019-08-16 RX ADMIN — LORATADINE 10 MILLIGRAM(S): 10 TABLET ORAL at 11:09

## 2019-08-16 RX ADMIN — MIRTAZAPINE 45 MILLIGRAM(S): 45 TABLET, ORALLY DISINTEGRATING ORAL at 21:05

## 2019-08-16 RX ADMIN — Medication 100 MILLIGRAM(S): at 16:44

## 2019-08-16 RX ADMIN — Medication 100 MILLIGRAM(S): at 11:09

## 2019-08-16 RX ADMIN — Medication 4: at 16:45

## 2019-08-16 RX ADMIN — FAMOTIDINE 20 MILLIGRAM(S): 10 INJECTION INTRAVENOUS at 16:44

## 2019-08-16 RX ADMIN — ALBUTEROL 2 PUFF(S): 90 AEROSOL, METERED ORAL at 09:06

## 2019-08-16 NOTE — PROGRESS NOTE ADULT - ASSESSMENT
Assess    GERD with bronchospasm  Some asthma not exclude  No significant aspiration  Depression  ETOH  Insomnia    Rec    Truncal elevation at night  Avoid food within 90 minutes of hs  Antacid per GI  Insomnia/depression per psych  Neb  Steroid taper  Toward LABA/ICS

## 2019-08-16 NOTE — DIETITIAN INITIAL EVALUATION ADULT. - ADD RECOMMEND
Rx probiotic to maintain gut integrity Rx probiotic to maintain gut integrity; Continue MVI, folic acid, thiamine daily

## 2019-08-16 NOTE — CONSULT NOTE ADULT - SUBJECTIVE AND OBJECTIVE BOX
HPI: 54 y/o Man with PMHx of HTN, HLD, ?CHF and EtOH abuse was admitted for suicide attempt. He was found in the parking lot across from [facility]. Patient was discharged form [facility] on 8/8/2019 after stabilization, admitted with SI, depression. He drinks half galloon of vodka everyday, denied smoking cigarette or drug use. He is homeless. CT chest neg for infection, but showed air/fluid level, better than before. CT head neg.  s/p 2L LR and zosyn and admission was called for ?pneumonitis.    Patient is complaining of life long reflux. He has occasional regurgitation. He had been on prilosec and also nexium in the past. Now he is able to eat well. No other complaints. He is on 1:1 observation. No previos EGD or colonoscopy.     PAST MEDICAL & SURGICAL HISTORY:  Asthma  Alcohol abuse  High cholesterol  HTN (hypertension)  GERD (gastroesophageal reflux disease)  Alcohol abuse  High cholesterol  Hypertension  Alcohol abuse  No significant past surgical history      ROS:  No Heartburn, regurgitation, dysphagia, odynophagia.  No dyspepsia  No abdominal pain.    No Nausea, vomiting.  No Bleeding.  No hematemesis.   No diarrhea.    No hematochezia  No weight loss, anorexia.  No edema.      MEDICATIONS  (STANDING):  ALBUTerol    90 MICROgram(s) HFA Inhaler 2 Puff(s) Inhalation every 6 hours  atorvastatin 20 milliGRAM(s) Oral at bedtime  benzocaine 15 mG/menthol 3.6 mG (Sugar-Free) Lozenge 1 Lozenge Oral four times a day  buDESOnide 160 MICROgram(s)/formoterol 4.5 MICROgram(s) Inhaler 2 Puff(s) Inhalation two times a day  dextrose 5%. 1000 milliLiter(s) (50 mL/Hr) IV Continuous <Continuous>  dextrose 50% Injectable 12.5 Gram(s) IV Push once  dextrose 50% Injectable 25 Gram(s) IV Push once  dextrose 50% Injectable 25 Gram(s) IV Push once  docusate sodium 100 milliGRAM(s) Oral two times a day  doxycycline hyclate Capsule 100 milliGRAM(s) Oral every 12 hours  enoxaparin Injectable 40 milliGRAM(s) SubCutaneous daily  famotidine    Tablet 20 milliGRAM(s) Oral two times a day  folic acid 1 milliGRAM(s) Oral daily  guaiFENesin ER 1200 milliGRAM(s) Oral every 12 hours  insulin lispro (HumaLOG) corrective regimen sliding scale   SubCutaneous three times a day before meals  loratadine 10 milliGRAM(s) Oral daily  methylPREDNISolone sodium succinate Injectable 60 milliGRAM(s) IV Push every 8 hours  mirtazapine 45 milliGRAM(s) Oral at bedtime  multivitamin 1 Tablet(s) Oral daily  pantoprazole    Tablet 40 milliGRAM(s) Oral before breakfast  polyethylene glycol 3350 17 Gram(s) Oral daily  senna 2 Tablet(s) Oral at bedtime  thiamine 100 milliGRAM(s) Oral daily    MEDICATIONS  (PRN):  dextrose 40% Gel 15 Gram(s) Oral once PRN Blood Glucose LESS THAN 70 milliGRAM(s)/deciliter  glucagon  Injectable 1 milliGRAM(s) IntraMuscular once PRN Glucose LESS THAN 70 milligrams/deciliter      Allergies    No Known Allergies    Intolerances        SOCIAL HISTORY:No smoking. Alcohol abuse. No drugs    ENDOSCOPIC/GI HISTORY: No EGD or colonoscopy.    FAMILY HISTORY:  FH: heart disease      Vital Signs Last 24 Hrs  T(C): 37.2 (15 Aug 2019 23:28), Max: 37.2 (15 Aug 2019 23:28)  T(F): 99 (15 Aug 2019 23:28), Max: 99 (15 Aug 2019 23:28)  HR: 104 (16 Aug 2019 09:08) (95 - 105)  BP: 117/75 (15 Aug 2019 23:28) (117/75 - 117/75)  BP(mean): --  RR: 18 (16 Aug 2019 08:00) (1 - 18)  SpO2: 96% (16 Aug 2019 09:08) (95% - 99%)    PHYSICAL EXAM:    GENERAL: NAD, well-groomed, well-developed  HEAD:  Atraumatic, Normocephalic  EYES: EOMI, PERRLA, conjunctiva and sclera clear  ENMT: No tonsillar erythema, exudates, or enlargement; Moist mucous membranes, Good dentition, No lesions  NECK: Supple, No JVD, Normal thyroid  CHEST/LUNG:Coarse breath sounds bilaterally  HEART: Regular rate and rhythm; No murmurs, rubs, or gallops  ABDOMEN: Soft, Nontender, Nondistended; Bowel sounds present  EXTREMITIES:  2+ Peripheral Pulses, No clubbing, cyanosis, or edema  LYMPH: No lymphadenopathy noted  SKIN: No rashes or lesions      LABS:                        10.0   16.10 )-----------( 366      ( 16 Aug 2019 07:58 )             31.7     08-16    133<L>  |  95<L>  |  29.0<H>  ----------------------------<  189<H>  4.8   |  25.0  |  0.82    Ca    9.5      16 Aug 2019 07:58  Phos  4.9     08-16  Mg     2.2     08-16    TPro  7.1  /  Alb  4.1  /  TBili  <0.2<L>  /  DBili  0.0  /  AST  10  /  ALT  15  /  AlkPhos  48  08-14           LIVER FUNCTIONS - ( 14 Aug 2019 11:56 )  Alb: 4.1 g/dL / Pro: 7.1 g/dL / ALK PHOS: 48 U/L / ALT: 15 U/L / AST: 10 U/L / GGT: x               RADIOLOGY & ADDITIONAL STUDIES:  < from: CT Chest No Cont (08.08.19 @ 21:55) >     EXAM:  CT CHEST                          PROCEDURE DATE:  08/08/2019          INTERPRETATION:  CT of the chest    Indication: vomiting    Technique: Axial images were obtained from the thoracic inlet through   lung bases without oral or IV contrast. Reformatted coronal and sagittal   images were submitted.    Comparison: CT of 6/28/18    FINDINGS:    Evaluation of the solid abdominal organs is limited without contrast.    The visualized neck, axilla and subcutaneous tissues are unremarkable.    The tracheobronchial tree is patent centrally.  There is no mediastinal   hematoma.  The great vessels are not enlarged. Coronary atherosclerosis.   There is no significant mediastinal or hilar adenopathy.    Esophagus is slightly distended with airand fluid.  The heart is not enlarged.  There is no pericardial effusion.    Lungs: Dependent atelectatic changes at the lung bases    Pleura: Unremarkable    Bones: Unremarkable.  Subcutaneous tissues: Unremarkable.  Upper abdomen:  Small hiatal hernia.    IMPRESSION:     No acute findings in the thorax.  No pneumomediastinum or pneumothorax.  The esophagus is slightly distended with air and fluid, less severe   compared to the previous study of June 28, 2018.                BLAKE EDWARD M.D, ATTENDING RADIOLOGIST  This document has been electronically signed. Aug  8 2019 10:25PM                  < end of copied text >
PULMONARY CONSULT NOTE      NISREEN MARTINEZYENNY-[id number]    Patient is a 53y old  Male who presents with a chief complaint of SI, ETOH intoxication. ?pneumonittis (13 Aug 2019 09:57)  ER HPI: "54 y/o M with PMHx of HTN, HLD, ?CHF and EtOH abuse presents to ED BIBA, restrained c/o suicidal attempt. As per EMS, pt was found in the parking lot across from [facility]. Pt was found saying suicidal thoughts and jumping in front of cars, ALOOB, and found with alcohol. Pt was agressive on ambulance, and given 10 Verset via IM. Hx unavailable bc pt unresponsive."     above HPI reviewed with the patient and admits the same. patient was discharged form [facility] on 8/8/2019 after stabilization, admitted with SI, depression. He has multiple ER visit at the end of with different complaints [ SOB, lethargy, BRBPR] and was discharged after stabilization. 8/1 discharged from ER to [facility] with steroid, neb for BA exacerbation. patient current reported depression anhedonia, SI. no HI. He drinks half galloon of vodka everyday, denied smoking cig or drug use. he is homeless. In ER, WBC was elevated with elevated lactate. CT chest neg for infection, but showed air/fluid level, better than before. CT head neg.  s/p 2L LR and zosyn and admission was called for ?pneumonitis and SI      HISTORY OF PRESENT ILLNESS:  dyspnea improved, still has some chest congestion  hx asthma, former smoker  uses inhaled prn, not sure of any recent ER visits  discolored sputum  no chest pain, no pets    MEDICATIONS  (STANDING):  buDESOnide 160 MICROgram(s)/formoterol 4.5 MICROgram(s) Inhaler 2 Puff(s) Inhalation two times a day  dextrose 5%. 1000 milliLiter(s) (50 mL/Hr) IV Continuous <Continuous>  dextrose 50% Injectable 12.5 Gram(s) IV Push once  dextrose 50% Injectable 25 Gram(s) IV Push once  dextrose 50% Injectable 25 Gram(s) IV Push once  docusate sodium 100 milliGRAM(s) Oral two times a day  enoxaparin Injectable 40 milliGRAM(s) SubCutaneous daily  famotidine    Tablet 20 milliGRAM(s) Oral two times a day  folic acid 1 milliGRAM(s) Oral daily  insulin lispro (HumaLOG) corrective regimen sliding scale   SubCutaneous three times a day before meals  methylPREDNISolone sodium succinate Injectable 60 milliGRAM(s) IV Push every 8 hours  mirtazapine 30 milliGRAM(s) Oral at bedtime  multivitamin 1 Tablet(s) Oral daily  pantoprazole    Tablet 40 milliGRAM(s) Oral before breakfast  polyethylene glycol 3350 17 Gram(s) Oral daily  senna 2 Tablet(s) Oral at bedtime  thiamine 100 milliGRAM(s) Oral daily      MEDICATIONS  (PRN):  ALBUTerol/ipratropium for Nebulization 3 milliLiter(s) Nebulizer every 6 hours PRN Shortness of Breath and/or Wheezing  chlordiazePOXIDE 25 milliGRAM(s) Oral every 6 hours PRN Anxiety and/or Alcohol Withdrawal Symptoms  dextrose 40% Gel 15 Gram(s) Oral once PRN Blood Glucose LESS THAN 70 milliGRAM(s)/deciliter  glucagon  Injectable 1 milliGRAM(s) IntraMuscular once PRN Glucose LESS THAN 70 milligrams/deciliter  zolpidem 5 milliGRAM(s) Oral at bedtime PRN Insomnia      Allergies    No Known Allergies    Intolerances        PAST MEDICAL & SURGICAL HISTORY:  Asthma  Alcohol abuse  High cholesterol  HTN (hypertension)  GERD (gastroesophageal reflux disease)  Alcohol abuse  High cholesterol  Hypertension  Alcohol abuse  No significant past surgical history  No significant past surgical history  No significant past surgical history      FAMILY HISTORY:  FH: heart disease      SOCIAL HISTORY  Smoking History:     REVIEW OF SYSTEMS:    CONSTITUTIONAL:  No fevers, chills, sweats    HEENT:  Eyes:  No diplopia or blurred vision. ENT:  No earache, sore throat or runny nose.    CARDIOVASCULAR:  No pressure, squeezing, tightness, or heaviness about the chest; no palpitations.    RESPIRATORY:  see HPI    GASTROINTESTINAL:  No abdominal pain, nausea, vomiting or diarrhea.    GENITOURINARY:  No dysuria, frequency or urgency.    NEUROLOGIC:  No paresthesias, fasciculations, seizures or weakness.    PSYCHIATRIC:  No disorder of thought or mood.    Vital Signs Last 24 Hrs  T(C): 36.6 (13 Aug 2019 16:03), Max: 37.2 (12 Aug 2019 20:39)  T(F): 97.8 (13 Aug 2019 16:03), Max: 99 (13 Aug 2019 07:55)  HR: 101 (13 Aug 2019 16:03) (89 - 105)  BP: 146/89 (13 Aug 2019 16:03) (111/71 - 146/89)  BP(mean): --  RR: 18 (13 Aug 2019 16:03) (18 - 20)  SpO2: 96% (13 Aug 2019 16:03) (92% - 96%)    PHYSICAL EXAMINATION:    GENERAL: The patient is a well-developed, well-nourished in no apparent distress.     HEENT: Head is normocephalic and atraumatic. Extraocular muscles are intact. Mucous membranes are moist.     NECK: Supple.     LUNGS: moderate air entry bilaterally with mostly upper airway  rhonchi. Respirations unlabored    HEART: Regular rate and rhythm without murmur.    ABDOMEN: Soft, nontender, and nondistended.  No hepatosplenomegaly is noted.    EXTREMITIES: Without any cyanosis, clubbing, rash, lesions or edema.    NEUROLOGIC: Grossly intact.      LABS:                        10.3   16.69 )-----------( 350      ( 13 Aug 2019 07:24 )             32.9     08-13    136  |  98  |  25.0<H>  ----------------------------<  196<H>  4.5   |  26.0  |  0.85    Ca    9.7      13 Aug 2019 07:24  Phos  4.8     08-13  Mg     2.0     08-13          ABG - ( 12 Aug 2019 12:59 )  pH, Arterial: 7.42  pH, Blood: x     /  pCO2: 36    /  pO2: 80    / HCO3: 24    / Base Excess: -0.6  /  SaO2: 96                          Procalcitonin, Serum: 0.10 ng/mL (08-12-19 @ 11:37)  Procalcitonin, Serum: 0.11 ng/mL (08-11-19 @ 08:06)      MICROBIOLOGY:    RADIOLOGY & ADDITIONAL STUDIES:  cxr and CT scan reviewed

## 2019-08-16 NOTE — PROGRESS NOTE ADULT - ASSESSMENT
54 y/o M with PMHx of HTN, HLD, ?CHF and EtOH abuse presents to ED BIBA, restrained c/o suicidal attempt. As per EMS, pt was found in the parking lot across from [facility]. Pt was found saying suicidal thoughts and jumping in front of cars, ALOOB, and found with alcohol. Pt was agressive on ambulance, and given 10 Verset via IM. Hx unavailable bc pt unresponsive. patient was discharged form [facility] on 8/8/2019 after stabilization, admitted with SI, depression. He has multiple ER visit at the end of with different complaints [ SOB, lethargy, BRBPR] and was discharged after stabilization. 8/1 discharged from ER to [facility] with steroid, neb for BA exacerbation. patient current reported depression anhedonia, SI. no HI. He drinks half galloon of vodka everyday, denied smoking cig or drug use. he is homeless. In ER, WBC was elevated with elevated lactate. CT chest neg for infection, but showed air/fluid level, better than before. CT head neg.  s/p 2L LR and zosyn and admission was called for ?pneumonitis and SI. found tohave diffuse wheezing and SOB.         SOB/wheezing likely from Asthma exacerbation/ asthmatic bronchitis  r/o microaspiration due to achalasia-  Pt appears euvolemic, CXR clear, No leg edema, PND or orthopnea. No hx of CHF. IV lasix was d/padmini  Yet with wheezing r/o microaspiration  (The esophagus is slightly distended with air and fluid on CT chest)  S & S eval recs regular with thins  Elevated HOB (pt & RN counseled, he was found in prone position despite order)  c/w PPI & H2 blockers. Disccussed with Dr. Dover  GI consult called  c/w neb, continues to be on high dose steroids IV 60 q6, O2 prn  c/w doxycycline PO x 5 days for bronchitis  Mucinex & zyrtec for productive cough  c/w Inhaled steroids  needs controller medications ( generic advair 250) and home neb as out pt if able   Echo: grade 1 DD, Ef 65-70%  CXR: clear on admission repeat CXR -ve  Ct chest: No acute findings in the thorax.No pneumomediastinum or pneumothorax.The esophagus is slightly distended with air and fluid, less severe compared to the previous study of June 28, 2018.  ABG noted  Leukocytosis & elevated BUN likely from recent steroid use  procal slightly elevated  lactic acidosis likely from asthma exacerbation, s/p IVF, lactate normalized  Daily PEF  Pulm consult appreciated  PFTs as out pt          Leukocytosis likely from recent steroid use vs ?pneumonitis/ bronchitis-  UA neg, Bc NGTD  CXR -ve  RVP -ve  Procal wnl  Added doxycyline for bronchitis      GERD- PPI & H2  blockers        depression/SI:   Disccussed with Dr. Pacheco, Increased Remeron 45 qHS & trazodone 100 qHS per psych  psych cx appreciated, Patient needs further psychiatric safety assessment- [facility] on d/c  c/w 1:1  haldol PRN for agitation  Avoid BZs per psych due to hx of etoh & substance abuse    ETOH abuse:  Pt was in [facility] for weeks & detoxed there, got discharged & had etoh prior to arrival but do not suspect withdrawal as per psych  No need for librium as per psych  Thiamine/ folate  SW eval      Insomina- added metatonin      HTN: not on meds    ?DM: ISS, accucheck, A1c 6.2.   start lipitor     DVT-P: lovenox  aspiration precautions  fall precaution 52 y/o M with PMHx of HTN, HLD, ?CHF and EtOH abuse presents to ED BIBA, restrained c/o suicidal attempt. As per EMS, pt was found in the parking lot across from [facility]. Pt was found saying suicidal thoughts and jumping in front of cars, ALOOB, and found with alcohol. Pt was agressive on ambulance, and given 10 Verset via IM. Hx unavailable bc pt unresponsive. patient was discharged form [facility] on 8/8/2019 after stabilization, admitted with SI, depression. He has multiple ER visit at the end of with different complaints [ SOB, lethargy, BRBPR] and was discharged after stabilization. 8/1 discharged from ER to [facility] with steroid, neb for BA exacerbation. patient current reported depression anhedonia, SI. no HI. He drinks half galloon of vodka everyday, denied smoking cig or drug use. he is homeless. In ER, WBC was elevated with elevated lactate. CT chest neg for infection, but showed air/fluid level, better than before. CT head neg.  s/p 2L LR and zosyn and admission was called for ?pneumonitis and SI. found tohave diffuse wheezing and SOB.         SOB/wheezing likely from Asthma exacerbation/ asthmatic bronchitis  r/o microaspiration due to achalasia-  Pt appears euvolemic, CXR clear, No leg edema, PND or orthopnea. No hx of CHF. IV lasix was d/padmini  Yet with wheezing r/o microaspiration  (The esophagus is slightly distended with air and fluid on CT chest)  S & S eval recs regular with thins  Elevated HOB (pt & RN counseled, he was found in prone position despite order)  aspiration precautions  c/w PPI & H2 blockers. Disccussed with Dr. Dover  GI consult called  c/w neb, continues to be on high dose steroids IV 60 q6, O2 prn  magnesium x 1 given per pulm  c/w doxycycline PO x 5 days for bronchitis  Mucinex & zyrtec for productive cough  c/w Inhaled steroids  needs controller medications ( generic advair 250) and home neb as out pt if able   Echo: grade 1 DD, Ef 65-70%  CXR: clear on admission repeat CXR -ve  Ct chest: No acute findings in the thorax.No pneumomediastinum or pneumothorax.The esophagus is slightly distended with air and fluid, less severe compared to the previous study of June 28, 2018.  ABG noted  Leukocytosis & elevated BUN likely from recent steroid use  procal slightly elevated  lactic acidosis likely from asthma exacerbation, s/p IVF, lactate normalized  Daily PEF  Pulm consult appreciated  PFTs as out pt        ?Esophageal dysmotility-  Yet with wheezing despite maximum steroids r/o microaspiration    CT chest: The esophagus is slightly distended with air and fluid   S & S eval recs regular with thins  Elevated HOB (pt & RN counseled, he was found in prone position despite order)  Aspiration precautions  c/w PPI & H2 blockers. Disccussed with Dr. Dover  GI consult called  Deniesheart burn but admits to regurgitation of food particles at night.           Leukocytosis likely from recent steroid use vs ?pneumonitis/ bronchitis-  UA neg, Bc NGTD  CXR -ve  RVP -ve  Procal wnl  Added doxycyline for bronchitis      GERD- PPI & H2  blockers        depression/SI:   Disccussed with Dr. Pacheco, Increased Remeron 45 qHS & trazodone 100 qHS per psych  psych cx appreciated, Patient needs further psychiatric safety assessment- [facility] on d/c  c/w 1:1  haldol PRN for agitation  Avoid BZs per psych due to hx of etoh & substance abuse    ETOH abuse:  Pt was in [facility] for weeks & detoxed there, got discharged & had etoh prior to arrival but do not suspect withdrawal as per psych  No need for librium as per psych  Thiamine/ folate  SW eval      Insomina-   Insomnia had improved with ambien but worsening when ambien was taken off by psych & trazodone was started as per pt. As per him he had a rough night as his ambien was d/padmini & trazodone did not help. Appears agitated due to lack of sleep.   Added metatonin  Ambien not recommended as per Dr. Pacheco due to pt's addictive behavior      HTN: not on meds    ?DM: ISS, accucheck, A1c 6.2.   started lipitor       DVT-P: lovenox  aspiration precautions  fall precaution

## 2019-08-16 NOTE — PROGRESS NOTE ADULT - SUBJECTIVE AND OBJECTIVE BOX
PULMONARY PROGRESS NOTE      NISREEN MARTINEZYENNY-[id number]    Patient is a 54y old  Male who presents with a chief complaint of SI, ETOH intoxication. ?pneumonittis (16 Aug 2019 11:30)  ETOH  Depression  GERD with Bronchospasm  Insomnia    INTERVAL HPI/OVERNIGHT EVENTS:  Less cough and wheeze      MEDICATIONS  (STANDING):  ALBUTerol    90 MICROgram(s) HFA Inhaler 2 Puff(s) Inhalation every 6 hours  atorvastatin 20 milliGRAM(s) Oral at bedtime  benzocaine 15 mG/menthol 3.6 mG (Sugar-Free) Lozenge 1 Lozenge Oral four times a day  buDESOnide 160 MICROgram(s)/formoterol 4.5 MICROgram(s) Inhaler 2 Puff(s) Inhalation two times a day  dextrose 5%. 1000 milliLiter(s) (50 mL/Hr) IV Continuous <Continuous>  dextrose 50% Injectable 12.5 Gram(s) IV Push once  dextrose 50% Injectable 25 Gram(s) IV Push once  dextrose 50% Injectable 25 Gram(s) IV Push once  docusate sodium 100 milliGRAM(s) Oral two times a day  doxycycline hyclate Capsule 100 milliGRAM(s) Oral every 12 hours  enoxaparin Injectable 40 milliGRAM(s) SubCutaneous daily  famotidine    Tablet 20 milliGRAM(s) Oral two times a day  folic acid 1 milliGRAM(s) Oral daily  guaiFENesin ER 1200 milliGRAM(s) Oral every 12 hours  insulin lispro (HumaLOG) corrective regimen sliding scale   SubCutaneous three times a day before meals  loratadine 10 milliGRAM(s) Oral daily  methylPREDNISolone sodium succinate Injectable 60 milliGRAM(s) IV Push every 8 hours  mirtazapine 45 milliGRAM(s) Oral at bedtime  multivitamin 1 Tablet(s) Oral daily  pantoprazole  Injectable 40 milliGRAM(s) IV Push two times a day  polyethylene glycol 3350 17 Gram(s) Oral daily  QUEtiapine 100 milliGRAM(s) Oral <User Schedule>  senna 2 Tablet(s) Oral at bedtime  thiamine 100 milliGRAM(s) Oral daily      MEDICATIONS  (PRN):  dextrose 40% Gel 15 Gram(s) Oral once PRN Blood Glucose LESS THAN 70 milliGRAM(s)/deciliter  glucagon  Injectable 1 milliGRAM(s) IntraMuscular once PRN Glucose LESS THAN 70 milligrams/deciliter      Allergies    No Known Allergies    Intolerances        PAST MEDICAL & SURGICAL HISTORY:  Asthma  Alcohol abuse  High cholesterol  HTN (hypertension)  GERD (gastroesophageal reflux disease)  Alcohol abuse  High cholesterol  Hypertension  Alcohol abuse  No significant past surgical history  No significant past surgical history  No significant past surgical history      SOCIAL HISTORY  Smoking History:       REVIEW OF SYSTEMS:    CONSTITUTIONAL:  No distress    HEENT:  Eyes:  No diplopia or blurred vision. ENT:  No earache, sore throat or runny nose.    CARDIOVASCULAR:  No pressure, squeezing, tightness, heaviness or aching about the chest; no palpitations.    RESPIRATORY:  No PND or orthopnea. Mild SOBOE    GASTROINTESTINAL:  No nausea, vomiting or diarrhea.    GENITOURINARY:  No dysuria, frequency or urgency.    NEUROLOGIC:  No paresthesias, fasciculations, seizures or weakness.    PSYCHIATRIC:  No disorder of thought or mood.    Vital Signs Last 24 Hrs  T(C): 37.2 (15 Aug 2019 23:28), Max: 37.2 (15 Aug 2019 23:28)  T(F): 99 (15 Aug 2019 23:28), Max: 99 (15 Aug 2019 23:28)  HR: 104 (16 Aug 2019 09:08) (95 - 105)  BP: 117/75 (15 Aug 2019 23:28) (117/75 - 117/75)  BP(mean): --  RR: 18 (16 Aug 2019 08:00) (1 - 18)  SpO2: 96% (16 Aug 2019 09:08) (95% - 99%)    PHYSICAL EXAMINATION:    GENERAL: The patient is awake and alert in no apparent distress.     HEENT: Head is normocephalic and atraumatic. Extraocular muscles are intact. Mucous membranes are moist.    NECK: Supple.    LUNGS: End Exp wheeze to auscultation without rales or rhonchi; respirations unlabored    HEART: Regular rate and rhythm without murmur.    ABDOMEN: Soft, nontender, and nondistended.      EXTREMITIES: Without any cyanosis, clubbing, rash, lesions or edema.    NEUROLOGIC: Grossly intact.    LABS:                        10.0   16.10 )-----------( 366      ( 16 Aug 2019 07:58 )             31.7     08-16    133<L>  |  95<L>  |  29.0<H>  ----------------------------<  189<H>  4.8   |  25.0  |  0.82    Ca    9.5      16 Aug 2019 07:58  Phos  4.9     08-16  Mg     2.2     08-16        RADIOLOGY & ADDITIONAL STUDIES:       EXAM:  XR CHEST PORTABLE ROUTINE 1V                          PROCEDURE DATE:  08/12/2019          INTERPRETATION:  TECHNIQUE: Single portable view of the chest.    COMPARISON: 8/9/2019    CLINICAL HISTORY: dyspnoea. Cough after IVF, ?fluid   overloadCHF,HTN,depression, SI, ETOH abuse    FINDINGS:    Single frontal view of the chest demonstrates the lungs to be clear. The   cardiomediastinal silhouette is normal. No acute osseous abnormalities.   Overlying EKG leads and wires are noted    IMPRESSION: No acute cardiopulmonary disease process.                AD FAN M.D., ATTENDING RADIOLOGIST  This document has been electronically signed. Aug 12 2019 10:40AM

## 2019-08-16 NOTE — DIETITIAN INITIAL EVALUATION ADULT. - PERTINENT LABORATORY DATA
08-16 Na133 mmol/L<L> Glu 189 mg/dL<H> K+ 4.8 mmol/L Cr  0.82 mg/dL BUN 29.0 mg/dL<H> Phos 4.9 mg/dL<H> Alb n/a   PAB n/a

## 2019-08-16 NOTE — CONSULT NOTE ADULT - ASSESSMENT
Patient with pneumonitis, alcohol abuse, air fluid level in esophagus, severe reflux. Able to eat. Occasional regurgitation.  Change PPI to iv bid  Order UGI series  Will not recommend EGD with active airway disease

## 2019-08-16 NOTE — PROGRESS NOTE BEHAVIORAL HEALTH - NSBHCHARTREVIEWIMAGING_PSY_A_CORE FT
< from: Xray Chest 1 View- PORTABLE-Urgent (08.10.19 @ 19:31) >    IMPRESSION: No acute cardiopulmonary disease process.    < end of copied text >
< from: Xray Chest 1 View- PORTABLE-Routine (08.12.19 @ 00:34) >    IMPRESSION: No acute cardiopulmonary disease process.    < end of copied text >

## 2019-08-16 NOTE — PROVIDER CONTACT NOTE (OTHER) - SITUATION
Patient is coming out into the damon complaining of not being able to fall asleep. States " I am going to flip out if I cant fall asleep". Requesting something to help.
blood glucose 253, no insulin orders

## 2019-08-16 NOTE — PROVIDER CONTACT NOTE (OTHER) - ACTION/TREATMENT ORDERED:
Medicine PETEY Marie ordered one time dose PO ativan. Will pass on to day shift nurse that medications need to be adjusted for the night. will continue to monitor.
no new orders at this time

## 2019-08-16 NOTE — PROGRESS NOTE ADULT - SUBJECTIVE AND OBJECTIVE BOX
CHIEF COMPLAINT/INTERVAL HISTORY:    Patient is a 53y old  Male who presents with a chief complaint of SI, ETOH intoxication. ?pneumonittis (13 Aug 2019 16:45)      HPI:  ER HPI: "54 y/o M with PMHx of HTN, HLD, ?CHF and EtOH abuse presents to ED BIBA, restrained c/o suicidal attempt. As per EMS, pt was found in the parking lot across from [facility]. Pt was found saying suicidal thoughts and jumping in front of cars, ALOOB, and found with alcohol. Pt was agressive on ambulance, and given 10 Verset via IM. Hx unavailable bc pt unresponsive."     above HPI reviewed with the patient and admits the same. patient was discharged form [facility] on 8/8/2019 after stabilization, admitted with SI, depression. He has multiple ER visit at the end of with different complaints [ SOB, lethargy, BRBPR] and was discharged after stabilization. 8/1 discharged from ER to [facility] with steroid, neb for BA exacerbation. patient current reported depression anhedonia, SI. no HI. He drinks half galloon of vodka everyday, denied smoking cig or drug use. he is homeless. In ER, WBC was elevated with elevated lactate. CT chest neg for infection, but showed air/fluid level, better than before. CT head neg.  s/p 2L LR and zosyn and admission was called for ?pneumonitis and SI (09 Aug 2019 09:02)      SUBJECTIVE & OBJECTIVE/ ROS: Pt seen and examined at bedside. Insomnia improved with ambien but worsening when ambien was taken off by psych & trazodone was started as per pt. . Pt denies visual & auditory hallucinations. Mild tremors & anxiety. He is yet SOB which is worse than before. As per him he had a rough night as his ambien was d/padmini & trazodone did not help. He believes his breathing is worse because he cannot get sleep at night. Appears agitated due to lack of sleep. Deniessymptoms of reflux but admits to regurgitation of food particles at night. No chest pain, palpitations, light headedness/dizziness,  fevers/chills, abdominal pain, n/v, diarrhea/constipation, dysuria or increased urinary frequency.     ICU Vital Signs Last 24 Hrs  T(C): 36.5 (14 Aug 2019 09:53), Max: 36.6 (13 Aug 2019 16:03)  T(F): 97.7 (14 Aug 2019 09:53), Max: 97.8 (13 Aug 2019 16:03)  HR: 55 (14 Aug 2019 09:53) (55 - 104)  BP: 142/89 (14 Aug 2019 09:53) (133/78 - 154/95)  BP(mean): --  ABP: --  ABP(mean): --  RR: 20 (14 Aug 2019 09:53) (18 - 20)  SpO2: 95% (13 Aug 2019 20:24) (95% - 96%)        MEDICATIONS  (STANDING):  ALBUTerol/ipratropium for Nebulization 3 milliLiter(s) Nebulizer every 6 hours  benzocaine 15 mG/menthol 3.6 mG (Sugar-Free) Lozenge 1 Lozenge Oral four times a day  buDESOnide 160 MICROgram(s)/formoterol 4.5 MICROgram(s) Inhaler 2 Puff(s) Inhalation two times a day  dextrose 5%. 1000 milliLiter(s) (50 mL/Hr) IV Continuous <Continuous>  dextrose 50% Injectable 12.5 Gram(s) IV Push once  dextrose 50% Injectable 25 Gram(s) IV Push once  dextrose 50% Injectable 25 Gram(s) IV Push once  docusate sodium 100 milliGRAM(s) Oral two times a day  doxycycline hyclate Capsule 100 milliGRAM(s) Oral every 12 hours  enoxaparin Injectable 40 milliGRAM(s) SubCutaneous daily  famotidine    Tablet 20 milliGRAM(s) Oral two times a day  folic acid 1 milliGRAM(s) Oral daily  guaiFENesin ER 1200 milliGRAM(s) Oral every 12 hours  insulin lispro (HumaLOG) corrective regimen sliding scale   SubCutaneous three times a day before meals  loratadine 10 milliGRAM(s) Oral daily  methylPREDNISolone sodium succinate Injectable 60 milliGRAM(s) IV Push every 8 hours  mirtazapine 45 milliGRAM(s) Oral at bedtime  multivitamin 1 Tablet(s) Oral daily  pantoprazole    Tablet 40 milliGRAM(s) Oral before breakfast  polyethylene glycol 3350 17 Gram(s) Oral daily  senna 2 Tablet(s) Oral at bedtime  thiamine 100 milliGRAM(s) Oral daily    MEDICATIONS  (PRN):  dextrose 40% Gel 15 Gram(s) Oral once PRN Blood Glucose LESS THAN 70 milliGRAM(s)/deciliter  glucagon  Injectable 1 milliGRAM(s) IntraMuscular once PRN Glucose LESS THAN 70 milligrams/deciliter  LORazepam   Injectable 2 milliGRAM(s) IV Push every 1 hour PRN CIWA-Ar score 8 or greater  traZODone 50 milliGRAM(s) Oral at bedtime PRN insomnia      LABS:                        10.4   17.44 )-----------( 370      ( 14 Aug 2019 08:02 )             32.7     08-14    137  |  99  |  31.0<H>  ----------------------------<  219<H>  4.9   |  25.0  |  0.92    Ca    9.3      14 Aug 2019 08:02  Phos  4.4     08-14  Mg     2.2     08-14            CAPILLARY BLOOD GLUCOSE      POCT Blood Glucose.: 208 mg/dL (14 Aug 2019 07:44)  POCT Blood Glucose.: 275 mg/dL (13 Aug 2019 21:27)  POCT Blood Glucose.: 213 mg/dL (13 Aug 2019 16:35)  POCT Blood Glucose.: 167 mg/dL (13 Aug 2019 11:39)      RECENT CULTURES:      RADIOLOGY & ADDITIONAL TESTS:      PHYSICAL EXAM:      GENERAL: Not in distress. Alert    HEENT:  Normocephalic and atraumatic.   NECK: Supple.    CARDIOVASCULAR: RRR S1, S2. No murmur/rubs/gallop  LUNGS: b/l diffuse wheezing  ABDOMEN: ND. Soft,  NT, no guarding / rebound / rigidity. BS normoactive. No CVA tenderness.    EXTREMITIES: no edema.   SKIN: warm and dry  NEUROLOGIC: AAO*3. grossly intact  PSYCHIATRIC: Calm.  mild agitation.

## 2019-08-16 NOTE — DIETITIAN INITIAL EVALUATION ADULT. - OTHER INFO
Pt BIBA for suicidal ideation with ETOH intoxication, PMHx of HTN, HLD, ?CHF. Pt reports  drinking 1/2 gallon of vodka daily, ?homelessness with recurrent bronchospasms with possible achalasia on CCT and aspiration. Pt BIBA for suicidal ideation with ETOH intoxication, PMHx of HTN, HLD, ?CHF. Pt reports  drinking 1/2 gallon of vodka daily, ?homelessness with recurrent bronchospasms with possible achalasia on CCT and aspiration. Interview attempt x 2, Pt agitated and now off the floor for X-ray. Per GI, Pt c/o life long acid reflux with occasional regurgitation, plan for PPI IV and UGI series. Per documentation Pt completing % of meals, likely meeting sufficient protein-energy needs. Unclear accuracy of documented weights as 24lb weight gain x 8 days with no documented edema is unlikely. RD to follow  up for interview and NFPE.

## 2019-08-17 LAB
ANION GAP SERPL CALC-SCNC: 16 MMOL/L — SIGNIFICANT CHANGE UP (ref 5–17)
BASOPHILS # BLD AUTO: 0.05 K/UL — SIGNIFICANT CHANGE UP (ref 0–0.2)
BASOPHILS NFR BLD AUTO: 0.3 % — SIGNIFICANT CHANGE UP (ref 0–2)
BUN SERPL-MCNC: 29 MG/DL — HIGH (ref 8–20)
CALCIUM SERPL-MCNC: 9.2 MG/DL — SIGNIFICANT CHANGE UP (ref 8.6–10.2)
CHLORIDE SERPL-SCNC: 96 MMOL/L — LOW (ref 98–107)
CO2 SERPL-SCNC: 25 MMOL/L — SIGNIFICANT CHANGE UP (ref 22–29)
CREAT SERPL-MCNC: 0.95 MG/DL — SIGNIFICANT CHANGE UP (ref 0.5–1.3)
EOSINOPHIL # BLD AUTO: 0 K/UL — SIGNIFICANT CHANGE UP (ref 0–0.5)
EOSINOPHIL NFR BLD AUTO: 0 % — SIGNIFICANT CHANGE UP (ref 0–6)
GLUCOSE BLDC GLUCOMTR-MCNC: 197 MG/DL — HIGH (ref 70–99)
GLUCOSE BLDC GLUCOMTR-MCNC: 250 MG/DL — HIGH (ref 70–99)
GLUCOSE BLDC GLUCOMTR-MCNC: 280 MG/DL — HIGH (ref 70–99)
GLUCOSE BLDC GLUCOMTR-MCNC: 284 MG/DL — HIGH (ref 70–99)
GLUCOSE SERPL-MCNC: 226 MG/DL — HIGH (ref 70–115)
HCT VFR BLD CALC: 33 % — LOW (ref 39–50)
HGB BLD-MCNC: 10.2 G/DL — LOW (ref 13–17)
IMM GRANULOCYTES NFR BLD AUTO: 9.2 % — HIGH (ref 0–1.5)
LYMPHOCYTES # BLD AUTO: 0.59 K/UL — LOW (ref 1–3.3)
LYMPHOCYTES # BLD AUTO: 3.8 % — LOW (ref 13–44)
MAGNESIUM SERPL-MCNC: 2.2 MG/DL — SIGNIFICANT CHANGE UP (ref 1.6–2.6)
MCHC RBC-ENTMCNC: 26.8 PG — LOW (ref 27–34)
MCHC RBC-ENTMCNC: 30.9 GM/DL — LOW (ref 32–36)
MCV RBC AUTO: 86.8 FL — SIGNIFICANT CHANGE UP (ref 80–100)
MONOCYTES # BLD AUTO: 1.35 K/UL — HIGH (ref 0–0.9)
MONOCYTES NFR BLD AUTO: 8.7 % — SIGNIFICANT CHANGE UP (ref 2–14)
NEUTROPHILS # BLD AUTO: 12.15 K/UL — HIGH (ref 1.8–7.4)
NEUTROPHILS NFR BLD AUTO: 78 % — HIGH (ref 43–77)
PHOSPHATE SERPL-MCNC: 5.4 MG/DL — HIGH (ref 2.4–4.7)
PLATELET # BLD AUTO: 335 K/UL — SIGNIFICANT CHANGE UP (ref 150–400)
POTASSIUM SERPL-MCNC: 4.4 MMOL/L — SIGNIFICANT CHANGE UP (ref 3.5–5.3)
POTASSIUM SERPL-SCNC: 4.4 MMOL/L — SIGNIFICANT CHANGE UP (ref 3.5–5.3)
RBC # BLD: 3.8 M/UL — LOW (ref 4.2–5.8)
RBC # FLD: 17.3 % — HIGH (ref 10.3–14.5)
SODIUM SERPL-SCNC: 137 MMOL/L — SIGNIFICANT CHANGE UP (ref 135–145)
WBC # BLD: 15.58 K/UL — HIGH (ref 3.8–10.5)
WBC # FLD AUTO: 15.58 K/UL — HIGH (ref 3.8–10.5)

## 2019-08-17 PROCEDURE — 99232 SBSQ HOSP IP/OBS MODERATE 35: CPT

## 2019-08-17 PROCEDURE — 99233 SBSQ HOSP IP/OBS HIGH 50: CPT

## 2019-08-17 RX ORDER — PANTOPRAZOLE SODIUM 20 MG/1
40 TABLET, DELAYED RELEASE ORAL
Refills: 0 | Status: DISCONTINUED | OUTPATIENT
Start: 2019-08-17 | End: 2019-08-20

## 2019-08-17 RX ORDER — IPRATROPIUM/ALBUTEROL SULFATE 18-103MCG
3 AEROSOL WITH ADAPTER (GRAM) INHALATION EVERY 6 HOURS
Refills: 0 | Status: DISCONTINUED | OUTPATIENT
Start: 2019-08-17 | End: 2019-08-20

## 2019-08-17 RX ADMIN — Medication 100 MILLIGRAM(S): at 06:33

## 2019-08-17 RX ADMIN — Medication 1 MILLIGRAM(S): at 11:30

## 2019-08-17 RX ADMIN — FAMOTIDINE 20 MILLIGRAM(S): 10 INJECTION INTRAVENOUS at 06:33

## 2019-08-17 RX ADMIN — Medication 3 MILLILITER(S): at 20:33

## 2019-08-17 RX ADMIN — MIRTAZAPINE 45 MILLIGRAM(S): 45 TABLET, ORALLY DISINTEGRATING ORAL at 21:18

## 2019-08-17 RX ADMIN — Medication 40 MILLIGRAM(S): at 21:17

## 2019-08-17 RX ADMIN — SENNA PLUS 2 TABLET(S): 8.6 TABLET ORAL at 21:15

## 2019-08-17 RX ADMIN — LORATADINE 10 MILLIGRAM(S): 10 TABLET ORAL at 11:30

## 2019-08-17 RX ADMIN — Medication 100 MILLIGRAM(S): at 11:30

## 2019-08-17 RX ADMIN — BUDESONIDE AND FORMOTEROL FUMARATE DIHYDRATE 2 PUFF(S): 160; 4.5 AEROSOL RESPIRATORY (INHALATION) at 20:33

## 2019-08-17 RX ADMIN — ATORVASTATIN CALCIUM 20 MILLIGRAM(S): 80 TABLET, FILM COATED ORAL at 21:15

## 2019-08-17 RX ADMIN — Medication 3 MILLILITER(S): at 15:29

## 2019-08-17 RX ADMIN — Medication 100 MILLIGRAM(S): at 17:39

## 2019-08-17 RX ADMIN — Medication 4: at 07:36

## 2019-08-17 RX ADMIN — Medication 6: at 16:27

## 2019-08-17 RX ADMIN — Medication 60 MILLIGRAM(S): at 13:33

## 2019-08-17 RX ADMIN — QUETIAPINE FUMARATE 100 MILLIGRAM(S): 200 TABLET, FILM COATED ORAL at 18:40

## 2019-08-17 RX ADMIN — BENZOCAINE AND MENTHOL 1 LOZENGE: 5; 1 LIQUID ORAL at 06:34

## 2019-08-17 RX ADMIN — Medication 1200 MILLIGRAM(S): at 17:37

## 2019-08-17 RX ADMIN — Medication 60 MILLIGRAM(S): at 06:32

## 2019-08-17 RX ADMIN — PANTOPRAZOLE SODIUM 40 MILLIGRAM(S): 20 TABLET, DELAYED RELEASE ORAL at 06:32

## 2019-08-17 RX ADMIN — ENOXAPARIN SODIUM 40 MILLIGRAM(S): 100 INJECTION SUBCUTANEOUS at 11:30

## 2019-08-17 RX ADMIN — BENZOCAINE AND MENTHOL 1 LOZENGE: 5; 1 LIQUID ORAL at 17:40

## 2019-08-17 RX ADMIN — BENZOCAINE AND MENTHOL 1 LOZENGE: 5; 1 LIQUID ORAL at 11:30

## 2019-08-17 RX ADMIN — POLYETHYLENE GLYCOL 3350 17 GRAM(S): 17 POWDER, FOR SOLUTION ORAL at 11:30

## 2019-08-17 RX ADMIN — PANTOPRAZOLE SODIUM 40 MILLIGRAM(S): 20 TABLET, DELAYED RELEASE ORAL at 17:38

## 2019-08-17 RX ADMIN — Medication 1200 MILLIGRAM(S): at 06:33

## 2019-08-17 RX ADMIN — Medication 2: at 11:28

## 2019-08-17 RX ADMIN — Medication 1 TABLET(S): at 11:30

## 2019-08-17 NOTE — PROGRESS NOTE ADULT - ASSESSMENT
Assess    GERD with bronchospasm  Some asthma not excluded  No significant aspiration  Depression  ETOH  Insomnia    Rec    Truncal elevation at night  Avoid food within 90 minutes of hs  Antacid per GI  Insomnia/depression per psych  Neb  Steroid taper  Toward LABA/ICS

## 2019-08-17 NOTE — PROGRESS NOTE ADULT - SUBJECTIVE AND OBJECTIVE BOX
Pt seen and examined :F/U chronic GERD    The esophagram showed a small hiatal hernia, GE reflux and some tertiary contractions of the esophagus. On pantoprazole BID he has no heartburn. His only complaint now is ongoing wheezing and SOB.    REVIEW OF SYSTEMS:    CONSTITUTIONAL: No fever, weight loss, or fatigue  EYES: No eye pain, visual disturbances, or discharge  ENMT:  No difficulty hearing, tinnitus, vertigo; No sinus or throat pain  RESPIRATORY: No cough, wheezing, chills or hemoptysis; No shortness of breath  CARDIOVASCULAR: No chest pain, palpitations, dizziness, or leg swelling  GASTROINTESTINAL:as above    MEDICATIONS:  MEDICATIONS  (STANDING):  ALBUTerol    90 MICROgram(s) HFA Inhaler 2 Puff(s) Inhalation every 6 hours  atorvastatin 20 milliGRAM(s) Oral at bedtime  benzocaine 15 mG/menthol 3.6 mG (Sugar-Free) Lozenge 1 Lozenge Oral four times a day  buDESOnide 160 MICROgram(s)/formoterol 4.5 MICROgram(s) Inhaler 2 Puff(s) Inhalation two times a day  dextrose 5%. 1000 milliLiter(s) (50 mL/Hr) IV Continuous <Continuous>  dextrose 50% Injectable 12.5 Gram(s) IV Push once  dextrose 50% Injectable 25 Gram(s) IV Push once  dextrose 50% Injectable 25 Gram(s) IV Push once  docusate sodium 100 milliGRAM(s) Oral two times a day  doxycycline hyclate Capsule 100 milliGRAM(s) Oral every 12 hours  enoxaparin Injectable 40 milliGRAM(s) SubCutaneous daily  folic acid 1 milliGRAM(s) Oral daily  guaiFENesin ER 1200 milliGRAM(s) Oral every 12 hours  insulin lispro (HumaLOG) corrective regimen sliding scale   SubCutaneous three times a day before meals  loratadine 10 milliGRAM(s) Oral daily  methylPREDNISolone sodium succinate Injectable 60 milliGRAM(s) IV Push every 8 hours  mirtazapine 45 milliGRAM(s) Oral at bedtime  multivitamin 1 Tablet(s) Oral daily  pantoprazole    Tablet 40 milliGRAM(s) Oral two times a day  polyethylene glycol 3350 17 Gram(s) Oral daily  QUEtiapine 100 milliGRAM(s) Oral <User Schedule>  senna 2 Tablet(s) Oral at bedtime  thiamine 100 milliGRAM(s) Oral daily    MEDICATIONS  (PRN):  dextrose 40% Gel 15 Gram(s) Oral once PRN Blood Glucose LESS THAN 70 milliGRAM(s)/deciliter  glucagon  Injectable 1 milliGRAM(s) IntraMuscular once PRN Glucose LESS THAN 70 milligrams/deciliter      Allergies    No Known Allergies    Intolerances        Vital Signs Last 24 Hrs  T(C): 37.1 (16 Aug 2019 23:50), Max: 37.1 (16 Aug 2019 23:50)  T(F): 98.8 (16 Aug 2019 23:50), Max: 98.8 (16 Aug 2019 23:50)  HR: 92 (17 Aug 2019 04:10) (84 - 109)  BP: 133/84 (16 Aug 2019 23:50) (127/77 - 133/84)  BP(mean): --  RR: 18 (16 Aug 2019 23:50) (18 - 20)  SpO2: 96% (17 Aug 2019 04:10) (94% - 96%)    08-16 @ 07:01  -  08-17 @ 07:00  --------------------------------------------------------  IN: 478 mL / OUT: 0 mL / NET: 478 mL        PHYSICAL EXAM:    General: Well developed; well nourished; in no acute distress but somewhat tachypneic  HEENT: MMM, conjunctiva and sclera clear  Lungs: diffuse loud exp. wheezes  Gastrointestinal:Abdomen: Soft non-tender non-distended but somewhat protuberant. Normal bowel sounds; No hepatosplenomegaly  Extremities: no cyanosis, clubbing or edema.  Skin: Warm and dry. No obvious rash    LABS:      CBC Full  -  ( 17 Aug 2019 06:10 )  WBC Count : 15.58 K/uL  RBC Count : 3.80 M/uL  Hemoglobin : 10.2 g/dL  Hematocrit : 33.0 %  Platelet Count - Automated : 335 K/uL  Mean Cell Volume : 86.8 fl  Mean Cell Hemoglobin : 26.8 pg  Mean Cell Hemoglobin Concentration : 30.9 gm/dL  Auto Neutrophil # : 12.15 K/uL  Auto Lymphocyte # : 0.59 K/uL  Auto Monocyte # : 1.35 K/uL  Auto Eosinophil # : 0.00 K/uL  Auto Basophil # : 0.05 K/uL  Auto Neutrophil % : 78.0 %  Auto Lymphocyte % : 3.8 %  Auto Monocyte % : 8.7 %  Auto Eosinophil % : 0.0 %  Auto Basophil % : 0.3 %    08-17    137  |  96<L>  |  29.0<H>  ----------------------------<  226<H>  4.4   |  25.0  |  0.95    Ca    9.2      17 Aug 2019 06:10  Phos  5.4     08-17  Mg     2.2     08-17                        RADIOLOGY & ADDITIONAL STUDIES (The following images were personally reviewed):  < from: Xray Esophagram (08.16.19 @ 15:24) >   EXAM:  ESOPHOGRAM                          PROCEDURE DATE:  08/16/2019          INTERPRETATION:  XR ESOPHAGRAM    HISTORY: Dilated esophagus on CT.    TECHNIQUE: Double contrast esophagram. Total fluoroscopic time of 5.2   minutes.    Comparison: CT chest 8/8/2019    FINDINGS:    The imaged lungs are clear on a  view. Degenerative changes in the   spine.    Barium traversed the esophagus without evidence of mass or obstruction.    There was esophageal dysmotility including tertiary contractions in the   esophagus and a delay in esophageal emptying which were most prominent   with the patient drinking in the prone position.    There is a small sliding hiatal hernia. Gastroesophageal reflux was seen   during the study.    IMPRESSION:      Small sliding hiatal hernia.    Gastroesophageal reflux.    Tertiary contractions in the esophagus.          < end of copied text >

## 2019-08-17 NOTE — CHART NOTE - NSCHARTNOTEFT_GEN_A_CORE
Notified by Leonora VILCHIS that patient still with expression of SI and constant observation order  soon to  ..... 1:1 was renewed.

## 2019-08-17 NOTE — PROGRESS NOTE ADULT - SUBJECTIVE AND OBJECTIVE BOX
PULMONARY PROGRESS NOTE      NISREEN MARTINEZYENNY-[id number]    Patient is a 54y old  Male who presents with a chief complaint of SI, ETOH intoxication. ?pneumonittis (16 Aug 2019 11:30)  ETOH  Depression  GERD with Bronchospasm  Insomnia    INTERVAL HPI/OVERNIGHT EVENTS:  Less cough and wheeze      MEDICATIONS  (STANDING):  ALBUTerol    90 MICROgram(s) HFA Inhaler 2 Puff(s) Inhalation every 6 hours  atorvastatin 20 milliGRAM(s) Oral at bedtime  benzocaine 15 mG/menthol 3.6 mG (Sugar-Free) Lozenge 1 Lozenge Oral four times a day  buDESOnide 160 MICROgram(s)/formoterol 4.5 MICROgram(s) Inhaler 2 Puff(s) Inhalation two times a day  dextrose 5%. 1000 milliLiter(s) (50 mL/Hr) IV Continuous <Continuous>  dextrose 50% Injectable 12.5 Gram(s) IV Push once  dextrose 50% Injectable 25 Gram(s) IV Push once  dextrose 50% Injectable 25 Gram(s) IV Push once  docusate sodium 100 milliGRAM(s) Oral two times a day  doxycycline hyclate Capsule 100 milliGRAM(s) Oral every 12 hours  enoxaparin Injectable 40 milliGRAM(s) SubCutaneous daily  famotidine    Tablet 20 milliGRAM(s) Oral two times a day  folic acid 1 milliGRAM(s) Oral daily  guaiFENesin ER 1200 milliGRAM(s) Oral every 12 hours  insulin lispro (HumaLOG) corrective regimen sliding scale   SubCutaneous three times a day before meals  loratadine 10 milliGRAM(s) Oral daily  methylPREDNISolone sodium succinate Injectable 60 milliGRAM(s) IV Push every 8 hours  mirtazapine 45 milliGRAM(s) Oral at bedtime  multivitamin 1 Tablet(s) Oral daily  pantoprazole  Injectable 40 milliGRAM(s) IV Push two times a day  polyethylene glycol 3350 17 Gram(s) Oral daily  QUEtiapine 100 milliGRAM(s) Oral <User Schedule>  senna 2 Tablet(s) Oral at bedtime  thiamine 100 milliGRAM(s) Oral daily      MEDICATIONS  (PRN):  dextrose 40% Gel 15 Gram(s) Oral once PRN Blood Glucose LESS THAN 70 milliGRAM(s)/deciliter  glucagon  Injectable 1 milliGRAM(s) IntraMuscular once PRN Glucose LESS THAN 70 milligrams/deciliter      Allergies    No Known Allergies    Intolerances        PAST MEDICAL & SURGICAL HISTORY:  Asthma  Alcohol abuse  High cholesterol  HTN (hypertension)  GERD (gastroesophageal reflux disease)  Alcohol abuse  High cholesterol  Hypertension  Alcohol abuse  No significant past surgical history  No significant past surgical history  No significant past surgical history      SOCIAL HISTORY  Smoking History:       REVIEW OF SYSTEMS:    CONSTITUTIONAL:  No distress    HEENT:  Eyes:  No diplopia or blurred vision. ENT:  No earache, sore throat or runny nose.    CARDIOVASCULAR:  No pressure, squeezing, tightness, heaviness or aching about the chest; no palpitations.    RESPIRATORY:  No PND or orthopnea. Mild SOBOE    GASTROINTESTINAL:  No nausea, vomiting or diarrhea.    GENITOURINARY:  No dysuria, frequency or urgency.    NEUROLOGIC:  No paresthesias, fasciculations, seizures or weakness.    PSYCHIATRIC:  No disorder of thought or mood.    Vital Signs Last 24 Hrs  T(C): 37.2 (15 Aug 2019 23:28), Max: 37.2 (15 Aug 2019 23:28)  T(F): 99 (15 Aug 2019 23:28), Max: 99 (15 Aug 2019 23:28)  HR: 104 (16 Aug 2019 09:08) (95 - 105)  BP: 117/75 (15 Aug 2019 23:28) (117/75 - 117/75)  BP(mean): --  RR: 18 (16 Aug 2019 08:00) (1 - 18)  SpO2: 96% (16 Aug 2019 09:08) (95% - 99%)    PHYSICAL EXAMINATION:    GENERAL: The patient is awake and alert in no apparent distress.     HEENT: Head is normocephalic and atraumatic. Extraocular muscles are intact. Mucous membranes are moist.    NECK: Supple.    LUNGS: End Exp wheeze to auscultation without rales or rhonchi; respirations unlabored    HEART: Regular rate and rhythm without murmur.    ABDOMEN: Soft, nontender, and nondistended.      EXTREMITIES: Without any cyanosis, clubbing, rash, lesions or edema.    NEUROLOGIC: Grossly intact.    LABS:                        10.0   16.10 )-----------( 366      ( 16 Aug 2019 07:58 )             31.7     08-16    133<L>  |  95<L>  |  29.0<H>  ----------------------------<  189<H>  4.8   |  25.0  |  0.82    Ca    9.5      16 Aug 2019 07:58  Phos  4.9     08-16  Mg     2.2     08-16        RADIOLOGY & ADDITIONAL STUDIES:       EXAM:  XR CHEST PORTABLE ROUTINE 1V                          PROCEDURE DATE:  08/12/2019          INTERPRETATION:  TECHNIQUE: Single portable view of the chest.    COMPARISON: 8/9/2019    CLINICAL HISTORY: dyspnoea. Cough after IVF, ?fluid   overloadCHF,HTN,depression, SI, ETOH abuse    FINDINGS:    Single frontal view of the chest demonstrates the lungs to be clear. The   cardiomediastinal silhouette is normal. No acute osseous abnormalities.   Overlying EKG leads and wires are noted    IMPRESSION: No acute cardiopulmonary disease process.                AD FAN M.D., ATTENDING RADIOLOGIST  This document has been electronically signed. Aug 12 2019 10:40AM

## 2019-08-17 NOTE — PROGRESS NOTE ADULT - ASSESSMENT
52 y/o M with PMHx of HTN, HLD, ?CHF and EtOH abuse presents to ED BIBA, restrained c/o suicidal attempt. As per EMS, pt was found in the parking lot across from [facility]. Pt was found saying suicidal thoughts and jumping in front of cars, ALOOB, and found with alcohol. Pt was agressive on ambulance, and given 10 Verset via IM. Hx unavailable bc pt unresponsive. patient was discharged form [facility] on 8/8/2019 after stabilization, admitted with SI, depression. He has multiple ER visit at the end of with different complaints [ SOB, lethargy, BRBPR] and was discharged after stabilization. 8/1 discharged from ER to [facility] with steroid, neb for BA exacerbation. patient current reported depression anhedonia, SI. no HI. He drinks half galloon of vodka everyday, denied smoking cig or drug use. he is homeless. In ER, WBC was elevated with elevated lactate. CT chest neg for infection, but showed air/fluid level, better than before. CT head neg.  s/p 2L LR and zosyn and admission was called for ?pneumonitis and SI. found tohave diffuse wheezing and SOB.     A/P    >SOB/wheezing likely from Asthma exacerbation/ asthmatic bronchitis  r/o microaspiration due to achalasia-  CXR clear  pulmo following - c/w steroid and nebs  S & S eval recs regular with thins  Elevated HOB (pt & RN counseled, he was found in prone position despite order)  aspiration precautions  c/w PPI & H2 blockers  GI ON BOARD -  Not a candidate for EGD at this time due to pulmonary status. Will change diet to diabetic, DASH and low fat   c/w neb, continues to be on high dose steroids IV 60 q6, O2 prn  c/w doxycycline PO x 5 days for bronchitis  Mucinex & zyrtec for productive cough  c/w Inhaled steroids  needs controller medications ( generic advair 250) and home neb as out pt if able   Echo: grade 1 DD, Ef 65-70%  Ct chest: No acute findings in the thorax.No pneumomediastinum or pneumothorax.The esophagus is slightly distended with air and fluid, less severe compared to the previous study of June 28, 2018.  PFTs as out pt        >?Esophageal dysmotility-  Yet with wheezing despite maximum steroids r/o microaspiration    CT chest: The esophagus is slightly distended with air and fluid   S & S eval recs regular with thins  Elevated HOB (pt & RN counseled, he was found in prone position despite order)  Aspiration precautions  c/w PPI & H2 blockers.   GI on board -  Not a candidate for EGD at this time due to pulmonary status. Will change diet to diabetic, DASH and low fat         >Leukocytosis likely from recent steroid use vs ?pneumonitis/ bronchitis-  UA neg, Bc NGTD  CXR -ve  RVP -ve  Procal wnl  Added doxycyline for bronchitis      >depression/SI:   psych following -  Increased Remeron 45 qHS & trazodone 100 qHS per psych  psych cx appreciated, Patient needs further psychiatric safety assessment- [facility] on d/c  c/w 1:1  haldol PRN for agitation  Avoid BZs per psych due to hx of etoh & substance abuse    >ETOH abuse:  Pt was in [facility] for weeks & detoxed there, got discharged & had etoh prior to arrival but do not suspect withdrawal as per psych  No need for librium as per psych  Thiamine/ folate  SW eval      Insomina-   Insomnia had improved with ambien but worsening when ambien was taken off by psych & trazodone was started as per pt. As per him he had a rough night as his ambien was d/padmini & trazodone did not help. Appears agitated due to lack of sleep.   Added metatonin  Ambien not recommended as per Dr. Pacheco due to pt's addictive behavior      HTN: not on meds    >pre diabete: ISS, accucheck, A1c 6.2.   started lipitor       DVT-P: lovenox  aspiration precautions  fall precaution 54 y/o M with PMHx of HTN, HLD, ?CHF and EtOH abuse presents to ED BIBA, restrained c/o suicidal attempt. As per EMS, pt was found in the parking lot across from [facility]. Pt was found saying suicidal thoughts and jumping in front of cars, ALOOB, and found with alcohol. Pt was agressive on ambulance, and given 10 Verset via IM. Hx unavailable bc pt unresponsive. patient was discharged form [facility] on 8/8/2019 after stabilization, admitted with SI, depression. He has multiple ER visit at the end of with different complaints [ SOB, lethargy, BRBPR] and was discharged after stabilization. 8/1 discharged from ER to [facility] with steroid, neb for BA exacerbation. patient current reported depression anhedonia, SI. no HI. He drinks half galloon of vodka everyday, denied smoking cig or drug use. he is homeless. In ER, WBC was elevated with elevated lactate. CT chest neg for infection, but showed air/fluid level, better than before. CT head neg.  s/p 2L LR and zosyn and admission was called for ?pneumonitis and SI. found tohave diffuse wheezing and SOB.     A/P    >SOB/wheezing likely from Asthma exacerbation/ asthmatic bronchitis  r/o microaspiration due to achalasia-  CXR clear  pulmo following - c/w steroid and nebs  S & S eval recs regular with thins  Elevated HOB (pt & RN counseled, he was found in prone position despite order)  aspiration precautions  c/w PPI & H2 blockers  GI ON BOARD -  Not a candidate for EGD at this time due to pulmonary status. Will change diet to diabetic, DASH and low fat   c/w doxycycline PO x 5 days for bronchitis  needs controller medications ( generic advair 250) and home neb as out pt if able   Echo: grade 1 DD, Ef 65-70%  Ct chest: No acute findings in the thorax.No pneumomediastinum or pneumothorax.The esophagus is slightly distended with air and fluid, less severe compared to the previous study of June 28, 2018.  PFTs as out pt        >?Esophageal dysmotility-  Yet with wheezing despite maximum steroids r/o microaspiration    CT chest: The esophagus is slightly distended with air and fluid   S & S eval recs regular with thins  Elevated HOB (pt & RN counseled, he was found in prone position despite order)  Aspiration precautions  c/w PPI & H2 blockers.   GI on board -  Not a candidate for EGD at this time due to pulmonary status. Will change diet to diabetic, DASH and low fat         >Leukocytosis likely from recent steroid use vs ?pneumonitis/ bronchitis-  UA neg, Bc NGTD  CXR -ve  RVP -ve  Procal wnl  Added doxycyline for bronchitis      >depression/SI:   psych following -  Increased Remeron 45 qHS & trazodone 100 qHS per psych  psych cx appreciated, Patient needs further psychiatric safety assessment- [facility] on d/c  c/w 1:1  haldol PRN for agitation  Avoid BZs per psych due to hx of etoh & substance abuse    >ETOH abuse:  Pt was in [facility] for weeks & detoxed there, got discharged & had etoh prior to arrival but do not suspect withdrawal as per psych  No need for librium as per psych  Thiamine/ folate  SW eval      Insomina-   Insomnia had improved with ambien but worsening when ambien was taken off by psych & trazodone was started as per pt. As per him he had a rough night as his ambien was d/padmini & trazodone did not help. Appears agitated due to lack of sleep.   Added metatonin  Ambien not recommended as per Dr. Pacheco due to pt's addictive behavior      HTN: not on meds    >pre diabete: ISS, accucheck, A1c 6.2.   started lipitor       DVT-P: lovenox  aspiration precautions  fall precaution

## 2019-08-18 LAB
ANION GAP SERPL CALC-SCNC: 17 MMOL/L — SIGNIFICANT CHANGE UP (ref 5–17)
BUN SERPL-MCNC: 31 MG/DL — HIGH (ref 8–20)
CALCIUM SERPL-MCNC: 9.2 MG/DL — SIGNIFICANT CHANGE UP (ref 8.6–10.2)
CHLORIDE SERPL-SCNC: 95 MMOL/L — LOW (ref 98–107)
CO2 SERPL-SCNC: 26 MMOL/L — SIGNIFICANT CHANGE UP (ref 22–29)
CREAT SERPL-MCNC: 0.78 MG/DL — SIGNIFICANT CHANGE UP (ref 0.5–1.3)
GLUCOSE BLDC GLUCOMTR-MCNC: 195 MG/DL — HIGH (ref 70–99)
GLUCOSE BLDC GLUCOMTR-MCNC: 196 MG/DL — HIGH (ref 70–99)
GLUCOSE BLDC GLUCOMTR-MCNC: 265 MG/DL — HIGH (ref 70–99)
GLUCOSE BLDC GLUCOMTR-MCNC: 318 MG/DL — HIGH (ref 70–99)
GLUCOSE SERPL-MCNC: 233 MG/DL — HIGH (ref 70–115)
HCT VFR BLD CALC: 32.3 % — LOW (ref 39–50)
HGB BLD-MCNC: 10.2 G/DL — LOW (ref 13–17)
MCHC RBC-ENTMCNC: 27.2 PG — SIGNIFICANT CHANGE UP (ref 27–34)
MCHC RBC-ENTMCNC: 31.6 GM/DL — LOW (ref 32–36)
MCV RBC AUTO: 86.1 FL — SIGNIFICANT CHANGE UP (ref 80–100)
PLATELET # BLD AUTO: 353 K/UL — SIGNIFICANT CHANGE UP (ref 150–400)
POTASSIUM SERPL-MCNC: 4.4 MMOL/L — SIGNIFICANT CHANGE UP (ref 3.5–5.3)
POTASSIUM SERPL-SCNC: 4.4 MMOL/L — SIGNIFICANT CHANGE UP (ref 3.5–5.3)
RBC # BLD: 3.75 M/UL — LOW (ref 4.2–5.8)
RBC # FLD: 17 % — HIGH (ref 10.3–14.5)
SODIUM SERPL-SCNC: 138 MMOL/L — SIGNIFICANT CHANGE UP (ref 135–145)
WBC # BLD: 16.3 K/UL — HIGH (ref 3.8–10.5)
WBC # FLD AUTO: 16.3 K/UL — HIGH (ref 3.8–10.5)

## 2019-08-18 PROCEDURE — 99232 SBSQ HOSP IP/OBS MODERATE 35: CPT

## 2019-08-18 RX ORDER — FAMOTIDINE 10 MG/ML
20 INJECTION INTRAVENOUS AT BEDTIME
Refills: 0 | Status: DISCONTINUED | OUTPATIENT
Start: 2019-08-18 | End: 2019-08-20

## 2019-08-18 RX ORDER — ALBUTEROL 90 UG/1
2.5 AEROSOL, METERED ORAL ONCE
Refills: 0 | Status: COMPLETED | OUTPATIENT
Start: 2019-08-18 | End: 2019-08-18

## 2019-08-18 RX ORDER — ONDANSETRON 8 MG/1
4 TABLET, FILM COATED ORAL EVERY 6 HOURS
Refills: 0 | Status: DISCONTINUED | OUTPATIENT
Start: 2019-08-18 | End: 2019-08-20

## 2019-08-18 RX ORDER — QUETIAPINE FUMARATE 200 MG/1
100 TABLET, FILM COATED ORAL
Refills: 0 | Status: DISCONTINUED | OUTPATIENT
Start: 2019-08-18 | End: 2019-08-20

## 2019-08-18 RX ADMIN — Medication 100 MILLIGRAM(S): at 05:28

## 2019-08-18 RX ADMIN — POLYETHYLENE GLYCOL 3350 17 GRAM(S): 17 POWDER, FOR SOLUTION ORAL at 11:57

## 2019-08-18 RX ADMIN — Medication 8: at 16:54

## 2019-08-18 RX ADMIN — Medication 40 MILLIGRAM(S): at 05:28

## 2019-08-18 RX ADMIN — BUDESONIDE AND FORMOTEROL FUMARATE DIHYDRATE 2 PUFF(S): 160; 4.5 AEROSOL RESPIRATORY (INHALATION) at 20:24

## 2019-08-18 RX ADMIN — BENZOCAINE AND MENTHOL 1 LOZENGE: 5; 1 LIQUID ORAL at 05:29

## 2019-08-18 RX ADMIN — BENZOCAINE AND MENTHOL 1 LOZENGE: 5; 1 LIQUID ORAL at 11:58

## 2019-08-18 RX ADMIN — Medication 1200 MILLIGRAM(S): at 17:21

## 2019-08-18 RX ADMIN — ATORVASTATIN CALCIUM 20 MILLIGRAM(S): 80 TABLET, FILM COATED ORAL at 21:12

## 2019-08-18 RX ADMIN — LORATADINE 10 MILLIGRAM(S): 10 TABLET ORAL at 11:58

## 2019-08-18 RX ADMIN — Medication 100 MILLIGRAM(S): at 11:58

## 2019-08-18 RX ADMIN — Medication 40 MILLIGRAM(S): at 21:13

## 2019-08-18 RX ADMIN — Medication 1 MILLIGRAM(S): at 11:58

## 2019-08-18 RX ADMIN — Medication 2: at 11:59

## 2019-08-18 RX ADMIN — Medication 2: at 07:29

## 2019-08-18 RX ADMIN — Medication 3 MILLILITER(S): at 20:24

## 2019-08-18 RX ADMIN — BENZOCAINE AND MENTHOL 1 LOZENGE: 5; 1 LIQUID ORAL at 17:21

## 2019-08-18 RX ADMIN — BUDESONIDE AND FORMOTEROL FUMARATE DIHYDRATE 2 PUFF(S): 160; 4.5 AEROSOL RESPIRATORY (INHALATION) at 10:06

## 2019-08-18 RX ADMIN — Medication 30 MILLILITER(S): at 00:34

## 2019-08-18 RX ADMIN — Medication 100 MILLIGRAM(S): at 17:21

## 2019-08-18 RX ADMIN — PANTOPRAZOLE SODIUM 40 MILLIGRAM(S): 20 TABLET, DELAYED RELEASE ORAL at 17:21

## 2019-08-18 RX ADMIN — ALBUTEROL 2.5 MILLIGRAM(S): 90 AEROSOL, METERED ORAL at 00:25

## 2019-08-18 RX ADMIN — ENOXAPARIN SODIUM 40 MILLIGRAM(S): 100 INJECTION SUBCUTANEOUS at 11:58

## 2019-08-18 RX ADMIN — Medication 30 MILLILITER(S): at 07:25

## 2019-08-18 RX ADMIN — QUETIAPINE FUMARATE 100 MILLIGRAM(S): 200 TABLET, FILM COATED ORAL at 17:21

## 2019-08-18 RX ADMIN — Medication 3 MILLILITER(S): at 16:19

## 2019-08-18 RX ADMIN — FAMOTIDINE 20 MILLIGRAM(S): 10 INJECTION INTRAVENOUS at 21:12

## 2019-08-18 RX ADMIN — BENZOCAINE AND MENTHOL 1 LOZENGE: 5; 1 LIQUID ORAL at 00:33

## 2019-08-18 RX ADMIN — MIRTAZAPINE 45 MILLIGRAM(S): 45 TABLET, ORALLY DISINTEGRATING ORAL at 21:13

## 2019-08-18 RX ADMIN — Medication 3 MILLILITER(S): at 10:04

## 2019-08-18 RX ADMIN — Medication 40 MILLIGRAM(S): at 13:00

## 2019-08-18 RX ADMIN — Medication 1 TABLET(S): at 11:58

## 2019-08-18 RX ADMIN — PANTOPRAZOLE SODIUM 40 MILLIGRAM(S): 20 TABLET, DELAYED RELEASE ORAL at 05:28

## 2019-08-18 RX ADMIN — Medication 3 MILLILITER(S): at 03:07

## 2019-08-18 RX ADMIN — Medication 1200 MILLIGRAM(S): at 05:29

## 2019-08-18 NOTE — PROGRESS NOTE ADULT - SUBJECTIVE AND OBJECTIVE BOX
Pt seen and examined :F/U chronic GERD    Patient on pantoprazole 40mg PO BID and famotidine at HS. Also on nebulizers and steroids for bronchospasm. Still c/o heartburn and reflux. Followed by pulmonary as well.    REVIEW OF SYSTEMS:    CONSTITUTIONAL: No fever, weight loss, or fatigue  EYES: No eye pain, visual disturbances, or discharge  ENMT:  No difficulty hearing, tinnitus, vertigo; No sinus or throat pain  RESPIRATORY: No cough, wheezing, chills or hemoptysis; No shortness of breath  CARDIOVASCULAR: No chest pain, palpitations, dizziness, or leg swelling  GASTROINTESTINAL: No abdominal or epigastric pain. No nausea, vomiting, or hematemesis; No diarrhea or constipation. No melena or hematochezia.    MEDICATIONS:  MEDICATIONS  (STANDING):  ALBUTerol/ipratropium for Nebulization 3 milliLiter(s) Nebulizer every 6 hours  atorvastatin 20 milliGRAM(s) Oral at bedtime  benzocaine 15 mG/menthol 3.6 mG (Sugar-Free) Lozenge 1 Lozenge Oral four times a day  buDESOnide 160 MICROgram(s)/formoterol 4.5 MICROgram(s) Inhaler 2 Puff(s) Inhalation two times a day  dextrose 5%. 1000 milliLiter(s) (50 mL/Hr) IV Continuous <Continuous>  dextrose 50% Injectable 12.5 Gram(s) IV Push once  dextrose 50% Injectable 25 Gram(s) IV Push once  dextrose 50% Injectable 25 Gram(s) IV Push once  docusate sodium 100 milliGRAM(s) Oral two times a day  doxycycline hyclate Capsule 100 milliGRAM(s) Oral every 12 hours  enoxaparin Injectable 40 milliGRAM(s) SubCutaneous daily  famotidine    Tablet 20 milliGRAM(s) Oral at bedtime  folic acid 1 milliGRAM(s) Oral daily  guaiFENesin ER 1200 milliGRAM(s) Oral every 12 hours  insulin lispro (HumaLOG) corrective regimen sliding scale   SubCutaneous three times a day before meals  loratadine 10 milliGRAM(s) Oral daily  methylPREDNISolone sodium succinate Injectable 40 milliGRAM(s) IV Push every 8 hours  mirtazapine 45 milliGRAM(s) Oral at bedtime  multivitamin 1 Tablet(s) Oral daily  pantoprazole    Tablet 40 milliGRAM(s) Oral two times a day  polyethylene glycol 3350 17 Gram(s) Oral daily  QUEtiapine 100 milliGRAM(s) Oral <User Schedule>  senna 2 Tablet(s) Oral at bedtime  thiamine 100 milliGRAM(s) Oral daily    MEDICATIONS  (PRN):  aluminum hydroxide/magnesium hydroxide/simethicone Suspension 30 milliLiter(s) Oral every 4 hours PRN Dyspepsia  dextrose 40% Gel 15 Gram(s) Oral once PRN Blood Glucose LESS THAN 70 milliGRAM(s)/deciliter  glucagon  Injectable 1 milliGRAM(s) IntraMuscular once PRN Glucose LESS THAN 70 milligrams/deciliter  ondansetron Injectable 4 milliGRAM(s) IV Push every 6 hours PRN Nausea and/or Vomiting      Allergies    No Known Allergies    Intolerances        Vital Signs Last 24 Hrs  T(C): 36.7 (18 Aug 2019 07:52), Max: 36.8 (17 Aug 2019 16:13)  T(F): 98 (18 Aug 2019 07:52), Max: 98.2 (17 Aug 2019 16:13)  HR: 100 (18 Aug 2019 07:52) (84 - 114)  BP: 136/95 (18 Aug 2019 07:52) (114/73 - 136/95)  BP(mean): --  RR: 18 (18 Aug 2019 07:52) (18 - 18)  SpO2: 95% (18 Aug 2019 07:52) (92% - 95%)      PHYSICAL EXAM:    General: Well developed; well nourished; in no acute distress  HEENT: MMM, conjunctiva and sclera clear  Lungs: continues with diffuse exp wheezes  Gastrointestinal:Abdomen: Soft non-tender non-distended; Normal bowel sounds; No hepatosplenomegaly  Extremities: no cyanosis, clubbing or edema.  Skin: Warm and dry. No obvious rash    LABS:      CBC Full  -  ( 18 Aug 2019 06:28 )  WBC Count : 16.30 K/uL  RBC Count : 3.75 M/uL  Hemoglobin : 10.2 g/dL  Hematocrit : 32.3 %  Platelet Count - Automated : 353 K/uL  Mean Cell Volume : 86.1 fl  Mean Cell Hemoglobin : 27.2 pg  Mean Cell Hemoglobin Concentration : 31.6 gm/dL  Auto Neutrophil # : x  Auto Lymphocyte # : x  Auto Monocyte # : x  Auto Eosinophil # : x  Auto Basophil # : x  Auto Neutrophil % : x  Auto Lymphocyte % : x  Auto Monocyte % : x  Auto Eosinophil % : x  Auto Basophil % : x    08-18    138  |  95<L>  |  31.0<H>  ----------------------------<  233<H>  4.4   |  26.0  |  0.78    Ca    9.2      18 Aug 2019 06:28  Phos  5.4     08-17  Mg     2.2     08-17

## 2019-08-18 NOTE — PROGRESS NOTE ADULT - SUBJECTIVE AND OBJECTIVE BOX
PULMONARY PROGRESS NOTE      NISREEN MARTINEZYENNY-[id number]    Patient is a 54y old  Male who presents with a chief complaint of SI, ETOH intoxication. ?pneumonittis (16 Aug 2019 11:30)  ETOH  Depression  GERD with Bronchospasm  Insomnia    INTERVAL HPI/OVERNIGHT EVENTS:  Less cough and wheeze  Fully alert      MEDICATIONS  (STANDING):  ALBUTerol    90 MICROgram(s) HFA Inhaler 2 Puff(s) Inhalation every 6 hours  atorvastatin 20 milliGRAM(s) Oral at bedtime  benzocaine 15 mG/menthol 3.6 mG (Sugar-Free) Lozenge 1 Lozenge Oral four times a day  buDESOnide 160 MICROgram(s)/formoterol 4.5 MICROgram(s) Inhaler 2 Puff(s) Inhalation two times a day  dextrose 5%. 1000 milliLiter(s) (50 mL/Hr) IV Continuous <Continuous>  dextrose 50% Injectable 12.5 Gram(s) IV Push once  dextrose 50% Injectable 25 Gram(s) IV Push once  dextrose 50% Injectable 25 Gram(s) IV Push once  docusate sodium 100 milliGRAM(s) Oral two times a day  doxycycline hyclate Capsule 100 milliGRAM(s) Oral every 12 hours  enoxaparin Injectable 40 milliGRAM(s) SubCutaneous daily  famotidine    Tablet 20 milliGRAM(s) Oral two times a day  folic acid 1 milliGRAM(s) Oral daily  guaiFENesin ER 1200 milliGRAM(s) Oral every 12 hours  insulin lispro (HumaLOG) corrective regimen sliding scale   SubCutaneous three times a day before meals  loratadine 10 milliGRAM(s) Oral daily  methylPREDNISolone sodium succinate Injectable 60 milliGRAM(s) IV Push every 8 hours  mirtazapine 45 milliGRAM(s) Oral at bedtime  multivitamin 1 Tablet(s) Oral daily  pantoprazole  Injectable 40 milliGRAM(s) IV Push two times a day  polyethylene glycol 3350 17 Gram(s) Oral daily  QUEtiapine 100 milliGRAM(s) Oral <User Schedule>  senna 2 Tablet(s) Oral at bedtime  thiamine 100 milliGRAM(s) Oral daily      MEDICATIONS  (PRN):  dextrose 40% Gel 15 Gram(s) Oral once PRN Blood Glucose LESS THAN 70 milliGRAM(s)/deciliter  glucagon  Injectable 1 milliGRAM(s) IntraMuscular once PRN Glucose LESS THAN 70 milligrams/deciliter      Allergies    No Known Allergies    Intolerances        PAST MEDICAL & SURGICAL HISTORY:  Asthma  Alcohol abuse  High cholesterol  HTN (hypertension)  GERD (gastroesophageal reflux disease)  Alcohol abuse  High cholesterol  Hypertension  Alcohol abuse  No significant past surgical history  No significant past surgical history  No significant past surgical history      SOCIAL HISTORY  Smoking History:       REVIEW OF SYSTEMS:    CONSTITUTIONAL:  No distress    HEENT:  Eyes:  No diplopia or blurred vision. ENT:  No earache, sore throat or runny nose.    CARDIOVASCULAR:  No pressure, squeezing, tightness, heaviness or aching about the chest; no palpitations.    RESPIRATORY:  No PND or orthopnea. Mild SOBOE    GASTROINTESTINAL:  No nausea, vomiting or diarrhea.    GENITOURINARY:  No dysuria, frequency or urgency.    NEUROLOGIC:  No paresthesias, fasciculations, seizures or weakness.    PSYCHIATRIC:  No disorder of thought or mood.    Vital Signs Last 24 Hrs  T(C): 37.2 (15 Aug 2019 23:28), Max: 37.2 (15 Aug 2019 23:28)  T(F): 99 (15 Aug 2019 23:28), Max: 99 (15 Aug 2019 23:28)  HR: 104 (16 Aug 2019 09:08) (95 - 105)  BP: 117/75 (15 Aug 2019 23:28) (117/75 - 117/75)  BP(mean): --  RR: 18 (16 Aug 2019 08:00) (1 - 18)  SpO2: 96% (16 Aug 2019 09:08) (95% - 99%)    PHYSICAL EXAMINATION:    GENERAL: The patient is awake and alert in no apparent distress.     HEENT: Head is normocephalic and atraumatic. Extraocular muscles are intact. Mucous membranes are moist.    NECK: Supple.    LUNGS: End Exp wheeze to auscultation without rales or rhonchi; respirations unlabored    HEART: Regular rate and rhythm without murmur.    ABDOMEN: Soft, nontender, and nondistended.      EXTREMITIES: Without any cyanosis, clubbing, rash, lesions or edema.    NEUROLOGIC: Grossly intact.    LABS:                        10.0   16.10 )-----------( 366      ( 16 Aug 2019 07:58 )             31.7     08-16    133<L>  |  95<L>  |  29.0<H>  ----------------------------<  189<H>  4.8   |  25.0  |  0.82    Ca    9.5      16 Aug 2019 07:58  Phos  4.9     08-16  Mg     2.2     08-16        RADIOLOGY & ADDITIONAL STUDIES:       EXAM:  XR CHEST PORTABLE ROUTINE 1V                          PROCEDURE DATE:  08/12/2019          INTERPRETATION:  TECHNIQUE: Single portable view of the chest.    COMPARISON: 8/9/2019    CLINICAL HISTORY: dyspnoea. Cough after IVF, ?fluid   overloadCHF,HTN,depression, SI, ETOH abuse    FINDINGS:    Single frontal view of the chest demonstrates the lungs to be clear. The   cardiomediastinal silhouette is normal. No acute osseous abnormalities.   Overlying EKG leads and wires are noted    IMPRESSION: No acute cardiopulmonary disease process.                AD FAN M.D., ATTENDING RADIOLOGIST  This document has been electronically signed. Aug 12 2019 10:40AM

## 2019-08-18 NOTE — PROGRESS NOTE ADULT - ASSESSMENT
54 y/o M with PMHx of HTN, HLD, ?CHF and EtOH abuse presents to ED BIBA, restrained c/o suicidal attempt. As per EMS, pt was found in the parking lot across from [facility]. Pt was found saying suicidal thoughts and jumping in front of cars, ALOOB, and found with alcohol. Pt was agressive on ambulance, and given 10 Verset via IM. Hx unavailable bc pt unresponsive. patient was discharged form [facility] on 8/8/2019 after stabilization, admitted with SI, depression. He has multiple ER visit at the end of with different complaints [ SOB, lethargy, BRBPR] and was discharged after stabilization. 8/1 discharged from ER to [facility] with steroid, neb for BA exacerbation. patient current reported depression anhedonia, SI. no HI. He drinks half galloon of vodka everyday, denied smoking cig or drug use. he is homeless. In ER, WBC was elevated with elevated lactate. CT chest neg for infection, but showed air/fluid level, better than before. CT head neg.  s/p 2L LR and zosyn and admission was called for ?pneumonitis and SI. found tohave diffuse wheezing and SOB.     A/P    >SOB/wheezing likely from Asthma exacerbation -likely GERD with bronchospasm  CXR clear  pulmo following - c/w steroid and nebs  S & S eval recs regular with thins  Elevated HOB (pt & RN counseled, he was found in prone position despite order)  aspiration precautions  c/w PPI & H2 blockers  GI ON BOARD -  Not a candidate for EGD at this time due to pulmonary status. Will change diet to diabetic, DASH and low fat   c/w doxycycline PO x 5 days for bronchitis  Ct chest: No acute findings in the thorax.No pneumomediastinum or pneumothorax.The esophagus is slightly distended with air and fluid, less severe compared to the previous study of June 28, 2018.  PFTs as out pt        >?Esophageal dysmotility-  Yet with wheezing despite maximum steroids likely from microaspiration    CT chest: The esophagus is slightly distended with air and fluid   S & S eval recs regular with thins  Elevated HOB (pt & RN counseled, he was found in prone position despite order)  Aspiration precautions  c/w PPI & H2 blockers.   GI on board -  Not a candidate for EGD at this time due to pulmonary status. Will change diet to diabetic, DASH and low fat         >Leukocytosis likely from recent steroid use - afebrile  UA neg, Bc NGTD  CXR -ve  RVP -ve  Procal wnl  c/w doxycyline for bronchitis x 5 days      >depression/SI:   psych following -  Increased Remeron 45 qHS & trazodone 100 qHS per psych  psych cx appreciated, Patient needs further psychiatric safety assessment- [facility] on d/c  c/w 1:1  haldol PRN for agitation  Avoid BZs per psych due to hx of etoh & substance abuse    >ETOH abuse:  Pt was in [facility] for weeks & detoxed there, got discharged & had etoh prior to arrival but do not suspect withdrawal as per psych  No need for librium as per psych  Thiamine/ folate  SW eval      Insomina-   Insomnia had improved with ambien but worsening when ambien was taken off by psych & trazodone was started as per pt. As per him he had a rough night as his ambien was d/padmini & trazodone did not help. Appears agitated due to lack of sleep.   Added metatonin  Ambien not recommended as per Dr. Pacheco due to pt's addictive behavior      HTN: not on meds, monitor BP     >pre diabete: ISS, accucheck, A1c 6.2.   started lipitor       DVT-P: lovenox  aspiration precautions  fall precaution

## 2019-08-18 NOTE — PROGRESS NOTE ADULT - ASSESSMENT
Assess    GERD with bronchospasm  Some asthma not excluded  No significant aspiration  Depression  ETOH  Insomnia    Rec    Truncal elevation at night  Avoid food within 90 minutes of hs  Antacid per GI - Consider day PPI and night H2 blocker  Insomnia/depression per psych  Neb  Steroid taper  Toward LABA/ICS  DC likely in 24-48 hours with OP FU

## 2019-08-18 NOTE — PROGRESS NOTE ADULT - SUBJECTIVE AND OBJECTIVE BOX
Internal Medicine Hospitalist - Dr. Zenobia MARTINEZ    [id number]    54y      Male    Patient is a 54y old  Male who presents with a chief complaint of SI, ETOH intoxication. ?pneumonittis (17 Aug 2019 14:15)    INTERVAL HPI/ OVERNIGHT EVENTS: Patient is seen and examined, report abdominal discomfort associated with nausea, also c/o cough and SOB, afebrile    REVIEW OF SYSTEMS:    Denied fever, chills, vomiting, chest pain,  headache, dizziness    PHYSICAL EXAM:    Vital Signs Last 24 Hrs  T(C): 36.7 (18 Aug 2019 07:52), Max: 36.8 (17 Aug 2019 16:13)  T(F): 98 (18 Aug 2019 07:52), Max: 98.2 (17 Aug 2019 16:13)  HR: 100 (18 Aug 2019 07:52) (84 - 114)  BP: 136/95 (18 Aug 2019 07:52) (114/73 - 136/95)  BP(mean): --  RR: 18 (18 Aug 2019 07:52) (18 - 18)  SpO2: 95% (18 Aug 2019 07:52) (92% - 95%)    GENERAL: NAD  CHEST/LUNG: positive wheezing b/l   HEART: S1S2+ audible  ABDOMEN: Soft, Nontender, Nondistended; Bowel sounds present  EXTREMITIES:  no edema  CNS: AAO X 3  Psychiatry: anxious    LABS:                        10.2   16.30 )-----------( 353      ( 18 Aug 2019 06:28 )             32.3     08-18    138  |  95<L>  |  31.0<H>  ----------------------------<  233<H>  4.4   |  26.0  |  0.78    Ca    9.2      18 Aug 2019 06:28  Phos  5.4     08-17  Mg     2.2     08-17              MEDICATIONS  (STANDING):  ALBUTerol/ipratropium for Nebulization 3 milliLiter(s) Nebulizer every 6 hours  atorvastatin 20 milliGRAM(s) Oral at bedtime  benzocaine 15 mG/menthol 3.6 mG (Sugar-Free) Lozenge 1 Lozenge Oral four times a day  buDESOnide 160 MICROgram(s)/formoterol 4.5 MICROgram(s) Inhaler 2 Puff(s) Inhalation two times a day  dextrose 5%. 1000 milliLiter(s) (50 mL/Hr) IV Continuous <Continuous>  dextrose 50% Injectable 12.5 Gram(s) IV Push once  dextrose 50% Injectable 25 Gram(s) IV Push once  dextrose 50% Injectable 25 Gram(s) IV Push once  docusate sodium 100 milliGRAM(s) Oral two times a day  doxycycline hyclate Capsule 100 milliGRAM(s) Oral every 12 hours  enoxaparin Injectable 40 milliGRAM(s) SubCutaneous daily  famotidine    Tablet 20 milliGRAM(s) Oral at bedtime  folic acid 1 milliGRAM(s) Oral daily  guaiFENesin ER 1200 milliGRAM(s) Oral every 12 hours  insulin lispro (HumaLOG) corrective regimen sliding scale   SubCutaneous three times a day before meals  loratadine 10 milliGRAM(s) Oral daily  methylPREDNISolone sodium succinate Injectable 40 milliGRAM(s) IV Push every 8 hours  mirtazapine 45 milliGRAM(s) Oral at bedtime  multivitamin 1 Tablet(s) Oral daily  pantoprazole    Tablet 40 milliGRAM(s) Oral two times a day  polyethylene glycol 3350 17 Gram(s) Oral daily  QUEtiapine 100 milliGRAM(s) Oral <User Schedule>  senna 2 Tablet(s) Oral at bedtime  thiamine 100 milliGRAM(s) Oral daily    MEDICATIONS  (PRN):  aluminum hydroxide/magnesium hydroxide/simethicone Suspension 30 milliLiter(s) Oral every 4 hours PRN Dyspepsia  dextrose 40% Gel 15 Gram(s) Oral once PRN Blood Glucose LESS THAN 70 milliGRAM(s)/deciliter  glucagon  Injectable 1 milliGRAM(s) IntraMuscular once PRN Glucose LESS THAN 70 milligrams/deciliter  ondansetron Injectable 4 milliGRAM(s) IV Push every 6 hours PRN Nausea and/or Vomiting      RADIOLOGY & ADDITIONAL TEST

## 2019-08-19 LAB
ANION GAP SERPL CALC-SCNC: 16 MMOL/L — SIGNIFICANT CHANGE UP (ref 5–17)
BUN SERPL-MCNC: 29 MG/DL — HIGH (ref 8–20)
CALCIUM SERPL-MCNC: 9.4 MG/DL — SIGNIFICANT CHANGE UP (ref 8.6–10.2)
CHLORIDE SERPL-SCNC: 96 MMOL/L — LOW (ref 98–107)
CO2 SERPL-SCNC: 25 MMOL/L — SIGNIFICANT CHANGE UP (ref 22–29)
CREAT SERPL-MCNC: 0.77 MG/DL — SIGNIFICANT CHANGE UP (ref 0.5–1.3)
GLUCOSE BLDC GLUCOMTR-MCNC: 212 MG/DL — HIGH (ref 70–99)
GLUCOSE BLDC GLUCOMTR-MCNC: 232 MG/DL — HIGH (ref 70–99)
GLUCOSE SERPL-MCNC: 198 MG/DL — HIGH (ref 70–115)
HCT VFR BLD CALC: 33.5 % — LOW (ref 39–50)
HGB BLD-MCNC: 10.5 G/DL — LOW (ref 13–17)
MCHC RBC-ENTMCNC: 27.2 PG — SIGNIFICANT CHANGE UP (ref 27–34)
MCHC RBC-ENTMCNC: 31.3 GM/DL — LOW (ref 32–36)
MCV RBC AUTO: 86.8 FL — SIGNIFICANT CHANGE UP (ref 80–100)
PLATELET # BLD AUTO: 361 K/UL — SIGNIFICANT CHANGE UP (ref 150–400)
POTASSIUM SERPL-MCNC: 4.5 MMOL/L — SIGNIFICANT CHANGE UP (ref 3.5–5.3)
POTASSIUM SERPL-SCNC: 4.5 MMOL/L — SIGNIFICANT CHANGE UP (ref 3.5–5.3)
RBC # BLD: 3.86 M/UL — LOW (ref 4.2–5.8)
RBC # FLD: 17.2 % — HIGH (ref 10.3–14.5)
SODIUM SERPL-SCNC: 137 MMOL/L — SIGNIFICANT CHANGE UP (ref 135–145)
WBC # BLD: 14.9 K/UL — HIGH (ref 3.8–10.5)
WBC # FLD AUTO: 14.9 K/UL — HIGH (ref 3.8–10.5)

## 2019-08-19 PROCEDURE — 99232 SBSQ HOSP IP/OBS MODERATE 35: CPT

## 2019-08-19 RX ADMIN — Medication 1 MILLIGRAM(S): at 11:39

## 2019-08-19 RX ADMIN — SENNA PLUS 2 TABLET(S): 8.6 TABLET ORAL at 21:03

## 2019-08-19 RX ADMIN — Medication 1200 MILLIGRAM(S): at 17:28

## 2019-08-19 RX ADMIN — MIRTAZAPINE 45 MILLIGRAM(S): 45 TABLET, ORALLY DISINTEGRATING ORAL at 21:03

## 2019-08-19 RX ADMIN — BUDESONIDE AND FORMOTEROL FUMARATE DIHYDRATE 2 PUFF(S): 160; 4.5 AEROSOL RESPIRATORY (INHALATION) at 08:56

## 2019-08-19 RX ADMIN — PANTOPRAZOLE SODIUM 40 MILLIGRAM(S): 20 TABLET, DELAYED RELEASE ORAL at 05:35

## 2019-08-19 RX ADMIN — FAMOTIDINE 20 MILLIGRAM(S): 10 INJECTION INTRAVENOUS at 21:03

## 2019-08-19 RX ADMIN — LORATADINE 10 MILLIGRAM(S): 10 TABLET ORAL at 11:39

## 2019-08-19 RX ADMIN — PANTOPRAZOLE SODIUM 40 MILLIGRAM(S): 20 TABLET, DELAYED RELEASE ORAL at 17:30

## 2019-08-19 RX ADMIN — Medication 4: at 17:27

## 2019-08-19 RX ADMIN — BENZOCAINE AND MENTHOL 1 LOZENGE: 5; 1 LIQUID ORAL at 11:37

## 2019-08-19 RX ADMIN — ENOXAPARIN SODIUM 40 MILLIGRAM(S): 100 INJECTION SUBCUTANEOUS at 11:39

## 2019-08-19 RX ADMIN — QUETIAPINE FUMARATE 100 MILLIGRAM(S): 200 TABLET, FILM COATED ORAL at 17:30

## 2019-08-19 RX ADMIN — Medication 40 MILLIGRAM(S): at 17:29

## 2019-08-19 RX ADMIN — Medication 100 MILLIGRAM(S): at 05:34

## 2019-08-19 RX ADMIN — Medication 6: at 11:44

## 2019-08-19 RX ADMIN — BENZOCAINE AND MENTHOL 1 LOZENGE: 5; 1 LIQUID ORAL at 05:34

## 2019-08-19 RX ADMIN — ATORVASTATIN CALCIUM 20 MILLIGRAM(S): 80 TABLET, FILM COATED ORAL at 21:03

## 2019-08-19 RX ADMIN — Medication 1 TABLET(S): at 11:38

## 2019-08-19 RX ADMIN — Medication 100 MILLIGRAM(S): at 11:39

## 2019-08-19 RX ADMIN — Medication 2: at 08:27

## 2019-08-19 RX ADMIN — Medication 3 MILLILITER(S): at 08:56

## 2019-08-19 RX ADMIN — Medication 40 MILLIGRAM(S): at 05:35

## 2019-08-19 RX ADMIN — BENZOCAINE AND MENTHOL 1 LOZENGE: 5; 1 LIQUID ORAL at 17:28

## 2019-08-19 RX ADMIN — Medication 30 MILLILITER(S): at 00:39

## 2019-08-19 RX ADMIN — Medication 1200 MILLIGRAM(S): at 05:35

## 2019-08-19 NOTE — PROGRESS NOTE BEHAVIORAL HEALTH - NSBHPTASSESSDT_PSY_A_CORE
12-Aug-2019 09:45
13-Aug-2019 08:30
14-Aug-2019 08:54
15-Aug-2019 09:38
16-Aug-2019 11:49
19-Aug-2019 16:12

## 2019-08-19 NOTE — PROGRESS NOTE BEHAVIORAL HEALTH - NSBHCONSULTOBS_PSY_A_CORE
Routine observation
Constant observation

## 2019-08-19 NOTE — PROGRESS NOTE BEHAVIORAL HEALTH - SUMMARY
mood improved No longer expressing any self injurious urges Primary problem is chronic alcoholism
pt is 58 year old male who is currently being treated inpatient for pneumonitis, alcohol abuse and suicidal ideations. pt was just released from [facility] and states he is depressed and hopeless.
pt is 53 year old male admitted to hospital for treatment of pneumonitis, alcohol withdrawal and suicidal ideation. pt was recently discharged from [facility]. pt appears depressed and hopeless. will continue to follow up while patient is still being treated inpatient. No need for Librium.
will make medication adjustments
no interval change Patient with severe alcoholism Is homeless Has been in [facility] recently Now difficult to engage in discussion of treatment needs Would benefit from long term substance abuse residential treatment presuming acute suicidality is no longer an issue He has of recent behaviors been impulsive particularly when intoxicated Presently unable to contract for safety with questionable motivation for sobriety
not much interval change

## 2019-08-19 NOTE — PROGRESS NOTE BEHAVIORAL HEALTH - NSBHCHARTREVIEWLAB_PSY_A_CORE FT
10.2   18.69 )-----------( 373      ( 12 Aug 2019 06:03 )             32.6     08-12    136  |  99  |  26.0<H>  ----------------------------<  177<H>  4.7   |  26.0  |  0.78    Ca    9.4      12 Aug 2019 06:03  Phos  3.6     08-12  Mg     2.1     08-12    TPro  6.9  /  Alb  4.2  /  TBili  <0.2<L>  /  DBili  0.1  /  AST  12  /  ALT  17  /  AlkPhos  53  08-11    LIVER FUNCTIONS - ( 11 Aug 2019 13:25 )  Alb: 4.2 g/dL / Pro: 6.9 g/dL / ALK PHOS: 53 U/L / ALT: 17 U/L / AST: 12 U/L / GGT: x             Urinalysis Basic - ( 10 Aug 2019 17:33 )    Color: Yellow / Appearance: Clear / S.010 / pH: x  Gluc: x / Ketone: Negative  / Bili: Negative / Urobili: Negative mg/dL   Blood: x / Protein: Negative mg/dL / Nitrite: Negative   Leuk Esterase: Negative / RBC: 0-2 /HPF / WBC 0-2   Sq Epi: x / Non Sq Epi: Occasional / Bacteria: Occasional
10.4   17.44 )-----------( 370      ( 14 Aug 2019 08:02 )             32.7  08-13  136  |  98  |  25.0<H>  ----------------------------<  196<H>  4.5   |  26.0  |  0.85  Ca    9.7      13 Aug 2019 07:24  Phos  4.8     08-13  Mg     2.0     08-13
10.0   16.10 )-----------( 366      ( 16 Aug 2019 07:58 )             31.7     08-16    133<L>  |  95<L>  |  29.0<H>  ----------------------------<  189<H>  4.8   |  25.0  |  0.82    Ca    9.5      16 Aug 2019 07:58  Phos  4.9     08-16  Mg     2.2     08-16    TPro  7.1  /  Alb  4.1  /  TBili  <0.2<L>  /  DBili  0.0  /  AST  10  /  ALT  15  /  AlkPhos  48  08-14    LIVER FUNCTIONS - ( 14 Aug 2019 11:56 )  Alb: 4.1 g/dL / Pro: 7.1 g/dL / ALK PHOS: 48 U/L / ALT: 15 U/L / AST: 10 U/L / GGT: x
10.2   17.08 )-----------( 366      ( 15 Aug 2019 06:42 )             32.6     08-15    136  |  97<L>  |  32.0<H>  ----------------------------<  207<H>  4.6   |  24.0  |  0.80    Ca    9.6      15 Aug 2019 06:42  Phos  5.4     08-15  Mg     2.0     08-15    TPro  7.1  /  Alb  4.1  /  TBili  <0.2<L>  /  DBili  0.0  /  AST  10  /  ALT  15  /  AlkPhos  48  08-14    LIVER FUNCTIONS - ( 14 Aug 2019 11:56 )  Alb: 4.1 g/dL / Pro: 7.1 g/dL / ALK PHOS: 48 U/L / ALT: 15 U/L / AST: 10 U/L / GGT: x
10.5   14.90 )-----------( 361      ( 19 Aug 2019 07:28 )             33.5     08-19    137  |  96<L>  |  29.0<H>  ----------------------------<  198<H>  4.5   |  25.0  |  0.77    Ca    9.4      19 Aug 2019 07:28
10.3   16.69 )-----------( 350      ( 13 Aug 2019 07:24 )             32.9  08-13  136  |  98  |  25.0<H>  ----------------------------<  196<H>  4.5   |  26.0  |  0.85  Ca    9.7      13 Aug 2019 07:24  Phos  4.8     08-13  Mg     2.0     08-13  TPro  6.9  /  Alb  4.2  /  TBili  <0.2<L>  /  DBili  0.1  /  AST  12  /  ALT  17  /  AlkPhos  53  08-11  LIVER FUNCTIONS - ( 11 Aug 2019 13:25 )  Alb: 4.2 g/dL / Pro: 6.9 g/dL / ALK PHOS: 53 U/L / ALT: 17 U/L / AST: 12 U/L / GGT: x

## 2019-08-19 NOTE — PROGRESS NOTE BEHAVIORAL HEALTH - THOUGHT PROCESS
Impaired reasoning
Linear

## 2019-08-19 NOTE — PROGRESS NOTE BEHAVIORAL HEALTH - RISK ASSESSMENT
Diagnosis: metastatic prostate cancer    Regimen: Zometa and Firmagon  Cycle/Day:     Dr. Park is supervising provider today.    ECO - Fully active, able to carry on all predisease activities without restrictions.    Nursing Assessment:   A comprehensive nursing assessment addressing the side-effects of chemotherapy was performed and the patient reports the following: seen and examined by Dr. Park, reviewed and agree with MA assessment.    Pre-Treatment: - Patient has valid pre-authorization  - VS completed  - Treatment parameters verified in patient protocol  - Lab results checked - MD notified; within treatment perameters.    Treatment: Refer to LDA and MAR for line assessment and medication administration  Refer to Toxicity Assessment doc flowsheet   Infusion pump used for non-vesicant drugs    Post Treatment: Treatment tolerated well; no adverse reaction    Transfusion: Not needed    Integrative Medicine: No    Education: No new instructions needed    Next appointment scheduled: 18  Patient instructed to call the office with any questions or concerns.    Patient Discharged: patient discharged to home per self, ambulatory, to home    
low imminent risk elevated chronic risk
high risk- suicidal ideation
high risk
high risk- suicidal ideations
elevated risk
elevated risk of suicidal behaviors

## 2019-08-19 NOTE — PROGRESS NOTE BEHAVIORAL HEALTH - NSBHCONSULTFOLLOWAFTERCARE_PSY_A_CORE FT
consider [facility] or other rehab program SW to pursue
inpatient psychiatric unit for stabilization of depressive symptoms and due to suicidal risk
inpatient psych unit once medically appropriate

## 2019-08-19 NOTE — PROGRESS NOTE ADULT - SUBJECTIVE AND OBJECTIVE BOX
NISREEN MARTINEZ    [id number]    54y      Male    INTERVAL HPI/OVERNIGHT EVENTS:    Patient is a 54y old  Male who presents with a chief complaint of SI, ETOH intoxication. patient seen at bedside and states he is tired because he could not sleep all night. patient states his breathing is improved    REVIEW OF SYSTEMS:    CONSTITUTIONAL: No fever, weight loss, or fatigue  RESPIRATORY: No cough, wheezing, hemoptysis; No shortness of breath  CARDIOVASCULAR: No chest pain, palpitations  GASTROINTESTINAL: No abdominal or epigastric pain. No nausea, vomiting  NEUROLOGICAL: No headaches, memory loss, loss of strength.  MISCELLANEOUS:      Vital Signs Last 24 Hrs  T(C): 36.7 (19 Aug 2019 08:43), Max: 36.7 (19 Aug 2019 08:43)  T(F): 98 (19 Aug 2019 08:43), Max: 98 (19 Aug 2019 08:43)  HR: 97 (19 Aug 2019 09:14) (84 - 99)  BP: 121/79 (19 Aug 2019 08:43) (121/79 - 132/88)  BP(mean): --  RR: 18 (19 Aug 2019 08:43) (18 - 18)  SpO2: 94% (19 Aug 2019 08:43) (94% - 95%)    PHYSICAL EXAM:    GENERAL: NAD  CHEST/LUNG: clear,   HEART: S1S2+ audible  ABDOMEN: Soft, Nontender, Nondistended; Bowel sounds present  EXTREMITIES:  no edema  CNS: AAO X 3  Psychiatry: anxious    LABS:                        10.5   14.90 )-----------( 361      ( 19 Aug 2019 07:28 )             33.5     08-19    137  |  96<L>  |  29.0<H>  ----------------------------<  198<H>  4.5   |  25.0  |  0.77    Ca    9.4      19 Aug 2019 07:28              MEDICATIONS  (STANDING):  ALBUTerol/ipratropium for Nebulization 3 milliLiter(s) Nebulizer every 6 hours  atorvastatin 20 milliGRAM(s) Oral at bedtime  benzocaine 15 mG/menthol 3.6 mG (Sugar-Free) Lozenge 1 Lozenge Oral four times a day  buDESOnide 160 MICROgram(s)/formoterol 4.5 MICROgram(s) Inhaler 2 Puff(s) Inhalation two times a day  dextrose 5%. 1000 milliLiter(s) (50 mL/Hr) IV Continuous <Continuous>  dextrose 50% Injectable 12.5 Gram(s) IV Push once  dextrose 50% Injectable 25 Gram(s) IV Push once  dextrose 50% Injectable 25 Gram(s) IV Push once  docusate sodium 100 milliGRAM(s) Oral two times a day  enoxaparin Injectable 40 milliGRAM(s) SubCutaneous daily  famotidine    Tablet 20 milliGRAM(s) Oral at bedtime  folic acid 1 milliGRAM(s) Oral daily  guaiFENesin ER 1200 milliGRAM(s) Oral every 12 hours  insulin lispro (HumaLOG) corrective regimen sliding scale   SubCutaneous three times a day before meals  loratadine 10 milliGRAM(s) Oral daily  methylPREDNISolone sodium succinate Injectable 40 milliGRAM(s) IV Push every 12 hours  mirtazapine 45 milliGRAM(s) Oral at bedtime  multivitamin 1 Tablet(s) Oral daily  pantoprazole    Tablet 40 milliGRAM(s) Oral two times a day  polyethylene glycol 3350 17 Gram(s) Oral daily  QUEtiapine 100 milliGRAM(s) Oral <User Schedule>  senna 2 Tablet(s) Oral at bedtime  thiamine 100 milliGRAM(s) Oral daily    MEDICATIONS  (PRN):  aluminum hydroxide/magnesium hydroxide/simethicone Suspension 30 milliLiter(s) Oral every 4 hours PRN Dyspepsia  dextrose 40% Gel 15 Gram(s) Oral once PRN Blood Glucose LESS THAN 70 milliGRAM(s)/deciliter  glucagon  Injectable 1 milliGRAM(s) IntraMuscular once PRN Glucose LESS THAN 70 milligrams/deciliter  ondansetron Injectable 4 milliGRAM(s) IV Push every 6 hours PRN Nausea and/or Vomiting      RADIOLOGY & ADDITIONAL TESTS:

## 2019-08-19 NOTE — PROGRESS NOTE BEHAVIORAL HEALTH - NSBHFUPREASONCONS_PSY_A_CORE
alcohol/depression
alcohol/suicidality
depression/alcohol
depression/med management/sleep disturbance
suicidality/alcohol
depression

## 2019-08-19 NOTE — ED BEHAVIORAL HEALTH NOTE - BEHAVIORAL HEALTH NOTE
pt also seen by Dr holden, patient requesting increase in seroquel to 100 mg denies SI HI and hallucinations pt also seen by Dr holden, patient requesting increase in seroquel  for sleep denies SI HI and hallucinations

## 2019-08-19 NOTE — PROGRESS NOTE BEHAVIORAL HEALTH - SECONDARY DX1
Depressive disorder
Alcohol abuse
Alcohol use disorder, severe, in controlled environment

## 2019-08-19 NOTE — PROGRESS NOTE BEHAVIORAL HEALTH - NSBHCHARTREVIEWVS_PSY_A_CORE FT
Vital Signs Last 24 Hrs  T(C): 36.4 (11 Aug 2019 23:10), Max: 36.4 (11 Aug 2019 23:10)  T(F): 97.5 (11 Aug 2019 23:10), Max: 97.5 (11 Aug 2019 23:10)  HR: 94 (12 Aug 2019 09:00) (87 - 115)  BP: 126/81 (12 Aug 2019 04:50) (112/74 - 135/94)  BP(mean): --  RR: 18 (11 Aug 2019 23:10) (18 - 18)  SpO2: 96% (12 Aug 2019 04:17) (92% - 96%)
Vital Signs Last 24 Hrs  T(C): 36.6 (13 Aug 2019 16:03), Max: 36.6 (13 Aug 2019 16:03)  T(F): 97.8 (13 Aug 2019 16:03), Max: 97.8 (13 Aug 2019 16:03)  HR: 104 (14 Aug 2019 04:50) (69 - 104)  BP: 133/78 (14 Aug 2019 04:50) (133/78 - 154/95)  RR: 18 (13 Aug 2019 16:03) (18 - 18)  SpO2: 95% (13 Aug 2019 20:24) (94% - 96%)
Vital Signs Last 24 Hrs  T(C): 37 (15 Aug 2019 07:56), Max: 37 (15 Aug 2019 07:56)  T(F): 98.6 (15 Aug 2019 07:56), Max: 98.6 (15 Aug 2019 07:56)  HR: 96 (15 Aug 2019 08:58) (55 - 113)  BP: 120/79 (15 Aug 2019 07:56) (107/70 - 142/89)  RR: 18 (15 Aug 2019 08:00) (18 - 20)  SpO2: 97% (15 Aug 2019 08:58) (93% - 98%)
Vital Signs Last 24 Hrs  T(C): 37.2 (15 Aug 2019 23:28), Max: 37.2 (15 Aug 2019 23:28)  T(F): 99 (15 Aug 2019 23:28), Max: 99 (15 Aug 2019 23:28)  HR: 104 (16 Aug 2019 09:08) (95 - 105)  BP: 117/75 (15 Aug 2019 23:28) (117/75 - 117/75)  RR: 18 (16 Aug 2019 08:00) (1 - 18)  SpO2: 96% (16 Aug 2019 09:08) (95% - 99%)
Vital Signs Last 24 Hrs  T(C): 36.4 (19 Aug 2019 16:12), Max: 36.7 (19 Aug 2019 08:43)  T(F): 97.6 (19 Aug 2019 16:12), Max: 98 (19 Aug 2019 08:43)  HR: 108 (19 Aug 2019 16:12) (84 - 108)  BP: 120/55 (19 Aug 2019 16:12) (120/55 - 132/88)  RR: 18 (19 Aug 2019 16:12) (18 - 18)  SpO2: 91% (19 Aug 2019 16:12) (91% - 95%)
Vital Signs Last 24 Hrs  T(C): 37.2 (13 Aug 2019 07:55), Max: 37.2 (12 Aug 2019 20:39)  T(F): 99 (13 Aug 2019 07:55), Max: 99 (13 Aug 2019 07:55)  HR: 94 (13 Aug 2019 07:55) (89 - 105)  BP: 130/87 (13 Aug 2019 07:55) (108/77 - 135/87)  RR: 18 (13 Aug 2019 07:55) (18 - 20)  SpO2: 92% (13 Aug 2019 07:55) (91% - 96%)

## 2019-08-19 NOTE — PROGRESS NOTE BEHAVIORAL HEALTH - PRIMARY DX
Alcohol use disorder, severe, in controlled environment
Depressive disorder

## 2019-08-19 NOTE — PROGRESS NOTE BEHAVIORAL HEALTH - THOUGHT CONTENT
Unremarkable
Guilt/Suicidality/Hopelessness
Hopelessness/Guilt/Suicidality
Hopelessness/Suicidality/Guilt
Guilt/Hopelessness
Hopelessness/Guilt

## 2019-08-19 NOTE — PROGRESS NOTE ADULT - ASSESSMENT
54 y/o M with PMHx of HTN, HLD, ?CHF and EtOH abuse presents to ED BIBA, restrained c/o suicidal attempt. As per EMS, pt was found in the parking lot across from [facility]. Pt was found saying suicidal thoughts and jumping in front of cars, ALOOB, and found with alcohol. Pt was agressive in ambulance, and given 10 Verset via IM. Hx unavailable bc pt unresponsive. patient was discharged form [facility] on 8/8/2019 after stabilization, admitted with SI, depression. He has multiple ER visit at the end of with different complaints [ SOB, lethargy, BRBPR] and was discharged after stabilization. 8/1 discharged from ER to [facility] with steroid, neb for BA exacerbation. patient current reported depression anhedonia, SI. no HI. He drinks half galloon of vodka everyday, denied smoking cig or drug use. he is homeless. In ER, WBC was elevated with elevated lactate. CT chest neg for infection, but showed air/fluid level, better than before. CT head neg.  s/p 2L LR and zosyn and admission was called for ?pneumonitis and SI.     A/P    >SOB/wheezing likely from Asthma exacerbation -likely GERD with bronchospasm  CXR clear  pulmo following -   S & S eval recs regular with thins  Elevated HOB (pt & RN counseled, he was found in prone position despite order)  aspiration precautions  c/w PPI & H2 blockers  GI ON BOARD -  Not a candidate for EGD at this time due to pulmonary status. Will change diet to diabetic, DASH and low fat   Ct chest: No acute findings in the thorax.No pneumomediastinum or pneumothorax. The esophagus is slightly distended with air and fluid, less severe compared to the previous study of June 28, 2018.  PFTs as out pt        >?Esophageal dysmotility-  CT chest: The esophagus is slightly distended with air and fluid   S & S eval recs regular with thins  c/w PPI & H2 blockers.   GI on board -  Not a candidate for EGD at this time due to pulmonary status.      >Leukocytosis likely from recent steroid use - afebrile  UA neg, Bc NGTD  CXR -ve  RVP -ve  Procal wnl  completed doxy    >depression/SI:   psych following -   Remeron 45 qHS & trazodone 100 qHS per psych  psych cx appreciated, Patient needs further psychiatric safety assessment- [facility] on d/c  c/w 1:1  haldol PRN for agitation  Avoid BZs per psych due to hx of etoh & substance abuse    >ETOH abuse:  Pt was in [facility] for weeks & detoxed there, got discharged & had etoh prior to arrival but do not suspect withdrawal as per psych  No need for librium as per psych  Thiamine/ folate  SW eval  c.w 1:1    Insomina-   Insomnia had improved with ambien but worsening when ambien was taken off by psych & trazodone was started as per pt. As per him he had a rough night as his ambien was d/padmini & trazodone did not help. Appears agitated due to lack of sleep.   melatonin  Ambien not recommended as per Dr. Pacheco due to pt's addictive behavior    HTN: not on meds, monitor BP     >pre diabete: ISS, accucheck, A1c 6.2.   started lipitor     DVT-P: lovenox  aspiration precautions  fall precaution    dispo - spoke to psych about need for inpatient vs outpatient

## 2019-08-19 NOTE — PROGRESS NOTE BEHAVIORAL HEALTH - NSBHCONSFOLLOWNEEDS_PSY_A_CORE
no psychiatric contraindications to discharge
Patient needs further psychiatric safety assessment prior to discharge
no psychiatric contraindications to discharge
Patient needs further psychiatric safety assessment prior to discharge
no psychiatric contraindications to discharge
Patient needs further psychiatric safety assessment prior to discharge

## 2019-08-19 NOTE — PROGRESS NOTE BEHAVIORAL HEALTH - NSBHFUPINTERVALHXFT_PSY_A_CORE
observed wheezing briefly then able to decrease hyperventilation and eat breakfast C/o SOB Fatigue remains depressed hopeless Unable to contract for safety in regards to recent suicidal expressions and behaviors no aggressive behaviors described Slept a little better Remeron dose was increased  pulmonary note appreciated
on antibitics Reamnis on 1 to 1 No major change Reports  transient suicidal ideas No specific plan Hopes to "rent a room" does not appear motivated for rehab program for alcoholism
pt states he has a sore throat and could not fall asleep last night until given Ambien. pt appears depressed and hopeless. when asked about suicidal ideation he responds, " I don't know what to do." pt has one to one aide for pt safety.
reports up all night despite melatonin Remeron ativan trazodone I explained his chronic alcoholism may contribute to persistent sleep disturbances In past he reports benefit from Seroquel When asked about current suicidal ideas he replied " I can't think straight I am too depressed" refused to contract for safety
chart reviewed and patient seen respiratory status improved No O2 use Slept a little better denies recurrent suicidal ideas " I was drunk I need to stop' "I want to go to [facility]"
pt has no acute complaints. pt stated he is ambulating, but becomes dizzy when he walks. pt states he is depressed and when questioned about suicidal ideations he responded, "sometimes I just don't know"

## 2019-08-19 NOTE — PROGRESS NOTE BEHAVIORAL HEALTH - NSBHLOC_PSY_A_CORE
Lethargic, arousable to verbal stimulus
Lethargic, arousable to verbal stimulus
Alert

## 2019-08-19 NOTE — PROGRESS NOTE BEHAVIORAL HEALTH - NSBHCONSULTMEDS_PSY_A_CORE FT
Seroquel 100 mg POHS  mirtazapine 45 milliGRAM(s) Oral at bedtime  thiamine 100 milliGRAM(s) Oral daily
mirtazapine 30 milliGRAM(s) Oral at bedtime
MEDICATIONS  (STANDING)  mirtazapine 45 milliGRAM(s) Oral at bedtime
chlordiazePOXIDE 50 milliGRAM(s) Oral every 8 hours  mirtazapine 30 milliGRAM(s) Oral at bedtime  multivitamin 1 Tablet(s) Oral daily  thiamine 100 milliGRAM(s) Oral daily
mirtazapine 45 milliGRAM(s) Oral at bedtime  QUEtiapine 100 milliGRAM(s) Oral <User Schedule>
Remeron 45 mg PO HS

## 2019-08-19 NOTE — PROGRESS NOTE BEHAVIORAL HEALTH - NSBHFUPINTERVALCCFT_PSY_A_CORE
"I didn't sleep well last night"
I can't sleep
I don't know what to say
feels down
I feel so much better I am ready to be discharged
pt has no acute complaints

## 2019-08-20 ENCOUNTER — TRANSCRIPTION ENCOUNTER (OUTPATIENT)
Age: 54
End: 2019-08-20

## 2019-08-20 VITALS
RESPIRATION RATE: 18 BRPM | DIASTOLIC BLOOD PRESSURE: 71 MMHG | TEMPERATURE: 98 F | OXYGEN SATURATION: 94 % | HEART RATE: 92 BPM | SYSTOLIC BLOOD PRESSURE: 107 MMHG

## 2019-08-20 LAB
GLUCOSE BLDC GLUCOMTR-MCNC: 176 MG/DL — HIGH (ref 70–99)
GLUCOSE BLDC GLUCOMTR-MCNC: 199 MG/DL — HIGH (ref 70–99)

## 2019-08-20 PROCEDURE — 96374 THER/PROPH/DIAG INJ IV PUSH: CPT

## 2019-08-20 PROCEDURE — 99232 SBSQ HOSP IP/OBS MODERATE 35: CPT

## 2019-08-20 PROCEDURE — 87040 BLOOD CULTURE FOR BACTERIA: CPT

## 2019-08-20 PROCEDURE — 80048 BASIC METABOLIC PNL TOTAL CA: CPT

## 2019-08-20 PROCEDURE — 83036 HEMOGLOBIN GLYCOSYLATED A1C: CPT

## 2019-08-20 PROCEDURE — 81001 URINALYSIS AUTO W/SCOPE: CPT

## 2019-08-20 PROCEDURE — 83605 ASSAY OF LACTIC ACID: CPT

## 2019-08-20 PROCEDURE — 71045 X-RAY EXAM CHEST 1 VIEW: CPT

## 2019-08-20 PROCEDURE — 84484 ASSAY OF TROPONIN QUANT: CPT

## 2019-08-20 PROCEDURE — 82550 ASSAY OF CK (CPK): CPT

## 2019-08-20 PROCEDURE — 80061 LIPID PANEL: CPT

## 2019-08-20 PROCEDURE — 80202 ASSAY OF VANCOMYCIN: CPT

## 2019-08-20 PROCEDURE — 87633 RESP VIRUS 12-25 TARGETS: CPT

## 2019-08-20 PROCEDURE — 36600 WITHDRAWAL OF ARTERIAL BLOOD: CPT

## 2019-08-20 PROCEDURE — 85610 PROTHROMBIN TIME: CPT

## 2019-08-20 PROCEDURE — 36415 COLL VENOUS BLD VENIPUNCTURE: CPT

## 2019-08-20 PROCEDURE — 80307 DRUG TEST PRSMV CHEM ANLYZR: CPT

## 2019-08-20 PROCEDURE — 80053 COMPREHEN METABOLIC PANEL: CPT

## 2019-08-20 PROCEDURE — 71250 CT THORAX DX C-: CPT

## 2019-08-20 PROCEDURE — 83880 ASSAY OF NATRIURETIC PEPTIDE: CPT

## 2019-08-20 PROCEDURE — 84100 ASSAY OF PHOSPHORUS: CPT

## 2019-08-20 PROCEDURE — 92610 EVALUATE SWALLOWING FUNCTION: CPT

## 2019-08-20 PROCEDURE — 82553 CREATINE MB FRACTION: CPT

## 2019-08-20 PROCEDURE — 83735 ASSAY OF MAGNESIUM: CPT

## 2019-08-20 PROCEDURE — 87581 M.PNEUMON DNA AMP PROBE: CPT

## 2019-08-20 PROCEDURE — 87486 CHLMYD PNEUM DNA AMP PROBE: CPT

## 2019-08-20 PROCEDURE — 93306 TTE W/DOPPLER COMPLETE: CPT

## 2019-08-20 PROCEDURE — 71045 X-RAY EXAM CHEST 1 VIEW: CPT | Mod: 26

## 2019-08-20 PROCEDURE — 99285 EMERGENCY DEPT VISIT HI MDM: CPT | Mod: 25

## 2019-08-20 PROCEDURE — 85027 COMPLETE CBC AUTOMATED: CPT

## 2019-08-20 PROCEDURE — 70450 CT HEAD/BRAIN W/O DYE: CPT

## 2019-08-20 PROCEDURE — 80076 HEPATIC FUNCTION PANEL: CPT

## 2019-08-20 PROCEDURE — 85730 THROMBOPLASTIN TIME PARTIAL: CPT

## 2019-08-20 PROCEDURE — 99239 HOSP IP/OBS DSCHRG MGMT >30: CPT

## 2019-08-20 PROCEDURE — 84145 PROCALCITONIN (PCT): CPT

## 2019-08-20 PROCEDURE — 87798 DETECT AGENT NOS DNA AMP: CPT

## 2019-08-20 PROCEDURE — 82962 GLUCOSE BLOOD TEST: CPT

## 2019-08-20 PROCEDURE — 74220 X-RAY XM ESOPHAGUS 1CNTRST: CPT

## 2019-08-20 PROCEDURE — 94640 AIRWAY INHALATION TREATMENT: CPT

## 2019-08-20 PROCEDURE — 82803 BLOOD GASES ANY COMBINATION: CPT

## 2019-08-20 PROCEDURE — 94760 N-INVAS EAR/PLS OXIMETRY 1: CPT

## 2019-08-20 PROCEDURE — 93005 ELECTROCARDIOGRAM TRACING: CPT

## 2019-08-20 RX ORDER — SUCRALFATE 1 G
1 TABLET ORAL
Refills: 0 | Status: DISCONTINUED | OUTPATIENT
Start: 2019-08-20 | End: 2019-08-20

## 2019-08-20 RX ADMIN — Medication 40 MILLIGRAM(S): at 05:16

## 2019-08-20 RX ADMIN — PANTOPRAZOLE SODIUM 40 MILLIGRAM(S): 20 TABLET, DELAYED RELEASE ORAL at 05:16

## 2019-08-20 RX ADMIN — Medication 1 TABLET(S): at 11:12

## 2019-08-20 RX ADMIN — LORATADINE 10 MILLIGRAM(S): 10 TABLET ORAL at 11:13

## 2019-08-20 RX ADMIN — Medication 1 GRAM(S): at 11:13

## 2019-08-20 RX ADMIN — BENZOCAINE AND MENTHOL 1 LOZENGE: 5; 1 LIQUID ORAL at 11:14

## 2019-08-20 RX ADMIN — Medication 1 MILLIGRAM(S): at 11:12

## 2019-08-20 RX ADMIN — Medication 30 MILLILITER(S): at 01:03

## 2019-08-20 RX ADMIN — Medication 2: at 07:53

## 2019-08-20 RX ADMIN — BENZOCAINE AND MENTHOL 1 LOZENGE: 5; 1 LIQUID ORAL at 05:16

## 2019-08-20 RX ADMIN — Medication 100 MILLIGRAM(S): at 11:12

## 2019-08-20 RX ADMIN — ENOXAPARIN SODIUM 40 MILLIGRAM(S): 100 INJECTION SUBCUTANEOUS at 11:12

## 2019-08-20 RX ADMIN — Medication 2: at 11:13

## 2019-08-20 RX ADMIN — Medication 100 MILLIGRAM(S): at 05:16

## 2019-08-20 RX ADMIN — Medication 1200 MILLIGRAM(S): at 05:16

## 2019-08-20 NOTE — PROGRESS NOTE ADULT - SUBJECTIVE AND OBJECTIVE BOX
discussed need to maintain sobriety Hopes to enroll in outpatient treatment attend AA cautioned about health effects of alcoholism   denies suicidal or violent urges  ROS : poor sleep fatigue mild wheezing  Vital Signs Last 24 Hrs  T(C): 36.8 (20 Aug 2019 08:14), Max: 36.8 (20 Aug 2019 08:14)  T(F): 98.2 (20 Aug 2019 08:14), Max: 98.2 (20 Aug 2019 08:14)  HR: 92 (20 Aug 2019 08:14) (66 - 108)  BP: 107/71 (20 Aug 2019 08:14) (107/71 - 120/55)  RR: 18 (20 Aug 2019 08:14) (18 - 18)  SpO2: 94% (20 Aug 2019 08:14) (91% - 96%)   MEDICATIONS  (STANDING):  ALBUTerol/ipratropium for Nebulization 3 milliLiter(s) Nebulizer every 6 hours  atorvastatin 20 milliGRAM(s) Oral at bedtime  benzocaine 15 mG/menthol 3.6 mG (Sugar-Free) Lozenge 1 Lozenge Oral four times a day  buDESOnide 160 MICROgram(s)/formoterol 4.5 MICROgram(s) Inhaler 2 Puff(s) Inhalation two times a day  docusate sodium 100 milliGRAM(s) Oral two times a day  enoxaparin Injectable 40 milliGRAM(s) SubCutaneous daily  famotidine    Tablet 20 milliGRAM(s) Oral at bedtime  folic acid 1 milliGRAM(s) Oral daily  guaiFENesin ER 1200 milliGRAM(s) Oral every 12 hours  insulin lispro (HumaLOG) corrective regimen sliding scale   SubCutaneous three times a day before meals  loratadine 10 milliGRAM(s) Oral daily  mirtazapine 45 milliGRAM(s) Oral at bedtime  multivitamin 1 Tablet(s) Oral daily  pantoprazole    Tablet 40 milliGRAM(s) Oral two times a day  polyethylene glycol 3350 17 Gram(s) Oral daily  predniSONE   Tablet 50 milliGRAM(s) Oral daily  QUEtiapine 100 milliGRAM(s) Oral <User Schedule>  senna 2 Tablet(s) Oral at bedtime  thiamine 100 milliGRAM(s) Oral daily

## 2019-08-20 NOTE — PROGRESS NOTE ADULT - PROVIDER SPECIALTY LIST ADULT
Gastroenterology
Hospitalist
Internal Medicine
Psychiatry
Pulmonology
Hospitalist

## 2019-08-20 NOTE — PROGRESS NOTE ADULT - SUBJECTIVE AND OBJECTIVE BOX
PULMONARY PROGRESS NOTE      [id number]    Patient is a 54y old  Male who presents with a chief complaint of SI, ETOH intoxication. ?pneumonitis (20 Aug 2019 10:12)      INTERVAL HPI/OVERNIGHT EVENTS:  lying flat  denies any chest pain  cough with thick sputum    MEDICATIONS  (STANDING):  ALBUTerol/ipratropium for Nebulization 3 milliLiter(s) Nebulizer every 6 hours  atorvastatin 20 milliGRAM(s) Oral at bedtime  benzocaine 15 mG/menthol 3.6 mG (Sugar-Free) Lozenge 1 Lozenge Oral four times a day  buDESOnide 160 MICROgram(s)/formoterol 4.5 MICROgram(s) Inhaler 2 Puff(s) Inhalation two times a day  dextrose 5%. 1000 milliLiter(s) (50 mL/Hr) IV Continuous <Continuous>  dextrose 50% Injectable 12.5 Gram(s) IV Push once  dextrose 50% Injectable 25 Gram(s) IV Push once  dextrose 50% Injectable 25 Gram(s) IV Push once  docusate sodium 100 milliGRAM(s) Oral two times a day  enoxaparin Injectable 40 milliGRAM(s) SubCutaneous daily  famotidine    Tablet 20 milliGRAM(s) Oral at bedtime  folic acid 1 milliGRAM(s) Oral daily  guaiFENesin ER 1200 milliGRAM(s) Oral every 12 hours  insulin lispro (HumaLOG) corrective regimen sliding scale   SubCutaneous three times a day before meals  loratadine 10 milliGRAM(s) Oral daily  mirtazapine 45 milliGRAM(s) Oral at bedtime  multivitamin 1 Tablet(s) Oral daily  pantoprazole    Tablet 40 milliGRAM(s) Oral two times a day  polyethylene glycol 3350 17 Gram(s) Oral daily  predniSONE   Tablet 50 milliGRAM(s) Oral daily  QUEtiapine 100 milliGRAM(s) Oral <User Schedule>  senna 2 Tablet(s) Oral at bedtime  sucralfate suspension 1 Gram(s) Oral four times a day  thiamine 100 milliGRAM(s) Oral daily      MEDICATIONS  (PRN):  aluminum hydroxide/magnesium hydroxide/simethicone Suspension 30 milliLiter(s) Oral every 4 hours PRN Dyspepsia  dextrose 40% Gel 15 Gram(s) Oral once PRN Blood Glucose LESS THAN 70 milliGRAM(s)/deciliter  glucagon  Injectable 1 milliGRAM(s) IntraMuscular once PRN Glucose LESS THAN 70 milligrams/deciliter  ondansetron Injectable 4 milliGRAM(s) IV Push every 6 hours PRN Nausea and/or Vomiting      Allergies    No Known Allergies    Intolerances        PAST MEDICAL & SURGICAL HISTORY:  Asthma  Alcohol abuse  High cholesterol  HTN (hypertension)  GERD (gastroesophageal reflux disease)  Alcohol abuse  High cholesterol  Hypertension  Alcohol abuse  No significant past surgical history  No significant past surgical history  No significant past surgical history      SOCIAL HISTORY  Smoking History:       REVIEW OF SYSTEMS:    CONSTITUTIONAL:  No distress    HEENT:  Eyes:  No diplopia or blurred vision. ENT:  No earache, sore throat or runny nose.    CARDIOVASCULAR:  No pressure, squeezing, tightness, heaviness or aching about the chest; no palpitations.    RESPIRATORY:  see HPI  GASTROINTESTINAL:  No nausea, vomiting or diarrhea.    GENITOURINARY:  No dysuria, frequency or urgency.    NEUROLOGIC:  No paresthesias, fasciculations, seizures or weakness.    PSYCHIATRIC:  No disorder of thought or mood.    Vital Signs Last 24 Hrs  T(C): 36.8 (20 Aug 2019 08:14), Max: 36.8 (20 Aug 2019 08:14)  T(F): 98.2 (20 Aug 2019 08:14), Max: 98.2 (20 Aug 2019 08:14)  HR: 92 (20 Aug 2019 08:14) (66 - 108)  BP: 107/71 (20 Aug 2019 08:14) (107/71 - 120/55)  BP(mean): --  RR: 18 (20 Aug 2019 08:14) (18 - 18)  SpO2: 94% (20 Aug 2019 08:14) (91% - 96%)    PHYSICAL EXAMINATION:    GENERAL: The patient is awake and alert in no apparent distress.     HEENT: Head is normocephalic and atraumatic. Extraocular muscles are intact. Mucous membranes are moist.    NECK: Supple.    LUNGS: moderate air entry with exp rhonchi; respirations unlabored    HEART: Regular rate and rhythm without murmur.    ABDOMEN: Soft, nontender, and nondistended.      EXTREMITIES: Without any cyanosis, clubbing, rash, lesions or edema.    NEUROLOGIC: Grossly intact.    LABS:                        10.5   14.90 )-----------( 361      ( 19 Aug 2019 07:28 )             33.5     08-19    137  |  96<L>  |  29.0<H>  ----------------------------<  198<H>  4.5   |  25.0  |  0.77    Ca    9.4      19 Aug 2019 07:28                          MICROBIOLOGY:    RADIOLOGY & ADDITIONAL STUDIES: PULMONARY PROGRESS NOTE      [id number]    Patient is a 54y old  Male who presents with a chief complaint of SI, ETOH intoxication. ?pneumonitis (20 Aug 2019 10:12)      INTERVAL HPI/OVERNIGHT EVENTS:  lying flat  denies any chest pain  cough with thick sputum    MEDICATIONS  (STANDING):  ALBUTerol/ipratropium for Nebulization 3 milliLiter(s) Nebulizer every 6 hours  atorvastatin 20 milliGRAM(s) Oral at bedtime  benzocaine 15 mG/menthol 3.6 mG (Sugar-Free) Lozenge 1 Lozenge Oral four times a day  buDESOnide 160 MICROgram(s)/formoterol 4.5 MICROgram(s) Inhaler 2 Puff(s) Inhalation two times a day  dextrose 5%. 1000 milliLiter(s) (50 mL/Hr) IV Continuous <Continuous>  dextrose 50% Injectable 12.5 Gram(s) IV Push once  dextrose 50% Injectable 25 Gram(s) IV Push once  dextrose 50% Injectable 25 Gram(s) IV Push once  docusate sodium 100 milliGRAM(s) Oral two times a day  enoxaparin Injectable 40 milliGRAM(s) SubCutaneous daily  famotidine    Tablet 20 milliGRAM(s) Oral at bedtime  folic acid 1 milliGRAM(s) Oral daily  guaiFENesin ER 1200 milliGRAM(s) Oral every 12 hours  insulin lispro (HumaLOG) corrective regimen sliding scale   SubCutaneous three times a day before meals  loratadine 10 milliGRAM(s) Oral daily  mirtazapine 45 milliGRAM(s) Oral at bedtime  multivitamin 1 Tablet(s) Oral daily  pantoprazole    Tablet 40 milliGRAM(s) Oral two times a day  polyethylene glycol 3350 17 Gram(s) Oral daily  predniSONE   Tablet 50 milliGRAM(s) Oral daily  QUEtiapine 100 milliGRAM(s) Oral <User Schedule>  senna 2 Tablet(s) Oral at bedtime  sucralfate suspension 1 Gram(s) Oral four times a day  thiamine 100 milliGRAM(s) Oral daily      MEDICATIONS  (PRN):  aluminum hydroxide/magnesium hydroxide/simethicone Suspension 30 milliLiter(s) Oral every 4 hours PRN Dyspepsia  dextrose 40% Gel 15 Gram(s) Oral once PRN Blood Glucose LESS THAN 70 milliGRAM(s)/deciliter  glucagon  Injectable 1 milliGRAM(s) IntraMuscular once PRN Glucose LESS THAN 70 milligrams/deciliter  ondansetron Injectable 4 milliGRAM(s) IV Push every 6 hours PRN Nausea and/or Vomiting      Allergies    No Known Allergies    Intolerances        PAST MEDICAL & SURGICAL HISTORY:  Asthma  Alcohol abuse  High cholesterol  HTN (hypertension)  GERD (gastroesophageal reflux disease)  Alcohol abuse  High cholesterol  Hypertension  Alcohol abuse  No significant past surgical history  No significant past surgical history  No significant past surgical history      SOCIAL HISTORY  Smoking History:       REVIEW OF SYSTEMS:    CONSTITUTIONAL:  No distress    HEENT:  Eyes:  No diplopia or blurred vision. ENT:  No earache, sore throat or runny nose.    CARDIOVASCULAR:  No pressure, squeezing, tightness, heaviness or aching about the chest; no palpitations.    RESPIRATORY:  see HPI  GASTROINTESTINAL:  No nausea, vomiting or diarrhea.    GENITOURINARY:  No dysuria, frequency or urgency.    NEUROLOGIC:  No paresthesias, fasciculations, seizures or weakness.    PSYCHIATRIC:  No disorder of thought or mood.    Vital Signs Last 24 Hrs  T(C): 36.8 (20 Aug 2019 08:14), Max: 36.8 (20 Aug 2019 08:14)  T(F): 98.2 (20 Aug 2019 08:14), Max: 98.2 (20 Aug 2019 08:14)  HR: 92 (20 Aug 2019 08:14) (66 - 108)  BP: 107/71 (20 Aug 2019 08:14) (107/71 - 120/55)  BP(mean): --  RR: 18 (20 Aug 2019 08:14) (18 - 18)  SpO2: 94% (20 Aug 2019 08:14) (91% - 96%)    PHYSICAL EXAMINATION:    GENERAL: The patient is awake and alert in no apparent distress.     HEENT: Head is normocephalic and atraumatic. Extraocular muscles are intact. Mucous membranes are moist.    NECK: Supple.    LUNGS: moderate air entry with exp rhonchi; respirations unlabored    HEART: Regular rate and rhythm without murmur.    ABDOMEN: Soft, nontender, and nondistended.      EXTREMITIES: Without any cyanosis, clubbing, rash, lesions or edema.    NEUROLOGIC: Grossly intact.    LABS:                        10.5   14.90 )-----------( 361      ( 19 Aug 2019 07:28 )             33.5     08-19    137  |  96<L>  |  29.0<H>  ----------------------------<  198<H>  4.5   |  25.0  |  0.77    Ca    9.4      19 Aug 2019 07:28                          MICROBIOLOGY:    RADIOLOGY & ADDITIONAL STUDIES:  cxr reviewed PULMONARY PROGRESS NOTE      [id number]    Patient is a 54y old  Male who presents with a chief complaint of SI, ETOH intoxication. ?pneumonitis (20 Aug 2019 10:12)      INTERVAL HPI/OVERNIGHT EVENTS:  lying flat  denies any chest pain  cough with thick sputum    MEDICATIONS  (STANDING):  ALBUTerol/ipratropium for Nebulization 3 milliLiter(s) Nebulizer every 6 hours  atorvastatin 20 milliGRAM(s) Oral at bedtime  benzocaine 15 mG/menthol 3.6 mG (Sugar-Free) Lozenge 1 Lozenge Oral four times a day  buDESOnide 160 MICROgram(s)/formoterol 4.5 MICROgram(s) Inhaler 2 Puff(s) Inhalation two times a day  dextrose 5%. 1000 milliLiter(s) (50 mL/Hr) IV Continuous <Continuous>  dextrose 50% Injectable 12.5 Gram(s) IV Push once  dextrose 50% Injectable 25 Gram(s) IV Push once  dextrose 50% Injectable 25 Gram(s) IV Push once  docusate sodium 100 milliGRAM(s) Oral two times a day  enoxaparin Injectable 40 milliGRAM(s) SubCutaneous daily  famotidine    Tablet 20 milliGRAM(s) Oral at bedtime  folic acid 1 milliGRAM(s) Oral daily  guaiFENesin ER 1200 milliGRAM(s) Oral every 12 hours  insulin lispro (HumaLOG) corrective regimen sliding scale   SubCutaneous three times a day before meals  loratadine 10 milliGRAM(s) Oral daily  mirtazapine 45 milliGRAM(s) Oral at bedtime  multivitamin 1 Tablet(s) Oral daily  pantoprazole    Tablet 40 milliGRAM(s) Oral two times a day  polyethylene glycol 3350 17 Gram(s) Oral daily  predniSONE   Tablet 50 milliGRAM(s) Oral daily  QUEtiapine 100 milliGRAM(s) Oral <User Schedule>  senna 2 Tablet(s) Oral at bedtime  sucralfate suspension 1 Gram(s) Oral four times a day  thiamine 100 milliGRAM(s) Oral daily      MEDICATIONS  (PRN):  aluminum hydroxide/magnesium hydroxide/simethicone Suspension 30 milliLiter(s) Oral every 4 hours PRN Dyspepsia  dextrose 40% Gel 15 Gram(s) Oral once PRN Blood Glucose LESS THAN 70 milliGRAM(s)/deciliter  glucagon  Injectable 1 milliGRAM(s) IntraMuscular once PRN Glucose LESS THAN 70 milligrams/deciliter  ondansetron Injectable 4 milliGRAM(s) IV Push every 6 hours PRN Nausea and/or Vomiting      Allergies    No Known Allergies    Intolerances        PAST MEDICAL & SURGICAL HISTORY:  Asthma  Alcohol abuse  High cholesterol  HTN (hypertension)  GERD (gastroesophageal reflux disease)  Alcohol abuse  High cholesterol  Hypertension  Alcohol abuse  No significant past surgical history  No significant past surgical history  No significant past surgical history      SOCIAL HISTORY  Smoking History:       REVIEW OF SYSTEMS:    CONSTITUTIONAL:  No distress    HEENT:  Eyes:  No diplopia or blurred vision. ENT:  No earache, sore throat or runny nose.    CARDIOVASCULAR:  No pressure, squeezing, tightness, heaviness or aching about the chest; no palpitations.    RESPIRATORY:  see HPI  GASTROINTESTINAL:  No nausea, vomiting or diarrhea.    GENITOURINARY:  No dysuria, frequency or urgency.    NEUROLOGIC:  No paresthesias, fasciculations, seizures or weakness.    PSYCHIATRIC:  No disorder of thought or mood.    Vital Signs Last 24 Hrs  T(C): 36.8 (20 Aug 2019 08:14), Max: 36.8 (20 Aug 2019 08:14)  T(F): 98.2 (20 Aug 2019 08:14), Max: 98.2 (20 Aug 2019 08:14)  HR: 92 (20 Aug 2019 08:14) (66 - 108)  BP: 107/71 (20 Aug 2019 08:14) (107/71 - 120/55)  BP(mean): --  RR: 18 (20 Aug 2019 08:14) (18 - 18)  SpO2: 94% (20 Aug 2019 08:14) (91% - 96%)    PHYSICAL EXAMINATION:    GENERAL: The patient is awake and alert in no apparent distress.     HEENT: Head is normocephalic and atraumatic. Extraocular muscles are intact. Mucous membranes are moist.    NECK: Supple.    LUNGS: moderate air entry with exp rhonchi; respirations unlabored    HEART: Regular rate and rhythm without murmur.    ABDOMEN: Soft, nontender, and nondistended.      EXTREMITIES: Without any cyanosis, clubbing, rash, lesions or edema.    NEUROLOGIC: Grossly intact.    LABS:                        10.5   14.90 )-----------( 361      ( 19 Aug 2019 07:28 )             33.5     08-19    137  |  96<L>  |  29.0<H>  ----------------------------<  198<H>  4.5   |  25.0  |  0.77    Ca    9.4      19 Aug 2019 07:28                          MICROBIOLOGY:    RADIOLOGY & ADDITIONAL STUDIES:  previous cxr reviewed

## 2019-08-20 NOTE — PROGRESS NOTE ADULT - REASON FOR ADMISSION
SI, ETOH intoxication. ?pneumonittis
SI, ETOH intoxication. ?pneumonitis
SI, ETOH intoxication. ?pneumonittis
SI, ETOH intoxication. ?pneumonitis

## 2019-08-20 NOTE — PROGRESS NOTE ADULT - SUBJECTIVE AND OBJECTIVE BOX
Chief Complaint: This is a 54y old Male patient being seen for follow-up consultation for GERD.    HPI / 24H events:  Patient reports ongoing reflux symptoms.  No nausea, vomiting or abdominal pain.    ROS: A 14-point review of systems was reviewed and was otherwise negative save what was reported in the HPI.    PAST MEDICAL/SURGICAL HISTORY:  Asthma  High cholesterol  HTN (hypertension)  GERD (gastroesophageal reflux disease)  Alcohol abuse  High cholesterol  Hypertension  No significant past surgical history    MEDICATIONS  (STANDING):  ALBUTerol/ipratropium for Nebulization 3 milliLiter(s) Nebulizer every 6 hours  atorvastatin 20 milliGRAM(s) Oral at bedtime  benzocaine 15 mG/menthol 3.6 mG (Sugar-Free) Lozenge 1 Lozenge Oral four times a day  buDESOnide 160 MICROgram(s)/formoterol 4.5 MICROgram(s) Inhaler 2 Puff(s) Inhalation two times a day  dextrose 5%. 1000 milliLiter(s) (50 mL/Hr) IV Continuous <Continuous>  dextrose 50% Injectable 12.5 Gram(s) IV Push once  dextrose 50% Injectable 25 Gram(s) IV Push once  dextrose 50% Injectable 25 Gram(s) IV Push once  docusate sodium 100 milliGRAM(s) Oral two times a day  enoxaparin Injectable 40 milliGRAM(s) SubCutaneous daily  famotidine    Tablet 20 milliGRAM(s) Oral at bedtime  folic acid 1 milliGRAM(s) Oral daily  guaiFENesin ER 1200 milliGRAM(s) Oral every 12 hours  insulin lispro (HumaLOG) corrective regimen sliding scale   SubCutaneous three times a day before meals  loratadine 10 milliGRAM(s) Oral daily  mirtazapine 45 milliGRAM(s) Oral at bedtime  multivitamin 1 Tablet(s) Oral daily  pantoprazole    Tablet 40 milliGRAM(s) Oral two times a day  polyethylene glycol 3350 17 Gram(s) Oral daily  predniSONE   Tablet 50 milliGRAM(s) Oral daily  QUEtiapine 100 milliGRAM(s) Oral <User Schedule>  senna 2 Tablet(s) Oral at bedtime  sucralfate suspension 1 Gram(s) Oral four times a day  thiamine 100 milliGRAM(s) Oral daily    MEDICATIONS  (PRN):  aluminum hydroxide/magnesium hydroxide/simethicone Suspension 30 milliLiter(s) Oral every 4 hours PRN Dyspepsia  dextrose 40% Gel 15 Gram(s) Oral once PRN Blood Glucose LESS THAN 70 milliGRAM(s)/deciliter  glucagon  Injectable 1 milliGRAM(s) IntraMuscular once PRN Glucose LESS THAN 70 milligrams/deciliter  ondansetron Injectable 4 milliGRAM(s) IV Push every 6 hours PRN Nausea and/or Vomiting    VITAL SIGNS LAST 24 HOURS:  T(C): 36.8 (20 Aug 2019 08:14), Max: 36.8 (20 Aug 2019 08:14)  T(F): 98.2 (20 Aug 2019 08:14), Max: 98.2 (20 Aug 2019 08:14)  HR: 92 (20 Aug 2019 08:14) (66 - 108)  BP: 107/71 (20 Aug 2019 08:14) (107/71 - 120/55)  BP(mean): --  RR: 18 (20 Aug 2019 08:14) (18 - 18)  SpO2: 94% (20 Aug 2019 08:14) (91% - 96%)    08-19-19 @ 07:01  -  08-20-19 @ 07:00  --------------------------------------------------------  IN: 118 mL / OUT: 0 mL / NET: 118 mL    PHYSICAL EXAM:  Constitutional: Well-developed, well-nourished, in no apparent distress  Eyes: Sclerae anicteric, conjunctivae normal  ENMT: Mucus membranes moist, no oropharyngeal thrush noted  Neck: No thyroid nodules appreciated, no significant cervical or supraclavicular lymphadenopathy  Respiratory: Breathing nonlabored; clear to auscultation  Cardiovascular: Regular rate and rhythm  Gastrointestinal: Soft, nontender, nondistended, normoactive bowel sounds; no hepatosplenomegaly appreciated; no rebound tenderness or involuntary guarding  Extremities: No clubbing, cyanosis or edema  Neurological: Alert and oriented to person, place and time; no asterixis  Skin: No jaundice  Lymph Nodes: No significant lymphadenopathy  Musculoskeletal: No significant peripheral atrophy  Psychiatric: Affect and mood appropriate                            10.5   14.90 )-----------( 361      ( 19 Aug 2019 07:28 )             33.5     08-19    137  |  96<L>  |  29.0<H>  ----------------------------<  198<H>  4.5   |  25.0  |  0.77    Ca    9.4      19 Aug 2019 07:28

## 2019-08-20 NOTE — PROGRESS NOTE ADULT - ASSESSMENT
bronchospasm with upper airway component  GERD  ETOH/Psych  post abx  continue prednisone,  nebs  mobilize  HOB elevated, aspiration/GERD preecautions bronchospasm with upper airway component  GERD documented on esophagram  ETOH/Psych  post abx  continue prednisone,  nebs  mobilize  HOB elevated, aspiration/GERD precautions   repeat CXR pending

## 2019-08-20 NOTE — PROGRESS NOTE ADULT - ASSESSMENT
Continue pantoprazole, H2RA for now.  Adding sucralfate ATC.  Will follow up.    Thank you for the consult.  Please do not hesitate to call with any questions or concerns.    TOMEKA Hartley MD  [facility] Physician [facility]  Division of Gastroenterology  Tel [phone]  Fax [phone]  08-20-19 @ 10:14

## 2019-08-20 NOTE — PROGRESS NOTE ADULT - PROBLEM SELECTOR PLAN 1
Unusual to have ongoing heartburn on full acid suppression. I wonder if the burning chest pain may be related to underlying pulmonary process. Will order that head of bed be elevated to at leats 30 degrees at all times and the he be NPO after 7PM daily. Continue antacid therapy.
stable on pantoprazole 40mg BID and will switch to PO. Not a candidate for EGD at this time due to pulmonary status. Will change diet to diabetic, DASH and low fat with no evening snack.
continue current meds Mental health services as outpatient

## 2019-08-21 ENCOUNTER — CHART COPY (OUTPATIENT)
Age: 54
End: 2019-08-21

## 2019-09-01 ENCOUNTER — OUTPATIENT (OUTPATIENT)
Dept: OUTPATIENT SERVICES | Facility: HOSPITAL | Age: 54
LOS: 1 days | End: 2019-09-01
Payer: MEDICAID

## 2019-09-01 PROCEDURE — G9001: CPT

## 2019-09-03 ENCOUNTER — APPOINTMENT (OUTPATIENT)
Dept: INTERNAL MEDICINE | Facility: CLINIC | Age: 54
End: 2019-09-03

## 2019-09-06 ENCOUNTER — EMERGENCY (EMERGENCY)
Facility: HOSPITAL | Age: 54
LOS: 1 days | Discharge: HOME | End: 2019-09-06
Attending: EMERGENCY MEDICINE
Payer: COMMERCIAL

## 2019-09-06 VITALS
HEART RATE: 99 BPM | OXYGEN SATURATION: 98 % | RESPIRATION RATE: 20 BRPM | DIASTOLIC BLOOD PRESSURE: 72 MMHG | SYSTOLIC BLOOD PRESSURE: 120 MMHG | TEMPERATURE: 98 F | HEIGHT: 65 IN | WEIGHT: 160.06 LBS

## 2019-09-06 LAB
ALBUMIN SERPL ELPH-MCNC: 4.3 G/DL — SIGNIFICANT CHANGE UP (ref 3.3–5.2)
ALP SERPL-CCNC: 87 U/L — SIGNIFICANT CHANGE UP (ref 40–120)
ALT FLD-CCNC: 14 U/L — SIGNIFICANT CHANGE UP
AMPHET UR-MCNC: NEGATIVE — SIGNIFICANT CHANGE UP
ANION GAP SERPL CALC-SCNC: 17 MMOL/L — SIGNIFICANT CHANGE UP (ref 5–17)
ANISOCYTOSIS BLD QL: SLIGHT — SIGNIFICANT CHANGE UP
AST SERPL-CCNC: 17 U/L — SIGNIFICANT CHANGE UP
BARBITURATES UR SCN-MCNC: POSITIVE
BASOPHILS # BLD AUTO: 0.06 K/UL — SIGNIFICANT CHANGE UP (ref 0–0.2)
BASOPHILS NFR BLD AUTO: 0.9 % — SIGNIFICANT CHANGE UP (ref 0–2)
BENZODIAZ UR-MCNC: POSITIVE
BILIRUB SERPL-MCNC: <0.2 MG/DL — LOW (ref 0.4–2)
BUN SERPL-MCNC: 15 MG/DL — SIGNIFICANT CHANGE UP (ref 8–20)
CALCIUM SERPL-MCNC: 9.3 MG/DL — SIGNIFICANT CHANGE UP (ref 8.6–10.2)
CHLORIDE SERPL-SCNC: 103 MMOL/L — SIGNIFICANT CHANGE UP (ref 98–107)
CO2 SERPL-SCNC: 21 MMOL/L — LOW (ref 22–29)
COCAINE METAB.OTHER UR-MCNC: NEGATIVE — SIGNIFICANT CHANGE UP
CREAT SERPL-MCNC: 0.72 MG/DL — SIGNIFICANT CHANGE UP (ref 0.5–1.3)
EOSINOPHIL # BLD AUTO: 0.06 K/UL — SIGNIFICANT CHANGE UP (ref 0–0.5)
EOSINOPHIL NFR BLD AUTO: 0.9 % — SIGNIFICANT CHANGE UP (ref 0–6)
ETHANOL SERPL-MCNC: 178 MG/DL — SIGNIFICANT CHANGE UP
GIANT PLATELETS BLD QL SMEAR: PRESENT — SIGNIFICANT CHANGE UP
GLUCOSE SERPL-MCNC: 90 MG/DL — SIGNIFICANT CHANGE UP (ref 70–115)
HCT VFR BLD CALC: 32.3 % — LOW (ref 39–50)
HGB BLD-MCNC: 9.8 G/DL — LOW (ref 13–17)
HYPOCHROMIA BLD QL: SLIGHT — SIGNIFICANT CHANGE UP
LYMPHOCYTES # BLD AUTO: 0.94 K/UL — LOW (ref 1–3.3)
LYMPHOCYTES # BLD AUTO: 13.2 % — SIGNIFICANT CHANGE UP (ref 13–44)
MACROCYTES BLD QL: SLIGHT — SIGNIFICANT CHANGE UP
MANUAL SMEAR VERIFICATION: SIGNIFICANT CHANGE UP
MCHC RBC-ENTMCNC: 27.1 PG — SIGNIFICANT CHANGE UP (ref 27–34)
MCHC RBC-ENTMCNC: 30.3 GM/DL — LOW (ref 32–36)
MCV RBC AUTO: 89.2 FL — SIGNIFICANT CHANGE UP (ref 80–100)
METAMYELOCYTES # FLD: 0.9 % — HIGH (ref 0–0)
METHADONE UR-MCNC: NEGATIVE — SIGNIFICANT CHANGE UP
MICROCYTES BLD QL: SLIGHT — SIGNIFICANT CHANGE UP
MONOCYTES # BLD AUTO: 0.18 K/UL — SIGNIFICANT CHANGE UP (ref 0–0.9)
MONOCYTES NFR BLD AUTO: 2.6 % — SIGNIFICANT CHANGE UP (ref 2–14)
MYELOCYTES NFR BLD: 2.6 % — HIGH (ref 0–0)
NEUTROPHILS # BLD AUTO: 5.59 K/UL — SIGNIFICANT CHANGE UP (ref 1.8–7.4)
NEUTROPHILS NFR BLD AUTO: 71.9 % — SIGNIFICANT CHANGE UP (ref 43–77)
NEUTS BAND # BLD: 7 % — SIGNIFICANT CHANGE UP (ref 0–8)
OPIATES UR-MCNC: NEGATIVE — SIGNIFICANT CHANGE UP
OVALOCYTES BLD QL SMEAR: SLIGHT — SIGNIFICANT CHANGE UP
PCP SPEC-MCNC: SIGNIFICANT CHANGE UP
PCP UR-MCNC: NEGATIVE — SIGNIFICANT CHANGE UP
PLAT MORPH BLD: NORMAL — SIGNIFICANT CHANGE UP
PLATELET # BLD AUTO: 373 K/UL — SIGNIFICANT CHANGE UP (ref 150–400)
POLYCHROMASIA BLD QL SMEAR: SLIGHT — SIGNIFICANT CHANGE UP
POTASSIUM SERPL-MCNC: 3.8 MMOL/L — SIGNIFICANT CHANGE UP (ref 3.5–5.3)
POTASSIUM SERPL-SCNC: 3.8 MMOL/L — SIGNIFICANT CHANGE UP (ref 3.5–5.3)
PROT SERPL-MCNC: 7.9 G/DL — SIGNIFICANT CHANGE UP (ref 6.6–8.7)
RBC # BLD: 3.62 M/UL — LOW (ref 4.2–5.8)
RBC # FLD: 15.9 % — HIGH (ref 10.3–14.5)
RBC BLD AUTO: ABNORMAL
SODIUM SERPL-SCNC: 141 MMOL/L — SIGNIFICANT CHANGE UP (ref 135–145)
THC UR QL: NEGATIVE — SIGNIFICANT CHANGE UP
WBC # BLD: 7.09 K/UL — SIGNIFICANT CHANGE UP (ref 3.8–10.5)
WBC # FLD AUTO: 7.09 K/UL — SIGNIFICANT CHANGE UP (ref 3.8–10.5)

## 2019-09-06 PROCEDURE — 99284 EMERGENCY DEPT VISIT MOD MDM: CPT

## 2019-09-06 RX ORDER — HALOPERIDOL DECANOATE 100 MG/ML
5 INJECTION INTRAMUSCULAR ONCE
Refills: 0 | Status: COMPLETED | OUTPATIENT
Start: 2019-09-06 | End: 2019-09-06

## 2019-09-06 RX ADMIN — HALOPERIDOL DECANOATE 5 MILLIGRAM(S): 100 INJECTION INTRAMUSCULAR at 18:00

## 2019-09-06 RX ADMIN — Medication 2 MILLIGRAM(S): at 18:00

## 2019-09-06 NOTE — ED PROVIDER NOTE - OBJECTIVE STATEMENT
The patient is a 54 year old male presents with alcohol intoxication.  Apparently, the patient was found on the street intoxicated.  The patient states that he has not had a sleep for 7 days.  No other complaints. No HA, No CP, No SOB, No abd pain, No motor No sensory loss

## 2019-09-06 NOTE — ED ADULT NURSE NOTE - OBJECTIVE STATEMENT
54y Male A&Ox4 BIBA following alcohol intoxication. Pt states he has a hard time sleeping so he drinks vodka to help, so he had "some vodka this afternoon." Per EMS pt was found intoxicated in the street. Pt denies hitting his head, LOC, chest pain, shortness of breath, difficulty breathing. No obvious signs of trauma, pt placed on  and remains in yellow gown.

## 2019-09-06 NOTE — ED PROVIDER NOTE - PATIENT PORTAL LINK FT
You can access the FollowMyHealth Patient Portal offered by [facility] by registering at the following website: [email or web address]. By joining [employer]’s FollowMyHealth portal, you will also be able to view your health information using other applications (apps) compatible with our system.

## 2019-09-06 NOTE — ED ADULT TRIAGE NOTE - CHIEF COMPLAINT QUOTE
Pt BIBA found intoxicated in front of stores, frequently in ED for same. Denies SI or HI. Pt agitated upon ED arrival. MD Smith at bedside for eval.

## 2019-09-07 ENCOUNTER — EMERGENCY (EMERGENCY)
Facility: HOSPITAL | Age: 54
LOS: 1 days | Discharge: HOME | End: 2019-09-07
Attending: EMERGENCY MEDICINE
Payer: COMMERCIAL

## 2019-09-07 VITALS
DIASTOLIC BLOOD PRESSURE: 87 MMHG | WEIGHT: 175.05 LBS | HEART RATE: 115 BPM | HEIGHT: 66 IN | OXYGEN SATURATION: 95 % | TEMPERATURE: 98 F | RESPIRATION RATE: 20 BRPM | SYSTOLIC BLOOD PRESSURE: 133 MMHG

## 2019-09-07 VITALS
WEIGHT: 164.02 LBS | DIASTOLIC BLOOD PRESSURE: 65 MMHG | TEMPERATURE: 98 F | HEART RATE: 100 BPM | HEIGHT: 65 IN | OXYGEN SATURATION: 95 % | SYSTOLIC BLOOD PRESSURE: 105 MMHG | RESPIRATION RATE: 18 BRPM

## 2019-09-07 VITALS
TEMPERATURE: 98 F | DIASTOLIC BLOOD PRESSURE: 72 MMHG | SYSTOLIC BLOOD PRESSURE: 116 MMHG | OXYGEN SATURATION: 99 % | HEART RATE: 90 BPM | RESPIRATION RATE: 20 BRPM

## 2019-09-07 PROCEDURE — 85027 COMPLETE CBC AUTOMATED: CPT

## 2019-09-07 PROCEDURE — 96374 THER/PROPH/DIAG INJ IV PUSH: CPT

## 2019-09-07 PROCEDURE — 80053 COMPREHEN METABOLIC PANEL: CPT

## 2019-09-07 PROCEDURE — 99285 EMERGENCY DEPT VISIT HI MDM: CPT | Mod: 25

## 2019-09-07 PROCEDURE — 80307 DRUG TEST PRSMV CHEM ANLYZR: CPT

## 2019-09-07 PROCEDURE — 36415 COLL VENOUS BLD VENIPUNCTURE: CPT

## 2019-09-07 PROCEDURE — 96375 TX/PRO/DX INJ NEW DRUG ADDON: CPT

## 2019-09-07 PROCEDURE — 99284 EMERGENCY DEPT VISIT MOD MDM: CPT

## 2019-09-07 PROCEDURE — 99285 EMERGENCY DEPT VISIT HI MDM: CPT

## 2019-09-07 RX ORDER — FAMOTIDINE 10 MG/ML
40 INJECTION INTRAVENOUS ONCE
Refills: 0 | Status: COMPLETED | OUTPATIENT
Start: 2019-09-07 | End: 2019-09-07

## 2019-09-07 RX ORDER — IPRATROPIUM/ALBUTEROL SULFATE 18-103MCG
3 AEROSOL WITH ADAPTER (GRAM) INHALATION ONCE
Refills: 0 | Status: COMPLETED | OUTPATIENT
Start: 2019-09-07 | End: 2019-09-07

## 2019-09-07 RX ADMIN — Medication 50 MILLIGRAM(S): at 05:08

## 2019-09-07 NOTE — ED PROVIDER NOTE - CLINICAL SUMMARY MEDICAL DECISION MAKING FREE TEXT BOX
The patient is chronic alcohol abuse who came in intoxicated and now sober and ambulating well and will dc home and follow up with PMD

## 2019-09-07 NOTE — ED PROVIDER NOTE - OBJECTIVE STATEMENT
53 y/o M pt with PMHx of EtOH abuse, HTN, and GERD presents to the ED c/o EtOH intoxication and acid reflux. Pt frequently presents to [facility] for EtOH intoxication. He was d/c from [facility] approximately 8 hours ago for EtOH intoxication. He reportedly drank Vodka today, s/p d/c earlier today. Denies abd pain, CP, SOB, but c/o acid reflux. No further acute complaints at this time.

## 2019-09-07 NOTE — ED PROVIDER NOTE - CHPI ED SYMPTOMS NEG
no disorientation/no chills/no abdominal distension/no nausea/no abdominal pain/no vomiting/no confusion/no fever/no pain/no weakness

## 2019-09-07 NOTE — ED ADULT NURSE REASSESSMENT NOTE - NS ED NURSE REASSESS COMMENT FT1
Pt sitting upright in stretcher in no apparent signs of distress. Pt remains on  and in yellow gown, PIV site WNL. Pt status unchanged, refer to flowsheet and chart, pt safety maintained, pt hemodynamically stable, updated on plan of care. Awaiting dispo. Call light provided, fall safety reinforced. Will continue to monitor Pt sitting upright in stretcher in no apparent signs of distress. Pt remains on  and in yellow gown, PIV site WNL. Pt ambulating and asking for food/water, snacks and water provided. Pt status unchanged, refer to flowsheet and chart, pt safety maintained, pt hemodynamically stable, updated on plan of care. Awaiting dispo. Call light provided, fall safety reinforced. Will continue to monitor

## 2019-09-07 NOTE — ED PROVIDER NOTE - OBJECTIVE STATEMENT
The patient is a 54 year old male presents with alcohol intoxication. Denies Head trauma, No HA, No CP, No SOB, No abd pain, No motor No sensory loss

## 2019-09-07 NOTE — ED ADULT TRIAGE NOTE - CHIEF COMPLAINT QUOTE
patient states that he cant sleep, patient found at [employer], patient admits to drinking alcohol tonight denies any complaints of pain

## 2019-09-08 ENCOUNTER — EMERGENCY (EMERGENCY)
Facility: HOSPITAL | Age: 54
LOS: 1 days | Discharge: HOME | End: 2019-09-08
Attending: EMERGENCY MEDICINE
Payer: COMMERCIAL

## 2019-09-08 VITALS
HEIGHT: 65 IN | TEMPERATURE: 98 F | DIASTOLIC BLOOD PRESSURE: 90 MMHG | SYSTOLIC BLOOD PRESSURE: 128 MMHG | WEIGHT: 160.06 LBS | RESPIRATION RATE: 16 BRPM | HEART RATE: 102 BPM | OXYGEN SATURATION: 96 %

## 2019-09-08 VITALS
HEART RATE: 94 BPM | SYSTOLIC BLOOD PRESSURE: 114 MMHG | RESPIRATION RATE: 18 BRPM | OXYGEN SATURATION: 94 % | TEMPERATURE: 98 F | DIASTOLIC BLOOD PRESSURE: 72 MMHG

## 2019-09-08 LAB
ALBUMIN SERPL ELPH-MCNC: 4.3 G/DL — SIGNIFICANT CHANGE UP (ref 3.3–5.2)
ALP SERPL-CCNC: 86 U/L — SIGNIFICANT CHANGE UP (ref 40–120)
ALT FLD-CCNC: 14 U/L — SIGNIFICANT CHANGE UP
ANION GAP SERPL CALC-SCNC: 17 MMOL/L — SIGNIFICANT CHANGE UP (ref 5–17)
AST SERPL-CCNC: 19 U/L — SIGNIFICANT CHANGE UP
BASOPHILS # BLD AUTO: 0.03 K/UL — SIGNIFICANT CHANGE UP (ref 0–0.2)
BASOPHILS NFR BLD AUTO: 0.4 % — SIGNIFICANT CHANGE UP (ref 0–2)
BILIRUB SERPL-MCNC: <0.2 MG/DL — LOW (ref 0.4–2)
BUN SERPL-MCNC: 12 MG/DL — SIGNIFICANT CHANGE UP (ref 8–20)
CALCIUM SERPL-MCNC: 8.9 MG/DL — SIGNIFICANT CHANGE UP (ref 8.6–10.2)
CHLORIDE SERPL-SCNC: 104 MMOL/L — SIGNIFICANT CHANGE UP (ref 98–107)
CO2 SERPL-SCNC: 21 MMOL/L — LOW (ref 22–29)
CREAT SERPL-MCNC: 0.74 MG/DL — SIGNIFICANT CHANGE UP (ref 0.5–1.3)
EOSINOPHIL # BLD AUTO: 0.09 K/UL — SIGNIFICANT CHANGE UP (ref 0–0.5)
EOSINOPHIL NFR BLD AUTO: 1.1 % — SIGNIFICANT CHANGE UP (ref 0–6)
ETHANOL SERPL-MCNC: 284 MG/DL — SIGNIFICANT CHANGE UP
GLUCOSE SERPL-MCNC: 147 MG/DL — HIGH (ref 70–115)
HCT VFR BLD CALC: 29.9 % — LOW (ref 39–50)
HGB BLD-MCNC: 9.2 G/DL — LOW (ref 13–17)
IMM GRANULOCYTES NFR BLD AUTO: 4.7 % — HIGH (ref 0–1.5)
LYMPHOCYTES # BLD AUTO: 2.17 K/UL — SIGNIFICANT CHANGE UP (ref 1–3.3)
LYMPHOCYTES # BLD AUTO: 26.6 % — SIGNIFICANT CHANGE UP (ref 13–44)
MCHC RBC-ENTMCNC: 27.1 PG — SIGNIFICANT CHANGE UP (ref 27–34)
MCHC RBC-ENTMCNC: 30.8 GM/DL — LOW (ref 32–36)
MCV RBC AUTO: 87.9 FL — SIGNIFICANT CHANGE UP (ref 80–100)
MONOCYTES # BLD AUTO: 0.66 K/UL — SIGNIFICANT CHANGE UP (ref 0–0.9)
MONOCYTES NFR BLD AUTO: 8.1 % — SIGNIFICANT CHANGE UP (ref 2–14)
NEUTROPHILS # BLD AUTO: 4.84 K/UL — SIGNIFICANT CHANGE UP (ref 1.8–7.4)
NEUTROPHILS NFR BLD AUTO: 59.1 % — SIGNIFICANT CHANGE UP (ref 43–77)
PLATELET # BLD AUTO: 449 K/UL — HIGH (ref 150–400)
POTASSIUM SERPL-MCNC: 3.9 MMOL/L — SIGNIFICANT CHANGE UP (ref 3.5–5.3)
POTASSIUM SERPL-SCNC: 3.9 MMOL/L — SIGNIFICANT CHANGE UP (ref 3.5–5.3)
PROT SERPL-MCNC: 7.7 G/DL — SIGNIFICANT CHANGE UP (ref 6.6–8.7)
RBC # BLD: 3.4 M/UL — LOW (ref 4.2–5.8)
RBC # FLD: 16.2 % — HIGH (ref 10.3–14.5)
SODIUM SERPL-SCNC: 142 MMOL/L — SIGNIFICANT CHANGE UP (ref 135–145)
WBC # BLD: 8.17 K/UL — SIGNIFICANT CHANGE UP (ref 3.8–10.5)
WBC # FLD AUTO: 8.17 K/UL — SIGNIFICANT CHANGE UP (ref 3.8–10.5)

## 2019-09-08 PROCEDURE — 99285 EMERGENCY DEPT VISIT HI MDM: CPT

## 2019-09-08 PROCEDURE — 99284 EMERGENCY DEPT VISIT MOD MDM: CPT

## 2019-09-08 PROCEDURE — 71045 X-RAY EXAM CHEST 1 VIEW: CPT | Mod: 26

## 2019-09-08 PROCEDURE — 71045 X-RAY EXAM CHEST 1 VIEW: CPT

## 2019-09-08 PROCEDURE — 85027 COMPLETE CBC AUTOMATED: CPT

## 2019-09-08 PROCEDURE — 80307 DRUG TEST PRSMV CHEM ANLYZR: CPT

## 2019-09-08 PROCEDURE — 82962 GLUCOSE BLOOD TEST: CPT

## 2019-09-08 PROCEDURE — 80053 COMPREHEN METABOLIC PANEL: CPT

## 2019-09-08 PROCEDURE — 36415 COLL VENOUS BLD VENIPUNCTURE: CPT

## 2019-09-08 RX ADMIN — Medication 30 MILLILITER(S): at 00:46

## 2019-09-08 RX ADMIN — FAMOTIDINE 40 MILLIGRAM(S): 10 INJECTION INTRAVENOUS at 00:46

## 2019-09-08 NOTE — ED PROVIDER NOTE - CLINICAL SUMMARY MEDICAL DECISION MAKING FREE TEXT BOX
patient with alcohol abuse BIBEMS for drunk @ [employer], patient is intoxicated, was confused to date initially, gait slightly unsteady. unable to get friend to . will check FS observe for sobriety

## 2019-09-08 NOTE — ED PROVIDER NOTE - PHYSICAL EXAMINATION
Gen: NAD, smells of alcohol, slightly slurred speech, AOx3- confused to date initially then corrected  Head: NCAT  HEENT: PERRL, EOMI, oral mucosa moist, normal conjunctiva, neck supple  Lung: CTAB, no respiratory distress  CV: rrr, no murmur, Normal perfusion  Abd: soft, NTND  MSK: No edema, no visible deformities  Neuro: No focal neurologic deficits  Skin: psoriasis   Psych: intoxicated, tearful

## 2019-09-08 NOTE — ED ADULT TRIAGE NOTE - CHIEF COMPLAINT QUOTE
Pt BIBA, found in "[employer] bathroom with ALOOB. Pt admits to drinking a lot of alcohol today. Pt frequently in ED for same.

## 2019-09-08 NOTE — ED PROVIDER NOTE - PATIENT PORTAL LINK FT
You can access the FollowMyHealth Patient Portal offered by [facility] by registering at the following website: [email or web address]. By joining [employer]’s FollowMyHealth portal, you will also be able to view your health information using other applications (apps) compatible with our system.

## 2019-09-08 NOTE — ED PROVIDER NOTE - OBJECTIVE STATEMENT
55yo M with alcohol abuse, daily drinker, just DC last night and day prior for same presentation. Picked up by EMS for being drunk in [place]. patient states 'i'm sober' admits to drinking alcohol, states mother  so he is sad. no SI/HI. has friends/family who may be able to pick him up. no complaints at this time.

## 2019-09-09 ENCOUNTER — EMERGENCY (EMERGENCY)
Facility: HOSPITAL | Age: 54
LOS: 1 days | Discharge: HOME | End: 2019-09-09
Attending: EMERGENCY MEDICINE
Payer: COMMERCIAL

## 2019-09-09 ENCOUNTER — CHART COPY (OUTPATIENT)
Age: 54
End: 2019-09-09

## 2019-09-09 VITALS
HEART RATE: 89 BPM | WEIGHT: 169.98 LBS | RESPIRATION RATE: 18 BRPM | OXYGEN SATURATION: 99 % | SYSTOLIC BLOOD PRESSURE: 146 MMHG | TEMPERATURE: 98 F | DIASTOLIC BLOOD PRESSURE: 82 MMHG | HEIGHT: 65 IN

## 2019-09-09 VITALS
RESPIRATION RATE: 18 BRPM | HEART RATE: 82 BPM | OXYGEN SATURATION: 99 % | DIASTOLIC BLOOD PRESSURE: 84 MMHG | TEMPERATURE: 98 F | SYSTOLIC BLOOD PRESSURE: 142 MMHG

## 2019-09-09 VITALS
HEART RATE: 99 BPM | WEIGHT: 169.98 LBS | OXYGEN SATURATION: 97 % | HEIGHT: 65 IN | SYSTOLIC BLOOD PRESSURE: 116 MMHG | TEMPERATURE: 98 F | RESPIRATION RATE: 20 BRPM | DIASTOLIC BLOOD PRESSURE: 78 MMHG

## 2019-09-09 PROCEDURE — 99285 EMERGENCY DEPT VISIT HI MDM: CPT

## 2019-09-09 PROCEDURE — 99284 EMERGENCY DEPT VISIT MOD MDM: CPT

## 2019-09-09 PROCEDURE — 70450 CT HEAD/BRAIN W/O DYE: CPT

## 2019-09-09 PROCEDURE — 70450 CT HEAD/BRAIN W/O DYE: CPT | Mod: 26

## 2019-09-09 PROCEDURE — 70486 CT MAXILLOFACIAL W/O DYE: CPT | Mod: 26

## 2019-09-09 PROCEDURE — 82962 GLUCOSE BLOOD TEST: CPT

## 2019-09-09 PROCEDURE — 70486 CT MAXILLOFACIAL W/O DYE: CPT

## 2019-09-09 PROCEDURE — 72125 CT NECK SPINE W/O DYE: CPT

## 2019-09-09 PROCEDURE — 72125 CT NECK SPINE W/O DYE: CPT | Mod: 26

## 2019-09-09 RX ADMIN — Medication 50 MILLIGRAM(S): at 03:27

## 2019-09-09 NOTE — ED PROVIDER NOTE - NS ED ROS FT
ROS: CONTUSIONAL: Denies fever, chills, fatigue, wt loss. HEAD: Denies HA, Dizziness. EYE: Denies Acute visual changes, diplopia. ENMT: Denies change in hearing, tinnitus, epistaxis, difficulty swallowing, sore throat. CARDIO: Denies CP, palpitations, edema. RESP: Denies Cough, SOB , Diff breathing, hemoptysis. GI: Denies N/V, ABD pain, change in bowel movement. URINARY: Denies difficulty urinating, pelvic pain. MS:  Denies joint pain, back pain, weakness, decreased ROM, swelling. NEURO: Denies change in gait, seizures, loss of sensation, dizziness, confusion LOC.  PSY: NO SI/HI. SKIN: abrasion to L upper lip

## 2019-09-09 NOTE — ED PROVIDER NOTE - NSFOLLOWUPINSTRUCTIONS_ED_ALL_ED_FT
- Follow up with your doctor within 2-3 days.   -- Please do not drink alcohol in excess.  Please seek help for your alcohol use problem and never drive after consuming any amount of alcohol.  Please also do not take any tylenol with alcohol as it increases your risk of liver damage.   - Return to the ED for any new or worsening symptoms.     Alcohol Abuse    Alcohol intoxication occurs when the amount of alcohol that a person has consumed impairs his or her ability to mentally and physically function. Chronic alcohol consumption can also lead to a variety of health issues including neurological disease, stomach disease, heart disease, liver disease, etc. Do not drive after drinking alcohol. Drinking enough alcohol to end up in an Emergency Room suggests you may have an alcohol abuse problem. Seek help at a drug addiction center.    SEEK IMMEDIATE MEDICAL CARE IF YOU HAVE ANY OF THE FOLLOWING SYMPTOMS: seizures, vomiting blood, blood in your stool, lightheadedness/dizziness, or becoming shaky to tremulous when you stop drinking.

## 2019-09-09 NOTE — ED PROVIDER NOTE - ATTENDING CONTRIBUTION TO CARE
53 yo M with hx of EtOH abuse well known to myself from multiple presents to ED s/p fall after increased EtOH intake.  Pt with noted ALOOB and facial abrasion and unable to give any hx secondary to current intoxicated stated.  PERRL, no pari/rhinorrhea, Neck supple, Cor Reg, Lungs clear b/l, Abd soft, NT, Ext FROM, Neuro no focal deficits.  Will check CT head, C-spine, maxillofacial, observe and re-eval

## 2019-09-09 NOTE — ED ADULT NURSE NOTE - OBJECTIVE STATEMENT
pt triaged, treated and dispo by provider, pt verbalized understanding of discharge instructions, pt tolerate po's, pt able to ambulate.  refer to provider notes..

## 2019-09-09 NOTE — ED ADULT TRIAGE NOTE - CHIEF COMPLAINT QUOTE
Patient BIBA to ED today [place] train [place].  Patient c/o right ankle pain.  Patient uncooperative.

## 2019-09-09 NOTE — ED PROVIDER NOTE - PATIENT PORTAL LINK FT
You can access the FollowMyHealth Patient Portal offered by [facility] by registering at the following website: [email or web address]. By joining [employer]’s FollowMyHealth portal, you will also be able to view your health information using other applications (apps) compatible with our system.

## 2019-09-09 NOTE — ED PROVIDER NOTE - OBJECTIVE STATEMENT
54M h/o ETOH abuse presents intoxicated. Was discharged earlier today for intox. Denies trauma, injuries, drug use. Admits to drinking alcohol.

## 2019-09-09 NOTE — ED PROVIDER NOTE - CLINICAL SUMMARY MEDICAL DECISION MAKING FREE TEXT BOX
Patient appears intoxicated.   - No evidence of falls or trauma. (abrasion from yesterday, normal CT head/neck from earlier today)  - FSG, fall precautions  - Will clear when clinically sober.

## 2019-09-09 NOTE — ED PROVIDER NOTE - PATIENT PORTAL LINK FT
You can access the FollowMyHealth Patient Portal offered by Health system by registering at the following website: [email or web address]. By joining [employer]’s FollowMyHealth portal, you will also be able to view your health information using other applications (apps) compatible with our system.

## 2019-09-09 NOTE — ED PROVIDER NOTE - OBJECTIVE STATEMENT
53 y/o M pt BIBA with hx of EtOH abuse, asthma, GERD, HLD, and HTN presents to ED c/o abrasion to upper lip s/p fall that occurred PTA. Pt admits to drinking EtOH today and falling, hitting his face on the curb. Pt states [place]. Denies LOC, fever, chills, CP, SOB, abd pain, n/v/d. No further complaints at this time.

## 2019-09-09 NOTE — ED PROVIDER NOTE - CLINICAL SUMMARY MEDICAL DECISION MAKING FREE TEXT BOX
PT with stable VS, no acute distress, non toxic appearing, tolerating PO in the ED, PT well known to ED, no acute FX or intercranial injury, PT demonstrates clinical sobriety no acute complaints, educated about when to return to the ED if needed. PT verbalizes that he understands all instructions and results.   PT demonstrates decision making ability no s/s of margarette, psychosis, A&O x3 in no acute distress no HI/SI.

## 2019-09-09 NOTE — ED PROVIDER NOTE - PHYSICAL EXAMINATION
Gen: Well appearing in NAD, intoxicated, poor hygiene  Head: NC/AT, small well healed abrasion to L upper lip  Neck: trachea midline  Resp:  No distress  Ext: no deformities  Neuro:  A&O appears non focal  Skin:  Warm and dry as visualized  Psych:  Normal affect and mood

## 2019-09-09 NOTE — ED PROVIDER NOTE - PROGRESS NOTE DETAILS
Patient reassesed--no complaints.  Vital signs normal.  Resting comfortably.  A&Ox3.  Speech clear. Gait normal.  Clinically sober.

## 2019-09-10 ENCOUNTER — APPOINTMENT (OUTPATIENT)
Dept: INTERNAL MEDICINE | Facility: CLINIC | Age: 54
End: 2019-09-10

## 2019-09-10 ENCOUNTER — EMERGENCY (EMERGENCY)
Facility: HOSPITAL | Age: 54
LOS: 1 days | Discharge: HOME | End: 2019-09-10
Attending: STUDENT IN AN ORGANIZED HEALTH CARE EDUCATION/TRAINING PROGRAM
Payer: COMMERCIAL

## 2019-09-10 VITALS
HEART RATE: 101 BPM | WEIGHT: 160.06 LBS | SYSTOLIC BLOOD PRESSURE: 117 MMHG | DIASTOLIC BLOOD PRESSURE: 81 MMHG | RESPIRATION RATE: 18 BRPM | HEIGHT: 65 IN | TEMPERATURE: 98 F | OXYGEN SATURATION: 94 %

## 2019-09-10 VITALS
RESPIRATION RATE: 16 BRPM | DIASTOLIC BLOOD PRESSURE: 67 MMHG | SYSTOLIC BLOOD PRESSURE: 104 MMHG | HEART RATE: 108 BPM | OXYGEN SATURATION: 95 % | TEMPERATURE: 99 F

## 2019-09-10 PROCEDURE — 99285 EMERGENCY DEPT VISIT HI MDM: CPT

## 2019-09-10 PROCEDURE — 99284 EMERGENCY DEPT VISIT MOD MDM: CPT

## 2019-09-10 RX ORDER — OLANZAPINE 15 MG/1
5 TABLET, FILM COATED ORAL ONCE
Refills: 0 | Status: COMPLETED | OUTPATIENT
Start: 2019-09-10 | End: 2019-09-10

## 2019-09-10 RX ORDER — FAMOTIDINE 10 MG/ML
40 INJECTION INTRAVENOUS ONCE
Refills: 0 | Status: COMPLETED | OUTPATIENT
Start: 2019-09-10 | End: 2019-09-10

## 2019-09-10 RX ADMIN — FAMOTIDINE 40 MILLIGRAM(S): 10 INJECTION INTRAVENOUS at 02:51

## 2019-09-10 RX ADMIN — OLANZAPINE 5 MILLIGRAM(S): 15 TABLET, FILM COATED ORAL at 02:52

## 2019-09-10 RX ADMIN — Medication 30 MILLILITER(S): at 02:51

## 2019-09-10 NOTE — ED ADULT NURSE REASSESSMENT NOTE - NS ED NURSE REASSESS COMMENT FT1
Pt sleeping comfortably, easy arousability, warm blankets provided for additional comfort, pt safety maintained.

## 2019-09-10 NOTE — ED PROVIDER NOTE - OBJECTIVE STATEMENT
55 y/o M pt BIBA with significant PMHx of EtOH abuse presents to the ED c/o epigastric discomfort. Pt was brought in s/p EtOH use, currently mildly agitated.  No further complaints at this time.

## 2019-09-10 NOTE — ED ADULT NURSE NOTE - OBJECTIVE STATEMENT
Pt verbally abusive, screaming and cursing in halls, refusing to answer questions, security made aware on standby, pt not combative physically however attempting to get out of bed, encouraged to remain on stretcher, stretcher placed near nursing station

## 2019-09-10 NOTE — ED ADULT TRIAGE NOTE - CHIEF COMPLAINT QUOTE
Pt BIBA, found wandering around [employer], stumbling by bystander. PT denies injury, denies pain. Pt in hospital gown, discharged yesterday from ED. Pt reports hit head, no obvious signs of trauma, no injuries or bleeding noted. Pt alert oriented and awake. Stated " I drank vodka tonight" Pt BIBA, found wandering around [employer], stumbling by bystander. PT denies injury, denies pain. Pt in hospital gown, discharged yesterday from ED. Pt reports hit head, no obvious signs of trauma, no injuries or bleeding noted. Pt alert oriented and awake. Stated " I drank vodka tonight" clothing removed and secured. Valuables locked up with security, # Pt BIBA, found wandering around [employer], stumbling by bystander. PT denies injury, denies pain. Pt in hospital gown, discharged yesterday from ED. Pt reports hit head, no obvious signs of trauma, no injuries or bleeding noted. Pt alert oriented and awake. Stated " I drank vodka tonight" clothing removed and secured. Valuables locked up with security, #[id number]

## 2019-09-10 NOTE — ED ADULT NURSE NOTE - NS TRANSFER PATIENT BELONGINGS
Clothing/jeans, sweater, sneaker. cash total of $62.00 held in  security safe along with phone chager. pt was left with cellphone.

## 2019-09-10 NOTE — ED PROVIDER NOTE - PROGRESS NOTE DETAILS
Pt signed out to me by Dr. Isaac pending sobriety. Pt clinically sober and ambulating without difficulty or assistance. will d/c with outpatient f/up.

## 2019-09-10 NOTE — ED PROVIDER NOTE - PATIENT PORTAL LINK FT
You can access the FollowMyHealth Patient Portal offered by [facility] by registering at the following website: [email or web address]. By joining [employer]’s FollowMyHealth portal, you will also be able to view your health information using other applications (apps) compatible with our system.

## 2019-09-11 ENCOUNTER — EMERGENCY (EMERGENCY)
Facility: HOSPITAL | Age: 54
LOS: 1 days | Discharge: HOME | End: 2019-09-11
Attending: STUDENT IN AN ORGANIZED HEALTH CARE EDUCATION/TRAINING PROGRAM
Payer: COMMERCIAL

## 2019-09-11 VITALS
SYSTOLIC BLOOD PRESSURE: 121 MMHG | TEMPERATURE: 98 F | OXYGEN SATURATION: 97 % | WEIGHT: 164.91 LBS | HEART RATE: 114 BPM | RESPIRATION RATE: 20 BRPM | HEIGHT: 65 IN | DIASTOLIC BLOOD PRESSURE: 72 MMHG

## 2019-09-11 PROCEDURE — 96372 THER/PROPH/DIAG INJ SC/IM: CPT

## 2019-09-11 PROCEDURE — 94640 AIRWAY INHALATION TREATMENT: CPT

## 2019-09-11 PROCEDURE — 71046 X-RAY EXAM CHEST 2 VIEWS: CPT | Mod: 26

## 2019-09-11 PROCEDURE — 99284 EMERGENCY DEPT VISIT MOD MDM: CPT

## 2019-09-11 PROCEDURE — 71046 X-RAY EXAM CHEST 2 VIEWS: CPT

## 2019-09-11 PROCEDURE — 99284 EMERGENCY DEPT VISIT MOD MDM: CPT | Mod: 25

## 2019-09-11 RX ORDER — DEXAMETHASONE 0.5 MG/5ML
8 ELIXIR ORAL ONCE
Refills: 0 | Status: COMPLETED | OUTPATIENT
Start: 2019-09-11 | End: 2019-09-11

## 2019-09-11 RX ORDER — ALBUTEROL 90 UG/1
2.5 AEROSOL, METERED ORAL ONCE
Refills: 0 | Status: COMPLETED | OUTPATIENT
Start: 2019-09-11 | End: 2019-09-11

## 2019-09-11 RX ORDER — ALBUTEROL 90 UG/1
1 AEROSOL, METERED ORAL ONCE
Refills: 0 | Status: COMPLETED | OUTPATIENT
Start: 2019-09-11 | End: 2019-09-11

## 2019-09-11 RX ORDER — IPRATROPIUM/ALBUTEROL SULFATE 18-103MCG
3 AEROSOL WITH ADAPTER (GRAM) INHALATION ONCE
Refills: 0 | Status: COMPLETED | OUTPATIENT
Start: 2019-09-11 | End: 2019-09-11

## 2019-09-11 RX ADMIN — Medication 3 MILLILITER(S): at 05:33

## 2019-09-11 RX ADMIN — ALBUTEROL 2.5 MILLIGRAM(S): 90 AEROSOL, METERED ORAL at 05:33

## 2019-09-11 RX ADMIN — Medication 3 MILLILITER(S): at 04:20

## 2019-09-11 RX ADMIN — Medication 8 MILLIGRAM(S): at 04:20

## 2019-09-11 RX ADMIN — ALBUTEROL 1 PUFF(S): 90 AEROSOL, METERED ORAL at 07:20

## 2019-09-11 RX ADMIN — Medication 50 MILLIGRAM(S): at 04:20

## 2019-09-11 NOTE — ED ADULT NURSE NOTE - NSIMPLEMENTINTERV_GEN_ALL_ED
Implemented All Universal Safety Interventions:  [place] to call system. Call bell, personal items and telephone within reach. Instruct patient to call for assistance. Room bathroom lighting operational. Non-slip footwear when patient is off stretcher. Physically safe environment: no spills, clutter or unnecessary equipment. Stretcher in lowest position, wheels locked, appropriate side rails in place.

## 2019-09-11 NOTE — ED PROVIDER NOTE - CHPI ED SYMPTOMS NEG
no chills/no body aches/no chest pain/no cough/no edema/no diaphoresis/no headache/no hemoptysis/no fever

## 2019-09-11 NOTE — ED ADULT TRIAGE NOTE - CHIEF COMPLAINT QUOTE
Pt A&Ox4 states "I'm having trouble breathing." Patient denies alcohol or drug used today, states asthma is acting up.

## 2019-09-11 NOTE — ED PROVIDER NOTE - PATIENT PORTAL LINK FT
You can access the FollowMyHealth Patient Portal offered by [facility] by registering at the following website: [email or web address]. By joining [employer]’s FollowMyHealth portal, you will also be able to view your health information using other applications (apps) compatible with our system.

## 2019-09-11 NOTE — ED PROVIDER NOTE - PROGRESS NOTE DETAILS
Patient resting comfortably without tachypnea, however, continues to have scattered wheezing in the posterior lung fields. Wheezing has improved.

## 2019-09-11 NOTE — ED PROVIDER NOTE - OBJECTIVE STATEMENT
53 yo male presents for evaluation of acute onset of dyspnea for the past couple of hours. Patient states that he has been generally well with no complaints until this evening. He states he feels like his chest is tight. Denies fever, chills, nausea, vomiting, or recent sick contacts.

## 2019-09-16 DIAGNOSIS — Z71.89 OTHER SPECIFIED COUNSELING: ICD-10-CM

## 2019-09-30 NOTE — ED PROVIDER NOTE - CPE EDP GASTRO NORM
Feeling well.  Reviewed ultrasound through [facility] office earlier today, overall reassuring but fetal PACs noted.  Patient and  state they had all their questions answered thus far and are comfortable with the plan to follow up in one month.  They are aware they can call with additional questions.  RTC 4 weeks.   normal...

## 2019-10-23 NOTE — ED ADULT NURSE NOTE - OBJECTIVE STATEMENT
----- Message from Garrison Samano MD sent at 10/22/2019  5:02 PM CDT -----  Thank you for helping out.  He had a UDS in the past that mentioned low compliance.  I did not do it and have no real interest in doing UDS to be honest, but symptomatically he seems ok.  Thanks again.      Garrison     ----- Message -----  From: Jens Abad MD  Sent: 10/22/2019  11:23 AM CDT  To: Garrison Samano MD, Berenice Ruiz LPN    I agree, he looks very stable. Probably just needs annual follow up with renal U/S but I will see him back in 6 months just to make sure he is doing well.     With no incontinence or UTI and stable upper tract hopefully I just need to make sure he has an updated catheter order and antimuscarinic Rx.     Thank you for sending him over.     Jens Gonzalez,  Can you please schedule him to see me in 6 months for continued follow-up.       ----- Message -----  From: Garrison Samano MD  Sent: 10/22/2019  11:11 AM CDT  To: Jens Abad MD         Pt A&Ox4 c/o LUQ pain and admits to ETOH detox wants to quit at this time. Pt resting comfortably, VSS, no signs of distress at this time, safety maintained, call bell in reach.

## 2019-11-11 NOTE — ED ADULT TRIAGE NOTE - NS ED TRIAGE AVPU SCALE
Verbal - The patient responds to verbal stimuli by opening their eyes when someone speaks to them. The patient is not fully oriented to time, place, or person. No

## 2019-11-18 ENCOUNTER — CHART COPY (OUTPATIENT)
Age: 54
End: 2019-11-18

## 2019-11-19 ENCOUNTER — INBOUND DOCUMENT (OUTPATIENT)
Age: 54
End: 2019-11-19

## 2019-11-19 ENCOUNTER — RECORD ABSTRACTING (OUTPATIENT)
Age: 54
End: 2019-11-19

## 2019-12-02 ENCOUNTER — CHART COPY (OUTPATIENT)
Age: 54
End: 2019-12-02

## 2019-12-10 ENCOUNTER — APPOINTMENT (OUTPATIENT)
Dept: INTERNAL MEDICINE | Facility: CLINIC | Age: 54
End: 2019-12-10

## 2019-12-18 ENCOUNTER — EMERGENCY (EMERGENCY)
Facility: HOSPITAL | Age: 54
LOS: 1 days | End: 2019-12-18
Admitting: EMERGENCY MEDICINE
Payer: MEDICAID

## 2019-12-18 PROCEDURE — 99285 EMERGENCY DEPT VISIT HI MDM: CPT

## 2019-12-18 PROCEDURE — 99284 EMERGENCY DEPT VISIT MOD MDM: CPT

## 2019-12-18 PROCEDURE — 99053 MED SERV 10PM-8AM 24 HR FAC: CPT

## 2019-12-19 ENCOUNTER — CHART COPY (OUTPATIENT)
Age: 54
End: 2019-12-19

## 2019-12-19 PROCEDURE — 90792 PSYCH DIAG EVAL W/MED SRVCS: CPT | Mod: GT

## 2020-01-27 NOTE — ED ADULT NURSE REASSESSMENT NOTE - NS ED NURSE REASSESS COMMENT FT1
"[email or web address]  Pager [phone]     Assuming care from previous RN, pt asleep and arousable on stretcher, resp even and unlabored, color good, offers no complaints at this time, awaiting sobriety and dispo, pt aware of plan of care, pt given more blankets and gown placed on pt, will continue to monitor

## 2020-01-29 NOTE — ED PROVIDER NOTE - SCRIBE NAME
Attending Attestation





- Resident


Resident Name: Emre Abbasi





- ED Attending Attestation


I have performed the following: I have examined & evaluated the patient, The 

case was reviewed & discussed with the resident, I agree w/resident's findings 

& plan, Exceptions are as noted





- HPI


HPI: 





01/29/20 16:41


74-year-old male with multiple comorbidities, history of advanced Parkinson's 

presents with chief complaint of mild headache relieved by over-the-counter 

Tylenol as well as bilateral foot and leg paresthesias for the past 2 months.  





- Physicial Exam


PE: 





01/29/20 16:43


Patient is awake and alert, obese, in no significant distress





Normocephalic and atraumatic


CTA


RRR


Bilateral upper extremity tremor and leadpipe rigidity noted





- Medical Decision Making





01/29/20 16:42


Patient reports that he has difficulty walking.  As per the worker accompanying 

the patient, patient's symptoms are baseline. Exam reveals no acute focal 

deficits, bilateral upper leadpipe rigidity and tremor noted. CT of head shows 

no evidence of acute intracranial pathology.  Patient symptoms appear at 

baseline.  Case discussed with Dr. Donnie Jones and he agrees with outpatient 

follow-up. Idania Morris

## 2020-02-27 NOTE — ED PROVIDER NOTE - DATE/TIME 1
09-Sep-2019 18:25 Cartilage Graft Text: The defect edges were debeveled with a #15 scalpel blade.  Given the location of the defect, shape of the defect, the fact the defect involved a full thickness cartilage defect a cartilage graft was deemed most appropriate.  An appropriate donor site was identified, cleansed, and anesthetized. The cartilage graft was then harvested and transferred to the recipient site, oriented appropriately and then sutured into place.  The secondary defect was then repaired using a primary closure.

## 2020-03-20 ENCOUNTER — APPOINTMENT (OUTPATIENT)
Dept: INTERNAL MEDICINE | Facility: CLINIC | Age: 55
End: 2020-03-20

## 2020-05-15 ENCOUNTER — RX RENEWAL (OUTPATIENT)
Age: 55
End: 2020-05-15

## 2020-05-18 PROBLEM — Z00.00 ENCOUNTER FOR PREVENTIVE HEALTH EXAMINATION: Noted: 2020-05-18

## 2020-05-19 ENCOUNTER — APPOINTMENT (OUTPATIENT)
Dept: INTERNAL MEDICINE | Facility: CLINIC | Age: 55
End: 2020-05-19
Payer: MEDICAID

## 2020-05-19 ENCOUNTER — APPOINTMENT (OUTPATIENT)
Dept: INTERNAL MEDICINE | Facility: CLINIC | Age: 55
End: 2020-05-19

## 2020-05-19 VITALS — BODY MASS INDEX: 27.16 KG/M2 | WEIGHT: 163 LBS | TEMPERATURE: 98.1 F | HEIGHT: 65 IN

## 2020-05-19 VITALS
HEIGHT: 65 IN | BODY MASS INDEX: 30.49 KG/M2 | DIASTOLIC BLOOD PRESSURE: 78 MMHG | RESPIRATION RATE: 14 BRPM | HEART RATE: 70 BPM | WEIGHT: 183 LBS | SYSTOLIC BLOOD PRESSURE: 128 MMHG

## 2020-05-19 DIAGNOSIS — D49.2 NEOPLASM OF UNSPECIFIED BEHAVIOR OF BONE, SOFT TISSUE, AND SKIN: ICD-10-CM

## 2020-05-19 DIAGNOSIS — K59.01 SLOW TRANSIT CONSTIPATION: ICD-10-CM

## 2020-05-19 DIAGNOSIS — T23.009A BURN OF UNSPECIFIED DEGREE OF UNSPECIFIED HAND, UNSPECIFIED SITE, INITIAL ENCOUNTER: ICD-10-CM

## 2020-05-19 DIAGNOSIS — Z00.00 ENCOUNTER FOR GENERAL ADULT MEDICAL EXAMINATION W/OUT ABNORMAL FINDINGS: ICD-10-CM

## 2020-05-19 DIAGNOSIS — Z11.59 ENCOUNTER FOR SCREENING FOR OTHER VIRAL DISEASES: ICD-10-CM

## 2020-05-19 DIAGNOSIS — Z23 ENCOUNTER FOR IMMUNIZATION: ICD-10-CM

## 2020-05-19 PROCEDURE — 99396 PREV VISIT EST AGE 40-64: CPT | Mod: 25

## 2020-05-19 PROCEDURE — 36415 COLL VENOUS BLD VENIPUNCTURE: CPT

## 2020-05-19 PROCEDURE — 90471 IMMUNIZATION ADMIN: CPT

## 2020-05-19 PROCEDURE — 90715 TDAP VACCINE 7 YRS/> IM: CPT

## 2020-05-19 PROCEDURE — 99214 OFFICE O/P EST MOD 30 MIN: CPT | Mod: 25

## 2020-05-19 NOTE — PHYSICAL EXAM
[No Acute Distress] : no acute distress [Well Nourished] : well nourished [Well-Appearing] : well-appearing [Well Developed] : well developed [EOMI] : extraocular movements intact [Normal Sclera/Conjunctiva] : normal sclera/conjunctiva [PERRL] : pupils equal round and reactive to light [Normal Outer Ear/Nose] : the outer ears and nose were normal in appearance [Normal Oropharynx] : the oropharynx was normal [No JVD] : no jugular venous distention [No Lymphadenopathy] : no lymphadenopathy [Thyroid Normal, No Nodules] : the thyroid was normal and there were no nodules present [No Respiratory Distress] : no respiratory distress  [Supple] : supple [No Accessory Muscle Use] : no accessory muscle use [Regular Rhythm] : with a regular rhythm [Clear to Auscultation] : lungs were clear to auscultation bilaterally [Normal Rate] : normal rate  [No Murmur] : no murmur heard [Normal S1, S2] : normal S1 and S2 [No Abdominal Bruit] : a ~M bruit was not heard ~T in the abdomen [No Carotid Bruits] : no carotid bruits [No Varicosities] : no varicosities [Pedal Pulses Present] : the pedal pulses are present [No Palpable Aorta] : no palpable aorta [No Edema] : there was no peripheral edema [Non Tender] : non-tender [Soft] : abdomen soft [No Extremity Clubbing/Cyanosis] : no extremity clubbing/cyanosis [No Masses] : no abdominal mass palpated [Non-distended] : non-distended [Normal Bowel Sounds] : normal bowel sounds [No HSM] : no HSM [Normal Posterior Cervical Nodes] : no posterior cervical lymphadenopathy [Normal Anterior Cervical Nodes] : no anterior cervical lymphadenopathy [No CVA Tenderness] : no CVA  tenderness [No Spinal Tenderness] : no spinal tenderness [No Joint Swelling] : no joint swelling [Grossly Normal Strength/Tone] : grossly normal strength/tone [No Rash] : no rash [Coordination Grossly Intact] : coordination grossly intact [No Focal Deficits] : no focal deficits [Normal Gait] : normal gait [Deep Tendon Reflexes (DTR)] : deep tendon reflexes were 2+ and symmetric [Normal Affect] : the affect was normal [Normal Insight/Judgement] : insight and judgment were intact

## 2020-05-19 NOTE — HEALTH RISK ASSESSMENT
[] : Yes [Very Good] : ~his/her~  mood as very good [16-20] : 16-20 [de-identified] : in remission [No] : No [HIV Test offered] : HIV Test offered [ColonoscopyDate] : due

## 2020-05-19 NOTE — ASSESSMENT
[FreeTextEntry1] : refer to gi chronic gerd colon cancere screeing\par burn on hand needs tdap\par check labs\par refer to derm for skin growth\par alcohol in remission\par seroquel for pscyh isses\par should see pscych

## 2020-05-19 NOTE — HISTORY OF PRESENT ILLNESS
[FreeTextEntry1] : For CPE [de-identified] : for cpe\par burned his skin working\par has been constipated\par has left arm growth\par patient otherwise not drinking he claims for past 7 months

## 2020-05-20 LAB
25(OH)D3 SERPL-MCNC: 21.2 NG/ML
ALBUMIN SERPL ELPH-MCNC: 5.3 G/DL
ALP BLD-CCNC: 66 U/L
ALT SERPL-CCNC: 13 U/L
ANION GAP SERPL CALC-SCNC: 16 MMOL/L
AST SERPL-CCNC: 20 U/L
BASOPHILS # BLD AUTO: 0.04 K/UL
BASOPHILS NFR BLD AUTO: 0.6 %
BILIRUB SERPL-MCNC: 0.2 MG/DL
BUN SERPL-MCNC: 13 MG/DL
CALCIUM SERPL-MCNC: 9.6 MG/DL
CHLORIDE SERPL-SCNC: 103 MMOL/L
CHOLEST SERPL-MCNC: 386 MG/DL
CHOLEST/HDLC SERPL: 8.4 RATIO
CO2 SERPL-SCNC: 19 MMOL/L
CREAT SERPL-MCNC: 0.89 MG/DL
EOSINOPHIL # BLD AUTO: 0.08 K/UL
EOSINOPHIL NFR BLD AUTO: 1.3 %
ESTIMATED AVERAGE GLUCOSE: 131 MG/DL
GLUCOSE SERPL-MCNC: 100 MG/DL
HBA1C MFR BLD HPLC: 6.2 %
HCT VFR BLD CALC: 38.1 %
HCV AB SER QL: NONREACTIVE
HCV S/CO RATIO: 0.14 S/CO
HDLC SERPL-MCNC: 46 MG/DL
HGB BLD-MCNC: 11.8 G/DL
IMM GRANULOCYTES NFR BLD AUTO: 0.3 %
LDLC SERPL CALC-MCNC: NORMAL MG/DL
LYMPHOCYTES # BLD AUTO: 1.95 K/UL
LYMPHOCYTES NFR BLD AUTO: 31.7 %
MAN DIFF?: NORMAL
MCHC RBC-ENTMCNC: 26 PG
MCHC RBC-ENTMCNC: 31 GM/DL
MCV RBC AUTO: 83.9 FL
MONOCYTES # BLD AUTO: 0.44 K/UL
MONOCYTES NFR BLD AUTO: 7.1 %
NEUTROPHILS # BLD AUTO: 3.63 K/UL
NEUTROPHILS NFR BLD AUTO: 59 %
NT-PROBNP SERPL-MCNC: 17 PG/ML
PLATELET # BLD AUTO: 287 K/UL
POTASSIUM SERPL-SCNC: 4.2 MMOL/L
PROT SERPL-MCNC: 7.9 G/DL
PSA SERPL-MCNC: 1.14 NG/ML
RBC # BLD: 4.54 M/UL
RBC # FLD: 18.3 %
SARS-COV-2 IGG SERPL IA-ACNC: 0.1 INDEX
SARS-COV-2 IGG SERPL QL IA: NEGATIVE
SODIUM SERPL-SCNC: 138 MMOL/L
T4 FREE SERPL-MCNC: 0.9 NG/DL
TRIGL SERPL-MCNC: 497 MG/DL
TSH SERPL-ACNC: 0.74 UIU/ML
WBC # FLD AUTO: 6.16 K/UL

## 2020-05-28 LAB — HUMAN IMMUNODEFICIENCY VIRUS 1 (HIV-1) QUALITATIVE, RNA: NEGATIVE

## 2020-06-10 NOTE — ED PROVIDER NOTE - OTHER FINDINGS
-- DO NOT REPLY / DO NOT REPLY ALL --  -- Message is from the [facility]--    COVID-19 Universal Screening: Negative    General Patient Message      Reason for Call: Patient will be going into the office for labs today    Caller Information       Type Contact Phone    06/10/2020 08:59 AM Phone (Incoming) Ralph Littlejohn (Self) [phone] (H)          Alternative phone number: [phone]      Turnaround time given to caller:   \"This message will be sent to [state Provider's name]. The clinical team will fulfill your request as soon as they review your message.\"    
QT/QTc: 352/442 ms

## 2020-07-20 NOTE — ED PROVIDER NOTE - PSH
Pt laying on stretcher with HOB elevated, eyes closed, in negative distress  Respirations regular and non-labored  NSR on monitor  VS  Awaiting results  Mother at bedside  Will continue to monitor       Guy Cruz RN  07/20/20 5516 No significant past surgical history    No significant past surgical history    No significant past surgical history

## 2020-09-28 ENCOUNTER — APPOINTMENT (OUTPATIENT)
Dept: INTERNAL MEDICINE | Facility: CLINIC | Age: 55
End: 2020-09-28
Payer: MEDICAID

## 2020-09-28 VITALS — RESPIRATION RATE: 12 BRPM | DIASTOLIC BLOOD PRESSURE: 78 MMHG | SYSTOLIC BLOOD PRESSURE: 114 MMHG | HEART RATE: 78 BPM

## 2020-09-28 VITALS — WEIGHT: 167 LBS | BODY MASS INDEX: 27.82 KG/M2 | HEIGHT: 65 IN

## 2020-09-28 PROCEDURE — 99214 OFFICE O/P EST MOD 30 MIN: CPT | Mod: 25

## 2020-09-28 PROCEDURE — 36415 COLL VENOUS BLD VENIPUNCTURE: CPT

## 2020-09-28 PROCEDURE — 90686 IIV4 VACC NO PRSV 0.5 ML IM: CPT

## 2020-09-28 PROCEDURE — G0008: CPT

## 2020-09-28 NOTE — ASSESSMENT
[FreeTextEntry1] : check lipid hepatic\par alcohol use in remission\par flu vaccine given\par follow up 6months\par continue seroquel\par dong well otherwise\par see gi for chronic gerd been 10 years since last egd

## 2020-09-28 NOTE — HISTORY OF PRESENT ILLNESS
[FreeTextEntry1] : follow up lipid\par gerd\par flu vaccine [de-identified] : follow up lipid\par has been alcohol free for one year\par has no chest pain sob nvd\par has chronic gerd and would like to see gi doctor\par he does smoke onoccaion\par he would like flu vaccine

## 2020-09-29 LAB
ALBUMIN SERPL ELPH-MCNC: 5.4 G/DL
ALP BLD-CCNC: 39 U/L
ALT SERPL-CCNC: 18 U/L
AST SERPL-CCNC: 27 U/L
BILIRUB DIRECT SERPL-MCNC: 0.1 MG/DL
BILIRUB INDIRECT SERPL-MCNC: 0.1 MG/DL
BILIRUB SERPL-MCNC: <0.2 MG/DL
CHOLEST SERPL-MCNC: 238 MG/DL
CHOLEST/HDLC SERPL: 4.8 RATIO
HDLC SERPL-MCNC: 49 MG/DL
LDLC SERPL CALC-MCNC: 146 MG/DL
PROT SERPL-MCNC: 7.6 G/DL
TRIGL SERPL-MCNC: 212 MG/DL

## 2020-09-30 NOTE — ED ADULT NURSE NOTE - NS PRO AD BILL OF RIGHTS
Nutrition Note Consult received for poor PO intake. Pt was seen and assessed by this service yesterday for same issue. Please refer to RD note from that time. ONS added. Pt meets criteria for chronic, severe protein-calorie malnutrition. This service will continue to follow per policy. Malnutrition Assessment: 
Malnutrition Status:  Severe malnutrition Context:  Chronic illness Findings of the 6 clinical characteristics of malnutrition:  
Energy Intake:  7 - 75% or less est energy requirements for 1 month or longer Weight Loss:  Unable to assess Body Fat Loss:  7 - Severe body fat loss, Orbital  
Muscle Mass Loss:  7 - Severe muscle mass loss, Hand (interosseous) Fluid Accumulation:  Unable to assess,   
 Strength:  Not performed Estimated Daily Nutrient Needs: 
Energy (kcal):  8166-4583([id number]) Protein (g):  67 - 1.2g/kg Fluid (ml/day):  1500 - 1ml/kcal 
 
 
 
Rojelio Iron, RD Available via [employer] Or Pager# [phone] Yes

## 2020-11-10 ENCOUNTER — RX RENEWAL (OUTPATIENT)
Age: 55
End: 2020-11-10

## 2020-11-24 NOTE — ED ADULT NURSE NOTE - CAS TRG GEN SKIN COLOR
Thank you for coming to our Emergency Department today. It is important to remember that some problems are difficult to diagnose and may not be found during your first visit. Be sure to follow up with your primary care doctor and review any labs/imaging that was performed with them. If you do not have a primary care doctor, you may contact the one listed on your discharge paperwork or you may also call the [facility] Appointment Desk at [phone] to schedule an appointment with one.     All medications may potentially have side effects and it is impossible to predict which medications may give you side effects. If you feel that you are having a negative effect of any medication you should immediately stop taking them and seek medical attention.    Return to the ER with any questions/concerns, new/concerning symptoms, worsening or failure to improve. Do not drive or make any important decisions for 24 hours if you have received any pain medications, sedatives or mood altering drugs during your ER visit.     Normal for race

## 2020-12-07 ENCOUNTER — APPOINTMENT (OUTPATIENT)
Dept: INTERNAL MEDICINE | Facility: CLINIC | Age: 55
End: 2020-12-07
Payer: MEDICAID

## 2020-12-07 VITALS — HEIGHT: 65 IN | WEIGHT: 175 LBS | BODY MASS INDEX: 29.16 KG/M2

## 2020-12-07 VITALS — DIASTOLIC BLOOD PRESSURE: 78 MMHG | SYSTOLIC BLOOD PRESSURE: 120 MMHG | HEART RATE: 72 BPM | RESPIRATION RATE: 14 BRPM

## 2020-12-07 PROCEDURE — 99214 OFFICE O/P EST MOD 30 MIN: CPT | Mod: 25

## 2020-12-07 PROCEDURE — 36415 COLL VENOUS BLD VENIPUNCTURE: CPT

## 2020-12-07 PROCEDURE — 99072 ADDL SUPL MATRL&STAF TM PHE: CPT

## 2020-12-07 NOTE — PHYSICAL EXAM
[No Acute Distress] : no acute distress [Well Nourished] : well nourished [Well Developed] : well developed [Well-Appearing] : well-appearing [Normal Sclera/Conjunctiva] : normal sclera/conjunctiva [PERRL] : pupils equal round and reactive to light [EOMI] : extraocular movements intact [Normal Outer Ear/Nose] : the outer ears and nose were normal in appearance [Normal Oropharynx] : the oropharynx was normal [No JVD] : no jugular venous distention [No Lymphadenopathy] : no lymphadenopathy [Supple] : supple [Thyroid Normal, No Nodules] : the thyroid was normal and there were no nodules present [No Respiratory Distress] : no respiratory distress  [No Accessory Muscle Use] : no accessory muscle use [Clear to Auscultation] : lungs were clear to auscultation bilaterally [Normal Rate] : normal rate  [Regular Rhythm] : with a regular rhythm [Normal S1, S2] : normal S1 and S2 [No Murmur] : no murmur heard [No Carotid Bruits] : no carotid bruits [No Abdominal Bruit] : a ~M bruit was not heard ~T in the abdomen [No Varicosities] : no varicosities [Pedal Pulses Present] : the pedal pulses are present [No Edema] : there was no peripheral edema [No Palpable Aorta] : no palpable aorta [No Extremity Clubbing/Cyanosis] : no extremity clubbing/cyanosis [Soft] : abdomen soft [Non Tender] : non-tender [Non-distended] : non-distended [No Masses] : no abdominal mass palpated [No HSM] : no HSM [Normal Bowel Sounds] : normal bowel sounds [Normal Posterior Cervical Nodes] : no posterior cervical lymphadenopathy [Normal Anterior Cervical Nodes] : no anterior cervical lymphadenopathy [No CVA Tenderness] : no CVA  tenderness [No Spinal Tenderness] : no spinal tenderness [No Joint Swelling] : no joint swelling [Grossly Normal Strength/Tone] : grossly normal strength/tone [No Rash] : no rash [Coordination Grossly Intact] : coordination grossly intact [No Focal Deficits] : no focal deficits [Normal Gait] : normal gait [Normal Affect] : the affect was normal [Normal Insight/Judgement] : insight and judgment were intact [de-identified] : left pinky fracture (was in ER) does not want corrected

## 2020-12-07 NOTE — HISTORY OF PRESENT ILLNESS
[FreeTextEntry1] : check prediabetes\par check lipid\par wants ed meds [de-identified] : for lipid followup has been alcohol free for one year\par has been taking his meds\par he feels ok\par would ed meds secondary to new girlfriend

## 2020-12-07 NOTE — ASSESSMENT
[FreeTextEntry1] : check lipid, check lft\par check a1c and glucose for prediabetes\par viagra for erections\par follow up year or prn

## 2020-12-08 ENCOUNTER — NON-APPOINTMENT (OUTPATIENT)
Age: 55
End: 2020-12-08

## 2020-12-08 LAB
ALBUMIN SERPL ELPH-MCNC: 4.6 G/DL
ALP BLD-CCNC: 53 U/L
ALT SERPL-CCNC: 22 U/L
ANION GAP SERPL CALC-SCNC: 16 MMOL/L
AST SERPL-CCNC: 30 U/L
BILIRUB SERPL-MCNC: 0.2 MG/DL
BUN SERPL-MCNC: 20 MG/DL
CALCIUM SERPL-MCNC: 9.2 MG/DL
CHLORIDE SERPL-SCNC: 102 MMOL/L
CHOLEST SERPL-MCNC: 261 MG/DL
CO2 SERPL-SCNC: 20 MMOL/L
CREAT SERPL-MCNC: 1.09 MG/DL
ESTIMATED AVERAGE GLUCOSE: 131 MG/DL
GLUCOSE SERPL-MCNC: 128 MG/DL
HBA1C MFR BLD HPLC: 6.2 %
HDLC SERPL-MCNC: 52 MG/DL
LDLC SERPL CALC-MCNC: 132 MG/DL
NONHDLC SERPL-MCNC: 209 MG/DL
POTASSIUM SERPL-SCNC: 4.1 MMOL/L
PROT SERPL-MCNC: 6.9 G/DL
SODIUM SERPL-SCNC: 137 MMOL/L
TRIGL SERPL-MCNC: 382 MG/DL

## 2020-12-30 ENCOUNTER — EMERGENCY (EMERGENCY)
Facility: HOSPITAL | Age: 55
LOS: 1 days | Discharge: HOME | End: 2020-12-30
Attending: EMERGENCY MEDICINE
Payer: COMMERCIAL

## 2020-12-30 VITALS
HEART RATE: 95 BPM | OXYGEN SATURATION: 95 % | HEIGHT: 65 IN | DIASTOLIC BLOOD PRESSURE: 86 MMHG | SYSTOLIC BLOOD PRESSURE: 128 MMHG | TEMPERATURE: 98 F | RESPIRATION RATE: 18 BRPM | WEIGHT: 160.06 LBS

## 2020-12-30 LAB
ALBUMIN SERPL ELPH-MCNC: 4.8 G/DL — SIGNIFICANT CHANGE UP (ref 3.3–5.2)
ALP SERPL-CCNC: 79 U/L — SIGNIFICANT CHANGE UP (ref 40–120)
ALT FLD-CCNC: 21 U/L — SIGNIFICANT CHANGE UP
ANION GAP SERPL CALC-SCNC: 16 MMOL/L — SIGNIFICANT CHANGE UP (ref 5–17)
APTT BLD: 40.3 SEC — HIGH (ref 27.5–35.5)
AST SERPL-CCNC: 26 U/L — SIGNIFICANT CHANGE UP
BASOPHILS # BLD AUTO: 0.05 K/UL — SIGNIFICANT CHANGE UP (ref 0–0.2)
BASOPHILS NFR BLD AUTO: 0.7 % — SIGNIFICANT CHANGE UP (ref 0–2)
BILIRUB SERPL-MCNC: <0.2 MG/DL — LOW (ref 0.4–2)
BLD GP AB SCN SERPL QL: SIGNIFICANT CHANGE UP
BUN SERPL-MCNC: 17 MG/DL — SIGNIFICANT CHANGE UP (ref 8–20)
CALCIUM SERPL-MCNC: 9.1 MG/DL — SIGNIFICANT CHANGE UP (ref 8.6–10.2)
CHLORIDE SERPL-SCNC: 99 MMOL/L — SIGNIFICANT CHANGE UP (ref 98–107)
CO2 SERPL-SCNC: 20 MMOL/L — LOW (ref 22–29)
CREAT SERPL-MCNC: 0.89 MG/DL — SIGNIFICANT CHANGE UP (ref 0.5–1.3)
EOSINOPHIL # BLD AUTO: 0.15 K/UL — SIGNIFICANT CHANGE UP (ref 0–0.5)
EOSINOPHIL NFR BLD AUTO: 2.2 % — SIGNIFICANT CHANGE UP (ref 0–6)
GLUCOSE SERPL-MCNC: 101 MG/DL — HIGH (ref 70–99)
HCT VFR BLD CALC: 35.6 % — LOW (ref 39–50)
HGB BLD-MCNC: 11.5 G/DL — LOW (ref 13–17)
IMM GRANULOCYTES NFR BLD AUTO: 0.4 % — SIGNIFICANT CHANGE UP (ref 0–1.5)
INR BLD: 1.01 RATIO — SIGNIFICANT CHANGE UP (ref 0.88–1.16)
LYMPHOCYTES # BLD AUTO: 2.05 K/UL — SIGNIFICANT CHANGE UP (ref 1–3.3)
LYMPHOCYTES # BLD AUTO: 29.7 % — SIGNIFICANT CHANGE UP (ref 13–44)
MCHC RBC-ENTMCNC: 28.5 PG — SIGNIFICANT CHANGE UP (ref 27–34)
MCHC RBC-ENTMCNC: 32.3 GM/DL — SIGNIFICANT CHANGE UP (ref 32–36)
MCV RBC AUTO: 88.1 FL — SIGNIFICANT CHANGE UP (ref 80–100)
MONOCYTES # BLD AUTO: 0.48 K/UL — SIGNIFICANT CHANGE UP (ref 0–0.9)
MONOCYTES NFR BLD AUTO: 7 % — SIGNIFICANT CHANGE UP (ref 2–14)
NEUTROPHILS # BLD AUTO: 4.14 K/UL — SIGNIFICANT CHANGE UP (ref 1.8–7.4)
NEUTROPHILS NFR BLD AUTO: 60 % — SIGNIFICANT CHANGE UP (ref 43–77)
PLATELET # BLD AUTO: 333 K/UL — SIGNIFICANT CHANGE UP (ref 150–400)
POTASSIUM SERPL-MCNC: 3.8 MMOL/L — SIGNIFICANT CHANGE UP (ref 3.5–5.3)
POTASSIUM SERPL-SCNC: 3.8 MMOL/L — SIGNIFICANT CHANGE UP (ref 3.5–5.3)
PROT SERPL-MCNC: 8 G/DL — SIGNIFICANT CHANGE UP (ref 6.6–8.7)
PROTHROM AB SERPL-ACNC: 11.7 SEC — SIGNIFICANT CHANGE UP (ref 10.6–13.6)
RBC # BLD: 4.04 M/UL — LOW (ref 4.2–5.8)
RBC # FLD: 13.5 % — SIGNIFICANT CHANGE UP (ref 10.3–14.5)
SODIUM SERPL-SCNC: 135 MMOL/L — SIGNIFICANT CHANGE UP (ref 135–145)
WBC # BLD: 6.9 K/UL — SIGNIFICANT CHANGE UP (ref 3.8–10.5)
WBC # FLD AUTO: 6.9 K/UL — SIGNIFICANT CHANGE UP (ref 3.8–10.5)

## 2020-12-30 PROCEDURE — 99284 EMERGENCY DEPT VISIT MOD MDM: CPT | Mod: 25

## 2020-12-30 PROCEDURE — 29125 APPL SHORT ARM SPLINT STATIC: CPT

## 2020-12-30 PROCEDURE — 73130 X-RAY EXAM OF HAND: CPT | Mod: 26,LT

## 2020-12-30 RX ORDER — KETOROLAC TROMETHAMINE 30 MG/ML
30 SYRINGE (ML) INJECTION ONCE
Refills: 0 | Status: DISCONTINUED | OUTPATIENT
Start: 2020-12-30 | End: 2020-12-30

## 2020-12-30 RX ORDER — DIPHENHYDRAMINE HCL 50 MG
25 CAPSULE ORAL ONCE
Refills: 0 | Status: COMPLETED | OUTPATIENT
Start: 2020-12-30 | End: 2020-12-30

## 2020-12-30 RX ORDER — HALOPERIDOL DECANOATE 100 MG/ML
5 INJECTION INTRAMUSCULAR ONCE
Refills: 0 | Status: COMPLETED | OUTPATIENT
Start: 2020-12-30 | End: 2020-12-30

## 2020-12-30 RX ORDER — DIPHENHYDRAMINE HCL 50 MG
25 CAPSULE ORAL ONCE
Refills: 0 | Status: DISCONTINUED | OUTPATIENT
Start: 2020-12-30 | End: 2020-12-30

## 2020-12-30 RX ADMIN — Medication 30 MILLIGRAM(S): at 18:07

## 2020-12-30 RX ADMIN — Medication 2 MILLIGRAM(S): at 18:19

## 2020-12-30 RX ADMIN — Medication 25 MILLIGRAM(S): at 18:11

## 2020-12-30 RX ADMIN — Medication 2 MILLIGRAM(S): at 18:12

## 2020-12-30 NOTE — ED PROVIDER NOTE - PATIENT PORTAL LINK FT
You can access the FollowMyHealth Patient Portal offered by [facility] by registering at the following website: [email or web address]. By joining [employer]’s FollowMyHealth portal, you will also be able to view your health information using other applications (apps) compatible with our system.

## 2020-12-30 NOTE — ED PROVIDER NOTE - OBJECTIVE STATEMENT
56 yo male with c/o hand pain  Pt states he had surgery 3 weeks ago with Dr Andre at [facility] and his 5th digit is swollen and his hand is very painful. No fever chills redness of pus  no other complaints  Med hx lcohol abuse    Alcohol abuse    Alcohol abuse    Asthma    GERD (gastroesophageal reflux disease)    High cholesterol    High cholesterol    HTN (hypertension)    Hypertension    No pertinent past medical history

## 2020-12-30 NOTE — ED PROVIDER NOTE - NSFOLLOWUPINSTRUCTIONS_ED_ALL_ED_FT
Keep the splint clean, dry and intact until further directed.  Keep the injured extremity elevated above the level of your heart while at rest.  Take ibuprofen 600mg every 6 hours and/or acetaminophen 1000mg every 6 hours as needed for pain.  Return to the ER if you have severe or worsening pain, numbness/tingling/color change to your hand/finger tips, or any other new symptoms.    Follow up with Dr. Andre as soon as possible.

## 2020-12-30 NOTE — ED ADULT NURSE NOTE - PSH
No significant past surgical history    No significant past surgical history    No significant past surgical history

## 2020-12-30 NOTE — ED PROVIDER NOTE - CLINICAL SUMMARY MEDICAL DECISION MAKING FREE TEXT BOX
pt with k wires in l hand and has hand pain   pe as doc  pt intoxicated and uncooperative  given meds labs sent xray ordered  discussed with Dr Mckee - place in ulna gutter splint with lots of padding   and have follow the instructions he gave the patient  Also tell pt that he cannot call the office and yell at the staff or Dr Mckee will not see him

## 2020-12-30 NOTE — ED ADULT TRIAGE NOTE - CHIEF COMPLAINT QUOTE
pt stated he had surgery 3 weeks ago and I had pins placed, then my surgeon left, pt drinking in the waiting room. aggressive with staff. swelling and pain noted to right hand and metal pins coming out of hand,.

## 2020-12-30 NOTE — ED ADULT NURSE NOTE - PMH
Alcohol abuse    Alcohol abuse    Alcohol abuse    Asthma    GERD (gastroesophageal reflux disease)    High cholesterol    High cholesterol    HTN (hypertension)    Hypertension    No pertinent past medical history

## 2020-12-30 NOTE — ED ADULT NURSE REASSESSMENT NOTE - NS ED NURSE REASSESS COMMENT FT1
pt with pins sticking out of top of left hand and out of left 5th digit. pt currently sleeping, even unlabored respirations, on  94%. will continue to monitor.

## 2020-12-30 NOTE — ED ADULT NURSE NOTE - OBJECTIVE STATEMENT
Pt c/o L hand pain, pt states had surgery and doctor put pins in it and never took them out, pt intoxicated, changed into yellow gown, belongings secured, will continue to monitor

## 2020-12-30 NOTE — ED ADULT NURSE REASSESSMENT NOTE - REASSESS COMMUNICATION
received pt from critical care in yellow gown, pt loud, yelling and found drinking alcohol that he hid. MD at bedside

## 2020-12-30 NOTE — ED PROVIDER NOTE - MUSCULOSKELETAL MINIMAL EXAM
L hand has two k wires sticking out , distal 5th digit l hand flexed at pip digit is swollen and tender no pus expressed nvi/atraumatic

## 2020-12-31 ENCOUNTER — EMERGENCY (EMERGENCY)
Facility: HOSPITAL | Age: 55
LOS: 1 days | Discharge: HOME | End: 2020-12-31
Attending: STUDENT IN AN ORGANIZED HEALTH CARE EDUCATION/TRAINING PROGRAM
Payer: COMMERCIAL

## 2020-12-31 VITALS
HEART RATE: 92 BPM | RESPIRATION RATE: 19 BRPM | SYSTOLIC BLOOD PRESSURE: 119 MMHG | TEMPERATURE: 99 F | DIASTOLIC BLOOD PRESSURE: 83 MMHG | OXYGEN SATURATION: 97 %

## 2020-12-31 VITALS
RESPIRATION RATE: 18 BRPM | OXYGEN SATURATION: 100 % | DIASTOLIC BLOOD PRESSURE: 91 MMHG | HEIGHT: 65 IN | HEART RATE: 92 BPM | SYSTOLIC BLOOD PRESSURE: 144 MMHG | WEIGHT: 164.91 LBS | TEMPERATURE: 98 F

## 2020-12-31 VITALS
HEART RATE: 99 BPM | OXYGEN SATURATION: 99 % | DIASTOLIC BLOOD PRESSURE: 94 MMHG | TEMPERATURE: 98 F | SYSTOLIC BLOOD PRESSURE: 135 MMHG | RESPIRATION RATE: 18 BRPM

## 2020-12-31 VITALS
HEART RATE: 86 BPM | DIASTOLIC BLOOD PRESSURE: 56 MMHG | OXYGEN SATURATION: 98 % | RESPIRATION RATE: 18 BRPM | TEMPERATURE: 98 F | SYSTOLIC BLOOD PRESSURE: 122 MMHG

## 2020-12-31 LAB
ALBUMIN SERPL ELPH-MCNC: 4.5 G/DL — SIGNIFICANT CHANGE UP (ref 3.3–5.2)
ALP SERPL-CCNC: 74 U/L — SIGNIFICANT CHANGE UP (ref 40–120)
ALT FLD-CCNC: 22 U/L — SIGNIFICANT CHANGE UP
ANION GAP SERPL CALC-SCNC: 19 MMOL/L — HIGH (ref 5–17)
APAP SERPL-MCNC: <3 UG/ML — LOW (ref 10–26)
AST SERPL-CCNC: 38 U/L — SIGNIFICANT CHANGE UP
BASOPHILS # BLD AUTO: 0.03 K/UL — SIGNIFICANT CHANGE UP (ref 0–0.2)
BASOPHILS NFR BLD AUTO: 0.4 % — SIGNIFICANT CHANGE UP (ref 0–2)
BILIRUB SERPL-MCNC: <0.2 MG/DL — LOW (ref 0.4–2)
BUN SERPL-MCNC: 14 MG/DL — SIGNIFICANT CHANGE UP (ref 8–20)
CALCIUM SERPL-MCNC: 8.6 MG/DL — SIGNIFICANT CHANGE UP (ref 8.6–10.2)
CHLORIDE SERPL-SCNC: 103 MMOL/L — SIGNIFICANT CHANGE UP (ref 98–107)
CO2 SERPL-SCNC: 17 MMOL/L — LOW (ref 22–29)
CREAT SERPL-MCNC: 0.72 MG/DL — SIGNIFICANT CHANGE UP (ref 0.5–1.3)
EOSINOPHIL # BLD AUTO: 0.02 K/UL — SIGNIFICANT CHANGE UP (ref 0–0.5)
EOSINOPHIL NFR BLD AUTO: 0.3 % — SIGNIFICANT CHANGE UP (ref 0–6)
ETHANOL SERPL-MCNC: 238 MG/DL — HIGH (ref 0–9)
GLUCOSE SERPL-MCNC: 98 MG/DL — SIGNIFICANT CHANGE UP (ref 70–99)
HCT VFR BLD CALC: 35.9 % — LOW (ref 39–50)
HGB BLD-MCNC: 11.7 G/DL — LOW (ref 13–17)
IMM GRANULOCYTES NFR BLD AUTO: 0.4 % — SIGNIFICANT CHANGE UP (ref 0–1.5)
LYMPHOCYTES # BLD AUTO: 1.55 K/UL — SIGNIFICANT CHANGE UP (ref 1–3.3)
LYMPHOCYTES # BLD AUTO: 22.3 % — SIGNIFICANT CHANGE UP (ref 13–44)
MCHC RBC-ENTMCNC: 28.3 PG — SIGNIFICANT CHANGE UP (ref 27–34)
MCHC RBC-ENTMCNC: 32.6 GM/DL — SIGNIFICANT CHANGE UP (ref 32–36)
MCV RBC AUTO: 86.9 FL — SIGNIFICANT CHANGE UP (ref 80–100)
MONOCYTES # BLD AUTO: 0.44 K/UL — SIGNIFICANT CHANGE UP (ref 0–0.9)
MONOCYTES NFR BLD AUTO: 6.3 % — SIGNIFICANT CHANGE UP (ref 2–14)
NEUTROPHILS # BLD AUTO: 4.87 K/UL — SIGNIFICANT CHANGE UP (ref 1.8–7.4)
NEUTROPHILS NFR BLD AUTO: 70.3 % — SIGNIFICANT CHANGE UP (ref 43–77)
PLATELET # BLD AUTO: 307 K/UL — SIGNIFICANT CHANGE UP (ref 150–400)
POTASSIUM SERPL-MCNC: 4.4 MMOL/L — SIGNIFICANT CHANGE UP (ref 3.5–5.3)
POTASSIUM SERPL-SCNC: 4.4 MMOL/L — SIGNIFICANT CHANGE UP (ref 3.5–5.3)
PROT SERPL-MCNC: 7.6 G/DL — SIGNIFICANT CHANGE UP (ref 6.6–8.7)
RBC # BLD: 4.13 M/UL — LOW (ref 4.2–5.8)
RBC # FLD: 13.6 % — SIGNIFICANT CHANGE UP (ref 10.3–14.5)
SALICYLATES SERPL-MCNC: <0.6 MG/DL — LOW (ref 10–20)
SODIUM SERPL-SCNC: 139 MMOL/L — SIGNIFICANT CHANGE UP (ref 135–145)
WBC # BLD: 6.94 K/UL — SIGNIFICANT CHANGE UP (ref 3.8–10.5)
WBC # FLD AUTO: 6.94 K/UL — SIGNIFICANT CHANGE UP (ref 3.8–10.5)

## 2020-12-31 PROCEDURE — 86901 BLOOD TYPING SEROLOGIC RH(D): CPT

## 2020-12-31 PROCEDURE — 86900 BLOOD TYPING SEROLOGIC ABO: CPT

## 2020-12-31 PROCEDURE — 80053 COMPREHEN METABOLIC PANEL: CPT

## 2020-12-31 PROCEDURE — 70450 CT HEAD/BRAIN W/O DYE: CPT | Mod: 26

## 2020-12-31 PROCEDURE — U0003: CPT

## 2020-12-31 PROCEDURE — 96375 TX/PRO/DX INJ NEW DRUG ADDON: CPT | Mod: XU

## 2020-12-31 PROCEDURE — 93010 ELECTROCARDIOGRAM REPORT: CPT

## 2020-12-31 PROCEDURE — 85610 PROTHROMBIN TIME: CPT

## 2020-12-31 PROCEDURE — 99285 EMERGENCY DEPT VISIT HI MDM: CPT | Mod: 25

## 2020-12-31 PROCEDURE — 96372 THER/PROPH/DIAG INJ SC/IM: CPT | Mod: XU

## 2020-12-31 PROCEDURE — 86850 RBC ANTIBODY SCREEN: CPT

## 2020-12-31 PROCEDURE — 96374 THER/PROPH/DIAG INJ IV PUSH: CPT | Mod: XU

## 2020-12-31 PROCEDURE — 73130 X-RAY EXAM OF HAND: CPT

## 2020-12-31 PROCEDURE — 29125 APPL SHORT ARM SPLINT STATIC: CPT | Mod: LT

## 2020-12-31 PROCEDURE — 99284 EMERGENCY DEPT VISIT MOD MDM: CPT | Mod: 25

## 2020-12-31 PROCEDURE — 85730 THROMBOPLASTIN TIME PARTIAL: CPT

## 2020-12-31 PROCEDURE — 36415 COLL VENOUS BLD VENIPUNCTURE: CPT

## 2020-12-31 PROCEDURE — 29125 APPL SHORT ARM SPLINT STATIC: CPT

## 2020-12-31 PROCEDURE — 0225U NFCT DS DNA&RNA 21 SARSCOV2: CPT

## 2020-12-31 PROCEDURE — 70450 CT HEAD/BRAIN W/O DYE: CPT

## 2020-12-31 PROCEDURE — 87631 RESP VIRUS 3-5 TARGETS: CPT

## 2020-12-31 PROCEDURE — 85025 COMPLETE CBC W/AUTO DIFF WBC: CPT

## 2020-12-31 RX ORDER — OXYCODONE HYDROCHLORIDE 5 MG/1
5 TABLET ORAL ONCE
Refills: 0 | Status: DISCONTINUED | OUTPATIENT
Start: 2020-12-31 | End: 2020-12-31

## 2020-12-31 RX ORDER — IBUPROFEN 200 MG
1 TABLET ORAL
Qty: 20 | Refills: 0
Start: 2020-12-31

## 2020-12-31 RX ORDER — OXYCODONE HYDROCHLORIDE 5 MG/1
1 TABLET ORAL
Qty: 10 | Refills: 0
Start: 2020-12-31

## 2020-12-31 RX ORDER — ACETAMINOPHEN 500 MG
975 TABLET ORAL ONCE
Refills: 0 | Status: COMPLETED | OUTPATIENT
Start: 2020-12-31 | End: 2020-12-31

## 2020-12-31 RX ORDER — OXYCODONE AND ACETAMINOPHEN 5; 325 MG/1; MG/1
1 TABLET ORAL
Qty: 10 | Refills: 0
Start: 2020-12-31 | End: 2021-01-02

## 2020-12-31 RX ADMIN — OXYCODONE HYDROCHLORIDE 5 MILLIGRAM(S): 5 TABLET ORAL at 01:07

## 2020-12-31 RX ADMIN — Medication 975 MILLIGRAM(S): at 01:07

## 2020-12-31 NOTE — ED PROVIDER NOTE - PATIENT PORTAL LINK FT
You can access the FollowMyHealth Patient Portal offered by [facility] by registering at the following website: [email or web address]. By joining [employer]’s FollowMyHealth portal, you will also be able to view your health information using other applications (apps) compatible with our system.

## 2020-12-31 NOTE — ED ADULT NURSE REASSESSMENT NOTE - DESCRIPTION
[facility] for [facility], after being seen in [facility] and was going to be discharged when he had the statement that  "he's done, Im going to jump in front of a train".  Pt endorses feeling si but then states he wouldn't do it. Pt explains that he has lost everything but would not elaborate.  Pt denies any HI/AH/VH Pt states that he has GERD and takes Prilosec 40 mg daily and high chol which he has not been taking his meds for.  Pt denies any PPH, No In-Pt, No Out-Pt services and does not follow with a Psych MD.  patient states lucho he drinks 2-3 times a week and when he drinks, he drinks a lot, and dneis any rec' drug use.  Pt having Splint with ace wrap to left hand and forearm. Pt states that he has a broken 5 th finger that had a pinned placed and then states that it was infected. dsd intact no drainage noted.    Pt given meal tray and po fluids and tolerated well.  Will monitor and chart changes and maintain safe environment [facility], after being seen in [facility] and was going to be discharged when he had the statement that  "he's done, Im going to jump in front of a train".  Pt endorses feeling si but then states he wouldn't do it. Pt explains that he has lost everything but would not elaborate.  Pt states that [place]  Pt denies any HI/AH/VH Pt states that he has GERD and takes Prilosec 40 mg daily and high chol which he has not been taking his meds for.  Pt denies any PPH, No In-Pt, No Out-Pt services and does not follow with a Psych MD.  patient states lucho he drinks 2-3 times a week and when he drinks, he drinks a lot, and dneis any rec' drug use.  Pt having Splint with ace wrap to left hand and forearm. Pt states that he has a broken 5 th finger that had a pinned placed and then states that it was infected. dsd intact no drainage noted. Pt given meal tray and po fluids and tolerated well.  Will monitor and chart changes and maintain safe environment

## 2020-12-31 NOTE — ED PROVIDER NOTE - PHYSICAL EXAMINATION
Constitutional - well-developed; well nourished. Head - small abrasion to L maxilla (there earlier. no new trauma). Airway patent. Eyes - PERRL. CV - RRR. no murmur. no edema. Pulm - CTAB. Abd - soft, nt. no rebound. no guarding. Neuro - A&Ox3. strength 5/5 x4. sensation intact x4. normal gait. Skin - No rash. MSK - normal ROM. L hand splint in place.

## 2020-12-31 NOTE — ED PROVIDER NOTE - PHYSICAL EXAMINATION
Constitutional - well-developed; well nourished. Head -small abrasion to L maxilla. Airway patent. Eyes - PERRL. CV - RRR. no murmur. no edema. Pulm - CTAB. Abd - soft, nt. no rebound. no guarding. Neuro - A&Ox3. strength 5/5 x4. sensation intact x4. normal gait. Skin - No rash. MSK - normal ROM.

## 2020-12-31 NOTE — ED PROVIDER NOTE - CLINICAL SUMMARY MEDICAL DECISION MAKING FREE TEXT BOX
Pt with intox and SI. PT likely suicidal 2/2 alcohol intoxication. if clinically sober and no longer suicidal plan to d/c, if not psych consult. PT signed out to DR. Baez.

## 2020-12-31 NOTE — ED ADULT TRIAGE NOTE - CHIEF COMPLAINT QUOTE
pt BIBA after being found sleeping in front of liquor store. + ETOH. Pt denies drug use. Pt in NAD at this time. Changed into yellow gown with belongings secured

## 2020-12-31 NOTE — ED PROVIDER NOTE - OBJECTIVE STATEMENT
Pt is a 56 yo M BIBEMS for intoxication. PT was seen by myself earlier and discharged as he was clinically sober. Pt reports leaving here and drinking more and was found laying in the street. Pt denies falling. no other complaints.  Pt now saying he wants to jump in front of a train and kill himself.

## 2020-12-31 NOTE — ED PROVIDER NOTE - PATIENT PORTAL LINK FT
You can access the FollowMyHealth Patient Portal offered by [facility] by registering at the following website: [email or web address]. By joining [employer]’s FollowMyHealth portal, you will also be able to view your health information using other applications (apps) compatible with our system.

## 2020-12-31 NOTE — ED PROVIDER NOTE - CLINICAL SUMMARY MEDICAL DECISION MAKING FREE TEXT BOX
Pt now clinically sober.  PT states that his splint from yesterday is wet. splint removed. no signs of infection. no redness. no drainage. k-wires in place. Pt instructed to f/up with his hand surgeon and to stop drinking. instructed to return for any new/concerning symptoms. Pt now clinically sober. ambulating to and from bathroomwith steady gait and without assistance.  PT states that his splint from yesterday is wet. splint removed. no signs of infection. no redness. no drainage. k-wires in place. Pt instructed to f/up with his hand surgeon and to stop drinking. instructed to return for any new/concerning symptoms.

## 2020-12-31 NOTE — ED PROVIDER NOTE - PROGRESS NOTE DETAILS
Sasha BUITRAGO: Patient reassesed--no complaints.  Vital signs normal.  Resting comfortably.  A&Ox3.  Speech clear. Gait normal.  Clinically sober. Patient denies suicidality, states he was upset earlier but never had any intention of harming himself. Sasha BUITRAGO: Patient getting dressed, almost ready to leave and now stating he is suicidal and wants to jump in front of a train. Patient to go to .

## 2020-12-31 NOTE — ED PROVIDER NOTE - NSFOLLOWUPINSTRUCTIONS_ED_ALL_ED_FT
- Follow up with your doctor within 2-3 days.   - Bring results with you to the appointment.   -- Please do not drink alcohol in excess.  Please seek help for your alcohol use problem and never drive after consuming any amount of alcohol.  Please also do not take any tylenol with alcohol as it increases your risk of liver damage.   - Return to the ED for any new or worsening symptoms.     Alcohol Abuse    Alcohol intoxication occurs when the amount of alcohol that a person has consumed impairs his or her ability to mentally and physically function. Chronic alcohol consumption can also lead to a variety of health issues including neurological disease, stomach disease, heart disease, liver disease, etc. Do not drive after drinking alcohol. Drinking enough alcohol to end up in an Emergency Room suggests you may have an alcohol abuse problem. Seek help at a drug addiction center.    SEEK IMMEDIATE MEDICAL CARE IF YOU HAVE ANY OF THE FOLLOWING SYMPTOMS: seizures, vomiting blood, blood in your stool, lightheadedness/dizziness, or becoming shaky to tremulous when you stop drinking.

## 2020-12-31 NOTE — ED ADULT NURSE REASSESSMENT NOTE - NSIMPLEMENTINTERV_GEN_ALL_ED
Implemented All Fall Risk Interventions:  [place] to call system. Call bell, personal items and telephone within reach. Instruct patient to call for assistance. Room bathroom lighting operational. Non-slip footwear when patient is off stretcher. Physically safe environment: no spills, clutter or unnecessary equipment. Stretcher in lowest position, wheels locked, appropriate side rails in place. Provide visual cue, wrist band, yellow gown, etc. Monitor gait and stability. Monitor for mental status changes and reorient to person, place, and time. Review medications for side effects contributing to fall risk. Reinforce activity limits and safety measures with patient and family.

## 2020-12-31 NOTE — ED ADULT TRIAGE NOTE - CHIEF COMPLAINT QUOTE
pt BIBA s/p being found laying in the middle of the road. + ETOH. Pt just d/c'd from [facility]. No injuries noted. Dr Parra called to [place]. Pt changed into yellow gown with belongings secured

## 2020-12-31 NOTE — ED ADULT NURSE REASSESSMENT NOTE - NS ED NURSE REASSESS COMMENT FT1
report received from Amber VILCHIS. pt a&ox4 ambulating with steady gait.  NAD noted, respirations even and unlabored.  Safety precautions in place.  Plan of care explained, pt verbalized understanding. report received from Amber VILCHIS. pt received in yellow gown with belongings secured.  pt a&ox4 ambulating with steady gait.  NAD noted, respirations even and unlabored.  Safety precautions in place.  Plan of care explained, pt verbalized understanding.

## 2020-12-31 NOTE — ED PROVIDER NOTE - CARE PLAN
Principal Discharge DX:	Alcohol intoxication   Principal Discharge DX:	Alcohol intoxication  Secondary Diagnosis:	Suicidal ideations

## 2020-12-31 NOTE — ED PROVIDER NOTE - OBJECTIVE STATEMENT
Pt is a 54 yo M BIBEMS for AMS/intoxication. Pt here frequently for alcohol intoxication. Pt admits to drinking today.  Pt denies any other complaints.

## 2020-12-31 NOTE — ED ADULT NURSE REASSESSMENT NOTE - COMFORT CARE
darkened lights/meal provided/po fluids offered/side rails down/wait time explained/warm blanket provided

## 2021-01-01 ENCOUNTER — EMERGENCY (EMERGENCY)
Facility: HOSPITAL | Age: 56
LOS: 1 days | End: 2021-01-01
Attending: EMERGENCY MEDICINE
Payer: COMMERCIAL

## 2021-01-01 VITALS
TEMPERATURE: 98 F | WEIGHT: 160.06 LBS | HEART RATE: 105 BPM | DIASTOLIC BLOOD PRESSURE: 93 MMHG | HEIGHT: 65 IN | SYSTOLIC BLOOD PRESSURE: 147 MMHG | OXYGEN SATURATION: 96 % | RESPIRATION RATE: 18 BRPM

## 2021-01-01 VITALS
OXYGEN SATURATION: 96 % | HEART RATE: 102 BPM | RESPIRATION RATE: 18 BRPM | SYSTOLIC BLOOD PRESSURE: 121 MMHG | TEMPERATURE: 98 F | DIASTOLIC BLOOD PRESSURE: 86 MMHG

## 2021-01-01 DIAGNOSIS — F10.10 ALCOHOL ABUSE, UNCOMPLICATED: ICD-10-CM

## 2021-01-01 LAB — SARS-COV-2 RNA SPEC QL NAA+PROBE: SIGNIFICANT CHANGE UP

## 2021-01-01 PROCEDURE — 99284 EMERGENCY DEPT VISIT MOD MDM: CPT

## 2021-01-01 PROCEDURE — 80307 DRUG TEST PRSMV CHEM ANLYZR: CPT

## 2021-01-01 PROCEDURE — G0378: CPT

## 2021-01-01 PROCEDURE — 80053 COMPREHEN METABOLIC PANEL: CPT

## 2021-01-01 PROCEDURE — 99285 EMERGENCY DEPT VISIT HI MDM: CPT

## 2021-01-01 PROCEDURE — 36415 COLL VENOUS BLD VENIPUNCTURE: CPT

## 2021-01-01 PROCEDURE — 90792 PSYCH DIAG EVAL W/MED SRVCS: CPT

## 2021-01-01 PROCEDURE — 99234 HOSP IP/OBS SM DT SF/LOW 45: CPT

## 2021-01-01 PROCEDURE — 85025 COMPLETE CBC W/AUTO DIFF WBC: CPT

## 2021-01-01 PROCEDURE — 93005 ELECTROCARDIOGRAM TRACING: CPT

## 2021-01-01 PROCEDURE — U0003: CPT

## 2021-01-01 RX ORDER — PANTOPRAZOLE SODIUM 20 MG/1
40 TABLET, DELAYED RELEASE ORAL ONCE
Refills: 0 | Status: COMPLETED | OUTPATIENT
Start: 2021-01-01 | End: 2021-01-01

## 2021-01-01 RX ADMIN — Medication 100 MILLIGRAM(S): at 06:34

## 2021-01-01 RX ADMIN — PANTOPRAZOLE SODIUM 40 MILLIGRAM(S): 20 TABLET, DELAYED RELEASE ORAL at 06:33

## 2021-01-01 NOTE — ED PROVIDER NOTE - OBJECTIVE STATEMENT
55 year old male well known to this dept for multiple visits for alcohol intoxication presents with alcohol intoxication. Pt states that he was drinking this morning and has no complaints at this time, including no chest pain, SOB, fever, chills, abd pain, headache, numbness, tingling, weakness.

## 2021-01-01 NOTE — ED PROVIDER NOTE - CONSTITUTIONAL, MLM
normal... clinically intoxicated, awake, alert, oriented to person, place, time/situation and in no apparent distress.

## 2021-01-01 NOTE — CHART NOTE - NSCHARTNOTEFT_GEN_A_CORE
SW NOTE: SW NOTE:  Per psych team, pt is T&R, would benefit from outpt f/u with AA meeting. SWER met with pt at bedside, pt alert oriented x4. Pt stated having existing AA meeting in the community and will be following up upon d/c.  SWER provided pt with  and dash resources list, pt appreciative and thankful. Pt reported he will be going to sister's house at [address] [place] [address] upon d/c via taxi.  No further SW services identified at this time.

## 2021-01-01 NOTE — ED CDU PROVIDER SUBSEQUENT DAY NOTE - PHYSICAL EXAMINATION
Constitutional - well-developed; well nourished. Head - small abrasion to L maxilla. Airway patent. Eyes - PERRL. CV - RRR. no murmur. no edema. Pulm - CTAB. Abd - soft, nt. no rebound. no guarding. Neuro - A&Ox3. strength 5/5 x4. sensation intact x4. normal gait. Skin - No rash. MSK - normal ROM. L hand splint in place.

## 2021-01-01 NOTE — ED CDU PROVIDER INITIAL DAY NOTE - PHYSICAL EXAMINATION
Gen: Well appearing in NAD  Head: NC/AT  Neck: trachea midline  Resp:  No distress  Ext: no deformities  Neuro:  A&O appears non focal  Skin:  Warm and dry as visualized  Psych:  Intermittently agitated, suicidal

## 2021-01-01 NOTE — ED BEHAVIORAL HEALTH ASSESSMENT NOTE - HPI (INCLUDE ILLNESS QUALITY, SEVERITY, DURATION, TIMING, CONTEXT, MODIFYING FACTORS, ASSOCIATED SIGNS AND SYMPTOMS)
Patient a 54 y/o single male, domiciled with sister, employed as a , no prior Psychiatric hx, no prior SI/SA, no current legal issues, hx of ETOH abuse , medically has GERD, with HLD was BIB/EMS due to ETOH intoxication with SI.    Patient in bed in no apparent distress, alert and oriented to time and place endorsed that he was drunk last night and thus endorsed stupid things. He added that he never tried to commit suicide, and is not Suicidal at this time. He endorses that he does not take any psych meds, not depressed and has been drinking daily for long time and drinks 1L vodka daily. He has fair sleep/appetite, has hx of shakes/tremors when not drinking and also has hx pf pass-out and black-out in the past. he  added that he was at rehab for 28 days [facility] years back [facility] but it does not help he also goes to AA meetings at times. Currently, in bed, relaxing and wants to leave. He was advised to decrease his ETOH consumption slowly so he would be able to quit and take care of life. He broke his left forearm ( Small Finger ) abnd is well wrapped with bandage . Previously more than 10 years ago he was under the influence and had DUI hx.    Medically has GERD with HLD.

## 2021-01-01 NOTE — ED BEHAVIORAL HEALTH ASSESSMENT NOTE - SUMMARY
Patient a 56 y/o single male, domiciled with sister, employed as a , no prior Psychiatric hx, no prior SI/SA, no current legal issues, hx of ETOH abuse , medically has GERD, with HLD was BIB/EMS due to ETOH intoxication with SI.    Patient in bed in no apparent distress, alert and oriented to time and place endorsed that he was drunk last night and thus endorsed stupid things. He added that he never tried to commit suicide, and is not Suicidal at this time. He endorses that he does not take any psych meds, not depressed and has been drinking daily for long time and drinks 1L vodka daily. He has fair sleep/appetite, has hx of shakes/tremors when not drinking and also has hx pf pass-out and black-out in the past. he  added that he was at rehab for 28 days [facility] years back [facility] but it does not help he also goes to AA meetings at times. Currently, in bed, relaxing and wants to leave. He was advised to decrease his ETOH consumption slowly so he would be able to quit and take care of life. He broke his left forearm ( Small Finger ) abnd is well wrapped with bandage . Previously more than 10 years ago he was under the influence and had DUI hx.    Plan: Discharge pt          F/U with AA meetings

## 2021-01-01 NOTE — ED ADULT NURSE REASSESSMENT NOTE - ANCILLARY STATUS
awaiting on urine sample/lab results pending
ua needed/lab results pending
needs urine/lab results pending

## 2021-01-01 NOTE — ED ADULT TRIAGE NOTE - CHIEF COMPLAINT QUOTE
patient c/o alcohol intoxication. admitted to drinking vodka (a lot) today, belongings locked up and placed in yellow gown.

## 2021-01-01 NOTE — ED ADULT NURSE REASSESSMENT NOTE - NS ED NURSE REASSESS COMMENT FT1
pt is awake and alert A&ox3, ambulate to ED restroom with steady gait noted. Speech is clear. Pt appears clinically sober @ this time, requesting to leave ED. MD Walden aware, pending d/c

## 2021-01-01 NOTE — ED ADULT NURSE NOTE - OBJECTIVE STATEMENT
pt BIAB c/o alcohol intoxication and AMS, pt admits to drinking "a lot of stuff" today, admits to daily ETOH use. Denies drug abuse, denies falls denies pain @ this time. received in yellow gown with no belongings @ bedside. RR even and unlabored, skin warm and dry. safety maintained

## 2021-01-01 NOTE — ED ADULT NURSE REASSESSMENT NOTE - NS ED NURSE REASSESS COMMENT FT1
patient medicated with Librium 100mg and Pantozol a ordered an patient tolerated well. Will monitor and chart changes and maintain safe environment
pt dressed and was being escorted out by security pt stated "Im going to leave here and im going to jump in front of train." MD Baez called to pt bedside pt confirmed he has SI, pt now not being discharged and being moved to behavorial health.
patient remains to having elevated HR of 117  Dr. Baez made aware No s/s of withdrawal noted
patient resting nad noted UA cup at bedside awaiting on sample.  Will monitor and chart changes and maintain safe environment

## 2021-01-01 NOTE — ED CDU PROVIDER DISPOSITION NOTE - CLINICAL COURSE
Pt signed out to me by Dr. Baez pending psych eval. Pt cleared by psych. will d/c with outpatient f/up. given return instructions.

## 2021-01-01 NOTE — ED PROVIDER NOTE - PATIENT PORTAL LINK FT
You can access the FollowMyHealth Patient Portal offered by [facility] by registering at the following website: [email or web address]. By joining [employer]’s FollowMyHealth portal, you will also be able to view your health information using other applications (apps) compatible with our system.

## 2021-01-01 NOTE — ED BEHAVIORAL HEALTH ASSESSMENT NOTE - SAFETY PLAN COMPLETED
Problem: Goal Outcome Summary  Goal: Goal Outcome Summary  Outcome: Improving  AVSS, A&O. Ibuprofen and tylenol staggered for pain, rating 2-3/10. Ambulating independently in room. New PIV placed on shift, previous was painful per patient. BS active, some diarrhea still noted-baseline per pt. Lap sites CDI. Abdominal rash improving. Plan:cont IV ABX then possible home today?      Yes

## 2021-01-01 NOTE — ED PROVIDER NOTE - CLINICAL SUMMARY MEDICAL DECISION MAKING FREE TEXT BOX
Pt with extensive Hx of etoh abuse, now presenting with the same. He is awake, alert, coherent, ambulates with steady gait, clinically sober and stable for dc

## 2021-01-01 NOTE — ED ADULT NURSE NOTE - NSIMPLEMENTINTERV_GEN_ALL_ED
Implemented All Fall Risk Interventions:  [place] to call system. Call bell, personal items and telephone within reach. Instruct patient to call for assistance. Room bathroom lighting operational. Non-slip footwear when patient is off stretcher. Physically safe environment: no spills, clutter or unnecessary equipment. Stretcher in lowest position, wheels locked, appropriate side rails in place. Provide visual cue, wrist band, yellow gown, etc. Monitor gait and stability. Monitor for mental status changes and reorient to person, place, and time. Review medications for side effects contributing to fall risk. Reinforce activity limits and safety measures with patient and family.

## 2021-01-01 NOTE — ED CDU PROVIDER DISPOSITION NOTE - PATIENT PORTAL LINK FT
You can access the FollowMyHealth Patient Portal offered by [facility] by registering at the following website: [email or web address]. By joining [employer]’s FollowMyHealth portal, you will also be able to view your health information using other applications (apps) compatible with our system.

## 2021-01-01 NOTE — ED PROVIDER NOTE - MUSCULOSKELETAL, MLM
Spine appears normal, range of motion is not limited, no muscle or joint tenderness. +splint in place to the LUE

## 2021-01-01 NOTE — ED CDU PROVIDER INITIAL DAY NOTE - OBJECTIVE STATEMENT
55M presents intoxicated with suicidal ideations. When clinically sober still stating he wants to jump in front of a train. Medically cleared. Pending  virgil.

## 2021-01-05 ENCOUNTER — NON-APPOINTMENT (OUTPATIENT)
Age: 56
End: 2021-01-05

## 2021-01-19 NOTE — ED PROVIDER NOTE - CPE EDP RESP NORM
normal... Benzoyl Peroxide Pregnancy And Lactation Text: This medication is Pregnancy Category C. It is unknown if benzoyl peroxide is excreted in breast milk.

## 2021-02-01 NOTE — ED ADULT TRIAGE NOTE - CHIEF COMPLAINT QUOTE
Patient BIBA to ED today after being found intoxicated.
walk in requesting covid testing, reports loss of smell

## 2021-02-01 NOTE — ED PROVIDER NOTE - PATIENT PORTAL LINK FT
You can access the FollowMyHealth Patient Portal offered by [facility] by registering at the following website: [email or web address]. By joining [employer]’s FollowMyHealth portal, you will also be able to view your health information using other applications (apps) compatible with our system.
You can access the FollowMyHealth Patient Portal offered by [facility] by registering at the following website: [email or web address]. By joining [employer]’s FollowMyHealth portal, you will also be able to view your health information using other applications (apps) compatible with our system.

## 2021-02-01 NOTE — ED PROVIDER NOTE - CHIEF COMPLAINT
The patient is a 54y Male complaining of alcohol intoxication.
The patient is a 40y Male complaining of medical evaluation.

## 2021-02-02 ENCOUNTER — APPOINTMENT (OUTPATIENT)
Dept: INTERNAL MEDICINE | Facility: CLINIC | Age: 56
End: 2021-02-02

## 2021-02-09 ENCOUNTER — APPOINTMENT (OUTPATIENT)
Dept: INTERNAL MEDICINE | Facility: CLINIC | Age: 56
End: 2021-02-09

## 2021-02-10 ENCOUNTER — NON-APPOINTMENT (OUTPATIENT)
Age: 56
End: 2021-02-10

## 2021-02-11 ENCOUNTER — INPATIENT (INPATIENT)
Facility: HOSPITAL | Age: 56
LOS: 3 days | Discharge: HOME | DRG: 897 | End: 2021-02-15
Attending: STUDENT IN AN ORGANIZED HEALTH CARE EDUCATION/TRAINING PROGRAM | Admitting: STUDENT IN AN ORGANIZED HEALTH CARE EDUCATION/TRAINING PROGRAM
Payer: COMMERCIAL

## 2021-02-11 VITALS
HEART RATE: 78 BPM | OXYGEN SATURATION: 98 % | HEIGHT: 65 IN | SYSTOLIC BLOOD PRESSURE: 140 MMHG | DIASTOLIC BLOOD PRESSURE: 72 MMHG | TEMPERATURE: 98 F | RESPIRATION RATE: 20 BRPM

## 2021-02-11 RX ORDER — FOLIC ACID 0.8 MG
1 TABLET ORAL DAILY
Refills: 0 | Status: DISCONTINUED | OUTPATIENT
Start: 2021-02-11 | End: 2021-02-12

## 2021-02-11 RX ORDER — HALOPERIDOL DECANOATE 100 MG/ML
5 INJECTION INTRAMUSCULAR ONCE
Refills: 0 | Status: COMPLETED | OUTPATIENT
Start: 2021-02-11 | End: 2021-02-11

## 2021-02-11 RX ORDER — THIAMINE MONONITRATE (VIT B1) 100 MG
100 TABLET ORAL DAILY
Refills: 0 | Status: DISCONTINUED | OUTPATIENT
Start: 2021-02-11 | End: 2021-02-12

## 2021-02-11 RX ADMIN — HALOPERIDOL DECANOATE 5 MILLIGRAM(S): 100 INJECTION INTRAMUSCULAR at 17:01

## 2021-02-11 RX ADMIN — Medication 2 MILLIGRAM(S): at 17:01

## 2021-02-11 NOTE — ED ADULT NURSE NOTE - OBJECTIVE STATEMENT
Assumed pt care eat 1805.  pt received sleeping in stretcher in hospital gown, no belongings at bedside.  pt with tele box and  on.  respirations even and unlabored.

## 2021-02-11 NOTE — ED PROVIDER NOTE - PHYSICAL EXAMINATION
General: intoxicated, NAD, odor of EtOH  Head:  NC, AT  Eyes: EOMI, PERRLA, no scleral icterus  Ears: no erythema/drainage  Nose: midline, no bleeding/drainage  Throat: MMM  Cardiac: RRR, no m/r/g, no lower extremity edema  Respiratory: CTABL, no wheezes/rales/rhonchi, equal chest wall expansions, no use of accessory muscles, no retractions  Abdomen: soft, nondistended, nontender, no rebound tenderness, no guarding, nonperitonitic  MSK/Vascular: full ROM, soft compartments, warm extremities  Neuro: Alert and oriented x2, motor/sensory intact  Psych: intoxicated affect

## 2021-02-11 NOTE — ED PROVIDER NOTE - OBJECTIVE STATEMENT
Pt is a 55 y.o. M hx of EtOHism, asthma, HTN, HLD, HTN, GERD, presenting intoxication. Pt admits to drinking 1L of vodka, delayed speech and wide based gait, easily distracted and cannot answer any other questions appropriately except name and place.

## 2021-02-11 NOTE — ED CDU PROVIDER INITIAL DAY NOTE - MEDICAL DECISION MAKING DETAILS
Plan:    Cardiac Monitor & Pulse Oximetry  Oral Hydration  CIWA Protocol  SBIRT  Alcohol Cessation Education

## 2021-02-11 NOTE — ED PROVIDER NOTE - ATTENDING CONTRIBUTION TO CARE
55yoM; with pmh signif for Alcohol Abuse, Asthma, HTn, HLD; now p/w acute intoxication.  admits to drinking vodka today and wants his mommy. denies any complaints at this time. denies cp/sob/palp. denies abd pain. denies n/v. denies f/c/s. denies cough.  General:     acutely agitated  Head:     NC/AT, EOMI, oral mucosa moist  Neck:     trachea midline  Lungs:     CTA b/l, no w/r/r  CVS:     S1S2, RRR, no m/g/r  Abd:     +BS, s/nt/nd, no organomegaly  Ext:    2+ radial and pedal pulses, no c/c/e  Neuro: grossly intact  A/P:  55yoM p/w acute alcohol intoxication.  -fingerstick, tx acute agitation, pulse ox, observe until sobriety

## 2021-02-11 NOTE — ED PROVIDER NOTE - PROGRESS NOTE DETAILS
Pt awake and alert c/o feeling depressed and suicidal with no definitive plan and requesting psych eval.  Case d/w Telepsych and will send to  for eval.  COVID

## 2021-02-12 DIAGNOSIS — F10.20 ALCOHOL DEPENDENCE, UNCOMPLICATED: ICD-10-CM

## 2021-02-12 DIAGNOSIS — F10.230 ALCOHOL DEPENDENCE WITH WITHDRAWAL, UNCOMPLICATED: ICD-10-CM

## 2021-02-12 LAB
ALBUMIN SERPL ELPH-MCNC: 4.4 G/DL — SIGNIFICANT CHANGE UP (ref 3.3–5.2)
ALP SERPL-CCNC: 86 U/L — SIGNIFICANT CHANGE UP (ref 40–120)
ALT FLD-CCNC: 17 U/L — SIGNIFICANT CHANGE UP
AMPHET UR-MCNC: NEGATIVE — SIGNIFICANT CHANGE UP
ANION GAP SERPL CALC-SCNC: 16 MMOL/L — SIGNIFICANT CHANGE UP (ref 5–17)
AST SERPL-CCNC: 30 U/L — SIGNIFICANT CHANGE UP
BARBITURATES UR SCN-MCNC: NEGATIVE — SIGNIFICANT CHANGE UP
BASOPHILS # BLD AUTO: 0.05 K/UL — SIGNIFICANT CHANGE UP (ref 0–0.2)
BASOPHILS NFR BLD AUTO: 1 % — SIGNIFICANT CHANGE UP (ref 0–2)
BENZODIAZ UR-MCNC: POSITIVE
BILIRUB SERPL-MCNC: 0.3 MG/DL — LOW (ref 0.4–2)
BUN SERPL-MCNC: 10 MG/DL — SIGNIFICANT CHANGE UP (ref 8–20)
CALCIUM SERPL-MCNC: 9 MG/DL — SIGNIFICANT CHANGE UP (ref 8.6–10.2)
CHLORIDE SERPL-SCNC: 99 MMOL/L — SIGNIFICANT CHANGE UP (ref 98–107)
CO2 SERPL-SCNC: 21 MMOL/L — LOW (ref 22–29)
COCAINE METAB.OTHER UR-MCNC: NEGATIVE — SIGNIFICANT CHANGE UP
CREAT SERPL-MCNC: 0.79 MG/DL — SIGNIFICANT CHANGE UP (ref 0.5–1.3)
EOSINOPHIL # BLD AUTO: 0.08 K/UL — SIGNIFICANT CHANGE UP (ref 0–0.5)
EOSINOPHIL NFR BLD AUTO: 1.7 % — SIGNIFICANT CHANGE UP (ref 0–6)
ETHANOL SERPL-MCNC: 40 MG/DL — HIGH (ref 0–9)
GLUCOSE SERPL-MCNC: 109 MG/DL — HIGH (ref 70–99)
HCT VFR BLD CALC: 33.1 % — LOW (ref 39–50)
HGB BLD-MCNC: 10.4 G/DL — LOW (ref 13–17)
IMM GRANULOCYTES NFR BLD AUTO: 0.2 % — SIGNIFICANT CHANGE UP (ref 0–1.5)
LACTATE SERPL-SCNC: 1.8 MMOL/L — SIGNIFICANT CHANGE UP (ref 0.5–2)
LYMPHOCYTES # BLD AUTO: 1.37 K/UL — SIGNIFICANT CHANGE UP (ref 1–3.3)
LYMPHOCYTES # BLD AUTO: 28.5 % — SIGNIFICANT CHANGE UP (ref 13–44)
MCHC RBC-ENTMCNC: 26 PG — LOW (ref 27–34)
MCHC RBC-ENTMCNC: 31.4 GM/DL — LOW (ref 32–36)
MCV RBC AUTO: 82.8 FL — SIGNIFICANT CHANGE UP (ref 80–100)
METHADONE UR-MCNC: NEGATIVE — SIGNIFICANT CHANGE UP
MONOCYTES # BLD AUTO: 0.58 K/UL — SIGNIFICANT CHANGE UP (ref 0–0.9)
MONOCYTES NFR BLD AUTO: 12.1 % — SIGNIFICANT CHANGE UP (ref 2–14)
NEUTROPHILS # BLD AUTO: 2.71 K/UL — SIGNIFICANT CHANGE UP (ref 1.8–7.4)
NEUTROPHILS NFR BLD AUTO: 56.5 % — SIGNIFICANT CHANGE UP (ref 43–77)
OPIATES UR-MCNC: NEGATIVE — SIGNIFICANT CHANGE UP
PCP SPEC-MCNC: SIGNIFICANT CHANGE UP
PCP UR-MCNC: NEGATIVE — SIGNIFICANT CHANGE UP
PLATELET # BLD AUTO: 273 K/UL — SIGNIFICANT CHANGE UP (ref 150–400)
POTASSIUM SERPL-MCNC: 3.5 MMOL/L — SIGNIFICANT CHANGE UP (ref 3.5–5.3)
POTASSIUM SERPL-SCNC: 3.5 MMOL/L — SIGNIFICANT CHANGE UP (ref 3.5–5.3)
PROT SERPL-MCNC: 7.7 G/DL — SIGNIFICANT CHANGE UP (ref 6.6–8.7)
RBC # BLD: 4 M/UL — LOW (ref 4.2–5.8)
RBC # FLD: 14.2 % — SIGNIFICANT CHANGE UP (ref 10.3–14.5)
SARS-COV-2 RNA SPEC QL NAA+PROBE: SIGNIFICANT CHANGE UP
SODIUM SERPL-SCNC: 135 MMOL/L — SIGNIFICANT CHANGE UP (ref 135–145)
THC UR QL: NEGATIVE — SIGNIFICANT CHANGE UP
WBC # BLD: 4.8 K/UL — SIGNIFICANT CHANGE UP (ref 3.8–10.5)
WBC # FLD AUTO: 4.8 K/UL — SIGNIFICANT CHANGE UP (ref 3.8–10.5)

## 2021-02-12 PROCEDURE — 99223 1ST HOSP IP/OBS HIGH 75: CPT

## 2021-02-12 RX ORDER — MIRTAZAPINE 45 MG/1
30 TABLET, ORALLY DISINTEGRATING ORAL AT BEDTIME
Refills: 0 | Status: DISCONTINUED | OUTPATIENT
Start: 2021-02-12 | End: 2021-02-15

## 2021-02-12 RX ORDER — TIOTROPIUM BROMIDE 18 UG/1
1 CAPSULE ORAL; RESPIRATORY (INHALATION) DAILY
Refills: 0 | Status: DISCONTINUED | OUTPATIENT
Start: 2021-02-12 | End: 2021-02-15

## 2021-02-12 RX ORDER — ONDANSETRON 8 MG/1
4 TABLET, FILM COATED ORAL ONCE
Refills: 0 | Status: COMPLETED | OUTPATIENT
Start: 2021-02-12 | End: 2021-02-12

## 2021-02-12 RX ORDER — FOLIC ACID 0.8 MG
1 TABLET ORAL DAILY
Refills: 0 | Status: DISCONTINUED | OUTPATIENT
Start: 2021-02-12 | End: 2021-02-15

## 2021-02-12 RX ORDER — ENOXAPARIN SODIUM 100 MG/ML
40 INJECTION SUBCUTANEOUS DAILY
Refills: 0 | Status: DISCONTINUED | OUTPATIENT
Start: 2021-02-12 | End: 2021-02-15

## 2021-02-12 RX ORDER — ALBUTEROL 90 UG/1
2 AEROSOL, METERED ORAL EVERY 6 HOURS
Refills: 0 | Status: DISCONTINUED | OUTPATIENT
Start: 2021-02-12 | End: 2021-02-15

## 2021-02-12 RX ORDER — PANTOPRAZOLE SODIUM 20 MG/1
40 TABLET, DELAYED RELEASE ORAL ONCE
Refills: 0 | Status: COMPLETED | OUTPATIENT
Start: 2021-02-12 | End: 2021-02-12

## 2021-02-12 RX ORDER — THIAMINE MONONITRATE (VIT B1) 100 MG
100 TABLET ORAL DAILY
Refills: 0 | Status: COMPLETED | OUTPATIENT
Start: 2021-02-12 | End: 2021-02-15

## 2021-02-12 RX ORDER — PANTOPRAZOLE SODIUM 20 MG/1
40 TABLET, DELAYED RELEASE ORAL
Refills: 0 | Status: DISCONTINUED | OUTPATIENT
Start: 2021-02-12 | End: 2021-02-15

## 2021-02-12 RX ADMIN — Medication 1 TABLET(S): at 11:21

## 2021-02-12 RX ADMIN — Medication 2 MILLIGRAM(S): at 13:55

## 2021-02-12 RX ADMIN — Medication 100 MILLIGRAM(S): at 06:27

## 2021-02-12 RX ADMIN — ENOXAPARIN SODIUM 40 MILLIGRAM(S): 100 INJECTION SUBCUTANEOUS at 22:50

## 2021-02-12 RX ADMIN — ONDANSETRON 4 MILLIGRAM(S): 8 TABLET, FILM COATED ORAL at 06:26

## 2021-02-12 RX ADMIN — PANTOPRAZOLE SODIUM 40 MILLIGRAM(S): 20 TABLET, DELAYED RELEASE ORAL at 13:54

## 2021-02-12 RX ADMIN — Medication 2 MILLIGRAM(S): at 18:35

## 2021-02-12 RX ADMIN — Medication 50 MILLIGRAM(S): at 17:18

## 2021-02-12 RX ADMIN — Medication 1 MILLIGRAM(S): at 11:21

## 2021-02-12 RX ADMIN — Medication 1 TABLET(S): at 13:57

## 2021-02-12 RX ADMIN — MIRTAZAPINE 30 MILLIGRAM(S): 45 TABLET, ORALLY DISINTEGRATING ORAL at 22:50

## 2021-02-12 RX ADMIN — Medication 100 MILLIGRAM(S): at 11:21

## 2021-02-12 RX ADMIN — Medication 1 MILLIGRAM(S): at 13:55

## 2021-02-12 RX ADMIN — Medication 50 MILLIGRAM(S): at 22:50

## 2021-02-12 RX ADMIN — Medication 50 MILLIGRAM(S): at 13:55

## 2021-02-12 RX ADMIN — PANTOPRAZOLE SODIUM 40 MILLIGRAM(S): 20 TABLET, DELAYED RELEASE ORAL at 22:50

## 2021-02-12 RX ADMIN — Medication 100 MILLIGRAM(S): at 13:55

## 2021-02-12 RX ADMIN — Medication 2 MILLIGRAM(S): at 23:02

## 2021-02-12 NOTE — ED BEHAVIORAL HEALTH ASSESSMENT NOTE - HPI (INCLUDE ILLNESS QUALITY, SEVERITY, DURATION, TIMING, CONTEXT, MODIFYING FACTORS, ASSOCIATED SIGNS AND SYMPTOMS)
Patient a 56 y/o single male, formerly domiciled with sister now street homeless, denies prior Psychiatric hx, no known hx SI/SA, charted history of DUI, hx of ETOH abuse (reports 1 gallon of vodka daily), med hx of GERD, HLD was BIB/EMS consult called for depression.    Patient was unsure regarding the circumstances that led to his admission as he reports he was drunk at the time--he reports a bystander at the train station called because he was walking back and forth (triage note reports bystander called secondary to his appearing intoxicated). He reports he evaded the police, but then when asked how he was transported to the ED, he reports it must have been the police who brought him. He denies active SI but reports 'I don't want to live anymore'. He is unsure about whether he wants to be psychiatrically admitted. He denies hx of psych tx but as reported detox/rehab stays in the past. 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 He denies HI. He reports visual phenomena when he withdraws ('spots in front of my eyes'), denies hx withdrawal sz but reports he is in withdrawal currently.    He reports he had a COVID test a few days ago but is not sure what the result was. He denies COVId positive contacts and travel in the past two weeks.

## 2021-02-12 NOTE — H&P ADULT - NSHPREVIEWOFSYSTEMS_GEN_ALL_CORE
CONSTITUTIONAL: no fevers, chills, night sweats, weight changes  HEENT: no vision changes or diplopia, no tinnitus, no sore throat  RESPIRATORY: denies dyspnea, sob, nocturnal cough, sputum or hemoptysis  CARDIOVASCULAR: no CP, palpitations or lower extremity edema  GI: no dysphagia, nausea, abd pain, constipation, diarrhea, stool change or blood in stool  : no dysuria or hematuria, no flank pain, no incontinence or urinary retention  MSK: no joint pain or swelling  INTEGUMENTARY: no rashes or lesions  NEUROLOGICAL: no HA, confusion, syncope, numbness, weakness, +tremors, no ataxia  PSYCH: + depression, +SI w/ plan, no HI, +anxiety  ENDOCRINE: no polyuria, polydipsia, no temp intolerance, no tremors, no changes in skin, hair or nails  HEMATOLOGIC/LYMPHATIC: no lymph node enlargement, abnormal bruising or bleeding

## 2021-02-12 NOTE — H&P ADULT - NSHPLABSRESULTS_GEN_ALL_CORE
10.4   4.80  )-----------( 273      ( 12 Feb 2021 02:38 )             33.1       02-12    135  |  99  |  10.0  ----------------------------<  109<H>  3.5   |  21.0<L>  |  0.79    Ca    9.0      12 Feb 2021 02:38    TPro  7.7  /  Alb  4.4  /  TBili  0.3<L>  /  DBili  x   /  AST  30  /  ALT  17  /  AlkPhos  86  02-12

## 2021-02-12 NOTE — ED ADULT NURSE REASSESSMENT NOTE - NS ED NURSE REASSESS COMMENT FT1
patient remains in yellow gown, sleeping comfortably in stretcher, no apparent distress noted. resp even and unlabored. denies complaints at this time. patient safety maintained, will continue to monitor.

## 2021-02-12 NOTE — ED ADULT NURSE REASSESSMENT NOTE - NS ED NURSE REASSESS COMMENT FT1
Pt has been evalauted by MD and medically cleared for release to . Pt in yellow gown, no IVs present, accompanied to  safely by CNA. Belongings secured in . Report given to ILANA VILCHIS.

## 2021-02-12 NOTE — ED BEHAVIORAL HEALTH ASSESSMENT NOTE - REASON
not medically cleared secondary to CIWA score of 6 after he received ativan 2mg and librium 100mg, escalating VS and nausea in the setting of alcohol withdrawal

## 2021-02-12 NOTE — ED BEHAVIORAL HEALTH ASSESSMENT NOTE - RISK ASSESSMENT
no known hx of SI/SA, however patient was unable to safety plan and was not sure about whether he wanted to be psychiatrically admitted Unable to determine Suicide Risk

## 2021-02-12 NOTE — ED BEHAVIORAL HEALTH ASSESSMENT NOTE - SUMMARY
Patient a 56 y/o single male, formerly domiciled with sister now street homeless, denies prior Psychiatric hx, no known hx SI/SA, charted history of DUI, hx of ETOH abuse (reports 1 gallon of vodka daily), med hx of GERD, HLD was BIB/EMS consult called for depression. Patient presented as somewhat confused about the circumstances of admissions, likely due to intoxication. He reports daily sustained alcohol use as he has reported in the past, reports  currently undomiciled and reports he 'just wants it to end' but denies active SI or plan. Nonetheless, as he is in active withdrawal he is not appropriate for transfer to available psychiatric beds and is not psychiatrically cleared. Recommend medical admission for withdrawal.     Patient is not psychaitrically cleared to leave the ED

## 2021-02-12 NOTE — ED BEHAVIORAL HEALTH NOTE - BEHAVIORAL HEALTH NOTE
PROGRESS NOTE: 02-12-21 @ 10:54  	  • Reason for Ongoing Consultation: 	    ID: [id number] Male with HEALTH ISSUES - PROBLEM Dx:  Alcohol use disorder, moderate, dependence            INTERVAL DATA:   • Interval Chief Complaint: "I don't feel safe...I want to end it.   I would jump in front of a train".  • Interval History: Pt reports he feels unsafe if discharged.  He endorses yesterday walking around train tracks when intoxicated thinking of jumping in front of a train.  Denies past suicide attempt or SIB, but feels hopeless and has noting to live for.  Pt also upset about losing is wallet and his credit cards.  Pt is homeless and has no support network, psych provider.  Pt is requesting in pt psych admission.  Pt will require detox on medical unit prior to in pt psych admission.  Spoke with charge RN and to MD for transfer back to main ED for medical admission.  Pt will require 1 to 1 supervision while on medical floors and medical ED.  Denies AH and no evidence of psychotic symptoms of delirium as has been medicated with Librium this am with CIWA of 6.  REVIEW OF SYSTEMS:   • Constitutional Symptoms	No complaints  • Eyes	No complaints  • Ears / Nose / Throat / Mouth	No complaints  • Cardiovascular	No complaints  • Respiratory	No complaints  • Gastrointestinal	No complaints  • Genitourinary	No complaints  • Musculoskeletal	No complaints  • Skin	No complaints  • Neurological	No complaints  • Psychiatric (see HPI)	See HPI  • Endocrine	No complaints  • Hematologic / Lymphatic	No complaints  • Allergic / Immunologic	No complaints    REVIEW OF VITALS/LABS/IMAGING/INVESTIGATIONS:   • Vital signs reviewed: Yes  • Vital Signs:	    T(C): 37.1 (02-12-21 @ 07:24), Max: 37.2 (02-12-21 @ 02:51)  HR: 100 (02-12-21 @ 07:24) (76 - 104)  BP: 116/66 (02-12-21 @ 07:24) (116/66 - 140/72)  RR: 18 (02-12-21 @ 07:24) (18 - 20)  SpO2: 95% (02-12-21 @ 07:24) (95% - 98%)    • Available labs reviewed: Yes  • Available Lab Results:                           10.4   4.80  )-----------( 273      ( 12 Feb 2021 02:38 )             33.1     02-12    135  |  99  |  10.0  ----------------------------<  109<H>  3.5   |  21.0<L>  |  0.79    Ca    9.0      12 Feb 2021 02:38    TPro  7.7  /  Alb  4.4  /  TBili  0.3<L>  /  DBili  x   /  AST  30  /  ALT  17  /  AlkPhos  86  02-12    LIVER FUNCTIONS - ( 12 Feb 2021 02:38 )  Alb: 4.4 g/dL / Pro: 7.7 g/dL / ALK PHOS: 86 U/L / ALT: 17 U/L / AST: 30 U/L / GGT: x                   MEDICATIONS:      PRN Medications: Rcd Haldol 5IM and Ativan 2 IM while in meal ED  • PRN Medications since last evaluation	Librium 100 mg 6:45  • PRN Details	    Current Medications:   folic acid 1 milliGRAM(s) Oral daily  multivitamin 1 Tablet(s) Oral daily  thiamine 100 milliGRAM(s) Oral daily     Medication Side Effects:  • Medication Side Effects or Adverse Reactions (new or ongoing)	None known    MENTAL STATUS EXAM:   • Level of Consciousness	Alert  • General Appearance	Well developed  • Body Habitus	Well nourished  • Hygiene	fair to poor  • Grooming	poor  • Behavior	Cooperative  • Eye Contact	fair  • Relatedness	fair  • Impulse Control	Normal  • Muscle Tone / Strength	Normal muscle tone/strength  • Abnormal Movements	No abnormal movements  • Gait / Station	Normal gait / station  • Speech Volume	soft  • Speech Rate	Normal  • Speech Spontaneity	Normal  • Speech Articulation	Normal  • Mood	Depressed  • Affect Quality	Depressed  • Affect Range	constricted  • Affect Congruence	Congruent  • Thought Process	Linear  • Thought Associations	Normal  • Thought Content	Unremarkable  • Perceptions	denies AH  • Oriented to Time	Yes  • Oriented to Place	Yes  • Oriented to Situation	Yes  • Oriented to Person	Yes  • Attention / Concentration	Normal  • Estimated Intelligence	Average  • Recent Memory	Normal  • Remote Memory	Normal  • Fund of Knowledge	Normal  • Language	No abnormalities noted  • Judgment (regarding everyday events)	Fair  • Insight (regarding psychiatric illness)  limited    SUICIDALITY:   • Suicidality (Interval)	SI with plan to jump in front of a train    HOMICIDALITY/AGGRESSION:   • Homicidality/Aggression	none known    DIAGNOSIS DSM-V:    Psychiatric Diagnosis (Corresponds to DSM-IV Axis I, II):   HEALTH ISSUES - PROBLEM Dx:  Alcohol use disorder, moderate, dependence  Depressive disorder, SI with plan    COVID Exposure Screen- Patient    1.	*In the past 14 days, have you been around anyone with a positive COVID-19 test?*   (  ) Yes   ( x ) No   (  ) Unknown- Reason (e.g. patient uncertain, sedated, refusing to answer, etc.):  ______  IF YES PROCEED TO QUESTION #2. IF NO or UNKNOWN, PLEASE SKIP TO QUESTION #7  2.	Were you within 6 feet of them for at least 15 minutes? (  ) Yes   (  ) No   (  ) Unknown- Reason: ______    3.	Have you provided care for them? (  ) Yes   (  ) No   (  ) Unknown- Reason: ______    4.	Have you had direct physical contact with them (touched, hugged, or kissed them)?  (  ) Yes   (  ) No    (  ) Unknown- Reason: ______    5.	Have you shared eating or drinking utensils with them? (  ) Yes   (  ) No    (  ) Unknown- Reason: ______    6.	Have they sneezed, coughed, or somehow got respiratory droplets on you? (  ) Yes   (  ) No    (  ) Unknown- Reason: ______      7.	*Have you been out of [place] within the past 14 days?*  (  ) Yes   (x  ) No   (  ) Unknown- Reason (e.g. patient uncertain, sedated, refusing to answer, etc.): _______  IF YES PLEASE ANSWER THE FOLLOWING QUESTIONS:  8.	Which state/country have you been to? ______   9.	Were you there over 24 hours? (  ) Yes   (  ) No    (  ) Unknown- Reason: ______    10.	What date did you return to [facility]? ______            Medical Diagnosis (Corresponds to DSM-IV Axis III):  • Axis III	  alcohol dependence    ASSESSMENT OF CURRENT CONDITION:   Summary (include case differential, formulation and patient response to therapy):   Patient a 54 y/o single male, formerly domiciled with sister now street homeless, denies prior Psychiatric hx, no known hx SI/SA, charted history of DUI, hx of ETOH abuse (reports 1 gallon of vodka daily), med hx of GERD, HLD was BIB/EMS consult called for depression. Patient presented as somewhat confused about the circumstances of admissions, likely due to intoxication. He reports daily sustained alcohol use as he has reported in the past, reports  currently undomiciled and reports he 'just wants it to end' but denies active SI or plan. Nonetheless, as he is in active withdrawal he is not appropriate for transfer to available psychiatric beds and is not psychiatrically cleared. Recommend medical admission for withdrawal.   2/12/21  Pt reports sdepressed mood, SI with plan to jump in front of a train.  Unclear of intention but unable to contract for safety if discharged.  Pt requesting in pt psych admission for safety and tx for depression.  Pt states he wants to stop drinking.  Pt is not psychiatrically cleared and is a danger to self.  Pt requires in pt psych admission when medically cleared following alcohol detox on medical floor.  Risk Assessment (consider static vs modifiable risk factors and protective factors; comment on level of risk for dangerous behavior):   RF Alcohol dependence, SI, homeless, male, no support, no tx in place  PF help seeking  PLAN  Admit to  medical for detox.  Continue 1 to 1  CIWA protocol  So not d/c AMA unless cleared by psych.

## 2021-02-12 NOTE — H&P ADULT - HISTORY OF PRESENT ILLNESS
56 y/o M w/ a PMH of ETOH abuse (1 gallon of vodka daily as per pt), HTN, HLD, asthma, GERD comes in w/ suicidal ideations.  Psych was consulted for evaluation and confirms pt has SI and needs inpt however concern for ETOH withdrawal.  Pt reports he last had a drink 2 days ago.  He is c/o feeling anxious and having the shakes.  He also reports feeling depressed w/ active SI.  Pt denies trying to hurt himself in the past but does report withdrawing from ETOH many times but doesn't remember the last time and is unsure if he has ever had seizures.  He denies cp, palpitations, sob, cough, fevers, chills, abd pain, N/V/D, leg pain/swelling.

## 2021-02-12 NOTE — H&P ADULT - ASSESSMENT
56 y/o M w/ a PMH of ETOH abuse (1 gallon of vodka daily as per pt), HTN, HLD, depression, asthma, GERD comes in w/ suicidal ideations.  Psych was consulted for evaluation and confirms pt has SI and needs inpt however concern for ETOH withdrawal.  On initial exam pt is exhibiting signs of active withdrawal.  Will admit pt and place on CIWA protocol.  Pt also has active SI and will therefore c/w 1:1 monitoring.           ETOH withdrawal   - Admit to medicine  - Pt actively withdrawing w/ current CIWA of 11  - Significant hx of ETOH dependence  - Utox (+) for benzo and BAL >200 on admission  - Place on Librium taper and PRN Ativan per protocol  - f/u Mg and Phos levels in the AM and supplement as needed    - Started on thiamine, folate and MV daily      AGMA likely 2/2 ETOH ketoacidosis  - AG mildly elevated and serum bicarb low  - Will give bicarb/1/2NS and reassess.  - f/u LA and UA to assess for uKetones      Depression w/ active SI  - Psych following  - Will likely be d/c'd to inpt behavioral facility once medically stable  - c/w 1:1 observation       Normocytic anemia  - H/H low but at base line (Hb 10-11)  - Hemodynamically stable  - f/u anemia panel   - Monitor cbc daily and maintain Hb >7      Asthma  - Not in acute exacerbation  - c/w home medications      HTN/HLD  - c/w home medications      GERD  - c/w protonix         VTE ppx: lovenox SQ  GI ppx: protonix      Dispo: Pt admitted to medicine.  Will likely be d/c'd to inpt behavioral facility once medically stable.

## 2021-02-12 NOTE — H&P ADULT - NSHPPHYSICALEXAM_GEN_ALL_CORE
GENERAL: pt examined bedside, laying comfortably in bed in NAD  HEENT: NC/AT, moist oral mucosa, clear conjunctiva, sclera nonicteric  RESPIRATORY: Normal respiratory effort; CTA b/l, no wheezing, rhonchi, rales  CARDIOVASCULAR: RRR, normal S1 and S2, no murmur/rub/gallop  ABDOMEN: soft, NT/ND, normoactive bowel sounds, no rebound/guarding  EXTREMITIES: No cynaosis, no clubbing, no lower extremity edema; Peripheral pulses are 2+ bilaterally  PSYCH: affect depressed but cooperative, active SI  NEUROLOGY: A+O to person, place, and time, +tremors, no focal neurologic deficits appreciated   SKIN: No rashes or no palpable lesions

## 2021-02-12 NOTE — ED ADULT NURSE REASSESSMENT NOTE - NS ED NURSE REASSESS COMMENT FT1
received pt Aox3, calm and in yellow gowns. pt reports feeling depressed w/ current SI w/ a plan to jump in front of a train/ car or overdose off heroine. pt denies prior SA. states he drinks a Gallon of vodka per/day. CIWA score 6. MD Herrera made aware. pt states he is homeless and hasn't ate in 4 days. denies any recent drug use. will monitor the pt for safety.

## 2021-02-12 NOTE — ED CDU PROVIDER SUBSEQUENT DAY NOTE - MEDICAL DECISION MAKING DETAILS
56 yo M pmh alcohol abuse, htn, hld, gerd, asthma presents with etoh intox, placed in CDU for BH eval. suicidal  VSS  pending psych eval.

## 2021-02-12 NOTE — ED CDU PROVIDER DISPOSITION NOTE - CLINICAL COURSE
54yo M pmh etoh abuse and psych presented for SI and etoh intox. Pt was in  and is not psychiatrically cleared, requiring psych admission however not medically cleared as pt is withdrawing.  CIWA score now 11.

## 2021-02-12 NOTE — ED CDU PROVIDER SUBSEQUENT DAY NOTE - PHYSICAL EXAMINATION
General: intoxicated, NAD, +aob  	Head:  NC, AT  	Eyes: EOMI, PERRLA, no scleral icterus  	Ears: no erythema/drainage  	Nose: midline, no bleeding/drainage  	Throat: MMM  	Cardiac: RRR, no m/r/g, no lower extremity edema  	Respiratory: CTABL, no wheezes/rales/rhonchi, equal chest wall expansions, no use of accessory muscles, no retractions  	Abdomen: soft, nondistended, nontender, no rebound tenderness, no guarding, nonperitonitic  	MSK/Vascular: full ROM, soft compartments, warm extremities  	Neuro: Alert and oriented, motor/sensory intact  Psych: intoxicated affect

## 2021-02-12 NOTE — ED ADULT NURSE REASSESSMENT NOTE - NS ED NURSE REASSESS COMMENT FT1
Pt. received from behavioral health for admission to hospital for acute alcohol withdrawal.  Pt. received A&Ox4 anxious with CIWA of 7 complaining of abdominal pain related to his acid reflux.  MD curtis made aware and medications to be ordered and administered.

## 2021-02-12 NOTE — H&P ADULT - NSICDXFAMILYHX_GEN_ALL_CORE_FT
FAMILY HISTORY:  Grandparent  Still living? Unknown  FH: heart disease, Age at diagnosis: Age Unknown

## 2021-02-12 NOTE — ED BEHAVIORAL HEALTH NOTE - BEHAVIORAL HEALTH NOTE
===================  PRE-HOSPITAL COURSE  ===================  SOURCE:  Triage documentation.   DETAILS:  Patient was BIBEMS; chief complaint of intoxication.     ============  ED COURSE   ============  SOURCE:  ED Charting/RN  ARRIVAL:  Patient arrived intoxicated to ED. Patient presents as disheveled. Patient was placed on 1:1 supervision and moved over to  and ready for consult.   BELONGINGS:  None notable.   BEHAVIOR: Patient has provided blood/urine for routine labs. Patient presently HI/AH/VH, however endorses SI to jump infront of a train or overdose on heroine. Patient’s speech is of normal volume/normal rate accompanied by a logical and linear thought process; patient is AOx4. Patient has been resting in hospital bed.   TREATMENT:  Patient received 5mg Haldol and 2mg Ativan IM ~17:00.   VISITORS:  Patient presently unaccompanied by social supports while in ED.     COVID Exposure Screen- collateral (i.e. third-party, chart review, belongings, etc; include EMS and ED staff)     1.        *Has the patient had a COVID-19 test in the last 21 days?  (  ) Yes   (  ) No   ( X) Unknown- Reason: ___Unknown___  IF YES PROCEED TO QUESTION #2. IF NO OR UNKNOWN THEN PLEASE SKIP TO QUESTION #3.  2.        Date of test: ____ ____  3.        Do you know the result? (  ) Negative   (  ) Positive   (  ) No result available  4.        *In the past 10 days, has the patient been around anyone with a positive COVID-19 test?*  (  ) Yes   (  ) No   ( X) Unknown- Reason (e.g. collateral uncertain, refusing to answer, etc.):  ___Unknown, arrived to ED altered___  IF YES PROCEED TO QUESTION #5. IF NO or UNKNOWN, PLEASE SKIP TO QUESTION #10  5.        Was the patient within 6 feet of them for at least 15 minutes? (  ) Yes   (  ) No   (  ) Unknown- Reason: ______   6.        Did the patient provide care for them? (  ) Yes   (  ) No   (  ) Unknown- Reason: ______   7.        Did the patient have direct physical contact with them (touched, hugged, or kissed them)? (  ) Yes   (  ) No    (  ) Unknown- Reason: ______   8.        Did the patient share eating or drinking utensils with them? (  ) Yes   (  ) No    (  ) Unknown- Reason: ______   9.        Have they sneezed, coughed, or somehow got respiratory droplets on the patient? (  ) Yes   (  ) No    (  ) Unknown- Reason: ______   10.     *Has the patient been out of [place] within the past 10 days?*  (  ) Yes   ( X) No   (  ) Unknown- Reason (e.g. collateral uncertain, sedated, refusing to answer, etc.): _______  IF YES PLEASE ANSWER THE FOLLOWING QUESTIONS:  11.     Which state/country has the patient been to? ______  12.     Were they there over 24 hours? (  ) Yes   (  ) No    (  ) Unknown- Reason: ______

## 2021-02-13 LAB
ANION GAP SERPL CALC-SCNC: 15 MMOL/L — SIGNIFICANT CHANGE UP (ref 5–17)
BUN SERPL-MCNC: 14 MG/DL — SIGNIFICANT CHANGE UP (ref 8–20)
CALCIUM SERPL-MCNC: 9.1 MG/DL — SIGNIFICANT CHANGE UP (ref 8.6–10.2)
CHLORIDE SERPL-SCNC: 101 MMOL/L — SIGNIFICANT CHANGE UP (ref 98–107)
CO2 SERPL-SCNC: 21 MMOL/L — LOW (ref 22–29)
CREAT SERPL-MCNC: 0.76 MG/DL — SIGNIFICANT CHANGE UP (ref 0.5–1.3)
FERRITIN SERPL-MCNC: 18 NG/ML — LOW (ref 30–400)
GLUCOSE SERPL-MCNC: 161 MG/DL — HIGH (ref 70–99)
HCT VFR BLD CALC: 35 % — LOW (ref 39–50)
HGB BLD-MCNC: 10.8 G/DL — LOW (ref 13–17)
IRON SATN MFR SERPL: 35 UG/DL — LOW (ref 59–158)
IRON SATN MFR SERPL: 6 % — LOW (ref 16–55)
MAGNESIUM SERPL-MCNC: 1.8 MG/DL — SIGNIFICANT CHANGE UP (ref 1.8–2.6)
MCHC RBC-ENTMCNC: 25.8 PG — LOW (ref 27–34)
MCHC RBC-ENTMCNC: 30.9 GM/DL — LOW (ref 32–36)
MCV RBC AUTO: 83.7 FL — SIGNIFICANT CHANGE UP (ref 80–100)
PHOSPHATE SERPL-MCNC: 3.1 MG/DL — SIGNIFICANT CHANGE UP (ref 2.4–4.7)
PLATELET # BLD AUTO: 245 K/UL — SIGNIFICANT CHANGE UP (ref 150–400)
POTASSIUM SERPL-MCNC: 3.3 MMOL/L — LOW (ref 3.5–5.3)
POTASSIUM SERPL-SCNC: 3.3 MMOL/L — LOW (ref 3.5–5.3)
RBC # BLD: 4.18 M/UL — LOW (ref 4.2–5.8)
RBC # FLD: 14.1 % — SIGNIFICANT CHANGE UP (ref 10.3–14.5)
SODIUM SERPL-SCNC: 137 MMOL/L — SIGNIFICANT CHANGE UP (ref 135–145)
TIBC SERPL-MCNC: 596 UG/DL — HIGH (ref 220–430)
TRANSFERRIN SERPL-MCNC: 417 MG/DL — HIGH (ref 180–329)
WBC # BLD: 4.9 K/UL — SIGNIFICANT CHANGE UP (ref 3.8–10.5)
WBC # FLD AUTO: 4.9 K/UL — SIGNIFICANT CHANGE UP (ref 3.8–10.5)

## 2021-02-13 PROCEDURE — 99233 SBSQ HOSP IP/OBS HIGH 50: CPT

## 2021-02-13 RX ORDER — FERROUS SULFATE 325(65) MG
325 TABLET ORAL DAILY
Refills: 0 | Status: DISCONTINUED | OUTPATIENT
Start: 2021-02-13 | End: 2021-02-15

## 2021-02-13 RX ORDER — LANOLIN ALCOHOL/MO/W.PET/CERES
3 CREAM (GRAM) TOPICAL AT BEDTIME
Refills: 0 | Status: DISCONTINUED | OUTPATIENT
Start: 2021-02-13 | End: 2021-02-15

## 2021-02-13 RX ORDER — POTASSIUM CHLORIDE 20 MEQ
40 PACKET (EA) ORAL ONCE
Refills: 0 | Status: COMPLETED | OUTPATIENT
Start: 2021-02-13 | End: 2021-02-13

## 2021-02-13 RX ADMIN — ALBUTEROL 2 PUFF(S): 90 AEROSOL, METERED ORAL at 08:47

## 2021-02-13 RX ADMIN — Medication 3 MILLIGRAM(S): at 23:11

## 2021-02-13 RX ADMIN — Medication 40 MILLIEQUIVALENT(S): at 14:39

## 2021-02-13 RX ADMIN — Medication 50 MILLIGRAM(S): at 23:10

## 2021-02-13 RX ADMIN — Medication 1 MILLIGRAM(S): at 14:37

## 2021-02-13 RX ADMIN — Medication 50 MILLIGRAM(S): at 04:31

## 2021-02-13 RX ADMIN — MIRTAZAPINE 30 MILLIGRAM(S): 45 TABLET, ORALLY DISINTEGRATING ORAL at 23:10

## 2021-02-13 RX ADMIN — Medication 100 MILLIGRAM(S): at 14:36

## 2021-02-13 RX ADMIN — Medication 50 MILLIGRAM(S): at 14:39

## 2021-02-13 RX ADMIN — Medication 1 TABLET(S): at 14:37

## 2021-02-13 RX ADMIN — PANTOPRAZOLE SODIUM 40 MILLIGRAM(S): 20 TABLET, DELAYED RELEASE ORAL at 04:31

## 2021-02-13 RX ADMIN — ENOXAPARIN SODIUM 40 MILLIGRAM(S): 100 INJECTION SUBCUTANEOUS at 14:37

## 2021-02-13 NOTE — PROGRESS NOTE ADULT - ASSESSMENT
56 y/o M w/ a PMH of ETOH abuse (1 gallon of vodka daily as per pt), HTN, HLD, depression, asthma, GERD comes in w/ suicidal ideations.  Psych was consulted for evaluation and confirms pt has SI and needs inpt however concern for ETOH withdrawal.  On initial exam pt is exhibiting signs of active withdrawal.  Will admit pt and place on CIWA protocol.  Pt also has active SI and will therefore c/w 1:1 monitoring.       ETOH withdrawal   - Pt actively withdrawing   - Significant hx of ETOH dependence  - Utox (+) for benzo and BAL >200 on admission  - Place on Librium taper and PRN Ativan per protocol  - f/u Mg and Phos levels in the AM and supplement as needed    - C/w thiamine, folate and MV daily    AGMA likely 2/2 ETOH ketoacidosis - Improving  - AG mildly elevated and serum bicarb low  - Fluids was given    Depression w/ active SI  - Psych following  - Will likely be d/c'd to inpt behavioral facility once medically stable  - c/w 1:1 observation     Normocytic anemia  - H/H low but at base line (Hb 10-11)  - Hemodynamically stable  - Monitor cbc daily and maintain Hb >7    Asthma  - Not in acute exacerbation  - c/w home medications    GERD  - c/w protonix     VTE ppx: lovenox SQ      Dispo:  d/c'd to inpt behavioral facility once medically stable.

## 2021-02-14 LAB
ALBUMIN SERPL ELPH-MCNC: 4.2 G/DL — SIGNIFICANT CHANGE UP (ref 3.3–5.2)
ALP SERPL-CCNC: 79 U/L — SIGNIFICANT CHANGE UP (ref 40–120)
ALT FLD-CCNC: 17 U/L — SIGNIFICANT CHANGE UP
ANION GAP SERPL CALC-SCNC: 16 MMOL/L — SIGNIFICANT CHANGE UP (ref 5–17)
AST SERPL-CCNC: 19 U/L — SIGNIFICANT CHANGE UP
BASOPHILS # BLD AUTO: 0.07 K/UL — SIGNIFICANT CHANGE UP (ref 0–0.2)
BASOPHILS NFR BLD AUTO: 1.1 % — SIGNIFICANT CHANGE UP (ref 0–2)
BILIRUB SERPL-MCNC: <0.2 MG/DL — LOW (ref 0.4–2)
BUN SERPL-MCNC: 17 MG/DL — SIGNIFICANT CHANGE UP (ref 8–20)
CALCIUM SERPL-MCNC: 9.1 MG/DL — SIGNIFICANT CHANGE UP (ref 8.6–10.2)
CHLORIDE SERPL-SCNC: 104 MMOL/L — SIGNIFICANT CHANGE UP (ref 98–107)
CO2 SERPL-SCNC: 20 MMOL/L — LOW (ref 22–29)
CREAT SERPL-MCNC: 0.91 MG/DL — SIGNIFICANT CHANGE UP (ref 0.5–1.3)
EOSINOPHIL # BLD AUTO: 0.22 K/UL — SIGNIFICANT CHANGE UP (ref 0–0.5)
EOSINOPHIL NFR BLD AUTO: 3.5 % — SIGNIFICANT CHANGE UP (ref 0–6)
GLUCOSE SERPL-MCNC: 128 MG/DL — HIGH (ref 70–99)
HCT VFR BLD CALC: 34.1 % — LOW (ref 39–50)
HGB BLD-MCNC: 10.4 G/DL — LOW (ref 13–17)
IMM GRANULOCYTES NFR BLD AUTO: 0.5 % — SIGNIFICANT CHANGE UP (ref 0–1.5)
LYMPHOCYTES # BLD AUTO: 1.46 K/UL — SIGNIFICANT CHANGE UP (ref 1–3.3)
LYMPHOCYTES # BLD AUTO: 22.9 % — SIGNIFICANT CHANGE UP (ref 13–44)
MAGNESIUM SERPL-MCNC: 1.8 MG/DL — SIGNIFICANT CHANGE UP (ref 1.6–2.6)
MCHC RBC-ENTMCNC: 25.7 PG — LOW (ref 27–34)
MCHC RBC-ENTMCNC: 30.5 GM/DL — LOW (ref 32–36)
MCV RBC AUTO: 84.4 FL — SIGNIFICANT CHANGE UP (ref 80–100)
MONOCYTES # BLD AUTO: 0.69 K/UL — SIGNIFICANT CHANGE UP (ref 0–0.9)
MONOCYTES NFR BLD AUTO: 10.8 % — SIGNIFICANT CHANGE UP (ref 2–14)
NEUTROPHILS # BLD AUTO: 3.9 K/UL — SIGNIFICANT CHANGE UP (ref 1.8–7.4)
NEUTROPHILS NFR BLD AUTO: 61.2 % — SIGNIFICANT CHANGE UP (ref 43–77)
PHOSPHATE SERPL-MCNC: 2.6 MG/DL — SIGNIFICANT CHANGE UP (ref 2.4–4.7)
PLATELET # BLD AUTO: 239 K/UL — SIGNIFICANT CHANGE UP (ref 150–400)
POTASSIUM SERPL-MCNC: 4.2 MMOL/L — SIGNIFICANT CHANGE UP (ref 3.5–5.3)
POTASSIUM SERPL-SCNC: 4.2 MMOL/L — SIGNIFICANT CHANGE UP (ref 3.5–5.3)
PROT SERPL-MCNC: 7.1 G/DL — SIGNIFICANT CHANGE UP (ref 6.6–8.7)
RBC # BLD: 4.04 M/UL — LOW (ref 4.2–5.8)
RBC # FLD: 14.2 % — SIGNIFICANT CHANGE UP (ref 10.3–14.5)
SARS-COV-2 IGG SERPL QL IA: NEGATIVE — SIGNIFICANT CHANGE UP
SARS-COV-2 IGM SERPL IA-ACNC: <0.1 INDEX — SIGNIFICANT CHANGE UP
SODIUM SERPL-SCNC: 140 MMOL/L — SIGNIFICANT CHANGE UP (ref 135–145)
WBC # BLD: 6.37 K/UL — SIGNIFICANT CHANGE UP (ref 3.8–10.5)
WBC # FLD AUTO: 6.37 K/UL — SIGNIFICANT CHANGE UP (ref 3.8–10.5)

## 2021-02-14 PROCEDURE — 99232 SBSQ HOSP IP/OBS MODERATE 35: CPT

## 2021-02-14 RX ORDER — PANTOPRAZOLE SODIUM 20 MG/1
40 TABLET, DELAYED RELEASE ORAL ONCE
Refills: 0 | Status: COMPLETED | OUTPATIENT
Start: 2021-02-14 | End: 2021-02-14

## 2021-02-14 RX ORDER — INFLUENZA VIRUS VACCINE 15; 15; 15; 15 UG/.5ML; UG/.5ML; UG/.5ML; UG/.5ML
0.5 SUSPENSION INTRAMUSCULAR ONCE
Refills: 0 | Status: DISCONTINUED | OUTPATIENT
Start: 2021-02-14 | End: 2021-02-15

## 2021-02-14 RX ADMIN — Medication 50 MILLIGRAM(S): at 06:53

## 2021-02-14 RX ADMIN — PANTOPRAZOLE SODIUM 40 MILLIGRAM(S): 20 TABLET, DELAYED RELEASE ORAL at 16:59

## 2021-02-14 RX ADMIN — Medication 1 MILLIGRAM(S): at 13:36

## 2021-02-14 RX ADMIN — Medication 50 MILLIGRAM(S): at 18:26

## 2021-02-14 RX ADMIN — PANTOPRAZOLE SODIUM 40 MILLIGRAM(S): 20 TABLET, DELAYED RELEASE ORAL at 06:54

## 2021-02-14 RX ADMIN — MIRTAZAPINE 30 MILLIGRAM(S): 45 TABLET, ORALLY DISINTEGRATING ORAL at 20:41

## 2021-02-14 RX ADMIN — Medication 325 MILLIGRAM(S): at 13:36

## 2021-02-14 RX ADMIN — ENOXAPARIN SODIUM 40 MILLIGRAM(S): 100 INJECTION SUBCUTANEOUS at 13:36

## 2021-02-14 RX ADMIN — Medication 1 TABLET(S): at 13:36

## 2021-02-14 RX ADMIN — ALBUTEROL 2 PUFF(S): 90 AEROSOL, METERED ORAL at 21:05

## 2021-02-14 RX ADMIN — Medication 3 MILLIGRAM(S): at 20:41

## 2021-02-14 RX ADMIN — Medication 100 MILLIGRAM(S): at 13:36

## 2021-02-14 NOTE — PATIENT PROFILE ADULT - NSPROGENPREVTRANSF_GEN_A_NUR
DVT and pulmonary embolism
PE eval
fever
Left renal lesion
Anticoagulation management, risk stratification, VTE prophylaxis
right knee OA
SOB HTN ? valvular disease
no

## 2021-02-14 NOTE — PROGRESS NOTE ADULT - ASSESSMENT
54 y/o M w/ a PMH of ETOH abuse (1 gallon of vodka daily as per pt), HTN, HLD, depression, asthma, GERD comes in w/ suicidal ideations.  Psych was consulted for evaluation and confirms pt has SI and needs inpt however concern for ETOH withdrawal.  On initial exam pt is exhibiting signs of active withdrawal.  Will admit pt and place on CIWA protocol.  Pt also has active SI and will therefore c/w 1:1 monitoring.       ETOH withdrawal   - Pt actively withdrawing   - Significant hx of ETOH dependence  - Utox (+) for benzo and BAL >200 on admission  - Place on Librium taper and PRN Ativan per protocol  - f/u Mg and Phos levels in the AM and supplement as needed    - C/w thiamine, folate and MV daily    AGMA likely 2/2 ETOH ketoacidosis - Improving  - AG mildly elevated and serum bicarb low  - Fluids was given    Depression w/ active SI  - Psych following  - Will likely be d/c'd to inpt behavioral facility once medically stable  - c/w 1:1 observation     Normocytic anemia  - H/H low but at base line (Hb 10-11)  - Hemodynamically stable  - Monitor cbc daily and maintain Hb >7    Asthma  - Not in acute exacerbation  - c/w home medications    GERD  - c/w protonix     VTE ppx: lovenox SQ      Dispo:  d/c'd to inpt behavioral facility once medically stable.

## 2021-02-15 ENCOUNTER — TRANSCRIPTION ENCOUNTER (OUTPATIENT)
Age: 56
End: 2021-02-15

## 2021-02-15 ENCOUNTER — EMERGENCY (EMERGENCY)
Facility: HOSPITAL | Age: 56
LOS: 1 days | Discharge: HOME | End: 2021-02-15
Attending: EMERGENCY MEDICINE
Payer: COMMERCIAL

## 2021-02-15 VITALS
TEMPERATURE: 98 F | HEART RATE: 100 BPM | DIASTOLIC BLOOD PRESSURE: 87 MMHG | RESPIRATION RATE: 18 BRPM | SYSTOLIC BLOOD PRESSURE: 141 MMHG | OXYGEN SATURATION: 92 %

## 2021-02-15 VITALS — HEIGHT: 65 IN

## 2021-02-15 PROCEDURE — 99239 HOSP IP/OBS DSCHRG MGMT >30: CPT

## 2021-02-15 PROCEDURE — 85025 COMPLETE CBC W/AUTO DIFF WBC: CPT

## 2021-02-15 PROCEDURE — 82728 ASSAY OF FERRITIN: CPT

## 2021-02-15 PROCEDURE — 83605 ASSAY OF LACTIC ACID: CPT

## 2021-02-15 PROCEDURE — 80048 BASIC METABOLIC PNL TOTAL CA: CPT

## 2021-02-15 PROCEDURE — 80307 DRUG TEST PRSMV CHEM ANLYZR: CPT

## 2021-02-15 PROCEDURE — 94640 AIRWAY INHALATION TREATMENT: CPT

## 2021-02-15 PROCEDURE — 86769 SARS-COV-2 COVID-19 ANTIBODY: CPT

## 2021-02-15 PROCEDURE — 96372 THER/PROPH/DIAG INJ SC/IM: CPT

## 2021-02-15 PROCEDURE — 36415 COLL VENOUS BLD VENIPUNCTURE: CPT

## 2021-02-15 PROCEDURE — 85027 COMPLETE CBC AUTOMATED: CPT

## 2021-02-15 PROCEDURE — 99218: CPT

## 2021-02-15 PROCEDURE — 99233 SBSQ HOSP IP/OBS HIGH 50: CPT

## 2021-02-15 PROCEDURE — 80053 COMPREHEN METABOLIC PANEL: CPT

## 2021-02-15 PROCEDURE — 82962 GLUCOSE BLOOD TEST: CPT

## 2021-02-15 PROCEDURE — U0003: CPT

## 2021-02-15 PROCEDURE — 84100 ASSAY OF PHOSPHORUS: CPT

## 2021-02-15 PROCEDURE — 83735 ASSAY OF MAGNESIUM: CPT

## 2021-02-15 PROCEDURE — 99285 EMERGENCY DEPT VISIT HI MDM: CPT | Mod: 25

## 2021-02-15 PROCEDURE — G0378: CPT

## 2021-02-15 PROCEDURE — 84466 ASSAY OF TRANSFERRIN: CPT

## 2021-02-15 PROCEDURE — 83540 ASSAY OF IRON: CPT

## 2021-02-15 PROCEDURE — U0005: CPT

## 2021-02-15 PROCEDURE — 83550 IRON BINDING TEST: CPT

## 2021-02-15 RX ORDER — FERROUS SULFATE 325(65) MG
1 TABLET ORAL
Qty: 0 | Refills: 0 | DISCHARGE
Start: 2021-02-15

## 2021-02-15 RX ADMIN — Medication 325 MILLIGRAM(S): at 08:11

## 2021-02-15 RX ADMIN — Medication 2 MILLIGRAM(S): at 01:59

## 2021-02-15 RX ADMIN — ENOXAPARIN SODIUM 40 MILLIGRAM(S): 100 INJECTION SUBCUTANEOUS at 08:11

## 2021-02-15 RX ADMIN — Medication 50 MILLIGRAM(S): at 06:21

## 2021-02-15 RX ADMIN — ALBUTEROL 2 PUFF(S): 90 AEROSOL, METERED ORAL at 10:39

## 2021-02-15 RX ADMIN — ALBUTEROL 2 PUFF(S): 90 AEROSOL, METERED ORAL at 01:34

## 2021-02-15 RX ADMIN — Medication 100 MILLIGRAM(S): at 08:12

## 2021-02-15 RX ADMIN — Medication 1 MILLIGRAM(S): at 08:11

## 2021-02-15 RX ADMIN — Medication 1 TABLET(S): at 08:11

## 2021-02-15 RX ADMIN — TIOTROPIUM BROMIDE 1 CAPSULE(S): 18 CAPSULE ORAL; RESPIRATORY (INHALATION) at 10:39

## 2021-02-15 RX ADMIN — PANTOPRAZOLE SODIUM 40 MILLIGRAM(S): 20 TABLET, DELAYED RELEASE ORAL at 06:21

## 2021-02-15 NOTE — ED PROVIDER NOTE - CLINICAL SUMMARY MEDICAL DECISION MAKING FREE TEXT BOX
54y/o M with PMHx of HTN, Asthma and ETOH abuse presents to the ED for intox. 54y/o M with PMHx of HTN, Asthma and ETOH abuse presents to the ED for intox, in obs awaiting clinical sobriety

## 2021-02-15 NOTE — CONSULT NOTE ADULT - ASSESSMENT
Patient a 56 y/o single male, domiciled, employed as a , past Psych hx of Depression, no prior SI/SA, denied other drug abuse hx, medically has GERD; Asthma was BIB/EMS due to ETOH withdrawal.     Patient in bed on 1:1 as he expressed SI while under the influence and since his admission he was on 1:1 for SI. He was admitted with ETOH level of 238 with associated shakes/tremors. He endorsed that he has been drinking 1-L to 1.75 L daily for months, [facility] and has good sleep/appetite. He denied having ETOH induced Seizures or DT. He is unwilling to go for rehab, but endorses that he would love to go to AA meetings. He denied being depressed or suicidal, and was never suicidal, he started to cry that he loves his life, has to go to work and would never do something to hurt himself. He does ot have any perceptual experiences at this time. He is AAOPx3 with Limited I+J pertaining to ETOH abuse.    Patient is alert, oriented to time and place, denies current or prior SI/SA, more realistic and endorsed that he loves his life ands would never do anything to hurt himself    Plan: Patient is Psychiatrically cleared, as he is not Suicidal nor does exhibit any acute issues needing any psych Intervention          1:1 discontinue before discharge          Need AA referral for sobriety          No psych Meds at this time.    Thanks for the Consult

## 2021-02-15 NOTE — CONSULT NOTE ADULT - SUBJECTIVE AND OBJECTIVE BOX
Patient a 56 y/o single male, domiciled, employed as a , past Psych hx of Depression, no prior SI/SA, denied other drug abuse hx, medically has GERD; Asthma was BIB/EMS due to ETOH withdrawal.     Patient in bed on 1:1 as he expressed SI while under the influence and since his admission he was on 1:1 for SI. He was admitted with ETOH level of 238 with associated shakes/tremors. He endorsed that he has been drinking 1-L to 1.75 L daily for months, [facility] and has good sleep/appetite. He denied having ETOH induced Seizures or DT. He is unwilling to go for rehab, but endorses that he would love to go to AA meetings. He denied being depressed or suicidal, and was never suicidal, he started to cry that he loves his life, has to go to work and would never do something to hurt himself. He does ot have any perceptual experiences at this time. He is AAOPx3 with Limited I+J pertaining to ETOH abuse.    Past Psychiatric Hx: Denied prior Psychiatric hx , denied being depressed or any previous SI/SA, denied other drug abuse hx, has hx of [facility] and was clean for years.    Family Hx: None/Denied     Social Hx: Single male, domiciled with family, never , and has no children and works as a     MSE: Patient a 56 y/o male, looks older than stated age, dressed in Hospital gown with good eye contact, fairly well related. Mood seems OK, with labile affect as he is fixated top leave as he has to work. Speech is of normal vol/tone. Gait is normal with no muscle abnormalities. He is attentive. Focused and has intact memory. Thoughts are linear, logical with no S/H/I/P at this time.    Diagnosis: Alcohol Abuse                  Alcohol abuse with ETOH withdrawal symptoms                  Substance induced Mood D/O    Labs:                       10.4   6.37  )-----------( 239      ( 14 Feb 2021 08:20 )             34.1   02-14    140  |  104  |  17.0  ----------------------------<  128<H>  4.2   |  20.0<L>  |  0.91    Ca    9.1      14 Feb 2021 08:20  Phos  2.6     02-14  Mg     1.8     02-14    TPro  7.1  /  Alb  4.2  /  TBili  <0.2<L>  /  DBili  x   /  AST  19  /  ALT  17  /  AlkPhos  79  02-14

## 2021-02-15 NOTE — ED PROVIDER NOTE - PHYSICAL EXAMINATION
General: no signs of trauma, somnolent, in no apparent distress.  Head: AT, NC  HEENT: Airway patent., no carrillo sign, no raccoon eyes, no hemotypanum   Eyes: clear bilaterally, pupils equal, round and reactive to light.  Cardiac: Normal rate, regular rhythm.  Heart sounds S1, S2.    Respiratory: Breath sounds clear and equal bilaterally.  Abdomen soft, non-tender, no guarding.  MSK: moving all extremities x 4  Neuro: somnolent, follows commands, ataxia, slurred speech  Skin: normal color. General: no signs of trauma, somnolent, in no apparent distress.  Head: AT, NC  HEENT: Airway patent., no carrillo sign, no raccoon eyes, no hemotympanum   Eyes: clear bilaterally, pupils equal, round and reactive to light.  Cardiac: Normal rate, regular rhythm.  Heart sounds S1, S2.    Respiratory: Breath sounds clear and equal bilaterally.  Abdomen soft, non-tender, no guarding.  MSK: moving all extremities x 4  Neuro: somnolent, follows commands, ataxia, slurred speech  Skin: normal color.

## 2021-02-15 NOTE — DISCHARGE NOTE PROVIDER - NSDCCPCAREPLAN_GEN_ALL_CORE_FT
PRINCIPAL DISCHARGE DIAGNOSIS  Diagnosis: Alcohol withdrawal syndrome without complication  Assessment and Plan of Treatment: Please refrain from drinking etoh. Please follow up with your pcp      SECONDARY DISCHARGE DIAGNOSES  Diagnosis: Suicide ideation  Assessment and Plan of Treatment: Please return to the hospital for any questions.

## 2021-02-15 NOTE — PROGRESS NOTE ADULT - SUBJECTIVE AND OBJECTIVE BOX
[facility] Division of Hospital Medicine    Chief Complaint:  Patient is a 55y old  Male who presents with a chief complaint of ETOH withdrawal (13 Feb 2021 15:52)      SUBJECTIVE / OVERNIGHT EVENTS:  Patient was seen and examined at bedside. Patient resting with no complaints. States that he wants to go home.   Patient denies chest pain, SOB, abd pain, N/V, fever, chills, dysuria or any other complaints. All remainder ROS negative.     MEDICATIONS  (STANDING):  ALBUTerol    90 MICROgram(s) HFA Inhaler 2 Puff(s) Inhalation every 6 hours  chlordiazePOXIDE   Oral   chlordiazePOXIDE 50 milliGRAM(s) Oral every 12 hours  enoxaparin Injectable 40 milliGRAM(s) SubCutaneous daily  ferrous    sulfate 325 milliGRAM(s) Oral daily  folic acid 1 milliGRAM(s) Oral daily  influenza   Vaccine 0.5 milliLiter(s) IntraMuscular once  melatonin 3 milliGRAM(s) Oral at bedtime  mirtazapine Soltab 30 milliGRAM(s) Oral at bedtime  multivitamin 1 Tablet(s) Oral daily  pantoprazole    Tablet 40 milliGRAM(s) Oral before breakfast  thiamine 100 milliGRAM(s) Oral daily  tiotropium 18 MICROgram(s) Capsule 1 Capsule(s) Inhalation daily    MEDICATIONS  (PRN):  LORazepam   Injectable 2 milliGRAM(s) IV Push every 2 hours PRN CIWA-Ar score increase by 2 points and a total score of 7 or less        I&O's Summary      PHYSICAL EXAM:  Vital Signs Last 24 Hrs  T(C): 37.2 (14 Feb 2021 13:29), Max: 37.2 (14 Feb 2021 13:29)  T(F): 98.9 (14 Feb 2021 13:29), Max: 98.9 (14 Feb 2021 13:29)  HR: 102 (14 Feb 2021 13:29) (98 - 115)  BP: 117/82 (14 Feb 2021 13:29) (106/68 - 134/76)  BP(mean): --  RR: 20 (14 Feb 2021 13:29) (18 - 20)  SpO2: 97% (14 Feb 2021 13:29) (96% - 97%)      Constitutional: NAD, well-developed, well-groomed, disheveled   ENT: MMM, no thrush, Supple, No JVD  Lungs: Clear to auscultation bilaterally, good air entry, non-labored breathing  Cardio: RRR, S1/S2, No murmur  Abdomen: Soft, Nontender, Nondistended; Bowel sounds present  Extremities: No calf tenderness, No pitting edema  Musculoskeletal:   No clubbing or cyanosis of digits; no joint swelling or tenderness to palpation  Psych: A+O to person, place, and time; affect appropriate  Neuro: CN 2-12 are intact and symmetric; no gross sensory deficits;   Skin: No rashes; no palpable lesions    LABS:                        10.4   6.37  )-----------( 239      ( 14 Feb 2021 08:20 )             34.1     02-14    140  |  104  |  17.0  ----------------------------<  128<H>  4.2   |  20.0<L>  |  0.91    Ca    9.1      14 Feb 2021 08:20  Phos  2.6     02-14  Mg     1.8     02-14    TPro  7.1  /  Alb  4.2  /  TBili  <0.2<L>  /  DBili  x   /  AST  19  /  ALT  17  /  AlkPhos  79  02-14              CAPILLARY BLOOD GLUCOSE            RADIOLOGY REVIEWED  
[facility] Division of Hospital Medicine    Chief Complaint:  Patient is a 55y old  Male who presents with a chief complaint of ETOH withdrawal (12 Feb 2021 14:00)      SUBJECTIVE / OVERNIGHT EVENTS:  Patient was seen and examined at bedside. 1:1 at bedside. States that he wants to leave. States that he only had SI because he was drinking. CIWA in progress.   Patient denies chest pain, SOB, abd pain, N/V, fever, chills, dysuria or any other complaints. All remainder ROS negative.     MEDICATIONS  (STANDING):  ALBUTerol    90 MICROgram(s) HFA Inhaler 2 Puff(s) Inhalation every 6 hours  chlordiazePOXIDE   Oral   chlordiazePOXIDE 50 milliGRAM(s) Oral every 8 hours  enoxaparin Injectable 40 milliGRAM(s) SubCutaneous daily  ferrous    sulfate 325 milliGRAM(s) Oral daily  folic acid 1 milliGRAM(s) Oral daily  melatonin 3 milliGRAM(s) Oral at bedtime  mirtazapine Soltab 30 milliGRAM(s) Oral at bedtime  multivitamin 1 Tablet(s) Oral daily  pantoprazole    Tablet 40 milliGRAM(s) Oral before breakfast  thiamine 100 milliGRAM(s) Oral daily  tiotropium 18 MICROgram(s) Capsule 1 Capsule(s) Inhalation daily    MEDICATIONS  (PRN):  LORazepam   Injectable 2 milliGRAM(s) IV Push every 2 hours PRN CIWA-Ar score increase by 2 points and a total score of 7 or less        I&O's Summary      PHYSICAL EXAM:  Vital Signs Last 24 Hrs  T(C): 37.2 (13 Feb 2021 11:19), Max: 37.2 (13 Feb 2021 11:19)  T(F): 98.9 (13 Feb 2021 11:19), Max: 98.9 (13 Feb 2021 11:19)  HR: 108 (13 Feb 2021 11:19) (96 - 108)  BP: 123/87 (13 Feb 2021 11:19) (121/86 - 123/87)  BP(mean): --  RR: 18 (13 Feb 2021 11:19) (18 - 18)  SpO2: 98% (13 Feb 2021 11:19) (97% - 98%)      Constitutional: NAD, Male resting, Mild tremors  ENT: MMM, no thrush, Supple, No JVD  Lungs: Clear to auscultation bilaterally, good air entry, non-labored breathing  Cardio: RRR, S1/S2, No murmur  Abdomen: Soft, Nontender, Nondistended; Bowel sounds present  Extremities: No calf tenderness, No pitting edema  Musculoskeletal:   No clubbing or cyanosis of digits; no joint swelling or tenderness to palpation  Psych: A+O to person, place, and time; affect appropriate  Neuro: CN 2-12 are intact and symmetric; no gross sensory deficits;   Skin: No rashes; no palpable lesions    LABS:                        10.8   4.90  )-----------( 245      ( 13 Feb 2021 09:00 )             35.0     02-13    137  |  101  |  14.0  ----------------------------<  161<H>  3.3<L>   |  21.0<L>  |  0.76    Ca    9.1      13 Feb 2021 09:00  Phos  3.1     02-13  Mg     1.8     02-13    TPro  7.7  /  Alb  4.4  /  TBili  0.3<L>  /  DBili  x   /  AST  30  /  ALT  17  /  AlkPhos  86  02-12              CAPILLARY BLOOD GLUCOSE            RADIOLOGY REVIEWED  
[facility] Division of Hospital Medicine    Chief Complaint:  Patient is a 55y old  Male who presents with a chief complaint of ETOH withdrawal (14 Feb 2021 14:14)      SUBJECTIVE / OVERNIGHT EVENTS:  Patient was seen and examined at bedside. Currently denies SI/HI. Offers no complaints. 1:1 at bedside.   Patient denies chest pain, SOB, abd pain, N/V, fever, chills, dysuria or any other complaints. All remainder ROS negative.     MEDICATIONS  (STANDING):  ALBUTerol    90 MICROgram(s) HFA Inhaler 2 Puff(s) Inhalation every 6 hours  chlordiazePOXIDE   Oral   chlordiazePOXIDE 50 milliGRAM(s) Oral once  enoxaparin Injectable 40 milliGRAM(s) SubCutaneous daily  ferrous    sulfate 325 milliGRAM(s) Oral daily  folic acid 1 milliGRAM(s) Oral daily  influenza   Vaccine 0.5 milliLiter(s) IntraMuscular once  melatonin 3 milliGRAM(s) Oral at bedtime  mirtazapine Soltab 30 milliGRAM(s) Oral at bedtime  multivitamin 1 Tablet(s) Oral daily  pantoprazole    Tablet 40 milliGRAM(s) Oral before breakfast  tiotropium 18 MICROgram(s) Capsule 1 Capsule(s) Inhalation daily    MEDICATIONS  (PRN):  LORazepam   Injectable 2 milliGRAM(s) IV Push every 2 hours PRN CIWA-Ar score increase by 2 points and a total score of 7 or less        I&O's Summary    15 Feb 2021 07:01  -  15 Feb 2021 13:14  --------------------------------------------------------  IN: 116 mL / OUT: 0 mL / NET: 116 mL        PHYSICAL EXAM:  Vital Signs Last 24 Hrs  T(C): 36.8 (15 Feb 2021 11:11), Max: 37.2 (14 Feb 2021 13:29)  T(F): 98.3 (15 Feb 2021 11:11), Max: 99 (14 Feb 2021 16:50)  HR: 100 (15 Feb 2021 11:11) (89 - 102)  BP: 141/87 (15 Feb 2021 11:11) (114/79 - 141/87)  BP(mean): --  RR: 18 (15 Feb 2021 11:11) (18 - 20)  SpO2: 92% (15 Feb 2021 11:11) (92% - 100%)      Constitutional: NAD, well-developed, well-groomed  ENT: MMM, no thrush, Supple, No JVD  Lungs: Clear to auscultation bilaterally, good air entry, non-labored breathing  Cardio: RRR, S1/S2, No murmur  Abdomen: Soft, Nontender, Nondistended; Bowel sounds present  Extremities: No calf tenderness, No pitting edema  Musculoskeletal:   No clubbing or cyanosis of digits; no joint swelling or tenderness to palpation  Psych: A+O to person, place, and time; affect appropriate  Neuro: CN 2-12 are intact and symmetric; no gross sensory deficits;   Skin: No rashes; no palpable lesions    LABS:                        10.4   6.37  )-----------( 239      ( 14 Feb 2021 08:20 )             34.1     02-14    140  |  104  |  17.0  ----------------------------<  128<H>  4.2   |  20.0<L>  |  0.91    Ca    9.1      14 Feb 2021 08:20  Phos  2.6     02-14  Mg     1.8     02-14    TPro  7.1  /  Alb  4.2  /  TBili  <0.2<L>  /  DBili  x   /  AST  19  /  ALT  17  /  AlkPhos  79  02-14              CAPILLARY BLOOD GLUCOSE            RADIOLOGY REVIEWED

## 2021-02-15 NOTE — ED ADULT NURSE NOTE - NSIMPLEMENTINTERV_GEN_ALL_ED
Implemented All Universal Safety Interventions:  [place] to call system. Call bell, personal items and telephone within reach. Instruct patient to call for assistance. Room bathroom lighting operational. Non-slip footwear when patient is off stretcher. Physically safe environment: no spills, clutter or unnecessary equipment. Stretcher in lowest position, wheels locked, appropriate side rails in place.

## 2021-02-15 NOTE — PROGRESS NOTE ADULT - ASSESSMENT
54 y/o M w/ a PMH of ETOH abuse (1 gallon of vodka daily as per pt), HTN, HLD, depression, asthma, GERD comes in w/ suicidal ideations.  Psych was consulted for evaluation and confirms pt has SI and needs inpt however concern for ETOH withdrawal.  On initial exam pt is exhibiting signs of active withdrawal.  Will admit pt and place on CIWA protocol.  Pt also has active SI and will therefore c/w 1:1 monitoring.       ETOH withdrawal   - Pt actively withdrawing   - Significant hx of ETOH dependence  - Utox (+) for benzo and BAL >200 on admission  - Place on Librium taper and PRN Ativan per protocol  - f/u Mg and Phos levels in the AM and supplement as needed    - C/w thiamine, folate and MV daily    AGMA likely 2/2 ETOH ketoacidosis - Improving  - AG mildly elevated and serum bicarb low  - Fluids was given    Depression w/ active SI  - Psych following  - Will likely be d/c'd to inpt behavioral facility once medically stable  - c/w 1:1 observation     Normocytic anemia  - H/H low but at base line (Hb 10-11)  - Hemodynamically stable  - Monitor cbc daily and maintain Hb >7    Asthma  - Not in acute exacerbation  - c/w home medications    GERD  - c/w protonix     VTE ppx: lovenox SQ    Needs psych follow up.  Dispo:  d/c'd to inpt behavioral facility once medically stable. 54 y/o M w/ a PMH of ETOH abuse (1 gallon of vodka daily as per pt), HTN, HLD, depression, asthma, GERD comes in w/ suicidal ideations.  Psych was consulted for evaluation and confirms pt has SI and needs inpt however concern for ETOH withdrawal.  On initial exam pt is exhibiting signs of active withdrawal.  Will admit pt and place on CIWA protocol.  Pt also has active SI and will therefore c/w 1:1 monitoring.       ETOH withdrawal   - Significant hx of ETOH dependence  - Utox (+) for benzo and BAL >200 on admission  - Place on Librium taper and PRN Ativan per protocol  - f/u Mg and Phos levels in the AM and supplement as needed    - C/w thiamine, folate and MV daily    AGMA likely 2/2 ETOH ketoacidosis - Improving  - AG mildly elevated and serum bicarb low  - Fluids was given    Depression w/ active SI  - Psych following  - Will likely be d/c'd to inpt behavioral facility once medically stable  - c/w 1:1 observation     Normocytic anemia  - H/H low but at base line (Hb 10-11)  - Hemodynamically stable  - Monitor cbc daily and maintain Hb >7    Asthma  - Not in acute exacerbation  - c/w home medications    GERD  - c/w protonix     VTE ppx: lovenox SQ    Needs psych follow up.  Dispo:  d/c'd to inpt behavioral facility once medically stable.

## 2021-02-15 NOTE — ED PROVIDER NOTE - OBJECTIVE STATEMENT
54y/o M with PMHx of HTN, Asthma and ETOH abuse presents to the ED for intox. Pt states he does not know why he is here, admits to drinking "a little bit" of alcohol. No current complaints. No recent injury, trauma or fall. Unable to obtain HPI secondary to condition.

## 2021-02-15 NOTE — DISCHARGE NOTE NURSING/CASE MANAGEMENT/SOCIAL WORK - PATIENT PORTAL LINK FT
You can access the FollowMyHealth Patient Portal offered by [facility] by registering at the following website: [email or web address]. By joining [facility]’s FollowMyHealth portal, you will also be able to view your health information using other applications (apps) compatible with our system.

## 2021-02-15 NOTE — ED ADULT TRIAGE NOTE - CHIEF COMPLAINT QUOTE
Patient with etoh intoxication. nausea/vomiting. patient changed into yellow gown, belongings out of reach.

## 2021-02-16 ENCOUNTER — EMERGENCY (EMERGENCY)
Facility: HOSPITAL | Age: 56
LOS: 1 days | Discharge: HOME | End: 2021-02-16
Attending: EMERGENCY MEDICINE
Payer: COMMERCIAL

## 2021-02-16 ENCOUNTER — EMERGENCY (EMERGENCY)
Facility: HOSPITAL | Age: 56
LOS: 1 days | Discharge: HOME | End: 2021-02-16
Attending: STUDENT IN AN ORGANIZED HEALTH CARE EDUCATION/TRAINING PROGRAM
Payer: COMMERCIAL

## 2021-02-16 ENCOUNTER — NON-APPOINTMENT (OUTPATIENT)
Age: 56
End: 2021-02-16

## 2021-02-16 VITALS
HEART RATE: 96 BPM | HEIGHT: 65 IN | WEIGHT: 179.9 LBS | SYSTOLIC BLOOD PRESSURE: 110 MMHG | TEMPERATURE: 98 F | DIASTOLIC BLOOD PRESSURE: 74 MMHG | RESPIRATION RATE: 18 BRPM | OXYGEN SATURATION: 95 %

## 2021-02-16 VITALS
OXYGEN SATURATION: 98 % | SYSTOLIC BLOOD PRESSURE: 130 MMHG | RESPIRATION RATE: 16 BRPM | HEART RATE: 88 BPM | DIASTOLIC BLOOD PRESSURE: 78 MMHG

## 2021-02-16 VITALS
RESPIRATION RATE: 20 BRPM | SYSTOLIC BLOOD PRESSURE: 127 MMHG | TEMPERATURE: 98 F | OXYGEN SATURATION: 98 % | HEART RATE: 87 BPM | DIASTOLIC BLOOD PRESSURE: 84 MMHG

## 2021-02-16 VITALS — HEIGHT: 65 IN

## 2021-02-16 LAB — SARS-COV-2 RNA SPEC QL NAA+PROBE: SIGNIFICANT CHANGE UP

## 2021-02-16 PROCEDURE — 82962 GLUCOSE BLOOD TEST: CPT

## 2021-02-16 PROCEDURE — 99283 EMERGENCY DEPT VISIT LOW MDM: CPT

## 2021-02-16 PROCEDURE — 99285 EMERGENCY DEPT VISIT HI MDM: CPT

## 2021-02-16 PROCEDURE — U0003: CPT

## 2021-02-16 PROCEDURE — G0378: CPT

## 2021-02-16 PROCEDURE — 99284 EMERGENCY DEPT VISIT MOD MDM: CPT

## 2021-02-16 PROCEDURE — U0005: CPT

## 2021-02-16 PROCEDURE — 99217: CPT

## 2021-02-16 RX ORDER — HALOPERIDOL DECANOATE 100 MG/ML
5 INJECTION INTRAMUSCULAR ONCE
Refills: 0 | Status: COMPLETED | OUTPATIENT
Start: 2021-02-16 | End: 2021-02-16

## 2021-02-16 RX ADMIN — Medication 50 MILLIGRAM(S): at 06:27

## 2021-02-16 RX ADMIN — HALOPERIDOL DECANOATE 5 MILLIGRAM(S): 100 INJECTION INTRAMUSCULAR at 22:50

## 2021-02-16 RX ADMIN — Medication 2 MILLIGRAM(S): at 22:50

## 2021-02-16 NOTE — ED ADULT NURSE NOTE - OBJECTIVE STATEMENT
PT coming into ED for ETOH.  PT admits to drinking an unknown amount of alcohol.  Denies drug use or trauma.  NO signs of injury.  PT dressed in yellow gown, safety maintained.

## 2021-02-16 NOTE — ED ADULT TRIAGE NOTE - CHIEF COMPLAINT QUOTE
pt awake and alert, BIBA after refusing placement and d/c'd home this afternoon. pt refusing to speak or provide any information, placed in yellow gown. belongings secured and labeled.

## 2021-02-16 NOTE — ED ADULT TRIAGE NOTE - PAIN: PRESENCE, MLM
Impression: Combined forms of age-related cataract, bilateral: H25.813. Plan: Cataracts account for the patient's complaints. Medium. No treatment currently recommended. The patient will monitor vision changes and contact us with any decrease in vision. denies pain/discomfort

## 2021-02-16 NOTE — ED CDU PROVIDER INITIAL DAY NOTE - PHYSICAL EXAMINATION
General: no signs of trauma, somnolent, in no apparent distress.  Head: AT, NC  HEENT: Airway patent., no carrillo sign, no raccoon eyes, no hemotympanum   Eyes: clear bilaterally, pupils equal, round and reactive to light.  Cardiac: Normal rate, regular rhythm.  Heart sounds S1, S2.    Respiratory: Breath sounds clear and equal bilaterally.  Abdomen soft, non-tender, no guarding.  MSK: moving all extremities x 4  Neuro: somnolent, follows commands, ataxia, slurred speech  Skin: normal color.

## 2021-02-16 NOTE — ED ADULT TRIAGE NOTE - CHIEF COMPLAINT QUOTE
Pt A&Ox2 states "I drank vodka."  BIBA c/o ETOH intoxication. Patient found to have half a pint bottle of vodka on person, pt has no obvious signs of trauma or injury, placed in yellow gown, belongings secured.

## 2021-02-16 NOTE — CHART NOTE - NSCHARTNOTEFT_GEN_A_CORE
SOCIAL WORK NOTE:  DISCUSSED CASE WITH SBIRT. SHE REPORTED PATIENT WANTED DETOX BUT THEN SHE WAS NOTIFIED BY MD THAT PATIENT CHANGED HIS MIND. RACQUEL ET WITH PATIENT.  PATIENT WAS AXOX4 UPON APPROACH AND SITTING UP IN STRETCHER IN YELLOW GOWN.  PATIENT CONFIRMED THAT HE NO LONGER WANTS ANYTHING AND THAT HIS UNCLE IS ON THE WAY TO PICK HIM UP.  RACQUEL OFFERED PATIENT RESOURCES. PATIENT DECLINED AND REPORTED HE WOULD RETURN TO ED OR CALL SBIRT SERVICES IF HE CHANGES HIS MIND OR FEELS HE NEEDS IT.  PATIENT DECLINED ANY FURTHER ASSISTANCE.

## 2021-02-16 NOTE — ED CDU PROVIDER DISPOSITION NOTE - NSFOLLOWUPINSTRUCTIONS_ED_ALL_ED_FT
- Follow up with your doctor within 2-3 days.   - Bring results with you to the appointment.   - Return to the ED for any new or worsening symptoms.     Alcohol Abuse    Alcohol intoxication occurs when the amount of alcohol that a person has consumed impairs his or her ability to mentally and physically function. Chronic alcohol consumption can also lead to a variety of health issues including neurological disease, stomach disease, heart disease, liver disease, etc. Do not drive after drinking alcohol. Drinking enough alcohol to end up in an Emergency Room suggests you may have an alcohol abuse problem. Seek help at a drug addiction center.    SEEK IMMEDIATE MEDICAL CARE IF YOU HAVE ANY OF THE FOLLOWING SYMPTOMS: seizures, vomiting blood, blood in your stool, lightheadedness/dizziness, or becoming shaky to tremulous when you stop drinking.

## 2021-02-16 NOTE — ED PROVIDER NOTE - NEURO NEGATIVE STATEMENT, MLM
no loss of consciousness, no gait abnormality, no headache, no sensory deficits, and no weakness. Yes

## 2021-02-16 NOTE — SBIRT NOTE ADULT - NSSBIRTUNABLESCROTHER_GEN_A_CORE
SBIRT coordinating with RN to reach patient via phone. SBIRT coordinating with RN to reach patient via phone. Unsuccessful attempts at reaching patient's listed phone numbers in EMR.

## 2021-02-16 NOTE — ED ADULT NURSE REASSESSMENT NOTE - NS ED NURSE REASSESS COMMENT FT1
pt ambulating with steady gait, tolerating PO. Vital Signs Stable. denies SI/HI or hallucinations. pt d/c in stable condition, no apparent distress noted at this time. pt A&Ox3. pt able to ambulate with steady gait. pt in no distress at d/c.

## 2021-02-16 NOTE — ED CDU PROVIDER DISPOSITION NOTE - PATIENT PORTAL LINK FT
You can access the FollowMyHealth Patient Portal offered by [facility] by registering at the following website: [email or web address]. By joining [email or web address]’s FollowMyHealth portal, you will also be able to view your health information using other applications (apps) compatible with our system.

## 2021-02-16 NOTE — ED PROVIDER NOTE - CLINICAL SUMMARY MEDICAL DECISION MAKING FREE TEXT BOX
pt now sober, no sign of intox or withdrawal, well appearing, states his uncle has a contact for detox and he wishes to leave to pursue that lead and declines our SBIRT/SW

## 2021-02-16 NOTE — ED PROVIDER NOTE - OBJECTIVE STATEMENT
55 year old male with PMH etoh abuse presents with intoxication. pt is well known to this dept for frequent visits for intoxication. He was discharged this morning from the hospital, and states that he went straight to the liquor store and drank vodka. Denies complaints at this time including no chets pain, SOB, abd pain, headache.

## 2021-02-16 NOTE — ED CDU PROVIDER DISPOSITION NOTE - CLINICAL COURSE
Patient presented in alcohol intox. When clinically sober stated he wants housing. Seen by SBIRT in the morning and patient wanted inpatient alcohol detox. Patient has changed his mind, his friend is here to pick him up. No longer wants housing or detox. Return precautions given.

## 2021-02-16 NOTE — ED PROVIDER NOTE - PATIENT PORTAL LINK FT
You can access the FollowMyHealth Patient Portal offered by [facility] by registering at the following website: [email or web address]. By joining [employer]’s FollowMyHealth portal, you will also be able to view your health information using other applications (apps) compatible with our system.

## 2021-02-16 NOTE — ED ADULT NURSE NOTE - OBJECTIVE STATEMENT
55y male Alert and oriented, pt being uncooperative with this RN regarding reasoning for coming to ED. pt continuing to deny substance use of ETOH use. pt recently discharged from [facility] this AM. pt in yellow gown, belongings secured. unable to obtain medical hx secondary to pt being uncooperative. denies SI/HI or hallucinations.

## 2021-02-16 NOTE — SBIRT NOTE ADULT - NSSBIRTALCPASSREFTXDET_GEN_A_CORE
Provided SBIRT services: Referral to Treatment Performed. Screening results reviewed with patient and patient provided information regarding healthy guidelines and potential negative consequences associated with level of risk. Motivation and readiness to reduce or stop use was discussed and goals and activities to make changes were suggested/offered. Referral for complete assessment and level of care determination at a certified treatment facility was completed by giving the patient information for treatment facilities that met their needs and encouraging them to call for an appointment. A call was not made to a facility because Patient not interested at this. Discussed [facility]; patient changed mind and is no longer interested in pursuing at this time.    Provided SBIRT services: Referral to Treatment Performed. Screening results reviewed with patient and patient provided information regarding healthy guidelines and potential negative consequences associated with level of risk. Motivation and readiness to reduce or stop use was discussed and goals and activities to make changes were suggested/offered. Referral for complete assessment and level of care determination at a certified treatment facility was completed by giving the patient information for treatment facilities that met their needs and encouraging them to call for an appointment. A call was not made to a facility because patient is not interested. Discussed [facility]; patient changed mind and is no longer interested in pursuing at this time.

## 2021-02-16 NOTE — ED ADULT NURSE REASSESSMENT NOTE - NS ED NURSE REASSESS COMMENT FT1
pt. received from night RN in yellow gown. pt sleeping, easy to arouse. pt. a&ox3. pt. states he came in for drinking. pt states his last drink was a couple of hours ago. pt. needs housing. pt. in no apparent distress at this time, breathing even and unlabored.

## 2021-02-16 NOTE — ED CDU PROVIDER INITIAL DAY NOTE - OBJECTIVE STATEMENT
56y/o M with PMHx of HTN, Asthma and ETOH abuse presents to the ED for intox. Pt states he does not know why he is here, admits to drinking "a little bit" of alcohol. No current complaints. No recent injury, trauma or fall. Unable to obtain HPI secondary to condition.

## 2021-02-16 NOTE — ED ADULT NURSE REASSESSMENT NOTE - NS ED NURSE REASSESS COMMENT FT1
PT with agitation.  Screaming "Im leaving now"  attempting to get out of bed.  Unable to be deescalated at this time.  Dr. Isaac at bedside, will medicated per orders.

## 2021-02-16 NOTE — ED ADULT NURSE REASSESSMENT NOTE - NS ED NURSE REASSESS COMMENT FT1
Assumed care of patient from previous RN. pt is awake and alert. Resting on stretcher in yellow gown. Bed in lowest position. Safety maintained.

## 2021-02-16 NOTE — ED PROVIDER NOTE - CONSTITUTIONAL, MLM
clinically intoxicated, awake, alert, oriented to person, place, time/situation and in no apparent distress. normal...

## 2021-02-17 ENCOUNTER — RX RENEWAL (OUTPATIENT)
Age: 56
End: 2021-02-17

## 2021-02-17 VITALS
TEMPERATURE: 98 F | SYSTOLIC BLOOD PRESSURE: 129 MMHG | OXYGEN SATURATION: 96 % | HEART RATE: 101 BPM | DIASTOLIC BLOOD PRESSURE: 87 MMHG | RESPIRATION RATE: 18 BRPM

## 2021-02-17 PROCEDURE — 82962 GLUCOSE BLOOD TEST: CPT

## 2021-02-17 PROCEDURE — 36415 COLL VENOUS BLD VENIPUNCTURE: CPT

## 2021-02-17 PROCEDURE — 99285 EMERGENCY DEPT VISIT HI MDM: CPT

## 2021-02-17 PROCEDURE — 96372 THER/PROPH/DIAG INJ SC/IM: CPT

## 2021-02-17 PROCEDURE — 99220: CPT

## 2021-02-17 PROCEDURE — G0378: CPT

## 2021-02-17 RX ADMIN — Medication 100 MILLIGRAM(S): at 08:46

## 2021-02-17 NOTE — ED PROVIDER NOTE - NSFOLLOWUPINSTRUCTIONS_ED_ALL_ED_FT
Abuse of Alcohol    WHAT YOU NEED TO KNOW:    Alcohol abuse means you drink more than the recommended daily or weekly limits. You may be drinking alcohol regularly or drinking large amounts in a short period of time (binge drinking). You continue to drink even though it causes legal, work, or relationship problems.    DISCHARGE INSTRUCTIONS:    Call your local emergency number (911 in the ) for any of the following:   •You have sudden chest pain or trouble breathing.      •You want to harm yourself or others.      •You have a seizure or have shaking or trembling.      Call your doctor if:   •You have hallucinations (you see or hear things that are not real).      •You cannot stop vomiting or you vomit blood.      •You need help to stop drinking alcohol.       •You have questions or concerns about your condition or care.      Medicines:   •Vitamin supplements may be given to treat low vitamin levels. Alcohol can make it hard for your body to absorb enough vitamins such as B1. Vitamin supplements may also be given to prevent alcohol related brain damage.       •Take your medicine as directed. Contact your healthcare provider if you think your medicine is not helping or if you have side effects. Tell him or her if you are allergic to any medicine. Keep a list of the medicines, vitamins, and herbs you take. Include the amounts, and when and why you take them. Bring the list or the pill bottles to follow-up visits. Carry your medicine list with you in case of an emergency.      Health problems alcohol abuse can cause:   •Cancer in your liver, pancreas, stomach, colon, kidney, or breast      •Stroke or a heart attack      •Liver, kidney, or lung disease      •Blackouts, memory loss, brain damage, or dementia      •Diabetes, immune system problems, or thiamine (vitamin B1) deficiency      •Problems for you and your baby if you drink while pregnant      Recommended alcohol limits:   •Men 21 to 64 years should limit alcohol to 2 drinks a day. Do not have more than 4 drinks in 1 day or more than 14 in 1 week.      •All women, and men 65 or older should limit alcohol to 1 drink in a day. Do not have more than 3 drinks in 1 day or more than 7 in 1 week. No amount of alcohol is okay during pregnancy.      Manage alcohol use:   •Decrease the amount you drink. This can help prevent health problems such as brain, heart, and liver damage, high blood pressure, diabetes, and cancer. If you cannot stop completely, healthcare providers can help you set goals to decrease the amount you drink.      •Plan weekly alcohol use. You will be less likely to drink more than the recommended limit if you plan ahead.      •Have food when you drink alcohol. Food will prevent alcohol from getting into your system too quickly. Eat before you have your first alcohol drink.      •Time your drinks carefully. Have no more than 1 drink in an hour. Have a liquid such as water, coffee, or a soft drink between alcohol drinks.      •Do not drive if you have had alcohol. Make sure someone who has not been drinking can help you get home.      •Do not drink alcohol if you are taking medicine. Alcohol is dangerous when you combine it with certain medicines, such as acetaminophen or blood pressure medicine. Talk to your healthcare provider about all the medicines you currently take.      Follow up with your healthcare provider as directed: Write down your questions so you remember to ask them during your visits.    For support and more information:   •Alcoholics Anonymous  Web Address: http://www.aa.org      •Substance Abuse and Mental Health Services Administration  [address]  [place] [address]  Web Address: http://www.samhsa.gov

## 2021-02-17 NOTE — ED ADULT NURSE REASSESSMENT NOTE - NS ED NURSE REASSESS COMMENT FT1
Late Note  Report received from outgoing DENG Guan at change of shift and assumed care of pt at that time. Pt received in yellow gown, sleeping peacefully on stretcher, breathing even and unlabored, fall precautions are in place, will continue to monitor.

## 2021-02-17 NOTE — ED CDU PROVIDER DISPOSITION NOTE - PATIENT PORTAL LINK FT
You can access the FollowMyHealth Patient Portal offered by [facility] by registering at the following website: [email or web address]. By joining [employer]s FollowMyHealth portal, you will also be able to view your health information using other applications (apps) compatible with our system.

## 2021-02-17 NOTE — ED PROVIDER NOTE - PROGRESS NOTE DETAILS
Patient became agitated and requesting to leave. However, given clear signs of intoxication patient not safe for discharge.   Patient currently sedated and appears to be resting comfortably.

## 2021-02-17 NOTE — CHART NOTE - NSCHARTNOTEFT_GEN_A_CORE
SOCIAL WORK NOTE:  THIS WORKER WAS NOTIFIED BY RN THAT PATIENT WAS LOOKING FOR HOUSING AS HAS NOWHERE TO GO. RN CONFIRMED THAT PATIENT HAD A COVID TEST WHICH WAS RESULTED AS NEGATIVE.  RN ALSO REPORTED THAT PATIENT WAS REQUESTING SOME CLOTHING.  PLACED CALL TO [facility] PLACEMENT UNIT # [phone] AND SPOKE WITH GUEVARA ROWLAND. SHE REPORTED THE PATENT HAS NOT USED SERVICES SINCE 2019 AND ALSO CONFIRMED HE HAS NO SOURCE OF INCOME.  SHE APPROVED PATIENT FOR PLACEMENT AT [facility] LOCATED AT [address] [place] [address].  MADE ARRANGEMENTS FOR THE PATIENT TO BE TRANSFERRED IN TAXI AT 12:15PM. PROVIDED PATIENT WITH SOME CLOTHING AND ADDITIONAL RESOURCES FOR FOOD PANTRIES, [employer] SHELTERS AND BUS TICKETS.  RN AWARE.  COVID NEGATIVE RESULT FAXED TO [facility] PER THEIR REQUEST TO FAX # [phone].

## 2021-02-17 NOTE — ED CDU PROVIDER DISPOSITION NOTE - CLINICAL COURSE
Patient presented in acute alcohol intoxication whom is now sober. Patient requesting assistance with shelter placement.

## 2021-02-17 NOTE — ED PROVIDER NOTE - OBJECTIVE STATEMENT
56 yo male whom is well known to this ER department for alcohol abuse presents after being found sleeping outside. Patient endorses recent alcohol use. Limited historian at this time.

## 2021-02-17 NOTE — ED ADULT NURSE REASSESSMENT NOTE - NS ED NURSE REASSESS COMMENT FT1
Pt in no acute distress, rouses to voice and subsequently alert and oriented to self, place and situation requesting assistance with housing, Dr Isaac at bedside, fall precautions in place, will continue to monitor.

## 2021-02-19 ENCOUNTER — NON-APPOINTMENT (OUTPATIENT)
Age: 56
End: 2021-02-19

## 2021-03-01 ENCOUNTER — EMERGENCY (EMERGENCY)
Facility: HOSPITAL | Age: 56
LOS: 1 days | Discharge: HOME | End: 2021-03-01
Attending: EMERGENCY MEDICINE
Payer: COMMERCIAL

## 2021-03-01 VITALS
TEMPERATURE: 98 F | WEIGHT: 169.98 LBS | HEART RATE: 97 BPM | HEIGHT: 65 IN | DIASTOLIC BLOOD PRESSURE: 84 MMHG | SYSTOLIC BLOOD PRESSURE: 146 MMHG | OXYGEN SATURATION: 98 % | RESPIRATION RATE: 18 BRPM

## 2021-03-01 PROCEDURE — 99284 EMERGENCY DEPT VISIT MOD MDM: CPT

## 2021-03-01 RX ORDER — HALOPERIDOL DECANOATE 100 MG/ML
5 INJECTION INTRAMUSCULAR ONCE
Refills: 0 | Status: COMPLETED | OUTPATIENT
Start: 2021-03-01 | End: 2021-03-01

## 2021-03-01 RX ADMIN — HALOPERIDOL DECANOATE 5 MILLIGRAM(S): 100 INJECTION INTRAMUSCULAR at 22:46

## 2021-03-01 RX ADMIN — Medication 2 MILLIGRAM(S): at 22:45

## 2021-03-01 NOTE — ED PROVIDER NOTE - OBJECTIVE STATEMENT
56 y/o male with PMHx of alcohol abuse, Asthma, GERD, HLD, and HTN presents to ED for detox. Patient reports he is homeless and is requesting detox at this time.    denies fever. denies HA or neck pain. no chest pain or sob. no abd pain. no n/v/d. no urinary f/u/d. no back pain. no motor or sensory deficits. denies illicit drug use. no recent travel. no rash. no other acute issues symptoms or concerns

## 2021-03-01 NOTE — ED PROVIDER NOTE - PATIENT PORTAL LINK FT
You can access the FollowMyHealth Patient Portal offered by [facility] by registering at the following website: [email or web address]. By joining [employer]’s FollowMyHealth portal, you will also be able to view your health information using other applications (apps) compatible with our system.

## 2021-03-01 NOTE — ED PROVIDER NOTE - PROGRESS NOTE DETAILS
Sarah ISLAS for ED attending, Dr. Jin. Had to sedate patient due to agitation. Patient was drunk and smells of alcohol

## 2021-03-01 NOTE — ED ADULT TRIAGE NOTE - CHIEF COMPLAINT QUOTE
Patient admits to etoh tonight, stating he is here for detox. denies si/hi. denies drugs. denies chest pain or shortness of breath.

## 2021-03-02 ENCOUNTER — EMERGENCY (EMERGENCY)
Facility: HOSPITAL | Age: 56
LOS: 1 days | Discharge: HOME | End: 2021-03-02
Attending: EMERGENCY MEDICINE
Payer: COMMERCIAL

## 2021-03-02 VITALS
DIASTOLIC BLOOD PRESSURE: 93 MMHG | RESPIRATION RATE: 18 BRPM | TEMPERATURE: 98 F | HEART RATE: 88 BPM | OXYGEN SATURATION: 96 % | SYSTOLIC BLOOD PRESSURE: 133 MMHG

## 2021-03-02 VITALS — HEIGHT: 65 IN

## 2021-03-02 VITALS
HEART RATE: 95 BPM | TEMPERATURE: 99 F | RESPIRATION RATE: 18 BRPM | DIASTOLIC BLOOD PRESSURE: 70 MMHG | OXYGEN SATURATION: 96 % | SYSTOLIC BLOOD PRESSURE: 109 MMHG

## 2021-03-02 VITALS
TEMPERATURE: 98 F | SYSTOLIC BLOOD PRESSURE: 111 MMHG | RESPIRATION RATE: 18 BRPM | OXYGEN SATURATION: 98 % | HEART RATE: 90 BPM | DIASTOLIC BLOOD PRESSURE: 72 MMHG

## 2021-03-02 PROCEDURE — 99285 EMERGENCY DEPT VISIT HI MDM: CPT | Mod: 25

## 2021-03-02 PROCEDURE — 96374 THER/PROPH/DIAG INJ IV PUSH: CPT

## 2021-03-02 PROCEDURE — 70450 CT HEAD/BRAIN W/O DYE: CPT | Mod: 26,MG

## 2021-03-02 PROCEDURE — G0378: CPT

## 2021-03-02 PROCEDURE — 99218: CPT

## 2021-03-02 PROCEDURE — 99284 EMERGENCY DEPT VISIT MOD MDM: CPT

## 2021-03-02 PROCEDURE — 99234 HOSP IP/OBS SM DT SF/LOW 45: CPT

## 2021-03-02 PROCEDURE — 99285 EMERGENCY DEPT VISIT HI MDM: CPT

## 2021-03-02 PROCEDURE — 96372 THER/PROPH/DIAG INJ SC/IM: CPT

## 2021-03-02 PROCEDURE — G1004: CPT

## 2021-03-02 NOTE — ED ADULT TRIAGE NOTE - CHIEF COMPLAINT QUOTE
pt BIBA from the bank after pt fell while taking money out of [place]. denies loc. denies blood thinners. no external signs of injury or trauma noted. this is pt's 3rd ER visit of today. pt admits to drinking alcohol today. dr. juarez called to bedside for eval.

## 2021-03-02 NOTE — SBIRT NOTE ADULT - NSSBIRTALCPASSREFTXDET_GEN_A_CORE
Provided SBIRT services: Referral to Treatment Performed. Screening results reviewed with patient and patient provided information regarding healthy guidelines and potential negative consequences associated with level of risk. Motivation and readiness to reduce or stop use was discussed and goals and activities to make changes were suggested/offered. Referral for complete assessment and level of care determination at a certified treatment facility was completed by contacting the treatment facility via phone. SBIRT linked patient to [facility]; completed initial phone screen, possible bed opportunity. Patient not interested in proceeding today. [facility] contacted information was given to patient.

## 2021-03-02 NOTE — ED ADULT TRIAGE NOTE - CHIEF COMPLAINT QUOTE
pt BIBA from street c/o alcohol intoxication. admits to drinking 1 quart of vodka since being discharged at 0945 today. denies SI/HI. calm and cooperative in triage. no external signs of trauma or injuries.

## 2021-03-02 NOTE — ED ADULT NURSE NOTE - OBJECTIVE STATEMENT
Pt in no apparent distress at this time. Airway patent, breathing spontaneous and nonlabored. pt sedated prior to coming to room. per report, pt was in intake and became aggressive. pt now in yellow gown, on 1:1. sleeping but arousable.

## 2021-03-02 NOTE — ED PROVIDER NOTE - PHYSICAL EXAMINATION
Gen: +etoh on breath  HEENT: Mucous membranes moist, pink conjunctivae, EOMI. NCAT  Neck: no midline ttp  CV: RRR, nl s1/s2.  Resp: CTAB, normal rate and effort  GI: Abdomen soft, NT, ND. No rebound, no guarding  : No CVAT  Neuro: moves all extremities.   MSK: No spine or joint tenderness to palpation  Skin: No rashes. intact and perfused.

## 2021-03-02 NOTE — ED PROVIDER NOTE - CHIEF COMPLAINT
[place] Infant Record


Exam Date & Time


Date seen by provider:  2017


Time seen by provider:  15:15


As delivering Provider





Provider


PCP


Libby





Delivery Assessment


Expected Date of Delivery:  2017


Hx :  3


Hx Para:  2


Gestational Age in Weeks:  38


Gestational Age in Days:  6


Amniotic Membrane Rupture Time:  08:15


Delivery Date:  2016


Delivery Time:  15:15


Condition of Infant:  Living


Infant Delivery Method:  Spontaneous Vaginal


Operative Indications (Cesarea:  N/A-Vaginal Delivery


Anesthesia Type:  None


Prenatal Events:  Routine Prenatal care (Chronic HTN)


Intrapartal Events:  None


Gender:  Female


Viability:  Living





Mother's Group Strep


Mother's Group B Strep:  Negative





Maternal Labs


Blood Type:  B+


HIV:  NR


Hep B:  Negative


Rubella:  Immune





Apgar Score


Apgar Score at 1 Minute:  8


Apgar Score at 5 Minutes:  9





Condition/Feeding


Benefits of breastfeeding discussed with mother.


 Feeding Method:  Breast Milk-Exclusive


Gestation:  Single





Admission Examination


Level of Alertness:  Alert


Cry Description:  Lusty


Activity/State:  Crying


Suckling:  Suckled w Encouragement


Skin:  No Bruising, Stork Bites, Vernix


Fontanelles:  Soft


Anterior [place] Descriptio:  WNL


Cephalohematoma:  No


Sclera Description:  Clear


Ears:  Normal


Mouth, Nose, Eyes:  Hard & Soft Palate Intact, Nares Patent Bilateral


Neck:  Head Mobile, Clavicles Intact


Cardiovascular:  Regular Rhythm, Femoral Pulses Equal


Respiratory:  Regular, Unlabored


Breath Sounds:  Clear


Caput Succedaneum:  Yes


Abdomen:  Soft, Bowel Sounds Audible


Genitalia:  Appear Normal


Back:  Spine Closed, Anus Patent


Hips:  WNL


Movement:  Symmetric-Body, Symmetric-Face


Muscle Tone:  Active


Extremities:  5 digits present on each extremity


Reflexes:  [place], Suck, Grasp-Bilateral





Weight/Height


Birth Weight:  3675


Weight (Pounds):  8


Weight (Ounces):  2





Impression on Admission


Impression on Admission:  Birth, Infant, Living, Term





Progress/Plan/Problem List


Progress/Plan


Term female infant born to a G3 now P3 mother via  @ 38.6 wga after IOL for 

chronic HTN





Plan


- Routine  care


- Breast feeding, lactation to see patient, Daily weights


- Vit K and Erythromycin given


- CCHD/Hearing/Bili pending


- Plan to D/c with parents








Copy


Copies To 1:   DEIRDRE NIÑO MD, HOLLY R MD 2017 15:51 The patient is a 55y Male complaining of altered mental status.

## 2021-03-02 NOTE — ED ADULT TRIAGE NOTE - HEIGHT IN INCHES
Size Of Lesion In Cm: 0.6 Additional Anesthesia Volume In Cc (Will Not Render If 0): 0 Wound Care: Petrolatum Destruction After The Procedure: No 5 Cryotherapy Text: The wound bed was treated with cryotherapy after the biopsy was performed. Post-Care Instructions: I reviewed with the patient in detail post-care instructions. Patient is to keep the biopsy site dry overnight, and then apply Bacitracin, Vaseline or Polysporin once daily until healed. Lab Facility: 3 Anesthesia Type: 1% lidocaine with epinephrine Type Of Destruction Used: Curettage Biopsy Type: H and E Electrodesiccation And Curettage Text: The wound bed was treated with electrodesiccation and curettage after the biopsy was performed. Electrodesiccation Text: The wound bed was treated with electrodesiccation after the biopsy was performed. Notification Instructions: Patient will be notified of biopsy results within 2-3 weeks. Depth Of Biopsy: dermis Consent: Written consent was obtained and risks were reviewed including but not limited to scarring, infection, bleeding, scabbing, incomplete removal, nerve damage and allergy to anesthesia. Render Post-Care Instructions In Note?: yes Silver Nitrate Text: The wound bed was treated with silver nitrate after the biopsy was performed. Anesthesia Volume In Cc (Will Not Render If 0): 0.5 Dressing: bandage Biopsy Method: Dermablade Lab: 6 Detail Level: Detailed Billing Type: Third-Party Bill Hemostasis: Aluminum Chloride Curettage Text: The wound bed was treated with curettage after the biopsy was performed.

## 2021-03-02 NOTE — ED ADULT NURSE NOTE - NSIMPLEMENTINTERV_GEN_ALL_ED
Implemented All Fall with Harm Risk Interventions:  [place] to call system. Call bell, personal items and telephone within reach. Instruct patient to call for assistance. Room bathroom lighting operational. Non-slip footwear when patient is off stretcher. Physically safe environment: no spills, clutter or unnecessary equipment. Stretcher in lowest position, wheels locked, appropriate side rails in place. Provide visual cue, wrist band, yellow gown, etc. Monitor gait and stability. Monitor for mental status changes and reorient to person, place, and time. Review medications for side effects contributing to fall risk. Reinforce activity limits and safety measures with patient and family. Provide visual clues: red socks.

## 2021-03-02 NOTE — ED ADULT NURSE REASSESSMENT NOTE - NS ED NURSE REASSESS COMMENT FT1
assumed pt care at 0730. Pt alert, but sleeping between care. Pt states "I'm fine" when asked how he was feeling then put blanket back over head. NAD noted, respirations even & unlabored. Pt offers no complaints at this time, awaiting SW consult- pt aware of plan of care. Safety maintained. Will continue to monitor.
pt awake & ambulated to bathroom with steady gait. Pt currently eating breakfast and tolerating. NAD noted, safety maintained.
pt screaming yelling cursing at staff, refusing to sit down, sat himself on the floor and refused to get up. Dr Jin at bedside. redirection attempted and unsuccessful. carol not verbalize needs just keeps yelling obscenities at staff. pt medicated as ordered safety maintained.
pt refusing detox or SW help for housing at this time. Pt just wants to be D/C'd and does not want to wait for any help- Dr Enriquez made aware- pt TBDC. Pt A&Ox 4 at this time, ambulatory gait, ate 100% of breakfast tray. PIV removed, catheter intact. Safety maintained.

## 2021-03-02 NOTE — ED ADULT NURSE NOTE - OBJECTIVE STATEMENT
Pt d/c'ed less than 2hrs ago, pt admits to drinking "1 quart of vodka" after d/c, was found intoxicated in street and was picked up by EMS.  Pt offers no c/o pain at this time, No acute s/s of respiratory distress noted or reported at this time, will continue to monitor

## 2021-03-02 NOTE — ED PROVIDER NOTE - CLINICAL SUMMARY MEDICAL DECISION MAKING FREE TEXT BOX
pt with repeated intox, does not want detox. ?fall from standing. no signs trauma. observe for sobriety.

## 2021-03-02 NOTE — ED PROVIDER NOTE - OBJECTIVE STATEMENT
56 y/o male hx etoh abuse here x2 earlier today with etoh in between each time present with intoxication again s/p drinking more and witnessed fall at bank. Pt unable to say what happened and no collateral for details about fall. Pt repeats "I have stomach cancer because prilosec doesn't work anymore". Limited historian, reports drinking more vodka.     limited ros given intox

## 2021-03-02 NOTE — ED CDU PROVIDER INITIAL DAY NOTE - NS ED ROS FT
Review of Systems:  	•	CONSTITUTIONAL - no fever  	•	SKIN - no rash  	•	HEMATOLOGIC - no bleeding, no bruising  	•	EYES - no eye pain, no blurred vision  	•	ENT - no change in hearing, no pain  	•	RESPIRATORY - no shortness of breath, no cough  	•	CARDIAC - no chest pain, no palpitations  	•	GI - no abd pain, no nausea, no vomiting, no diarrhea, no constipation, no bleeding  	•	GENITO-URINARY - no discharge, no dysuria; no hematuria  	•	ENDO - no polydypsia, no polyurea, no heat/no cold intolerance  	•	MUSCULOSKELETAL - no joint pain, no swelling, no redness  	•	NEUROLOGIC - no weakness, no headache, no anesthesia, no paresthesias  	•	PSYCH - no anxiety, non suicidal, non homicidal, no hallucination, no depression  	•	ALLERGY - no rhinitis

## 2021-03-02 NOTE — ED CDU PROVIDER INITIAL DAY NOTE - MEDICAL DECISION MAKING DETAILS
PT with CTH no acute findings, in obs to reassess for clinical sobriety, pt with no complaints at this time

## 2021-03-02 NOTE — ED PROVIDER NOTE - CLINICAL SUMMARY MEDICAL DECISION MAKING FREE TEXT BOX
56 yo male hx alcohol abuse presents to ED BIBA for intoxication. Will monitor the patient and continue to reassess for signs of withdrawal.

## 2021-03-02 NOTE — ED PROVIDER NOTE - OBJECTIVE STATEMENT
56 yo male history of Alcohol Abuse, Asthma, GERD, HLD, HTN BIBA after being found intoxicated in the drink. Patient was d/c from [facility] ED at 0945 this am after presenting yesterday evening for similar complaint. Patient refused SBIRT  around 0900 this am. EMS states that the patient drank roughly 1 quart of vodka PTA at the ED. Currently, patient is sleeping in bed. Does not complain of any falls or trauma. States that he would simply like a place to sleep for a few hours. Further history unobtainable secondary to patient's current intoxicated condition.

## 2021-03-02 NOTE — ED PROVIDER NOTE - ATTENDING CONTRIBUTION TO CARE
Joanne: I performed a face to face evaluation of this patient and performed a full history and physical examination on the patient.  I agree with the resident's history, physical examination, and plan of the patient unless otherwise noted. My brief assessment is as follows: hx etoh abuse, seen here earlier today, refused sw/sbirt, drank "more vodka" found intoxicated in street. knows where he is but slurring words, denies any issues/complaints. non toxic, ncat, no midline neck pain, ctab, rrr, abd benign, old scabbed abrasion to knees, moving all extremities. slurred speech with etoh on breath. observe for sobriety.

## 2021-03-02 NOTE — ED CDU PROVIDER INITIAL DAY NOTE - OBJECTIVE STATEMENT
54 y/o male with PMHx of alcohol abuse, Asthma, GERD, HLD, and HTN presents to ED for detox. Patient reports he is homeless and is requesting detox at this time.    denies fever. denies HA or neck pain. no chest pain or sob. no abd pain. no n/v/d. no urinary f/u/d. no back pain. no motor or sensory deficits. denies illicit drug use. no recent travel. no rash. no other acute issues symptoms or concerns

## 2021-03-02 NOTE — ED PROVIDER NOTE - NSFOLLOWUPCLINICS_GEN_ALL_ED_FT
[id number] [place] [address]  Phone: [phone]  Fax:   Follow Up Time: Urgent

## 2021-03-02 NOTE — ED ADULT NURSE NOTE - NSIMPLEMENTINTERV_GEN_ALL_ED
Implemented All Universal Safety Interventions:  [place] to call system. Call bell, personal items and telephone within reach. Instruct patient to call for assistance. Room bathroom lighting operational. Non-slip footwear when patient is off stretcher. Physically safe environment: no spills, clutter or unnecessary equipment. Stretcher in lowest position, wheels locked, appropriate side rails in place.

## 2021-03-02 NOTE — CHART NOTE - NSCHARTNOTEFT_GEN_A_CORE
SOCIAL WORK NOTE: PATIENT REFUSING SBIRT CONSULT AND NOT MOTIVATED FOR TREATMENT AT THIS TIME AND PATIENT ALSO REFUSING SOCIAL WORK CONSULT.  PATIENT BEING DISCHARGED

## 2021-03-02 NOTE — ED PROVIDER NOTE - IV ALTEPASE ADMIN HIDDEN
Health Maintenance Summary     Topic Due On Due Status Completed On    Pap Smear - Cervical Cancer Screening  Feb 26, 2020 Not Due Feb 26, 2015    Immunization - TDAP Pregnancy  Hidden     IMMUNIZATION - DTaP/Tdap/Td Jul 10, 2025 Not Due Jul 10, 2015    Immunization-Influenza Sep 1, 2017 Not Due Dec 22, 2016          Patient is up to date, no discussion needed. show

## 2021-03-02 NOTE — ED ADULT NURSE NOTE - CAS EDN DISCHARGE ASSESSMENT
She is here for follow-up and at this point doing very very well. She has not had any pain or evidence of drainage from the ears. This is with the exception of day 1 where she had a scant amount of drainage.    Physical examination:  On physical examination she was alert and oriented ×3. Her mood and affect were appropriate. She was well dressed and well kempt. There was no wheezing, stridor, nor audible respirations. The auricles are normal bilaterally as were the ear canals. She had bilaterally patent present PE tubes. There was no evidence of drainage or inflammation.    Impression:  Doing well status post bilateral laryngotomy tube placement    Recommendation:  I would like to see her back in 3-4 months for routine tube check.  
Alert and oriented to person, place and time

## 2021-03-02 NOTE — ED PROVIDER NOTE - PHYSICAL EXAMINATION
Const: Sleeping in bed. Alcohol smell on breath.   HEENT: NC/AT. Moist mucous membranes.  Eyes: No scleral icterus. EOMI.  Neck:. Soft and supple. Full ROM without pain.  Cardiac: Regular rate and regular rhythm. +S1/S2. Peripheral pulses 2+ and symmetric. No LE edema.  Resp: Speaking in full sentences. No evidence of respiratory distress. No wheezes, rales or rhonchi.  Abd: Soft, non-tender, non-distended. Normal bowel sounds in all 4 quadrants. No guarding or rebound.  Back: Spine midline and non-tender. No CVAT.  Skin: No rashes, abrasions or lacerations.  Lymph: No cervical lymphadenopathy.  Neuro: Moves extremities when aroused. Unable to perform full Neurologic assessment secondary to intoxication. Const: Sleeping in bed. Alcohol smell on breath.   HEENT: NC/AT. Moist mucous membranes. No signs of external head trauma.   Eyes: No scleral icterus. EOMI.  Neck:. Soft and supple. Full ROM without pain.  Cardiac: Regular rate and regular rhythm. +S1/S2. Peripheral pulses 2+ and symmetric. No LE edema.  Resp: Speaking in full sentences. No evidence of respiratory distress. No wheezes, rales or rhonchi.  Abd: Soft, non-tender, non-distended. Normal bowel sounds in all 4 quadrants. No guarding or rebound.  Back: Spine midline and non-tender. No CVAT.  Skin: No rashes, abrasions or lacerations.  Lymph: No cervical lymphadenopathy.  Neuro: Moves extremities when aroused. Unable to perform full Neurologic assessment secondary to intoxication.

## 2021-03-03 ENCOUNTER — NON-APPOINTMENT (OUTPATIENT)
Age: 56
End: 2021-03-03

## 2021-03-03 VITALS
RESPIRATION RATE: 18 BRPM | OXYGEN SATURATION: 98 % | TEMPERATURE: 98 F | HEART RATE: 86 BPM | SYSTOLIC BLOOD PRESSURE: 124 MMHG | DIASTOLIC BLOOD PRESSURE: 56 MMHG

## 2021-03-03 LAB
RAPID RVP RESULT: SIGNIFICANT CHANGE UP
SARS-COV-2 RNA SPEC QL NAA+PROBE: SIGNIFICANT CHANGE UP

## 2021-03-03 PROCEDURE — 0225U NFCT DS DNA&RNA 21 SARSCOV2: CPT

## 2021-03-03 PROCEDURE — G0378: CPT

## 2021-03-03 PROCEDURE — 99217: CPT

## 2021-03-03 PROCEDURE — 70450 CT HEAD/BRAIN W/O DYE: CPT

## 2021-03-03 PROCEDURE — 99285 EMERGENCY DEPT VISIT HI MDM: CPT

## 2021-03-03 NOTE — ED CDU PROVIDER DISPOSITION NOTE - CLINICAL COURSE
patient w/ alcohol abuse wanted social work for housing, now does not want to wait. no signs of intox or withdrawal. will d/c

## 2021-03-03 NOTE — ED CDU PROVIDER SUBSEQUENT DAY NOTE - PROGRESS NOTE DETAILS
Patient seen and reassessed.   Patient is AAOX3, NAD, able to ambulate, non tremulous, and tolerating oral intake.   VSS.   Patient states he is homeless, requesting SW.

## 2021-03-03 NOTE — CHART NOTE - NSCHARTNOTEFT_GEN_A_CORE
SOCIAL WORK NOTE:  PATIENT HELD OVER NIGHT FOR SOCIAL WORK CONSULT IN THE MORNING.  DISCUSSED CASE WITH DR BUI WHOM CONFIRMED THAT PATIENT IS MEDICALLY READY FOR DC.  MET WITH PATIENT TO DISCUSS HOUSING. PATIENT PRESENTLY WANTING TO GET DRESSED AND LEAVE. PATIENT IS REFUSING HOUSING AGAIN AND WILL FIND HIS OWN METHOD OUT.  MADE PATIENT AWARE THAT IT MAY BE BEST TO CONSIDER HOUSING AT THIS POINT AS HE FREQUENTS THE ED SECONDARY TO THE WEATHER.  PATIENT ENDORSED BEING AWARE OF THIS AND KNOWS IT IS NOT THE RIGHT THINGS TO DO BUT DOES NOT WANT HOUSING AT THIS TIME.  HE DOES NOT LIKE WHERE THEY PLACE HIM. MADE PATIENT AWARE THAT THEY MAY PLACE HIM SOME PLACE DIFFERENTLY.  PATIENT STILL REFUSED. MD AWARE.

## 2021-03-03 NOTE — ED ADULT NURSE REASSESSMENT NOTE - NS ED NURSE REASSESS COMMENT FT1
Report given to DENG Connelly
assumed care of patient 0730 charting as noted. pt alert and oriented x4 sitting up drinking gingerale. awaiting SW consult. pt educated on plan of care, pt able to successfully teach back plan of care to RN, RN will continue to reeducate pt during hospital stay.
patient remains in yellow gown, sleeping comfortably in stretcher, no apparent distress noted. resp even and unlabored. denies complaints at this time. patient safety maintained, will continue to monitor.
Report received from off-going RN at 19:30, VSS, pt resting comfortably in stretcher. No s/s of apparent distress noted. Will continue to monitor at this time.

## 2021-03-03 NOTE — ED CDU PROVIDER DISPOSITION NOTE - NSFOLLOWUPINSTRUCTIONS_ED_ALL_ED_FT
1. Return to ED for worsening, progressive or any other concerning symptoms   2. Follow up with your primary care doctor in 2-3days     Alcohol Abuse    Alcohol intoxication occurs when the amount of alcohol that a person has consumed impairs his or her ability to mentally and physically function. Chronic alcohol consumption can also lead to a variety of health issues including neurological disease, stomach disease, heart disease, liver disease, etc. Do not drive after drinking alcohol. Drinking enough alcohol to end up in an Emergency Room suggests you may have an alcohol abuse problem. Seek help at a drug addiction center.    SEEK IMMEDIATE MEDICAL CARE IF YOU HAVE ANY OF THE FOLLOWING SYMPTOMS: seizures, vomiting blood, blood in your stool, lightheadedness/dizziness, or becoming shaky to tremulous when you stop drinking.

## 2021-03-03 NOTE — ED CDU PROVIDER DISPOSITION NOTE - PATIENT PORTAL LINK FT
You can access the FollowMyHealth Patient Portal offered by [facility] by registering at the following website: [email or web address]. By joining [facility]’s FollowMyHealth portal, you will also be able to view your health information using other applications (apps) compatible with our system.

## 2021-03-03 NOTE — ED CDU PROVIDER SUBSEQUENT DAY NOTE - PHYSICAL EXAMINATION
Gen: resting comfortably   HEENT: Mucous membranes moist, pink conjunctivae, EOMI. NCAT  Neck: no midline ttp  CV: RRR, nl s1/s2.  Resp: CTAB, normal rate and effort  GI: Abdomen soft, NT, ND. No rebound, no guarding  : No CVAT  Neuro: moves all extremities.   MSK: No spine or joint tenderness to palpation  Skin: No rashes. intact and perfused.

## 2021-03-10 NOTE — ED CDU PROVIDER SUBSEQUENT DAY NOTE - PHYSICAL EXAMINATION
Alert, lucid, and in no apparent distress. Pt is normocephalic, atraumatic.  Pupils are equal, round, lips pink, moist mucous membranes, tongue midline. Neck supple.   Lungs clear to ausultation. Heart regular rate and rhythm, normal S1, S2  Abdomen is soft, nontender, no pulsatile mass, no masses, no distension, no suprapubic tenderness.   Non-focal sensory, 5 out of 5 motor strengthSkin without rash, purpura, eccyhmosis  No submandibular adenopathy. Normal mentation, does not appear agitated

## 2021-03-11 ENCOUNTER — NON-APPOINTMENT (OUTPATIENT)
Age: 56
End: 2021-03-11

## 2021-03-11 NOTE — ED CDU PROVIDER DISPOSITION NOTE - PATIENT PORTAL LINK FT
You can access the FollowMyHealth Patient Portal offered by [facility] by registering at the following website: [email or web address]. By joining [employer]’s FollowMyHealth portal, you will also be able to view your health information using other applications (apps) compatible with our system.

## 2021-03-17 ENCOUNTER — EMERGENCY (EMERGENCY)
Facility: HOSPITAL | Age: 56
LOS: 1 days | Discharge: HOME | End: 2021-03-17
Attending: STUDENT IN AN ORGANIZED HEALTH CARE EDUCATION/TRAINING PROGRAM
Payer: COMMERCIAL

## 2021-03-17 VITALS — HEIGHT: 65 IN

## 2021-03-17 PROCEDURE — 99284 EMERGENCY DEPT VISIT MOD MDM: CPT

## 2021-03-17 NOTE — ED PROVIDER NOTE - PROGRESS NOTE DETAILS
Pt observed in ED.  Awake at this time saying he does not want to live and he is considering jumping in front of a train. will check labs and EKG for BH clearance

## 2021-03-17 NOTE — ED ADULT NURSE NOTE - OBJECTIVE STATEMENT
pt uncooperative and unwilling to answer question. Pt with hx of ETOH abuse. Pt stated he was having suicidal ideations. Pt with alcohol noted on breath. belongings placed in bags and labeled and pt placed in yellow gown. Pt placed on 1:1. pt educated on plan of care, pt able to successfully teach back plan of care to RN, RN will continue to reeducate pt during hospital stay.

## 2021-03-17 NOTE — ED ADULT NURSE NOTE - NSIMPLEMENTINTERV_GEN_ALL_ED
Implemented All Fall Risk Interventions:  [place] to call system. Call bell, personal items and telephone within reach. Instruct patient to call for assistance. Room bathroom lighting operational. Non-slip footwear when patient is off stretcher. Physically safe environment: no spills, clutter or unnecessary equipment. Stretcher in lowest position, wheels locked, appropriate side rails in place. Provide visual cue, wrist band, yellow gown, etc. Monitor gait and stability. Monitor for mental status changes and reorient to person, place, and time. Review medications for side effects contributing to fall risk. Reinforce activity limits and safety measures with patient and family.

## 2021-03-17 NOTE — ED PROVIDER NOTE - OBJECTIVE STATEMENT
54y/o M with PMHx of ETOH abuse presents to the ED for intox c/o SI. Pt is well known to myself for ED visits.

## 2021-03-17 NOTE — ED ADULT TRIAGE NOTE - ESI TRIAGE ACUITY LEVEL, MLM
Influenza   Recommend alternating tylenol and motrin for body aches/fever symptoms  Recommend mucinex for congestion  Recommend chloraseptic spray for sore throat  Plenty of water intake  LO QUE NECESITA SABER:   La influenza (gripe) es armando infección provocada por el virus de la influenza  La gripe se propaga fácilmente cuando armando persona infectada tose, estornuda o tiene contacto cercano con otras personas  Usted podría propagar la gripe a otras personas por armando semana o más después de que aparezcan los signos o síntomas  INSTRUCCIONES SOBRE EL ERNESTO HOSPITALARIA:   Llame al 911 en stew de presentar lo siguiente:   · Tiene dificultad para respirar, y alesia labios lucen púrpuras o azules  · Usted sufre armando convulsión  Regrese a la beata de emergencias si:   · Usted se siente mareado, orina poco o no orina  · Usted tiene dolor de huey con rigidez en el gurjit y se siente cansado o confundido  · Usted comienza a sentir dolor o presión en el pecho  · Alesia síntomas, rhonda falta de [place], vómito o [place], Chiquis Pounds  · Alesia síntomas, rhonda fiebre y tos, parecen mejorar heavenly Lorella Pack  Pregúntele a borges Sheikh Parkers vitaminas y minerales son adecuados para usted  · Usted tiene dolor muscular o debilidad nuevos  · Usted tiene preguntas o inquietudes acerca de borges condición o cuidado  Medicamentos:  Es posible que usted necesite alguno de los siguientes:  · El acetaminofén  wyatt el dolor y baja la fiebre  Está disponible sin receta médica  Pregunte la cantidad y la frecuencia con que debe tomarlos  [address]  El acetaminofén puede causar daño en el hígado cuando no se brennan de forma correcta  · AINEs (Analgésicos antiinflamatorios no esteroides) rhonda el ibuprofeno, ayudan a disminuir la inflamación, el dolor y la [place]  [place] medicamento esta disponible con o sin armando receta médica  Los AINEs pueden causar sangrado estomacal o problemas renales en ciertas personas   Si usted brennan un medicamento anticoagulante, siempre pregúntele a borges médico si los RAMÓN son seguros para usted  Siempre manuelito la etiqueta de kyle medicamento y [place] instrucciones  · Los antivirales  ayudan a combatir armando infección viral     · [place] richie medicamentos rhonda se le haya indicado  Consulte con borges médico si usted tommy que borges medicamento no le está ayudando o si presenta efectos secundarios  Infórmele si es alérgico a cualquier medicamento  Mantenga armando lista actualizada de los [place], las vitaminas y los productos herbales que brennan  Incluya los siguientes datos de los medicamentos: cantidad, frecuencia y motivo de administración  Traiga con usted la lista o los envases de la píldoras a richie citas de seguimiento  Lleve la lista de los medicamentos con usted en stew de armando emergencia  Descanse  tanto rhonda pueda para recuperarse  Consuma líquidos según le indicaron  para evitar la deshidratación  Pregunte cuánto líquido debe neelima cada día y cuáles líquidos son los más adecuados para usted  Prevenga la propagación de la gripe:   · Lávese las jose frecuentemente  Utilice agua y [place]  [employer] las jose después de usar el baño, cambiarle el pañal a un jessica o estornudar  Lávese las jose antes de comer o preparar alimentos  Use un gel desinfectante si no tiene [place] y [place]  No se toque los ojos, la nariz o la boca a menos que se haya lavado las jose lula  · Cúbrase la boca al toser o estornudar  Tosa en un pañuelo desechable o en pliegue de borges brazo  · Limpie los artículos que se comparten con armando solución de limpieza zeepda gérmenes  [address]  [place] feliciano y los interruptores basilio  No comparta toallas, cubiertos ni platos con personas con síntomas de armando enfermedad  Lave las sábanas, [place], cubiertos y vajilla con agua y [place]  · Use armando mascarilla  para cubrir borges boca y [place] si usted está enfermo o [place] persona cerca a usted tiene gripe  2 · Manténgase alejado de otros  si está enfermo  · La vacuna contra la gripe  ayuda a prevenir influenza (gripe)  Romayne Gain persona mayor de 6 meses de edad debería recibir armando vacuna contra la gripe anualmente  Reciba la vacuna tan pronto esté disponible, generalmente eloina los meses de septiembre u [place]  Acuda a richie consultas de control con hankins médico según le indicaron  Anote richie preguntas para que se acuerde de hacerlas eloina richie visitas  © 2017 [address] Information is for End User's use only and may not be sold, redistributed or otherwise used for commercial purposes  All illustrations and images included in CareNotes® are the copyrighted property of A D A M , Inc  or Kimo Romero  Esta información es sólo para uso en educación  Hankins intención no es darle un consejo médico sobre enfermedades o tratamientos  Colsulte con hankins Tariq Senait farmacéutico antes de seguir cualquier régimen médico para saber si es seguro y efectivo para usted

## 2021-03-18 ENCOUNTER — APPOINTMENT (OUTPATIENT)
Dept: INTERNAL MEDICINE | Facility: CLINIC | Age: 56
End: 2021-03-18

## 2021-03-18 DIAGNOSIS — F10.10 ALCOHOL ABUSE, UNCOMPLICATED: ICD-10-CM

## 2021-03-18 DIAGNOSIS — F32.9 MAJOR DEPRESSIVE DISORDER, SINGLE EPISODE, UNSPECIFIED: ICD-10-CM

## 2021-03-18 LAB
ALBUMIN SERPL ELPH-MCNC: 4.7 G/DL — SIGNIFICANT CHANGE UP (ref 3.3–5.2)
ALP SERPL-CCNC: 63 U/L — SIGNIFICANT CHANGE UP (ref 40–120)
ALT FLD-CCNC: 25 U/L — SIGNIFICANT CHANGE UP
AMPHET UR-MCNC: NEGATIVE — SIGNIFICANT CHANGE UP
ANION GAP SERPL CALC-SCNC: 16 MMOL/L — SIGNIFICANT CHANGE UP (ref 5–17)
AST SERPL-CCNC: 20 U/L — SIGNIFICANT CHANGE UP
BARBITURATES UR SCN-MCNC: NEGATIVE — SIGNIFICANT CHANGE UP
BASOPHILS # BLD AUTO: 0.04 K/UL — SIGNIFICANT CHANGE UP (ref 0–0.2)
BASOPHILS NFR BLD AUTO: 0.9 % — SIGNIFICANT CHANGE UP (ref 0–2)
BENZODIAZ UR-MCNC: POSITIVE
BILIRUB SERPL-MCNC: <0.2 MG/DL — LOW (ref 0.4–2)
BUN SERPL-MCNC: 11 MG/DL — SIGNIFICANT CHANGE UP (ref 8–20)
CALCIUM SERPL-MCNC: 8.9 MG/DL — SIGNIFICANT CHANGE UP (ref 8.6–10.2)
CHLORIDE SERPL-SCNC: 103 MMOL/L — SIGNIFICANT CHANGE UP (ref 98–107)
CO2 SERPL-SCNC: 23 MMOL/L — SIGNIFICANT CHANGE UP (ref 22–29)
COCAINE METAB.OTHER UR-MCNC: NEGATIVE — SIGNIFICANT CHANGE UP
COVID-19 SPIKE DOMAIN AB INTERP: NEGATIVE — SIGNIFICANT CHANGE UP
COVID-19 SPIKE DOMAIN ANTIBODY RESULT: 0.4 U/ML — SIGNIFICANT CHANGE UP
CREAT SERPL-MCNC: 0.83 MG/DL — SIGNIFICANT CHANGE UP (ref 0.5–1.3)
EOSINOPHIL # BLD AUTO: 0.15 K/UL — SIGNIFICANT CHANGE UP (ref 0–0.5)
EOSINOPHIL NFR BLD AUTO: 3.2 % — SIGNIFICANT CHANGE UP (ref 0–6)
ETHANOL SERPL-MCNC: 132 MG/DL — HIGH (ref 0–9)
GLUCOSE SERPL-MCNC: 109 MG/DL — HIGH (ref 70–99)
HCT VFR BLD CALC: 36.6 % — LOW (ref 39–50)
HGB BLD-MCNC: 10.9 G/DL — LOW (ref 13–17)
IMM GRANULOCYTES NFR BLD AUTO: 0.6 % — SIGNIFICANT CHANGE UP (ref 0–1.5)
LYMPHOCYTES # BLD AUTO: 1.48 K/UL — SIGNIFICANT CHANGE UP (ref 1–3.3)
LYMPHOCYTES # BLD AUTO: 31.9 % — SIGNIFICANT CHANGE UP (ref 13–44)
MCHC RBC-ENTMCNC: 23.9 PG — LOW (ref 27–34)
MCHC RBC-ENTMCNC: 29.8 GM/DL — LOW (ref 32–36)
MCV RBC AUTO: 80.3 FL — SIGNIFICANT CHANGE UP (ref 80–100)
METHADONE UR-MCNC: NEGATIVE — SIGNIFICANT CHANGE UP
MONOCYTES # BLD AUTO: 0.54 K/UL — SIGNIFICANT CHANGE UP (ref 0–0.9)
MONOCYTES NFR BLD AUTO: 11.6 % — SIGNIFICANT CHANGE UP (ref 2–14)
NEUTROPHILS # BLD AUTO: 2.4 K/UL — SIGNIFICANT CHANGE UP (ref 1.8–7.4)
NEUTROPHILS NFR BLD AUTO: 51.8 % — SIGNIFICANT CHANGE UP (ref 43–77)
OPIATES UR-MCNC: NEGATIVE — SIGNIFICANT CHANGE UP
PCP SPEC-MCNC: SIGNIFICANT CHANGE UP
PCP UR-MCNC: NEGATIVE — SIGNIFICANT CHANGE UP
PLATELET # BLD AUTO: 208 K/UL — SIGNIFICANT CHANGE UP (ref 150–400)
POTASSIUM SERPL-MCNC: 4 MMOL/L — SIGNIFICANT CHANGE UP (ref 3.5–5.3)
POTASSIUM SERPL-SCNC: 4 MMOL/L — SIGNIFICANT CHANGE UP (ref 3.5–5.3)
PROT SERPL-MCNC: 7.2 G/DL — SIGNIFICANT CHANGE UP (ref 6.6–8.7)
RBC # BLD: 4.56 M/UL — SIGNIFICANT CHANGE UP (ref 4.2–5.8)
RBC # FLD: 16.1 % — HIGH (ref 10.3–14.5)
SARS-COV-2 IGG+IGM SERPL QL IA: 0.4 U/ML — SIGNIFICANT CHANGE UP
SARS-COV-2 IGG+IGM SERPL QL IA: NEGATIVE — SIGNIFICANT CHANGE UP
SARS-COV-2 RNA SPEC QL NAA+PROBE: SIGNIFICANT CHANGE UP
SODIUM SERPL-SCNC: 142 MMOL/L — SIGNIFICANT CHANGE UP (ref 135–145)
THC UR QL: NEGATIVE — SIGNIFICANT CHANGE UP
WBC # BLD: 4.64 K/UL — SIGNIFICANT CHANGE UP (ref 3.8–10.5)
WBC # FLD AUTO: 4.64 K/UL — SIGNIFICANT CHANGE UP (ref 3.8–10.5)

## 2021-03-18 PROCEDURE — 93010 ELECTROCARDIOGRAM REPORT: CPT

## 2021-03-18 PROCEDURE — 99220: CPT

## 2021-03-18 PROCEDURE — 99285 EMERGENCY DEPT VISIT HI MDM: CPT

## 2021-03-18 RX ORDER — PANTOPRAZOLE SODIUM 20 MG/1
1 TABLET, DELAYED RELEASE ORAL
Qty: 30 | Refills: 0
Start: 2021-03-18 | End: 2021-04-16

## 2021-03-18 RX ORDER — QUETIAPINE FUMARATE 200 MG/1
1 TABLET, FILM COATED ORAL
Qty: 30 | Refills: 0
Start: 2021-03-18 | End: 2021-04-16

## 2021-03-18 RX ORDER — THIAMINE MONONITRATE (VIT B1) 100 MG
1 TABLET ORAL
Qty: 7 | Refills: 0
Start: 2021-03-18 | End: 2021-03-24

## 2021-03-18 RX ORDER — ALBUTEROL 90 UG/1
2 AEROSOL, METERED ORAL EVERY 6 HOURS
Refills: 0 | Status: DISCONTINUED | OUTPATIENT
Start: 2021-03-18 | End: 2021-03-22

## 2021-03-18 RX ORDER — ONDANSETRON 8 MG/1
4 TABLET, FILM COATED ORAL EVERY 6 HOURS
Refills: 0 | Status: DISCONTINUED | OUTPATIENT
Start: 2021-03-18 | End: 2021-03-22

## 2021-03-18 RX ORDER — ALBUTEROL 90 UG/1
2 AEROSOL, METERED ORAL
Qty: 240 | Refills: 0
Start: 2021-03-18 | End: 2021-04-16

## 2021-03-18 RX ORDER — SERTRALINE 25 MG/1
1 TABLET, FILM COATED ORAL
Qty: 30 | Refills: 0
Start: 2021-03-18 | End: 2021-04-16

## 2021-03-18 RX ORDER — PANTOPRAZOLE SODIUM 20 MG/1
40 TABLET, DELAYED RELEASE ORAL
Refills: 0 | Status: DISCONTINUED | OUTPATIENT
Start: 2021-03-18 | End: 2021-03-22

## 2021-03-18 RX ORDER — THIAMINE MONONITRATE (VIT B1) 100 MG
100 TABLET ORAL DAILY
Refills: 0 | Status: DISCONTINUED | OUTPATIENT
Start: 2021-03-18 | End: 2021-03-22

## 2021-03-18 RX ORDER — SERTRALINE 25 MG/1
50 TABLET, FILM COATED ORAL DAILY
Refills: 0 | Status: DISCONTINUED | OUTPATIENT
Start: 2021-03-18 | End: 2021-03-22

## 2021-03-18 RX ORDER — FOLIC ACID 0.8 MG
1 TABLET ORAL
Qty: 7 | Refills: 0
Start: 2021-03-18 | End: 2021-03-24

## 2021-03-18 RX ORDER — QUETIAPINE FUMARATE 200 MG/1
300 TABLET, FILM COATED ORAL AT BEDTIME
Refills: 0 | Status: DISCONTINUED | OUTPATIENT
Start: 2021-03-18 | End: 2021-03-22

## 2021-03-18 RX ADMIN — PANTOPRAZOLE SODIUM 40 MILLIGRAM(S): 20 TABLET, DELAYED RELEASE ORAL at 10:28

## 2021-03-18 RX ADMIN — QUETIAPINE FUMARATE 300 MILLIGRAM(S): 200 TABLET, FILM COATED ORAL at 21:08

## 2021-03-18 RX ADMIN — Medication 100 MILLIGRAM(S): at 10:28

## 2021-03-18 RX ADMIN — Medication 1 TABLET(S): at 10:28

## 2021-03-18 RX ADMIN — Medication 30 MILLILITER(S): at 21:08

## 2021-03-18 RX ADMIN — Medication 50 MILLIGRAM(S): at 10:28

## 2021-03-18 RX ADMIN — Medication 50 MILLIGRAM(S): at 18:55

## 2021-03-18 NOTE — ED CDU PROVIDER INITIAL DAY NOTE - OBJECTIVE STATEMENT
56y/o M with PMHx of ETOH abuse presents to the ED for intox c/o SI. Pt is well known to myself for ED visits.

## 2021-03-18 NOTE — ED BEHAVIORAL HEALTH ASSESSMENT NOTE - CURRENT MEDICATION
Prilosec 40mg in the morning  Seroquel 300mg at bedtime  Albuterol inhaler 2 puffs Q6 hours PRN  fenofibrate 145mg daily

## 2021-03-18 NOTE — ED BEHAVIORAL HEALTH ASSESSMENT NOTE - NSACTIVEVENT_PSY_ALL_CORE
Take the Omeprazole daily in the morning 1/2 hr prior to breakfast  Follow dietary recommendations listed below  Follow up with GI in 1 month after your procedures         GERD DIET      Gastroesophageal Reflux disease, commonly referred to as GERD, occurs when the contents of the stomach come into contact with the lining of the esophagus. When this occurs, the acidic content of the stomach causes irritation and burning to the lining of the esophagus. This is commonly associated with heartburn, chronic cough, belching, bloating, or pain in the back of the breastbone.     Specific dietary modifications can be implemented to prevent these symptoms from occurring. Foods such as: garlic, onion, high fat foods, chocolate, caffeine, peppermint, spearmint, and alcohol should be avoided. These foods lower the pressure of the lower esophageal sphincter (LES), causing the acidic contents of the stomach to reflux into the esophagus.     Some foods can cause further irritation when heartburn currently exists. These foods, including: citrus fruits and juices, tomatoes, and peppers and should be avoided.     Additionally, it is important to limit the amount of food eaten at one time. Small, frequent meals are generally better tolerated than three large meals. Large meals tend to force the acidic contents of the stomach up into the esophagus, resulting in reflux. Smaller, lower fat meals are not in the stomach long enough to cause irritation.     Following these dietary modifications can:   Significantly decrease GERD symptoms.   Heal the esophagus from injury.   Manage and prevent complications of acid reflux.   Keep symptoms in remission.      Reference:    BENITO Godwin (2002) Tell Me What To Eat If I Have Acid Reflux.  [place], [place]:  [employer]                                SAMPLE MENU FOR GERD DIET: 1600 calories/day    Breakfast Snack Lunch Snack Supper   Whole grain cereal--1 cup  Skim milk--1/2 cup  Banana--1    Whole wheat toast--2 slices  Margarine--1 tsp. Medium apple Vegetable Soup--1 cup  Saltines--4  Lean beef brayan--3 oz.  Whole wheat roll--1  Reduced calorie kirkpatrick--1 tbsp.  Mustard--1 tbsp. Pretzels--2 oz. Grilled chicken breast--4 oz  Green salad--4 oz  Vinegar/Oil dressing--1 tbsp.  Herbed brown rice--1/2 cup  Broccoli--1/2 cup  Celery sticks--1/2 cup   Oatmeal--1 cup  Raisins--6  Whole grain English muffin--2 halves  Jam--1 tbsp.  Applesauce--1/2 cup  Decaffeinated coffee/tea--1 cup Yash crackers--4  Smoked turkey--3 oz.  Whole grain zuleyma--1  Low fat kirkpatrick--1 tbsp.  Lettuce--1/2 cup  Low fat cottage cheese--1/2 cup  Peaches--1 cup Low-fat frozen yogurt--1/2 cup Broiled salmon--3 oz.  Boiled red potatoes--1/2 cup  Steamed cauliflower--1 cup  Apricots--1 cup  Skim milk--1 cup   Bagel--1  Low-fat cream cheese or Peanut butter--1 tbsp  Banana--1  Skim milk--1 cup Red or Green Grapes--20 Whole grain tortilla--1  Tuna salad--3 oz.  Cucumber slices--1/2 cup  Lettuce--1/2 cup  Low-fat mozzarella cheese--1 oz. Papaya--1 cup Baked pork chops--3 oz.  Applesauce--1/2 cup  Baked potato--1 med.  Margarine--1 tbsp.  Green beans--1/2 cup                 GERD DIET: 1800 calories/day    Breakfast Snack Lunch Snack Supper   Whole grain cereal--1 cup  Skim milk--1/2 cup  Banana--1   Whole wheat toast--2 slices  Margarine--1 tsp. Medium apple Vegetable Soup--1 cup  Saltines--4  Lean beef brayan--3 oz.  Whole wheat roll--1  Reduced calorie kirkpatrick--1 tbsp.  Mustard--1 tbsp.  Baby carrots--1/2 cup Pretzels--4 oz. Grilled chicken breast--4 oz  Green salad--4 oz  Vinegar/Oil dressing--1 tbsp.  Brown rice--1/2 cup  Broccoli--1/2 cup  Celery sticks--1/2 cup   Oatmeal--1 cup  Raisins--6  Whole grain English muffin--2 halves  Jam--1 tbsp.  Applesauce--1/2 cup  Decaffeinated coffee/tea--1 cup Yash crackers--4  Skim milk--1 cup  Smoked turkey--3 oz.  Whole grain zuleyma--1  Low fat kirkpatrick--1 tbsp.  Lettuce--1/2 cup  Low fat cottage cheese--1/2  cup  Peaches--1 cup Low-fat frozen yogurt--1/2 cup  Granola--1 oz. Broiled salmon--3 oz.  Boiled red potatoes--1/2 cup  Steamed cauliflower--1 cup  Apricots--1 cup  Skim milk--1 cup  Strawberries--1/2 cup   Bagel--1  Low-fat cream cheese or Peanut butter--1 tbsp  Banana--1  Skim milk--1 cup Red or Green Grapes--20 Whole grain tortilla--1  Tuna salad--3 oz.  Cucumber slices--1/2 cup  Lettuce--1/2 cup  Low-fat mozzarella cheese--1 oz.  Papaya--1 cup Corn tortilla chips--2 oz. Baked pork chops--3 oz.  Applesauce--1/2 cup  Baked potato--1 medium  Margarine--1 tbsp.  Green beans--1/2 cup  Whole wheat roll--1          Tips to Control Acid Reflux  To control acid reflux, you’ll need to make some basic diet and lifestyle changes. The simple steps outlined below may be all you’ll need to relieve discomfort.  Watch What You Eat    · Avoid fatty foods and spicy foods.  · Eat fewer acidic foods, such as citrus and tomato-based foods. These can increase symptoms.  · Limit drinking alcohol, caffeine, and fizzy beverages. All increase acid reflux.  · Try limiting chocolate, peppermint, and spearmint. These can worsen acid reflux in some people.  Watch When You Eat  · Avoid lying down for 3 hours after eating.  · Do not snack before going to bed.  Raise Your Head    Raising your head and upper body by 4 inches to 6 inches helps limit reflux when you’re lying down. Put blocks under the head of the bed frame to raise it.  Other Changes  · Lose weight, if you need to.  · Don’t work out near bedtime.  · Avoid tight-fitting clothes.  · Limit aspirin and ibuprofen.  · Stop smoking.   © 7771-1891 [employer]. [address] [place] [address], [place] [address]. All rights reserved. This information is not intended as a substitute for professional medical care. Always follow your healthcare professional's instructions.        Colonoscopy Preparation Instructions    YOU ARE SCHEDULED FOR A COLONOSCOPY WITH:  Virgilio Bell  MD       PREP MEDICATIONS WERE SENT TO:[facility]    Date:   Location:   [address] [place] [address]    Phone Number: [phone] (after 5 pm for Emergency Calls Only)    *[facility] Admissions will be contacting you the day before your procedure to confirm your health history and arrival time.      BOWEL PREPARATION:  To complete a successful colonoscopy, the bowel must be clean so that the physician can clearly view the colon.  It is very important that you read all the instructions well in advance of the procedure.  Without proper preparation, the procedure will not be successful and the test may need to be repeated.  For women of childbearing age:  Do not wear a Tampon the morning of the procedure.  Feminine pads are fine to wear.      If you take Phentermine, please contact the nursing staff as this might need to be stopped for a minimum of 10-14 days before you procedure.    ITEMS NEEDED FOR YOUR BOWEL PREP:   · 2 Dulcolax(bisacodyl) laxative tablets-5 mg each.   · 1 Gallon Colyte or Nulytely pres cribed for you.  · 1 Gallon (128 oz) of Gatorade (not red, pink, or purple).  · May also use Crystal Light, lemonade, iced tea or flavored water.  SUGAR FREE IF YOU ARE DIABETIC PLEASE.    5 DAYS BEFORE YOUR PROCEDURE:   · Stop the following medications 5 days before your procedure as they may cause blood thinning.  · Aspirin, Bufferin, Anacin, Ecotrin, Excedrin, Advil, Motrin/ibuprofen, Nuprin, Indocin, Naproxen, Aleve, Pepto-Bismol, [place], Glucosamine, Diclofenac(Voltaren), Iron Supplements and herbal supplements.  · You may safely use Tylenol or Acetaminophen as directed on the bottle.   · Do not eat popcorn, seeds, nuts, whole kernel corn or products with Petrolia(a fat substitute found in [employer] and [place] Fat Free products) for 5 days prior.    3 DAYS BEFORE YOUR PROCEDURE:    · Do not consume alcoholic  beverages. This can also put you at risk for bleeding.                 PREP DAY:                                      DAY BEFORE YOUR COLONOSCOPY:  · CLEAR LIQUID DIET (Drink Clear liquids only all day today).   · You may have any of the following :  · Water, Coffee or Tea without Creamer, Gatorade or other Sports Drinks, popsicles, carbonated water or soft drinks, Bong-Aid, plain Jello without fruit or toppings, hard candy, Vegetable, Beef or Chicken Broth.  You may also have fruit juices that are pulp free, such as:  Apple, orange or white Grape.  .    · You may not have any solid foods, dairy products, or anything that is Red, Pink or Purple in color  · Do not take Lomotil, Imodium, or fiber supplement today.     At 3 pm on prep day  · Take 2 Dulcolax tablets by mouth with 8 ounces of Clear liquid.  · Mix Colyte/Nulytely and Gatorade (or other clear liquid) in the gallon jug provided. Shake or stir until the powder is dissolved. You may refrigerate to chill if you wish.     At 5 pm on prep day    Start drinking the liquid prep. Drink an 8-10 ounce glass every 15-20 minutes until the entire gallon of prep is completed.   · If you develop nausea, cramping or fullness take a break from the prep. Resume prep as soon as possible.   · If you do not have any bowel movement, or if you have severe pain or vomiting, call [phone] to page the GI doctor on call.     DAY OF YOUR COLONOSCOPY:  · YOU MAY NOT HAVE ANYTHING BY MOUTH AFTER MIDNIGHT THE DAY OF YOUR PROCEDURE. This is an anesthesia requirement due to the sedation for your procedure. If you consume anything by mouth after 12:00am, your procedure will be canceled.   · You may take blood pressure, seizure and Parkinson's medications with a few sips of water 2 hours before you arrive.      AFTER YOUR COLONOSCOPY:  You will receive after-procedure information and instructions in recovery.  You will need to rest for the remainder of the day.   Do not drive, work or  operate machinery for 24 hours.   · Do not drink alcohol for 24 hours.       ·   IMPORTANT INFORMATION TO KNOW FOR YOUR PROCEDURE:   · Check with your insurance company to verify benefit coverage for this procedure. It is the patient's responsibility to verify insurance benefits. If you have had previous polyps removed or are having symptoms, this procedure will be considered \"Diagnostic\" not a \"Screening.\"  Contact our office if you take blood thinners (Coumadin, Warfarin, Plavix, etc), if you have artificial joints, or if you are diabetic. Special arrangements will need to be made for your procedure.   · IF YOU USE A C-PAP OR BI-PAP MACHINE, PLEASE BRING IT WITH YOU ON THE DAY OF PROCEDURE.  · Bring a Photo ID with you the day of your procedure.     A responsible adult over the age of 18 must accompany you from the hospital after your exam or YOUR APPOINTMENT WILL BE CANCELLED. Your  will need to remain in the building during your procedure.      CANCELLATION OR RESCHEDULING:  If you must reschedule or cancel your appointment, please notify your physician's office at least 48 hours in advance      What is colonoscopy?   Colonoscopy is a procedure that allows your doctor to clearly see the lining of your colon (large bowel).    What happens during the colonoscopy?   You will be given IV medications to help you relax and stay comfortable during the exam. You will lie on your side or on your back while the flexible tube is moved slowly through the large bowel.. You may feel some cramping or gas but the medicine should keep you comfortable.     Are there complications of colonoscopy?   Complications after a colonoscopy are rare, but can occur.   Risks and possible complications include:   Perforation   Bleeding   Infection   Missed polyps/diagnostic error   Adverse reaction to medication     IF YOU HAVE ANY QUESTIONS OR CONCERNS REGARDING THESE INSTRUCTIONS OR YOUR PROCEDURE PLEASE CONTACT OUR OFFICE AT  [phone]                 Upper GI Endoscopy (Gastroscopy or EGD)        Before your test:  1. If you are being sedated with assistance from an Anesthesiologist you may not have ANYTHING MOUTH AFTER MIDNIGHT.  This is an Anesthesia requirement to proceed with your procedure.    2. You will need someone to drive you home after the test because you may be given medicine to relax and keep you comfortable during the exam.  Please have your friend or relative remain within the building until your test is completed.  Do NOT plan on using public transportation after the exam. No taxi, limo or public transportation.  You may feel groggy and need help getting around.  Plan on being at the hospital approximately 3 hours from your arrival time.  Be aware your procedure time may change as the there are sometimes  procedures added onto the doctor's schedule that need to be done emergently and your start time may need to change.  We will do our best to keep your procedure time as indicated above.       3. You may not drive for 24 hours.    4. Rest the day of your exam, you may return to work to following day.      How is an EGD done?  You will be placed in a comfortable position on your side. A flexible scope is passed through your mouth and into the esophagus, stomach and duodenum (first portion of the small intestine). The scope does not interfere with your breathing. If there is any area that looks abnormal, the doctor may take a tissue sample (biopsy or brushings).    Possible complications of upper GI endoscopy:   Complications after endoscopy are rare, but can occur.   Risks include:  ·  Perforation,  ·  Bleeding,   ·  Infection  ·  Adverse reaction to medications used for sedation .      Call your health care provider if:  1. You have increasing pain in the throat, chest or stomach.  2. You have trouble breathing or swallowing.  3. You have a fever higher that 101F  4. You vomit blood or coffee ground material.  5. Your  bowel movements become black or bloody.    Results:  Your health care provider will discuss the results with you. If a biopsy is taken, your health care provider will discuss the results with you after receiving a report from the pathologist.      Substance intoxication or withdrawal

## 2021-03-18 NOTE — CHART NOTE - NSCHARTNOTEFT_GEN_A_CORE
RACQUEL Note: Discussed case with Dr. Pacheco earlier. MD made a referral to [facility] after he cleared pt for discharge. Followed up with [facility] [phone], pt finished phone screening. Spoke with [facility] who stated that they do not have a bed today and that we can call in the a.m for any possible openings. Dr. Pacheco stated the pt will stay in ED overnight under OBS. [facility] at [facility] tomorrow. ILANA RN aware. SW to follow

## 2021-03-18 NOTE — ED ADULT NURSE REASSESSMENT NOTE - NS ED NURSE REASSESS COMMENT FT1
assumed care of pt @1930. pt currently awaiting possible placement to [facility] pending bed availability. pt's CIWA 3. states he has acid reflux. no other pt complaints at this time. will continue to monitor the pt for safety.

## 2021-03-18 NOTE — ED BEHAVIORAL HEALTH ASSESSMENT NOTE - HPI (INCLUDE ILLNESS QUALITY, SEVERITY, DURATION, TIMING, CONTEXT, MODIFYING FACTORS, ASSOCIATED SIGNS AND SYMPTOMS)
54 y/o male with PMH of depression, alcohol abuse, GERD, asthma, and HLD presents for suicidal ideations. Patient states that he has been having thoughts to "end it all" almost everyday the past year. States he was living with a friend but cannot go back there anymore. Patient states that he drinks about a quart of vodka a day, and has these thoughts while intoxicated and sober. Patient reports last drink was 2 days ago. Patient states that he just wants "to do something to end it all that doesn't hurt." When asked if patient wanted to stop drinking alcohol, he responded "it gets me through the day and I need to get my head right." Patient has been to [facility] in the past for his alcohol abuse, unsure if he wants to go back. Patient states that he does not see an outpatient provider for depression and denies being on past psychotropic medications. Denies self-injurious behavior or previous suicide attempts. Denies HI/AH/VH. Denies smoking and illicit drug use.

## 2021-03-18 NOTE — ED BEHAVIORAL HEALTH ASSESSMENT NOTE - SUMMARY
56 y/o male with depression and chronic alcohol abuse presents for suicidal ideations. Patient states that he has been having suicidal ideations for the past year. Patient states that he drinks about a quart of vodka a day, reports last drink was 2 days ago. Patient states that he just wants "to do something to end it all that doesn't hurt." Patient has been to [facility] in the past for his alcohol abuse and after discussion, patient is willing to return to [facility] for detox and further placement in sober house. Start patient on sertraline 50mg for depression and librium taper in addition to current medications.

## 2021-03-18 NOTE — ED BEHAVIORAL HEALTH ASSESSMENT NOTE - DESCRIPTION
See HPI Triage note reports patient was initially uncooperative and slurring words. Patient was moved to  and was calm and cooperative. GERD, asthma, HLD

## 2021-03-18 NOTE — ED BEHAVIORAL HEALTH ASSESSMENT NOTE - DETAILS
Patient requesting morning Prilosec dose due to "upset stomach." Self referred. Patient states that he has been thinking about ways to "end it all," patient unable to elaborate about these thoughts. Return to ED for suicidal ideations or plan/intent to harm self.

## 2021-03-18 NOTE — ED ADULT NURSE REASSESSMENT NOTE - NS ED NURSE REASSESS COMMENT FT1
Received pt Aox3, calm and in yellow gowns.  Pt states he is depressed. As per pt "I cant take it anymore". Pt states he does not want to live anymore. states he lost his job, house and everyone closed to him has . states he is homeless and drinks everyday for the pat 30 years. Last use of vodka last night. Denies any A/V/H or prior SA. reports hx of depression. takes Prilosec 40mg and Seroquel 300mg hs. no other pt complaints at this time. Will monitor the pt for safety.

## 2021-03-18 NOTE — ED ADULT NURSE REASSESSMENT NOTE - COMFORT CARE
darkened lights/plan of care explained/po fluids offered/repositioned/wait time explained/warm blanket provided

## 2021-03-18 NOTE — ED BEHAVIORAL HEALTH ASSESSMENT NOTE - RISK ASSESSMENT
Elevated risk due to depression, undomiciled, unemployed, and chronic alcohol abuse. Moderate Acute Suicide Risk

## 2021-03-18 NOTE — ED BEHAVIORAL HEALTH ASSESSMENT NOTE - CASE SUMMARY
54 y/o male with depression and chronic alcohol abuse presents for suicidal ideations. Patient has been to [facility] in the past for his alcohol abuse and after discussion, patient is willing to return to [facility] for detox and further placement in sober house. Start patient on sertraline 50mg for depression and librium taper in addition to current medications.

## 2021-03-19 VITALS
SYSTOLIC BLOOD PRESSURE: 104 MMHG | OXYGEN SATURATION: 98 % | RESPIRATION RATE: 18 BRPM | HEART RATE: 95 BPM | DIASTOLIC BLOOD PRESSURE: 66 MMHG | TEMPERATURE: 98 F

## 2021-03-19 PROCEDURE — 36415 COLL VENOUS BLD VENIPUNCTURE: CPT

## 2021-03-19 PROCEDURE — 85025 COMPLETE CBC W/AUTO DIFF WBC: CPT

## 2021-03-19 PROCEDURE — 93005 ELECTROCARDIOGRAM TRACING: CPT

## 2021-03-19 PROCEDURE — U0003: CPT

## 2021-03-19 PROCEDURE — G0378: CPT

## 2021-03-19 PROCEDURE — 86769 SARS-COV-2 COVID-19 ANTIBODY: CPT

## 2021-03-19 PROCEDURE — 80307 DRUG TEST PRSMV CHEM ANLYZR: CPT

## 2021-03-19 PROCEDURE — 99217: CPT

## 2021-03-19 PROCEDURE — 80053 COMPREHEN METABOLIC PANEL: CPT

## 2021-03-19 PROCEDURE — U0005: CPT

## 2021-03-19 PROCEDURE — 99285 EMERGENCY DEPT VISIT HI MDM: CPT

## 2021-03-19 RX ADMIN — PANTOPRAZOLE SODIUM 40 MILLIGRAM(S): 20 TABLET, DELAYED RELEASE ORAL at 06:51

## 2021-03-19 RX ADMIN — Medication 50 MILLIGRAM(S): at 06:51

## 2021-03-19 NOTE — ED CDU PROVIDER DISPOSITION NOTE - CLINICAL COURSE
PT in ED for psych eval/detox. Pt cleared by psychiatry and arranged for outpatient detox at [facility]. will d/c to [facility].

## 2021-03-19 NOTE — ED CDU PROVIDER DISPOSITION NOTE - PATIENT PORTAL LINK FT
You can access the FollowMyHealth Patient Portal offered by [facility] by registering at the following website: [email or web address]. By joining [facility]’s FollowMyHealth portal, you will also be able to view your health information using other applications (apps) compatible with our system.

## 2021-03-19 NOTE — CHART NOTE - NSCHARTNOTEFT_GEN_A_CORE
RACQUEL Note: Called [facility] [phone] and they have a bed for pt today. BH RN spoke with their RN and gave report. Pts meds were picked up at [facility].  Medicaid taxi arranged.

## 2021-03-23 ENCOUNTER — NON-APPOINTMENT (OUTPATIENT)
Age: 56
End: 2021-03-23

## 2021-04-02 NOTE — ED ADULT NURSE NOTE - PSYCHOSOCIAL WDL
Urine Pregnancy Test Result: negative Detail Level: None Alert and oriented x 3, normal mood and affect, no apparent risk to self or others.

## 2021-04-04 ENCOUNTER — EMERGENCY (EMERGENCY)
Facility: HOSPITAL | Age: 56
LOS: 1 days | Discharge: OTHER INSTITUTION | End: 2021-04-04
Attending: EMERGENCY MEDICINE
Payer: COMMERCIAL

## 2021-04-04 VITALS
DIASTOLIC BLOOD PRESSURE: 77 MMHG | HEART RATE: 102 BPM | TEMPERATURE: 98 F | SYSTOLIC BLOOD PRESSURE: 116 MMHG | HEIGHT: 65 IN | OXYGEN SATURATION: 98 % | RESPIRATION RATE: 18 BRPM | WEIGHT: 199.96 LBS

## 2021-04-04 LAB
ALBUMIN SERPL ELPH-MCNC: 4.3 G/DL — SIGNIFICANT CHANGE UP (ref 3.3–5.2)
ALP SERPL-CCNC: 93 U/L — SIGNIFICANT CHANGE UP (ref 40–120)
ALT FLD-CCNC: 19 U/L — SIGNIFICANT CHANGE UP
ANION GAP SERPL CALC-SCNC: 15 MMOL/L — SIGNIFICANT CHANGE UP (ref 5–17)
APAP SERPL-MCNC: <3 UG/ML — LOW (ref 10–26)
AST SERPL-CCNC: 26 U/L — SIGNIFICANT CHANGE UP
BASOPHILS # BLD AUTO: 0.07 K/UL — SIGNIFICANT CHANGE UP (ref 0–0.2)
BASOPHILS NFR BLD AUTO: 1.1 % — SIGNIFICANT CHANGE UP (ref 0–2)
BILIRUB SERPL-MCNC: <0.2 MG/DL — LOW (ref 0.4–2)
BUN SERPL-MCNC: 11 MG/DL — SIGNIFICANT CHANGE UP (ref 8–20)
CALCIUM SERPL-MCNC: 8.6 MG/DL — SIGNIFICANT CHANGE UP (ref 8.6–10.2)
CHLORIDE SERPL-SCNC: 101 MMOL/L — SIGNIFICANT CHANGE UP (ref 98–107)
CO2 SERPL-SCNC: 22 MMOL/L — SIGNIFICANT CHANGE UP (ref 22–29)
CREAT SERPL-MCNC: 1.12 MG/DL — SIGNIFICANT CHANGE UP (ref 0.5–1.3)
EOSINOPHIL # BLD AUTO: 0.09 K/UL — SIGNIFICANT CHANGE UP (ref 0–0.5)
EOSINOPHIL NFR BLD AUTO: 1.4 % — SIGNIFICANT CHANGE UP (ref 0–6)
ETHANOL SERPL-MCNC: 239 MG/DL — HIGH (ref 0–9)
GLUCOSE SERPL-MCNC: 115 MG/DL — HIGH (ref 70–99)
HCT VFR BLD CALC: 32.6 % — LOW (ref 39–50)
HGB BLD-MCNC: 10 G/DL — LOW (ref 13–17)
IMM GRANULOCYTES NFR BLD AUTO: 0.3 % — SIGNIFICANT CHANGE UP (ref 0–1.5)
LIDOCAIN IGE QN: 87 U/L — HIGH (ref 22–51)
LYMPHOCYTES # BLD AUTO: 1.92 K/UL — SIGNIFICANT CHANGE UP (ref 1–3.3)
LYMPHOCYTES # BLD AUTO: 30.3 % — SIGNIFICANT CHANGE UP (ref 13–44)
MCHC RBC-ENTMCNC: 23.8 PG — LOW (ref 27–34)
MCHC RBC-ENTMCNC: 30.7 GM/DL — LOW (ref 32–36)
MCV RBC AUTO: 77.4 FL — LOW (ref 80–100)
MONOCYTES # BLD AUTO: 0.41 K/UL — SIGNIFICANT CHANGE UP (ref 0–0.9)
MONOCYTES NFR BLD AUTO: 6.5 % — SIGNIFICANT CHANGE UP (ref 2–14)
NEUTROPHILS # BLD AUTO: 3.83 K/UL — SIGNIFICANT CHANGE UP (ref 1.8–7.4)
NEUTROPHILS NFR BLD AUTO: 60.4 % — SIGNIFICANT CHANGE UP (ref 43–77)
PLATELET # BLD AUTO: 372 K/UL — SIGNIFICANT CHANGE UP (ref 150–400)
POTASSIUM SERPL-MCNC: 4.2 MMOL/L — SIGNIFICANT CHANGE UP (ref 3.5–5.3)
POTASSIUM SERPL-SCNC: 4.2 MMOL/L — SIGNIFICANT CHANGE UP (ref 3.5–5.3)
PROT SERPL-MCNC: 7.5 G/DL — SIGNIFICANT CHANGE UP (ref 6.6–8.7)
RAPID RVP RESULT: SIGNIFICANT CHANGE UP
RBC # BLD: 4.21 M/UL — SIGNIFICANT CHANGE UP (ref 4.2–5.8)
RBC # FLD: 15.9 % — HIGH (ref 10.3–14.5)
SALICYLATES SERPL-MCNC: <0.6 MG/DL — LOW (ref 10–20)
SARS-COV-2 RNA SPEC QL NAA+PROBE: SIGNIFICANT CHANGE UP
SODIUM SERPL-SCNC: 138 MMOL/L — SIGNIFICANT CHANGE UP (ref 135–145)
WBC # BLD: 6.34 K/UL — SIGNIFICANT CHANGE UP (ref 3.8–10.5)
WBC # FLD AUTO: 6.34 K/UL — SIGNIFICANT CHANGE UP (ref 3.8–10.5)

## 2021-04-04 PROCEDURE — 99285 EMERGENCY DEPT VISIT HI MDM: CPT

## 2021-04-04 RX ORDER — THIAMINE MONONITRATE (VIT B1) 100 MG
100 TABLET ORAL ONCE
Refills: 0 | Status: COMPLETED | OUTPATIENT
Start: 2021-04-04 | End: 2021-04-04

## 2021-04-04 RX ORDER — ONDANSETRON 8 MG/1
4 TABLET, FILM COATED ORAL ONCE
Refills: 0 | Status: COMPLETED | OUTPATIENT
Start: 2021-04-04 | End: 2021-04-04

## 2021-04-04 RX ORDER — SODIUM CHLORIDE 9 MG/ML
1000 INJECTION INTRAMUSCULAR; INTRAVENOUS; SUBCUTANEOUS ONCE
Refills: 0 | Status: COMPLETED | OUTPATIENT
Start: 2021-04-04 | End: 2021-04-04

## 2021-04-04 RX ADMIN — Medication 100 MILLIGRAM(S): at 20:46

## 2021-04-04 RX ADMIN — SODIUM CHLORIDE 1000 MILLILITER(S): 9 INJECTION INTRAMUSCULAR; INTRAVENOUS; SUBCUTANEOUS at 20:36

## 2021-04-04 RX ADMIN — ONDANSETRON 4 MILLIGRAM(S): 8 TABLET, FILM COATED ORAL at 20:36

## 2021-04-04 NOTE — ED PROVIDER NOTE - NS ED ROS FT
General: Denies fever, chills  HEENT: Denies sensory changes, sore throat  Neck: Denies neck pain, neck stiffness  Resp: Denies coughing, SOB  Cardiovascular: Denies CP, palpitations, LE edema  GI: + nausea, vomiting, abdominal pain, Denies diarrhea, constipation, blood in stool  : Denies dysuria, hematuria, frequency, incontinence  MSK: Denies back pain  Neuro: Denies HA, dizziness, numbness, weakness  Skin: Denies rashes.

## 2021-04-04 NOTE — ED ADULT NURSE NOTE - OBJECTIVE STATEMENT
Pt. presents alert and oriented x 4, AOB, complaining of AMS and SI. Pt. received in yellow gown, pt. reports being intoxicated "I drank a gallon of vodka," was found by EMS outside [employer]. Pt. denies drug use. Pt. is tearful, reports SI, denies HI. Pt. denies hallucinations or delusions and is calm and cooperative with staff. Pt. JOLLY, skin warm and dry, cap refill < 2 seconds. 1:1 at bedside.

## 2021-04-04 NOTE — ED PROVIDER NOTE - OBJECTIVE STATEMENT
56 y/o M hx of chronic alcohol abuse presents with alcohol intoxication. Per EMS report, patient was found at [employer] prior to arrival. Patient endorses drinking alcohol daily for the last 15 years, states that he drank a gallon of vodka today prior to arrival. Last drink  was 1 hour prior to arrival to the ER. Upon speaking to patient, he states that "he wants to kill himself" and began crying. Endorsing epigastric and RUQ abdominal pain. Endorses multiple episodes of non-bilious and non-bloody emesis. States that he has had the "shakes" whenever he stops drinking alcohol. Denies fever. Denies chest pain. Denies shortness of breath. Denies other illicit drug use.

## 2021-04-04 NOTE — ED PROVIDER NOTE - CLINICAL SUMMARY MEDICAL DECISION MAKING FREE TEXT BOX
54 y/o M hx of chronic alcohol abuse presents with alcohol intoxication. 1 gallon of alcohol intake per patient, daily alcohol use. Does not appear to be actively withdrawing given last drink 1 hour prior to arrival and patient is clinically intoxicated.   Unable to assess gait, slide to ground upon standing. Will attempt to reassess when clinically sober.   +suicidal ideation - will get psych labs for behavorial health evaluation in the AM  zofran 4mg IV for nausea  1L bolus NS   CBC, CMP, lipase - epigastric abd pain - r/o alcohol induced pancreatitis   COVID PCR - for BH

## 2021-04-04 NOTE — ED PROVIDER NOTE - PHYSICAL EXAMINATION
General: Well appearing male in no acute distress  HEENT: Normocephalic, atraumatic. Moist mucous membranes. Oropharynx clear. No lymphadenopathy.  Eyes: No scleral icterus. EOMI. WALTER.  Neck:. Soft and supple. Full ROM without pain. No midline tenderness  Cardiac: Regular rate and regular rhythm. No murmurs, rubs, gallops. Peripheral pulses 2+ and symmetric. No LE edema.  Resp: Lungs CTAB. Speaking in full sentences. No wheezes, rales or rhonchi.  Abd: Soft, +tender epigastric/RUQ abdomen, non-distended. No guarding or rebound. No scars, masses, or lesions.  Back: Spine midline and non-tender. No CVA tenderness.    Skin: No rashes, abrasions, or lacerations. Healed abrasions over B/L hands.   Neuro: AO x 3. Moves all extremities symmetrically. Motor strength and sensation grossly intact. Attempted to ambulate patient but patient was unable to do so, slid to ground upon standing.

## 2021-04-04 NOTE — ED PROVIDER NOTE - ATTENDING CONTRIBUTION TO CARE
I personally saw the patient with the resident, and completed the key components of the history and physical exam. I then discussed the management plan with the resident.   gen intoxicated resp clear cardiac no murmur abd mild ttp epigastreic region no g/r/r no cvat neuro intact

## 2021-04-04 NOTE — ED ADULT TRIAGE NOTE - CHIEF COMPLAINT QUOTE
C/o abdominal pain. BIBEMS found in front of [employer]. +Nausea. Pt states "I drank a gallon of vodka". Pt placed in yellow gown. Belongings secured.

## 2021-04-05 DIAGNOSIS — F32.9 MAJOR DEPRESSIVE DISORDER, SINGLE EPISODE, UNSPECIFIED: ICD-10-CM

## 2021-04-05 DIAGNOSIS — F10.10 ALCOHOL ABUSE, UNCOMPLICATED: ICD-10-CM

## 2021-04-05 LAB
AMPHET UR-MCNC: NEGATIVE — SIGNIFICANT CHANGE UP
BARBITURATES UR SCN-MCNC: NEGATIVE — SIGNIFICANT CHANGE UP
BENZODIAZ UR-MCNC: POSITIVE
COCAINE METAB.OTHER UR-MCNC: NEGATIVE — SIGNIFICANT CHANGE UP
METHADONE UR-MCNC: NEGATIVE — SIGNIFICANT CHANGE UP
OPIATES UR-MCNC: NEGATIVE — SIGNIFICANT CHANGE UP
PCP SPEC-MCNC: SIGNIFICANT CHANGE UP
PCP UR-MCNC: NEGATIVE — SIGNIFICANT CHANGE UP
THC UR QL: NEGATIVE — SIGNIFICANT CHANGE UP

## 2021-04-05 PROCEDURE — 99285 EMERGENCY DEPT VISIT HI MDM: CPT

## 2021-04-05 PROCEDURE — 99218: CPT

## 2021-04-05 PROCEDURE — 93010 ELECTROCARDIOGRAM REPORT: CPT

## 2021-04-05 RX ORDER — FOLIC ACID 0.8 MG
1 TABLET ORAL DAILY
Refills: 0 | Status: DISCONTINUED | OUTPATIENT
Start: 2021-04-05 | End: 2021-04-09

## 2021-04-05 RX ORDER — THIAMINE MONONITRATE (VIT B1) 100 MG
100 TABLET ORAL DAILY
Refills: 0 | Status: DISCONTINUED | OUTPATIENT
Start: 2021-04-05 | End: 2021-04-09

## 2021-04-05 RX ORDER — PANTOPRAZOLE SODIUM 20 MG/1
40 TABLET, DELAYED RELEASE ORAL
Refills: 0 | Status: DISCONTINUED | OUTPATIENT
Start: 2021-04-05 | End: 2021-04-09

## 2021-04-05 RX ADMIN — Medication 100 MILLIGRAM(S): at 10:11

## 2021-04-05 RX ADMIN — Medication 50 MILLIGRAM(S): at 08:31

## 2021-04-05 RX ADMIN — Medication 1 TABLET(S): at 10:11

## 2021-04-05 RX ADMIN — PANTOPRAZOLE SODIUM 40 MILLIGRAM(S): 20 TABLET, DELAYED RELEASE ORAL at 10:11

## 2021-04-05 RX ADMIN — Medication 50 MILLIGRAM(S): at 19:57

## 2021-04-05 RX ADMIN — Medication 1 MILLIGRAM(S): at 10:11

## 2021-04-05 NOTE — ED BEHAVIORAL HEALTH ASSESSMENT NOTE - MEDICAL RECORD REVIEWED
Vaccine Information Statement(s) was given today. This has been reviewed, questions answered, and verbal consent given by Patient for injection(s) and administration of Influenza (Inactivated).    1. Does the patient have a moderate to severe fever?  No  2. Has the patient had a serious reaction to a flu shot before?   No  3. Has the patient ever had Guillian Boulder Syndrome within 6 weeks of a previous flu shot?  No  4. Is the patient less than 6 months of age?  No    Patient is eligible to receive the vaccine based on all questions being answered as 'No'.    Patient tolerated without incident. See immunization grid for documentation.    
Yes

## 2021-04-05 NOTE — ED BEHAVIORAL HEALTH ASSESSMENT NOTE - ADDITIONAL DETAILS ALL
Patient states he has been having suicidal thoughts almost everyday for the past year but now has plan with unknown intent

## 2021-04-05 NOTE — ED BEHAVIORAL HEALTH ASSESSMENT NOTE - SUMMARY
55 year old homeless Male with EToH abuse having progressive thoughts of SI and worsening depression seen in the ED. Has sought out substance abuse help in past with no long term sobriety. Patient now illicit plan with unknown intent. No concern for aggressive behaviour at this time. No concern for psychosis.   Plan:  Monitor for EToH withdrawal  Contact various locations for admission to inpatient psychiatric treatment secondary to increasing SI.   Reassess 55 year old homeless Male with EToH abuse having progressive thoughts of SI and worsening depression seen in the ED. Has sought out substance abuse help in past with no long term sobriety. Patient now illicit plan with unknown intent. No concern for aggressive behaviour at this time. No concern for psychosis.   Plan:  Monitor for EToH withdrawal  Contact various locations for admission to inpatient psychiatric treatment secondary to increasing SI.

## 2021-04-05 NOTE — ED BEHAVIORAL HEALTH ASSESSMENT NOTE - DESCRIPTION
Patient initially came in for EToH abuse and abdominal pain. Then reported depression with SI to provider. GERD, asthma, HLD See HPI

## 2021-04-05 NOTE — ED BEHAVIORAL HEALTH ASSESSMENT NOTE - CASE SUMMARY
55 year old homeless Male with EToH abuse having progressive thoughts of SI and worsening depression seen in the ED. Has sought out substance abuse help in past with no long term sobriety. Patient now illicit plan with unknown intent. No concern for aggressive behaviour at this time. No concern for psychosis.   Plan:  Monitor for EToH withdrawal  Contact various locations for admission to inpatient psychiatric treatment secondary to increasing SI.   Reassess

## 2021-04-05 NOTE — ED BEHAVIORAL HEALTH ASSESSMENT NOTE - RISK ASSESSMENT
Elevated SI risk due to depression, undomiciled, unemployed, and chronic alcohol abuse. Moderate Acute Suicide Risk

## 2021-04-05 NOTE — ED BEHAVIORAL HEALTH ASSESSMENT NOTE - DETAILS
mother abusing diet pills and amphetamines Abdominal pain secondary to GERD n/a Not at this time Thoughts have been more frequent over last 2 weeks

## 2021-04-05 NOTE — ED BEHAVIORAL HEALTH ASSESSMENT NOTE - HPI (INCLUDE ILLNESS QUALITY, SEVERITY, DURATION, TIMING, CONTEXT, MODIFYING FACTORS, ASSOCIATED SIGNS AND SYMPTOMS)
55 year old homeless Male with PMHx of depression, EToH abuse, GERD, asthma, and HLD presents for depression with suicidal ideations. Reports over last 2 weeks his depression is "getting bad... I don't want to live, but I don't want it to hurt". States if he would attempt then would OD on heroin. When asked if he had intent to go through with OD, he said, "I don't know, I can't answer that. No h/o SA, HI, AH/VH. Last used Heroin >2 months ago. Reports now feeling anxious. No h/o withdrawal seizures. Reports drinking 1 gallon of vodka per day now. Was last at [facility] for 1 week leaving on 21 and states it helped. Currently not receiving addiction counseling and has never attended AA. Reports acute on chronic epigastric pain secondary to GERD 10/10 burning sensation.    Reports [place] the [place]. No children or significant other. Girlfriend  >1 year old due to opoid OD. No access to gun.  FHx of mother abusing diet pills and amphetamines. No h/o physical/emotional/neglectful/sexual abuse. Highest level of education 7th grade. 55 year old homeless Male with PMHx of depression, EToH abuse, GERD, asthma, and HLD presents for depression with suicidal ideations. Reports over last 2 weeks his depression is "getting bad... I don't want to live, but I don't want it to hurt". States if he would attempt then would OD on heroin. When asked if he had intent to go through with OD, he said, "I don't know, I can't answer that. No h/o SA, HI, AH/VH. Last used Heroin >2 months ago. Reports now feeling anxious. No h/o withdrawal seizures. Reports drinking "1 gallon of vodka" per day now. Was last at [facility] for 1 week leaving on 21 and states it helped. Currently not receiving addiction counseling and has never attended AA. Reports acute on chronic epigastric pain secondary to GERD 10/10 burning sensation.    Reports [place] the [place]. No children or significant other. Girlfriend  >1 year old due to opoid OD. No access to gun.  FHx of mother abusing diet pills and amphetamines. No h/o physical/emotional/neglectful/sexual abuse. Highest level of education 7th grade.

## 2021-04-06 VITALS
RESPIRATION RATE: 20 BRPM | HEART RATE: 62 BPM | DIASTOLIC BLOOD PRESSURE: 99 MMHG | SYSTOLIC BLOOD PRESSURE: 139 MMHG | OXYGEN SATURATION: 98 % | TEMPERATURE: 98 F

## 2021-04-06 LAB — SARS-COV-2 RNA SPEC QL NAA+PROBE: SIGNIFICANT CHANGE UP

## 2021-04-06 PROCEDURE — 99285 EMERGENCY DEPT VISIT HI MDM: CPT | Mod: 25

## 2021-04-06 PROCEDURE — 83690 ASSAY OF LIPASE: CPT

## 2021-04-06 PROCEDURE — 96375 TX/PRO/DX INJ NEW DRUG ADDON: CPT

## 2021-04-06 PROCEDURE — 36415 COLL VENOUS BLD VENIPUNCTURE: CPT

## 2021-04-06 PROCEDURE — U0003: CPT

## 2021-04-06 PROCEDURE — G0378: CPT

## 2021-04-06 PROCEDURE — 80053 COMPREHEN METABOLIC PANEL: CPT

## 2021-04-06 PROCEDURE — 0225U NFCT DS DNA&RNA 21 SARSCOV2: CPT

## 2021-04-06 PROCEDURE — 99217: CPT

## 2021-04-06 PROCEDURE — 85025 COMPLETE CBC W/AUTO DIFF WBC: CPT

## 2021-04-06 PROCEDURE — 80307 DRUG TEST PRSMV CHEM ANLYZR: CPT

## 2021-04-06 PROCEDURE — 93005 ELECTROCARDIOGRAM TRACING: CPT

## 2021-04-06 PROCEDURE — U0005: CPT

## 2021-04-06 PROCEDURE — 96374 THER/PROPH/DIAG INJ IV PUSH: CPT

## 2021-04-06 RX ADMIN — Medication 1 MILLIGRAM(S): at 11:44

## 2021-04-06 RX ADMIN — Medication 50 MILLIGRAM(S): at 08:22

## 2021-04-06 RX ADMIN — Medication 30 MILLILITER(S): at 11:45

## 2021-04-06 RX ADMIN — PANTOPRAZOLE SODIUM 40 MILLIGRAM(S): 20 TABLET, DELAYED RELEASE ORAL at 08:22

## 2021-04-06 NOTE — ED CDU PROVIDER DISPOSITION NOTE - CLINICAL COURSE
No events during observation stay; patient transferred to [facility] for psychiatric care

## 2021-04-06 NOTE — ED ADULT NURSE REASSESSMENT NOTE - NS ED NURSE REASSESS COMMENT FT1
Assumed care of patient from previous RN.   A&Ox4, RR even and unlabored. Skin is warm and dry.  PT dressed in yellow gown. 1:1 remains at bedside.  Safety maintained.  No signs of withdrawal at this time.
Patient presented in  area dressed in hospital gowns.  Patient alert and oriented.  Patient endorses he feels suicidal due to reported ETOH abuse and hopelessness.  Patient CIWA is 10 and received Librium 50mg PO at 0831.  Patient oriented to staff and educated about plan of care.  Safety maintained.
pt educated on plan of care to be transferred to [facility], pt endorses plan.
assumed care of pt @1930. pt's CIWA score 6 @1930. Librium 50mg PRN given to pt. pt currently resting/sleeping comfortably, in no apparent distress. will continue to monitor the pt for safety.
Patient provided dinner at bedside but reports "I'll eat later".  Patient CIWA is a 3 on assessment.  Patient educated about availability of PRN medication if he feels he is having withdrawals from ETOH.  Patient verbalized understanding he will remain in BH area till a bed is available.  No attempts to harm self or others and safety maintained.
Patient ate 100% of his lunch.  Patient resting in bed with no distress.  Patient CIWA is 3.  Patient agrees with plan for inpatient hospitalization and continues to have passive suicidal ideation and depressed mood.  No attempts to harm self or others and safety maintained.
Patient ate 75% of his breakfast.  Patient compliant with medications.  Patient resting in bed.  Patient agrees with plan for inpatient placement when bed available.  Safety maintained.
assumed care of pt sleeping/resting and was easily aroused by verbal stimuli. pt c/o mild nausea stating "Do you have the stomach medication?" pt assessed for alcohol withdrawals and scored a ciwa of 5. pt given Librium 50 mg po. pt denies suicidal or homicidal ideations at this time stating "I said it because I was drinking" pt also denies auditory or visual hallucinations. pt has made no attempts to harm himself or others.

## 2021-04-06 NOTE — ED ADULT NURSE REASSESSMENT NOTE - STATUS
awaiting transfer, no change

## 2021-04-06 NOTE — CHART NOTE - NSCHARTNOTEFT_GEN_A_CORE
16:40SWNote: no answer from [facility] ,left message in voicemail. Worker called [facility] ([facility]) bed may be open this evening ,case faxed for review.   17:55 p/c from [facility] had questions regarding ambulation remarks by ED MD(Davin) ,per behavioral RN (Kalpesh)informed pt intoxicated upon arrival to the ED pt ambulating independently at this moment. [facility] states pt is accepted however, their patient that was going to be d/c wasn't therefore she can take Mr Montanez in the am. SW to call in the am to confirm pt ambulating independently and that bed is available. SW to follow.
RACQUEL Note: Plan is to transfer pt for inpt psychiatric care. Current CIWA score is a 3. legal status: 9.37. No available beds at [facility] and [facility]. Too late for a referral to [facility]. Contacted [facility], spoke with Danielle [phone]. Possible bed, referral made pending MD review.
SW Note: Pt will be transferred to [facility] for in psychiatric care. Coordinated admit with [facility] and [facility] [phone]. Faxed second covid neg and recent RN note. Completed ins auth for admit. [facility] [employer] [phone]. Auth approved for 2 days 4/6 & 4/7. Auth# [id number]. Accepting Dr. Prieto BUITRAGO. Ambulance arranged with [facility]

## 2021-04-06 NOTE — ED ADULT NURSE REASSESSMENT NOTE - COMFORT CARE
meal provided/plan of care explained
meal provided/plan of care explained
darkened lights
darkened lights/meal provided/plan of care explained/po fluids offered
meal provided/plan of care explained

## 2021-04-06 NOTE — ED ADULT NURSE REASSESSMENT NOTE - GENERAL PATIENT STATE
anxious/comfortable appearance/cooperative/resting/sleeping
resting/sleeping
comfortable appearance/cooperative
comfortable appearance/cooperative/no change observed/resting/sleeping

## 2021-04-14 NOTE — ED BEHAVIORAL HEALTH ASSESSMENT NOTE - DIFFERENTIAL
Medication:    Outpatient Medications Marked as Taking for the 4/9/21 encounter (Refill) with Dayne Lynn NP   Medication Sig Dispense Refill   • amphetamine-dextroamphetamine (ADDERALL XR) 25 MG 24 hr capsule Take 1 capsule by mouth daily. Do not start before March 11, 2021. 30 capsule 0       Message to Prescriber:     [] Pharmacy has been verified.    [] Patient completely out of medication (*Route encounter as high priority if checked)    [] Patient informed refill request can take up to 3 business days to be processed    Patient currently has follow up appointment scheduled      
Pt last seen 2/12/21   Next appt 7/8/21    Last prescribed 3/11/21 Amphetmine Dextroamphetamine Adderall XR 25 mg capsule taking 1 daily.     Last sold 3/16/21    Routing to Dayne Lynn DNP to authorize refill if appropriate.  
C-SSRS Screener   1. Have you ever wished to be dead or wished you could go to sleep and not wake up?  [  ]Yes, [x  ]No, [  ]Unable to Assess  Details:     2. Have you actually had any thoughts of killing yourself?   [  ]Yes, [ x ]No, [  ]Unable to Assess  Details:

## 2021-05-03 ENCOUNTER — EMERGENCY (EMERGENCY)
Facility: HOSPITAL | Age: 56
LOS: 1 days | Discharge: HOME | End: 2021-05-03
Attending: EMERGENCY MEDICINE
Payer: COMMERCIAL

## 2021-05-03 VITALS
WEIGHT: 179.9 LBS | DIASTOLIC BLOOD PRESSURE: 82 MMHG | OXYGEN SATURATION: 100 % | RESPIRATION RATE: 18 BRPM | TEMPERATURE: 98 F | HEART RATE: 91 BPM | HEIGHT: 65 IN | SYSTOLIC BLOOD PRESSURE: 113 MMHG

## 2021-05-03 PROCEDURE — 99284 EMERGENCY DEPT VISIT MOD MDM: CPT

## 2021-05-03 PROCEDURE — 99285 EMERGENCY DEPT VISIT HI MDM: CPT

## 2021-05-03 NOTE — ED PROVIDER NOTE - OBJECTIVE STATEMENT
56 y/o male with PMHx of Alcohol abuse Asthma, GERD, HLD, and HTN presents to ED with alcohol intoxication. As per EMS, patient was found intoxicated outside a [employer]. Patient has no complaints at this time.    denies fever. denies HA or neck pain. no chest pain or sob. no abd pain. no n/v/d. no urinary f/u/d. no back pain. no motor or sensory deficits. denies illicit drug use. no recent travel. no rash. denies SI/Hi. no other acute issues symptoms or concerns

## 2021-05-03 NOTE — ED PROVIDER NOTE - PATIENT PORTAL LINK FT
You can access the FollowMyHealth Patient Portal offered by [facility] by registering at the following website: [email or web address]. By joining [employer]’s FollowMyHealth portal, you will also be able to view your health information using other applications (apps) compatible with our system.

## 2021-05-03 NOTE — ED ADULT TRIAGE NOTE - CHIEF COMPLAINT QUOTE
BIBEMS found in front of [employer]. Pt states, "I drank alcohol today". Pt ambulating to bed without difficulty.

## 2021-05-04 ENCOUNTER — EMERGENCY (EMERGENCY)
Facility: HOSPITAL | Age: 56
LOS: 1 days | Discharge: HOME | End: 2021-05-04
Attending: STUDENT IN AN ORGANIZED HEALTH CARE EDUCATION/TRAINING PROGRAM
Payer: COMMERCIAL

## 2021-05-04 ENCOUNTER — NON-APPOINTMENT (OUTPATIENT)
Age: 56
End: 2021-05-04

## 2021-05-04 VITALS
HEIGHT: 65 IN | OXYGEN SATURATION: 100 % | RESPIRATION RATE: 18 BRPM | DIASTOLIC BLOOD PRESSURE: 69 MMHG | SYSTOLIC BLOOD PRESSURE: 128 MMHG | TEMPERATURE: 98 F | HEART RATE: 92 BPM

## 2021-05-04 LAB
ALBUMIN SERPL ELPH-MCNC: 5.1 G/DL — SIGNIFICANT CHANGE UP (ref 3.3–5.2)
ALP SERPL-CCNC: 69 U/L — SIGNIFICANT CHANGE UP (ref 40–120)
ALT FLD-CCNC: 46 U/L — HIGH
ANION GAP SERPL CALC-SCNC: 18 MMOL/L — HIGH (ref 5–17)
APTT BLD: 34.7 SEC — SIGNIFICANT CHANGE UP (ref 27.5–35.5)
AST SERPL-CCNC: 38 U/L — SIGNIFICANT CHANGE UP
BASOPHILS # BLD AUTO: 0.04 K/UL — SIGNIFICANT CHANGE UP (ref 0–0.2)
BASOPHILS NFR BLD AUTO: 0.5 % — SIGNIFICANT CHANGE UP (ref 0–2)
BILIRUB SERPL-MCNC: <0.2 MG/DL — LOW (ref 0.4–2)
BUN SERPL-MCNC: 11 MG/DL — SIGNIFICANT CHANGE UP (ref 8–20)
CALCIUM SERPL-MCNC: 9.1 MG/DL — SIGNIFICANT CHANGE UP (ref 8.6–10.2)
CHLORIDE SERPL-SCNC: 105 MMOL/L — SIGNIFICANT CHANGE UP (ref 98–107)
CO2 SERPL-SCNC: 19 MMOL/L — LOW (ref 22–29)
CREAT SERPL-MCNC: 1.09 MG/DL — SIGNIFICANT CHANGE UP (ref 0.5–1.3)
EOSINOPHIL # BLD AUTO: 0.01 K/UL — SIGNIFICANT CHANGE UP (ref 0–0.5)
EOSINOPHIL NFR BLD AUTO: 0.1 % — SIGNIFICANT CHANGE UP (ref 0–6)
ETHANOL SERPL-MCNC: 194 MG/DL — HIGH (ref 0–9)
GLUCOSE SERPL-MCNC: 135 MG/DL — HIGH (ref 70–99)
HCT VFR BLD CALC: 41 % — SIGNIFICANT CHANGE UP (ref 39–50)
HGB BLD-MCNC: 12.2 G/DL — LOW (ref 13–17)
IMM GRANULOCYTES NFR BLD AUTO: 0.4 % — SIGNIFICANT CHANGE UP (ref 0–1.5)
INR BLD: 1.14 RATIO — SIGNIFICANT CHANGE UP (ref 0.88–1.16)
LYMPHOCYTES # BLD AUTO: 2.62 K/UL — SIGNIFICANT CHANGE UP (ref 1–3.3)
LYMPHOCYTES # BLD AUTO: 33.6 % — SIGNIFICANT CHANGE UP (ref 13–44)
MCHC RBC-ENTMCNC: 23.7 PG — LOW (ref 27–34)
MCHC RBC-ENTMCNC: 29.8 GM/DL — LOW (ref 32–36)
MCV RBC AUTO: 79.6 FL — LOW (ref 80–100)
MONOCYTES # BLD AUTO: 0.36 K/UL — SIGNIFICANT CHANGE UP (ref 0–0.9)
MONOCYTES NFR BLD AUTO: 4.6 % — SIGNIFICANT CHANGE UP (ref 2–14)
NEUTROPHILS # BLD AUTO: 4.73 K/UL — SIGNIFICANT CHANGE UP (ref 1.8–7.4)
NEUTROPHILS NFR BLD AUTO: 60.8 % — SIGNIFICANT CHANGE UP (ref 43–77)
PLATELET # BLD AUTO: 426 K/UL — HIGH (ref 150–400)
POTASSIUM SERPL-MCNC: 4.3 MMOL/L — SIGNIFICANT CHANGE UP (ref 3.5–5.3)
POTASSIUM SERPL-SCNC: 4.3 MMOL/L — SIGNIFICANT CHANGE UP (ref 3.5–5.3)
PROT SERPL-MCNC: 8.2 G/DL — SIGNIFICANT CHANGE UP (ref 6.6–8.7)
PROTHROM AB SERPL-ACNC: 13.1 SEC — SIGNIFICANT CHANGE UP (ref 10.6–13.6)
RBC # BLD: 5.15 M/UL — SIGNIFICANT CHANGE UP (ref 4.2–5.8)
RBC # FLD: 19.9 % — HIGH (ref 10.3–14.5)
SODIUM SERPL-SCNC: 142 MMOL/L — SIGNIFICANT CHANGE UP (ref 135–145)
WBC # BLD: 7.79 K/UL — SIGNIFICANT CHANGE UP (ref 3.8–10.5)
WBC # FLD AUTO: 7.79 K/UL — SIGNIFICANT CHANGE UP (ref 3.8–10.5)

## 2021-05-04 PROCEDURE — 99291 CRITICAL CARE FIRST HOUR: CPT | Mod: 57

## 2021-05-04 PROCEDURE — 96372 THER/PROPH/DIAG INJ SC/IM: CPT

## 2021-05-04 PROCEDURE — 73030 X-RAY EXAM OF SHOULDER: CPT

## 2021-05-04 PROCEDURE — 85610 PROTHROMBIN TIME: CPT

## 2021-05-04 PROCEDURE — 80307 DRUG TEST PRSMV CHEM ANLYZR: CPT

## 2021-05-04 PROCEDURE — 36415 COLL VENOUS BLD VENIPUNCTURE: CPT

## 2021-05-04 PROCEDURE — 99291 CRITICAL CARE FIRST HOUR: CPT | Mod: 25

## 2021-05-04 PROCEDURE — 80053 COMPREHEN METABOLIC PANEL: CPT

## 2021-05-04 PROCEDURE — 23650 CLTX SHO DSLC W/MNPJ WO ANES: CPT | Mod: LT

## 2021-05-04 PROCEDURE — 23650 CLTX SHO DSLC W/MNPJ WO ANES: CPT | Mod: 54

## 2021-05-04 PROCEDURE — 72125 CT NECK SPINE W/O DYE: CPT | Mod: 26

## 2021-05-04 PROCEDURE — 85025 COMPLETE CBC W/AUTO DIFF WBC: CPT

## 2021-05-04 PROCEDURE — 70450 CT HEAD/BRAIN W/O DYE: CPT | Mod: 26

## 2021-05-04 PROCEDURE — 70450 CT HEAD/BRAIN W/O DYE: CPT

## 2021-05-04 PROCEDURE — 72125 CT NECK SPINE W/O DYE: CPT

## 2021-05-04 PROCEDURE — 85730 THROMBOPLASTIN TIME PARTIAL: CPT

## 2021-05-04 PROCEDURE — 71250 CT THORAX DX C-: CPT | Mod: 26,MA

## 2021-05-04 PROCEDURE — 73030 X-RAY EXAM OF SHOULDER: CPT | Mod: 26,LT,76

## 2021-05-04 PROCEDURE — 71250 CT THORAX DX C-: CPT

## 2021-05-04 RX ORDER — KETAMINE HYDROCHLORIDE 100 MG/ML
100 INJECTION INTRAMUSCULAR; INTRAVENOUS ONCE
Refills: 0 | Status: DISCONTINUED | OUTPATIENT
Start: 2021-05-04 | End: 2021-05-04

## 2021-05-04 RX ADMIN — KETAMINE HYDROCHLORIDE 100 MILLIGRAM(S): 100 INJECTION INTRAMUSCULAR; INTRAVENOUS at 03:40

## 2021-05-04 NOTE — ED ADULT NURSE REASSESSMENT NOTE - NS ED NURSE REASSESS COMMENT FT1
pt discharged by MD. PT NO APPARENT DISTRESS. AMBULATING with  out assistance. vss. pt placed in sling. pt educated on sling. anox4. belongings given back to pt as per night rn.

## 2021-05-04 NOTE — ED PROVIDER NOTE - CARE PROVIDER_API CALL
Vick Sandoval (DO)  Orthopedics  [address] [place] [id number]  Phone: [phone]  Fax: [phone]  Follow Up Time: 7-10 Days

## 2021-05-04 NOTE — ED PROVIDER NOTE - CRITICAL CARE ATTENDING CONTRIBUTION TO CARE
Due to the a high probability of clinically significant, life threatening deterioration, the patient required high level of preparedness to intervene emergently. I personally spent critical care time directly and personally managing the patient. This included (but not limited too reviewing  vitals, managing orders, and determining and adjusting plan depending on response to interventions.

## 2021-05-04 NOTE — ED ADULT NURSE REASSESSMENT NOTE - NS ED NURSE REASSESS COMMENT FT1
Pt woke up, yelling in pain stating that he thinks that "his shoulder is out again" Pt woke up, yelling in pain stating that he thinks that "his shoulder is out again."  notified

## 2021-05-04 NOTE — ED PROVIDER NOTE - WR INTERPRETED BY 1
If you are a smoker, it is important for your health to stop smoking. Please be aware that second hand smoke is also harmful. Adrian Isaac

## 2021-05-04 NOTE — ED PROVIDER NOTE - NSFOLLOWUPINSTRUCTIONS_ED_ALL_ED_FT
1) Follow up with the listed orthopedist in one week  2) Return to the ER for worsening or concerning symptoms  3) Take ibuprofen for pain control      Shoulder Dislocation    WHAT YOU NEED TO KNOW:    A shoulder dislocation happens when the top of your arm bone (humerus) moves out of the socket in your shoulder blade.    Shoulder Anatomy         DISCHARGE INSTRUCTIONS:    Return to the emergency department if:   •Your shoulder and arm become pale or cold.      •You cannot move your shoulder and arm.      •You have more redness or swelling in your shoulder.      •Your shoulder becomes dislocated again.      Call your doctor if:   •You have a fever.      •You have more pain in your shoulder and arm even after you rest and take your medicine.      •You have new weakness or numbness in your shoulder and arm.      •You have questions or concerns about your condition or care.      Medicines:You may need any of the following:   •Prescription pain medicine may be given. Ask your healthcare provider how to take this medicine safely. Some prescription pain medicines contain acetaminophen. Do not take other medicines that contain acetaminophen without talking to your healthcare provider. Too much acetaminophen may cause liver damage. Prescription pain medicine may cause constipation. Ask your healthcare provider how to prevent or treat constipation.       •A muscle relaxer may help the tight muscles in your shoulder relax.      •Take your medicine as directed. Contact your healthcare provider if you think your medicine is not helping or if you have side effects. Tell him or her if you are allergic to any medicine. Keep a list of the medicines, vitamins, and herbs you take. Include the amounts, and when and why you take them. Bring the list or the pill bottles to follow-up visits. Carry your medicine list with you in case of an emergency.      Rest your shoulder and arm: Rest helps your muscles and tissues heal. Avoid activities that cause pain or use an overhead arm motion. Your healthcare provider will tell you when it is safe to return to sports or other daily activities.    How to wear a brace, sling, or splint: A brace, sling, or splint may be needed to limit your movement and protect your shoulder.    Shoulder Sling     •Wear your brace, sling, or splint all the time. Take it off only to bathe or do exercises as directed. Ask your healthcare provider how many weeks you should wear it.      •Keep your skin clean and dry. Place padding under your armpit to help absorb sweat and prevent sores on your skin.      •Do not hunch your shoulders. This may cause pain. Keep your shoulders relaxed.      •Support your wrist and hand with the sling. Cover the knuckles on your hand with your sling. Your wrist should be positioned higher than your elbow. Your wrist may start to hurt or go numb if your sling is too short.      Apply ice: Ice helps decrease swelling and pain. Ice may also help prevent tissue damage. Use an ice pack, or put crushed ice in a plastic bag. Cover it with a towel before you place it on your shoulder. Apply ice for 15 to 20 minutes every hour or as directed.    Exercises: Begin gentle exercises as directed. After healing begins, you may start light exercises so your shoulder does not get stiff. Go to physical therapy if directed. A physical therapist teaches you exercises to help improve movement and strength, and to decrease pain. Do not lift heavy objects or do any exercise that causes severe pain.    Follow up with your doctor in 5 to 10 days: Write down your questions so you remember to ask them during your visits.       Head Injury    WHAT YOU NEED TO KNOW:    A head injury can include your scalp, face, skull, or brain and range from mild to severe. Effects can appear immediately after the injury or develop later. The effects may last a short time or be permanent. Healthcare providers may want to check your recovery over time. Treatment may change as you recover or develop new health problems from the head injury.    DISCHARGE INSTRUCTIONS:    Call your local emergency number (911 in the [place]), or have someone else call if:   •You cannot be woken.      •You have a seizure.      •You stop responding to others or you faint.      •You have blurry or double vision.      •Your speech becomes slurred or confused.      •You have arm or leg weakness, loss of feeling, or new problems with coordination.      •Your pupils are larger than usual, or one pupil is a different size than the other.      •You have blood or clear fluid coming out of your ears or nose.      Return to the emergency department if:   •You have repeated or forceful vomiting.      •You feel confused.      •Your headache gets worse or becomes severe.      •You or someone caring for you notices that you are harder to wake than usual.      Call your doctor if:   •Your symptoms last longer than 6 weeks after the injury.      •You have questions or concerns about your condition or care.      Medicines:   •Acetaminophen decreases pain and fever. It is available without a doctor's order. Ask how much to take and how often to take it. Follow directions. Read the labels of all other medicines you are using to see if they also contain acetaminophen, or ask your doctor or pharmacist. Acetaminophen can cause liver damage if not taken correctly. Do not use more than 4 grams (4,000 milligrams) total of acetaminophen in one day.       •Take your medicine as directed. Contact your healthcare provider if you think your medicine is not helping or if you have side effects. Tell him or her if you are allergic to any medicine. Keep a list of the medicines, vitamins, and herbs you take. Include the amounts, and when and why you take them. Bring the list or the pill bottles to follow-up visits. Carry your medicine list with you in case of an emergency.      Self-care:   •Rest or do quiet activities. Limit your time watching TV, using the computer, or doing tasks that require a lot of thinking. Slowly return to your normal activities as directed. Do not play sports or do activities that may cause you to get hit in the head. Ask your healthcare provider when you can return to sports.      •Apply ice on your head for 15 to 20 minutes every hour or as directed. Use an ice pack, or put crushed ice in a plastic bag. Cover it with a towel before you apply it to your skin. Ice helps prevent tissue damage and decreases swelling and pain.      •Have someone stay with you for 24 hours , or as directed. This person can monitor you for problems and call for help if needed. When you are awake, the person should ask you a few questions every few hours to see if you are thinking clearly. An example is to ask your name or address.      Prevent another head injury:   •Wear a helmet that fits properly. Do this when you play sports, or ride a bike, scooter, or skateboard. Helmets help decrease your risk for a serious head injury. Talk to your healthcare provider about other ways you can protect yourself if you play sports.      •Wear your seatbelt every time you are in a car. This helps lower your risk for a head injury if you are in a car accident.      Follow up with your doctor as directed: Write down your questions so you remember to ask them during your visits.

## 2021-05-04 NOTE — ED PROVIDER NOTE - PATIENT PORTAL LINK FT
You can access the FollowMyHealth Patient Portal offered by [facility] by registering at the following website: [email or web address]. By joining [employer]’s FollowMyHealth portal, you will also be able to view your health information using other applications (apps) compatible with our system.

## 2021-05-04 NOTE — ED ADULT NURSE NOTE - OBJECTIVE STATEMENT
BIBEMS from [address] s/p rolling down the incline and landing on his L shoulder. ALso reports minor head injury. Deformity noted to L shoulder, patient unable to put his arm down. Patient fl;ailing BIBEMS from [place] [facility] s/p rolling down the incline and landing on his L shoulder. AL[place] reports minor head injury. Deformity noted to L shoulder, patient unable to put his arm down. Patient not sitting still for initial blood draw, medicated with 100mg Ketamine IM. Shoulder reduced immediately post sedation administration. Brought over to imaging post reduction. 20g PIV inserted to R AC, labs drawn and sent, continuous cardiac monitoring in place.

## 2021-05-04 NOTE — ED PROVIDER NOTE - INTERNATIONAL TRAVEL
Â  May shower. No tub bathing, swimming pool or hot tub use. No driving until seen in the office for a follow-up appointment. No lifting greater than 10 pounds    DISCHARGE INSTRUCTIONS:   OUTPATIENT DEPARTMENT AND THE  DAY SURGERY CENTER    DIET   As tolerated      MEDICATIONS   Resume your usual medication. Prescription given/filled. Take the medications that your doctor has prescribed for you. Avoid any aspirin or aspirin containing products for ___7_ days. DRESSING    Incisions glued shut, no special care needed. Wear Jobst bra at all times except for laundering or showering   May use ice pack to surgical area, do not put directly on top of incision    BATHING    Shower / Shampoo-starting tomorrow. Pat incisional areas dry   No swimming, hot tub or submersion until directed by physician. ACTIVITY    Up as tolerated/no restrictions. Light activity for several days, gradually resume normal activities, without lifting   No lifting more than: 10 lbs   May not drive a car until after seen by MD      Patient/Family given For your Well Being Teaching Sheet:   Care After Anesthesia/ Sedation   Pain Management Tips   About Surgical Site infection    ___ Other:     Supplies:    FOLLOW-UP CARE  _x__ You have an appointment to see your doctor October 11 at 9:30  ___ Call for results in ______days    Notify your physician immediately if you experience any of the following symptoms:  â¢ If the operative site is visible, observe the area for swelling, redness, warmth, or discoloration. â¢ Fever 101 degree or higher. â¢ Nausea or vomiting that persists longer than 24 hours. â¢ Pain not relieved by the prescribed medication. â¢ Heavy bleeding. â¢ Cloudy or foul smelling drainage. If you have any questions, please call the [facility] at [phone].
No

## 2021-05-04 NOTE — ED PROVIDER NOTE - PROGRESS NOTE DETAILS
Patient agitated, made worse due to pain from the left shoulder and obvious intoxication. After calming the patient down we were able ot reduce the left shoulder with gentle traction. Patient still exhibiting signs of intoxication so he was observed. His mental state improved and he was stable for discharge home.

## 2021-05-04 NOTE — ED PROCEDURE NOTE - NS ED PERI NEURO NEG
patients states fingers feeling numb/Post-application: Motor, sensory, and vascular responses intact in the injured extremity.

## 2021-05-04 NOTE — ED ADULT TRIAGE NOTE - CHIEF COMPLAINT QUOTE
patient states that he was walking up the incline under the overpass when he lost his footing and "rolled down" patient with complaints of left shoulder pain, head injury denies any LOC, Dr Isaac at bedside for evaluation

## 2021-05-04 NOTE — ED PROVIDER NOTE - OBJECTIVE STATEMENT
56 yo male presents for evaluation s/p fall down on [place]. Patient complaining of left shoulder pain.

## 2021-05-04 NOTE — ED PROVIDER NOTE - CARE PLAN
Principal Discharge DX:	Alcohol abuse  Secondary Diagnosis:	Shoulder dislocation, left, initial encounter  Secondary Diagnosis:	Closed head injury, initial encounter

## 2021-05-06 ENCOUNTER — NON-APPOINTMENT (OUTPATIENT)
Age: 56
End: 2021-05-06

## 2021-05-07 NOTE — ED ADULT NURSE REASSESSMENT NOTE - NS ED NURSE REASSESS COMMENT FT1
POST Pain Procedure without Sedation Care Instructions    Follow Up Plan:   1. Rest today and gradually resume regular activity  2. Place ice on your injection site for 20 minutes an hour while awake for 24 hours as needed  3. After 24 hours, place a heating pad on LOW setting on your injection site 3-4 times/day for 20 minutes as needed  4. May shower tomorrow  5. Resume your regular diet, as tolerated. If nauseated, only drink clear liquids until the nausea goes away.   6. No prothesis for 4 hours post procedure.     If any of the following problems occur, notify the doctor who did your procedure or come to the emergency department. Emergency Room number [phone].  • Severe pain  • Persistent Nausea and/or vomiting lasting more than a few hours  • Temperature above 101 degrees F or redness, swelling, warmth or drainage at injection site may indicate infection  • Chest pain or shortness of breath call 911    Fall Prevention at Home  Falls happen at home for many reasons. Several known factors add to your risk for falling. These include:  • Poor vision or hearing  • History of falls  • Use of aids, such a cane  • Poor nutrition  • Certain medication  • Being over 65 years old  • Conditions in the home, such as slippery floors, loose rugs, cords on floor    Our goal is to help you stay safe at home and prevent falls. Here are some things that you can do that will help you lower your risk for falls at home:    Bathroom  • Use a raised toilet seat and safety frame for ease in getting up and down from toilet  • Set water temperator at 120 degrees or less (prevent burns and falls trying to avoid burns)  • Consider a hand-held shower head, shower chair and handrails in the tub  • Use liquid soap or soap on a rope to prevent dropping soap    Lighting  • Replace dim, burned out or glaring lights with bright, soft white light bulbs  • Use a night light  • Make sure lights are easy turn on and off  • Keep a flashlight  available    Clear Hallways and Stairs  • Remove clutter, especially from hallways and stairwells  • Use handrails while taking the stairs  • Place non-skid treads or bright reflective tape to oksana the edge of stairs    Floors  • Remove scatter/throw rugs  • Place non-skid treads or double-sided tape under area rugs  • Keep floor free from clutter  • Wipe up spills immediately  • Make sure floors are not slippery    Other  • Store frequently used items at waist level  • Select furniture with armrests for support in getting up and down  • Keep phone within easy reach  • Prevent dizziness and weakness from poor nutrition or medication change, consult your doctor or dietician         At [facility] we are committed to providing you with excellent service and are pleased that you and your physician have selected us to provide your medical care.  Your satisfaction is very important, as we strive to achieve excellence in every aspect of your stay with us.       Following discharge you may receive a survey in the mail that helps us measure and improve our care.  We ask that you return the survey in the postage-paid envelope to help us evaluate how we can improve the service provided to you.         If for any reason you feel that you cannot rate your care with us as very good (5), please contact the Endoscopy charge nurse at [phone] between 7:00AM and 3:30PM. If you are not satisfied after speaking with the charge nurse, please feel free to contact Rosa M Saucedo, Manager [facility] at [phone].     received patient from off going RN - patient sleeping, continue to monitor

## 2021-05-24 NOTE — ED ADULT TRIAGE NOTE - SPO2 (%)
Writer contacted patient to discuss refill of lexapro. She stated that she has been on it for years. When she spoke with Natividad back in December they discussed her stopping olonzapine, not stopping lexapro. She said she's been on it for years, and needs a refill.     Routing to [facility] to advise if okay to provide refill? At last visit on 08/06 you wanted to see her back in 4 weeks for a follow up, but patient stopped taking olanzapine and never came in for a follow up.     Medication refill:    Medication Name: escitalopram (LEXAPRO) 20 MG tablet    Medication last refilled: 12/21/2020    Provider: KELLY Clark    Last office visit: 8/6/2020    Follow up: See Patient Instruction section.  Return in about 4 weeks (around 9/3/2020) for f/u.    Last lab related to medication: none    Upcoming appointments: No future appointments.    Refill outcome: Can not refill without MD authorization         100

## 2021-05-25 ENCOUNTER — NON-APPOINTMENT (OUTPATIENT)
Age: 56
End: 2021-05-25

## 2021-06-01 ENCOUNTER — NON-APPOINTMENT (OUTPATIENT)
Age: 56
End: 2021-06-01

## 2021-06-04 ENCOUNTER — NON-APPOINTMENT (OUTPATIENT)
Age: 56
End: 2021-06-04

## 2021-06-15 NOTE — ED ADULT NURSE NOTE - NS ED NURSE IV DC DT
Patient sent up from clinic after BPP of 8/8 and indeterminate NST, unsure if FHR is showing repetitive decels.  Placed on EFM upon arrival.  Indeterminate baseline, difficult to tell if baseline is in 140's with accels to 170's or if baseline is 170's with decels, contractions not tracing well on admission.  At 1707 baseline normalized to 145 with moderate variability.  At 1745 FHR rises to 180's with contractions and is 120's between contractions.  Patient states she feels a lot of fetal movement with contractions.  Dr. Mariano has reviewed strip.  IV fluid flush of D5LR 500 ml in, now running at 125 ml/hr.  Will continue to monitor.   20-Aug-2017 03:23

## 2021-06-15 NOTE — ED BEHAVIORAL HEALTH ASSESSMENT NOTE - GROOMING
Fair Slit Excision Additional Text (Leave Blank If You Do Not Want): A linear line was drawn on the skin overlying the lesion. An incision was made slowly until the lesion was visualized.  Once visualized, the lesion was removed with blunt dissection.

## 2021-06-16 NOTE — ED ADULT NURSE NOTE - FALLEN IN THE PAST
Pastora  () facilitates triage conversation.    Pt calls to report several family members tested positive for covid April 15th and April 17th.    Pt herself remains asymptomatic.  Has slept in separate rooms since family members' positive test results.    Asks if she should postpone 1st covid vaccine dose for herself until a sufficient quarantine period has passed.  Answer -> Yes.  Quarantine period should count from most recent positive family members' test date of 4/17/21.    Family members should all wear masks.  Strive to maintain distance as feasible throughout each day.  Eat at separate times and in separate rooms if possible.  Open windows when outdoor air temps allow.  Continue sleeping in separate spaces.  Pt confirms she has been facilitating all of these recommendations.    Therefore determination is made that pt could schedule her first covid vaccine for any date after May 2nd, 2021, provided she remains asymptomatic.  Will know more after tomorrow's covid test, scheduled at [facility].    No further questions or action needed at this time.    Eugenie Colón RN  [facility] Triage     Reason for Disposition    [1] CLOSE CONTACT COVID-19 EXPOSURE within last 14 days AND [2] NO symptoms     Pt already has covid test scheduled for herself tomorrow at [facility]. Declines the need for a virtual provider visit at this time.    Additional Information    Negative: COVID-19 lab test positive    Negative: [1] Lives with someone known to have influenza (flu test positive) AND [2] flu-like symptoms (e.g., cough, runny nose, sore throat, SOB; with or without fever)    Negative: [1] Symptoms of COVID-19 (e.g., cough, fever, SOB, or others) AND [2] HCP diagnosed COVID-19 based on symptoms    Negative: [1] Symptoms of COVID-19 (e.g., cough, fever, SOB, or others) AND [2] lives in an area with community spread    Negative: [1] Symptoms of COVID-19 (e.g., cough, fever, SOB, or others) AND [2]  within 14 days of EXPOSURE (close contact) with diagnosed or suspected COVID-19 patient    Negative: [1] Symptoms of COVID-19 (e.g., cough, fever, SOB, or others) AND [2] within 14 days of travel from high-risk area for COVID-19 community spread (identified by CDC)    Negative: [1] Difficulty breathing (shortness of breath) occurs AND [2] onset > 14 days after COVID-19 EXPOSURE (Close Contact)    Negative: [1] Dry cough occurs AND [2] onset > 14 days after COVID-19 EXPOSURE    Negative: [1] Wet cough (i.e., white-yellow, yellow, green, or clarita colored sputum) AND [2] onset > 14 days after COVID-19 EXPOSURE    Negative: [1] Common cold symptoms AND [2] onset > 14 days after COVID-19 EXPOSURE    Negative: COVID-19 vaccine reaction suspected (e.g., fever, headache, muscle aches) occurring during days 1-3 after getting vaccine    Negative: COVID-19 vaccine, questions about    Negative: [1] CLOSE CONTACT COVID-19 EXPOSURE within last 14 days AND [2] needs COVID-19 lab test to return to work AND [3] NO symptoms    Negative: [1] CLOSE CONTACT COVID-19 EXPOSURE within last 14 days AND [2] exposed person is a  (e.g., police or paramedic) AND [3] NO symptoms    Negative: [1] CLOSE CONTACT COVID-19 EXPOSURE within last 14 days AND [2] exposed person is a healthcare worker who was NOT using all recommended personal protective equipment (i.e., a respirator-N95 mask, eye protection, gloves, and gown) AND [3] NO symptoms    Negative: [1] Living or working in a correctional facility, long-term care facility, or shelter (i.e., congregate setting; densely populated) AND [2] where an outbreak has occurred AND [3] NO symptoms     [facility] [place] Specific Disposition - [facility] Specific Patient Instructions  COVID 19 Nurse Triage Plan/Patient Instructions    Please be aware that novel coronavirus (COVID-19) may be circulating in the community. If you develop symptoms such as fever, cough, or SOB or if you have  concerns about the presence of another infection including coronavirus (COVID-19), please contact your health care provider or visit [email or web address]      Home care recommended. Follow home care protocol based instructions.    Thank you for taking steps to prevent the spread of this virus.  Limit your contact with others.  Wear a simple mask to cover your cough.  Wash your hands well and often.    Resources  [facility]: About COVID-19: [email or web address]  CDC: What to Do If You're Sick: www.cdc.gov/coronavirus/2019-ncov/about/steps-when-sick.html  CDC: Ending Home Isolation: www.cdc.gov/coronavirus/2019-ncov/hcp/disposition-in-home-patients.html   CDC: Caring for Someone: www.cdc.gov/coronavirus/2019-ncov/if-you-are-sick/care-for-someone.html   [facility]: Interim Guidance for Hospital Discharge to Home: www.[facility].mn./diseases/coronavirus/hcp/hospdischarge.pdf  [facility] clinical trials (COVID-19 research studies): clinicalaffairs.[facility]-clinical-trials   Below are the COVID-19 hotlines at the [place] Department of [facility] ([facility]). Interpreters are available.   For health questions: Call 570-514-4768 or 1-882.811.3044 (7 a.m. to 7 p.m.)  For questions about schools and childcare: Call 740-751-8866 or 1-290.822.1063 (7 a.m. to 7 p.m.)    Protocols used: CORONAVIRUS (COVID-19) EXPOSURE-A-AH 1.3.21       no

## 2021-06-23 NOTE — ED PROVIDER NOTE - DATE/TIME 1
no lesions,  no deformities,  no traumatic injuries,  no significant scars are present,  chest wall non-tender,  no masses present, breathing is unlabored without accessory muscle use, normal breath sounds
11-Sep-2019 05:21

## 2021-08-03 NOTE — ED PROVIDER NOTE - CONSTITUTIONAL, MLM
Pt is coming in tomorrow am for labs, he has MWV next week. Please place appropriate orders today   Well appearing, awake, alert, oriented to person, place, time/situation and in no apparent distress. normal...

## 2021-08-11 NOTE — ED BEHAVIORAL HEALTH ASSESSMENT NOTE - AXIS III
Please make an appointment with IR by calling the IR booking office at [phone]; recommend IR follow in 1 week for tube evaluation    Please make an appointment and follow up outpatient with Dr. Yarbrough in 1-2 weeks  Please make an appointment and follow up with your Primary Care Physician in 1-2 weeks     GERD, asthma, HLD

## 2021-08-13 ENCOUNTER — RX RENEWAL (OUTPATIENT)
Age: 56
End: 2021-08-13

## 2021-08-30 ENCOUNTER — APPOINTMENT (OUTPATIENT)
Dept: INTERNAL MEDICINE | Facility: CLINIC | Age: 56
End: 2021-08-30
Payer: MEDICAID

## 2021-08-30 VITALS — BODY MASS INDEX: 28.12 KG/M2 | DIASTOLIC BLOOD PRESSURE: 78 MMHG | SYSTOLIC BLOOD PRESSURE: 118 MMHG | WEIGHT: 169 LBS

## 2021-08-30 DIAGNOSIS — R37 SEXUAL DYSFUNCTION, UNSPECIFIED: ICD-10-CM

## 2021-08-30 PROCEDURE — 99214 OFFICE O/P EST MOD 30 MIN: CPT | Mod: 25

## 2021-08-30 RX ORDER — ALBUTEROL SULFATE 90 UG/1
108 (90 BASE) INHALANT RESPIRATORY (INHALATION)
Qty: 1 | Refills: 3 | Status: ACTIVE | COMMUNITY
Start: 2018-03-15 | End: 1900-01-01

## 2021-08-30 NOTE — COUNSELING
[Strategies to reduce or eliminate alcohol use discussed] : Strategies to reduce or eliminate alcohol use discussed [Encouraged to maintain food diary] : Encouraged to maintain food diary [Encouraged to increase physical activity] : Encouraged to increase physical activity

## 2021-08-30 NOTE — HEALTH RISK ASSESSMENT
[] : No [No] : No [No falls in past year] : Patient reported no falls in the past year [2] : 2) Feeling down, depressed, or hopeless for more than half of the days (2) [Several Days (1)] : 8.) Moving or speaking so slowly that other people could have noticed, or the opposite, moving or speaking faster than usual? Several days [Moderate] : severity of depression is moderate [PHQ-9 Positive] : PHQ-9 Positive [I have developed a follow-up plan documented below in the note.] : I have developed a follow-up plan documented below in the note. [de-identified] : states is in remision but who knows [[id number]] : 4 [[id number]] : 9

## 2021-08-30 NOTE — ASSESSMENT
[FreeTextEntry1] : needs to go to [facility] for treatment, he is taking psych meds I have him on but needs formal\par psych evaluation and follow up\par has substance abuse problems which he needs to control\par

## 2021-08-30 NOTE — HISTORY OF PRESENT ILLNESS
[FreeTextEntry1] : for follow up [de-identified] : for follow up\par has mental health issues\par alcohol problems has not seen a psychiatrist\par he has no chest pain sob nvd or palpitations\par he is in a sober house\par but fails alcohol rehabl almost every time\par

## 2021-08-31 ENCOUNTER — NON-APPOINTMENT (OUTPATIENT)
Age: 56
End: 2021-08-31

## 2021-08-31 LAB
25(OH)D3 SERPL-MCNC: 24.6 NG/ML
ALBUMIN SERPL ELPH-MCNC: 4.9 G/DL
ALP BLD-CCNC: 69 U/L
ALT SERPL-CCNC: 18 U/L
ANION GAP SERPL CALC-SCNC: 17 MMOL/L
AST SERPL-CCNC: 22 U/L
BASOPHILS # BLD AUTO: 0.05 K/UL
BASOPHILS NFR BLD AUTO: 0.6 %
BILIRUB SERPL-MCNC: <0.2 MG/DL
BUN SERPL-MCNC: 14 MG/DL
CALCIUM SERPL-MCNC: 9.3 MG/DL
CHLORIDE SERPL-SCNC: 102 MMOL/L
CHOLEST SERPL-MCNC: 278 MG/DL
CO2 SERPL-SCNC: 20 MMOL/L
CREAT SERPL-MCNC: 0.92 MG/DL
EOSINOPHIL # BLD AUTO: 0.07 K/UL
EOSINOPHIL NFR BLD AUTO: 0.9 %
ESTIMATED AVERAGE GLUCOSE: 148 MG/DL
FOLATE SERPL-MCNC: 8.2 NG/ML
GLUCOSE SERPL-MCNC: 146 MG/DL
HBA1C MFR BLD HPLC: 6.8 %
HCT VFR BLD CALC: 38.4 %
HCV AB SER QL: NONREACTIVE
HCV S/CO RATIO: 0.1 S/CO
HDLC SERPL-MCNC: 52 MG/DL
HGB BLD-MCNC: 11.9 G/DL
HIV1+2 AB SPEC QL IA.RAPID: NONREACTIVE
IMM GRANULOCYTES NFR BLD AUTO: 0.3 %
LDLC SERPL CALC-MCNC: NORMAL MG/DL
LYMPHOCYTES # BLD AUTO: 1.69 K/UL
LYMPHOCYTES NFR BLD AUTO: 21.7 %
MAN DIFF?: NORMAL
MCHC RBC-ENTMCNC: 26.5 PG
MCHC RBC-ENTMCNC: 31 GM/DL
MCV RBC AUTO: 85.5 FL
MONOCYTES # BLD AUTO: 0.58 K/UL
MONOCYTES NFR BLD AUTO: 7.5 %
NEUTROPHILS # BLD AUTO: 5.37 K/UL
NEUTROPHILS NFR BLD AUTO: 69 %
NONHDLC SERPL-MCNC: 226 MG/DL
PLATELET # BLD AUTO: 284 K/UL
POTASSIUM SERPL-SCNC: 3.5 MMOL/L
PROT SERPL-MCNC: 7.7 G/DL
PSA SERPL-MCNC: 1.56 NG/ML
RBC # BLD: 4.49 M/UL
RBC # FLD: 18.3 %
SODIUM SERPL-SCNC: 139 MMOL/L
TRIGL SERPL-MCNC: 538 MG/DL
TSH SERPL-ACNC: 1.11 UIU/ML
VIT B12 SERPL-MCNC: 275 PG/ML
WBC # FLD AUTO: 7.78 K/UL

## 2021-09-02 ENCOUNTER — NON-APPOINTMENT (OUTPATIENT)
Age: 56
End: 2021-09-02

## 2021-09-05 NOTE — PROGRESS NOTE ADULT - PROBLEM SELECTOR PROBLEM 1
Gastroesophageal reflux disease, esophagitis presence not specified
Gastroesophageal reflux disease, esophagitis presence not specified
assistance with activity   - Consult OT/PT to assist with strengthening/mobility   - Keep Call bell within reach  - Keep bed low and locked with side rails adjusted as appropriate  - Keep care items and personal belongings within reach  - Initiate and maintain comfort rounds  - Make Fall Risk Sign visible to staff  - Apply yellow socks and bracelet for high fall risk patients  - Consider moving patient to room near nurses station  Outcome: Progressing  Goal: Maintain or return to baseline ADL function  Description: INTERVENTIONS:  -  Assess patient's ability to carry out ADLs; assess patient's baseline for ADL function and identify physical deficits which impact ability to perform ADLs (bathing, care of mouth/teeth, toileting, grooming, dressing, etc )  - Assess/evaluate cause of self-care deficits   - Assess range of motion  - Assess patient's mobility; develop plan if impaired  - Assess patient's need for assistive devices and provide as appropriate  - Encourage maximum independence but intervene and supervise when necessary  - Involve family in performance of ADLs  - Assess for home care needs following discharge   - Consider OT consult to assist with ADL evaluation and planning for discharge  - Provide patient education as appropriate  Outcome: Progressing  Goal: Maintains/Returns to pre admission functional level  Description: INTERVENTIONS:  - Perform BMAT or MOVE assessment daily    - Set and communicate daily mobility goal to care team and patient/family/caregiver  - Collaborate with rehabilitation services on mobility goals if consulted  - Perform Range of Motion 4 times a day  - Reposition patient every 2 hours    - Dangle patient 4 times a day  - Stand patient 4 times a day  - Ambulate patient 3 times a day  - Out of bed to chair 3 times a day   - Out of bed for meals 3 times a day  - Out of bed for toileting  - Record patient progress and toleration of activity level   Outcome: Progressing     Problem:
DISCHARGE PLANNING  Goal: Discharge to home or other facility with appropriate resources  Description: INTERVENTIONS:  - Identify barriers to discharge w/patient and caregiver  - Arrange for needed discharge resources and transportation as appropriate  - Identify discharge learning needs (meds, wound care, etc )  - Arrange for interpretive services to assist at discharge as needed  - Refer to Case Management Department for coordinating discharge planning if the patient needs post-hospital services based on physician/advanced practitioner order or complex needs related to functional status, cognitive ability, or social support system  Outcome: Progressing     Problem: Knowledge Deficit  Goal: Patient/family/caregiver demonstrates understanding of disease process, treatment plan, medications, and discharge instructions  Description: Complete learning assessment and assess knowledge base    Interventions:  - Provide teaching at level of understanding  - Provide teaching via preferred learning methods  Outcome: Progressing
Depressive disorder

## 2021-10-15 ENCOUNTER — APPOINTMENT (OUTPATIENT)
Dept: INTERNAL MEDICINE | Facility: CLINIC | Age: 56
End: 2021-10-15
Payer: MEDICAID

## 2021-10-15 VITALS
BODY MASS INDEX: 25.99 KG/M2 | WEIGHT: 156 LBS | DIASTOLIC BLOOD PRESSURE: 70 MMHG | HEIGHT: 65 IN | HEART RATE: 68 BPM | SYSTOLIC BLOOD PRESSURE: 120 MMHG | RESPIRATION RATE: 12 BRPM

## 2021-10-15 DIAGNOSIS — K59.00 CONSTIPATION, UNSPECIFIED: ICD-10-CM

## 2021-10-15 DIAGNOSIS — K70.10 ALCOHOLIC HEPATITIS W/OUT ASCITES: ICD-10-CM

## 2021-10-15 DIAGNOSIS — F10.280: ICD-10-CM

## 2021-10-15 DIAGNOSIS — F10.20 ALCOHOL DEPENDENCE, UNCOMPLICATED: ICD-10-CM

## 2021-10-15 DIAGNOSIS — K21.9 GASTRO-ESOPHAGEAL REFLUX DISEASE W/OUT ESOPHAGITIS: ICD-10-CM

## 2021-10-15 PROCEDURE — G0008: CPT

## 2021-10-15 PROCEDURE — 99214 OFFICE O/P EST MOD 30 MIN: CPT | Mod: 25

## 2021-10-15 PROCEDURE — 90686 IIV4 VACC NO PRSV 0.5 ML IM: CPT

## 2021-10-15 NOTE — HISTORY OF PRESENT ILLNESS
[FreeTextEntry8] : abd pain \par patient is drinking again\par has stomach upset\par patient has been having mid epigastric pain\par no nvd or palpitatins\par has no blood in the stool\par recently overdosed on heroin got from a girl he was not aware was heroin and normally does not due cocaine\par he has psych issues but refuses to see psychy\par

## 2021-10-15 NOTE — ASSESSMENT
[FreeTextEntry1] : gastritis alcohol related\par needs to stop alcohol take omeprazole\par needs flu vaccine \par check liver sono\par see gi for egd\par will never get well until he stops alcohol

## 2021-10-15 NOTE — HEALTH RISK ASSESSMENT
[] : No [Yes] : Yes [4 or more  times a week (4 pts)] : 4 or more  times a week (4 points) [No falls in past year] : Patient reported no falls in the past year [Patient refused screening] : Patient refused screening

## 2021-10-16 LAB
BASOPHILS # BLD AUTO: 0.07 K/UL
BASOPHILS NFR BLD AUTO: 0.8 %
EOSINOPHIL # BLD AUTO: 0.1 K/UL
EOSINOPHIL NFR BLD AUTO: 1.1 %
ESTIMATED AVERAGE GLUCOSE: 140 MG/DL
HBA1C MFR BLD HPLC: 6.5 %
HCT VFR BLD CALC: 40.8 %
HGB BLD-MCNC: 12.6 G/DL
IMM GRANULOCYTES NFR BLD AUTO: 0.3 %
LYMPHOCYTES # BLD AUTO: 1.78 K/UL
LYMPHOCYTES NFR BLD AUTO: 19.3 %
MAN DIFF?: NORMAL
MCHC RBC-ENTMCNC: 27 PG
MCHC RBC-ENTMCNC: 30.9 GM/DL
MCV RBC AUTO: 87.6 FL
MONOCYTES # BLD AUTO: 0.76 K/UL
MONOCYTES NFR BLD AUTO: 8.2 %
NEUTROPHILS # BLD AUTO: 6.48 K/UL
NEUTROPHILS NFR BLD AUTO: 70.3 %
PLATELET # BLD AUTO: 357 K/UL
RBC # BLD: 4.66 M/UL
RBC # FLD: 17 %
WBC # FLD AUTO: 9.22 K/UL

## 2021-10-17 LAB
ALBUMIN SERPL ELPH-MCNC: 4.9 G/DL
ALP BLD-CCNC: 86 U/L
ALT SERPL-CCNC: 38 U/L
ANION GAP SERPL CALC-SCNC: 23 MMOL/L
AST SERPL-CCNC: 37 U/L
BILIRUB SERPL-MCNC: 0.2 MG/DL
BUN SERPL-MCNC: 9 MG/DL
CALCIUM SERPL-MCNC: 9.9 MG/DL
CHLORIDE SERPL-SCNC: 100 MMOL/L
CHOLEST SERPL-MCNC: 273 MG/DL
CO2 SERPL-SCNC: 15 MMOL/L
CREAT SERPL-MCNC: 0.92 MG/DL
GLUCOSE SERPL-MCNC: 155 MG/DL
HDLC SERPL-MCNC: 59 MG/DL
LDLC SERPL CALC-MCNC: 147 MG/DL
NONHDLC SERPL-MCNC: 214 MG/DL
POTASSIUM SERPL-SCNC: 3.8 MMOL/L
PROT SERPL-MCNC: 8 G/DL
SODIUM SERPL-SCNC: 138 MMOL/L
TRIGL SERPL-MCNC: 331 MG/DL

## 2021-10-18 ENCOUNTER — NON-APPOINTMENT (OUTPATIENT)
Age: 56
End: 2021-10-18

## 2021-10-26 RX ORDER — FENOFIBRATE 145 MG/1
145 TABLET, COATED ORAL
Qty: 30 | Refills: 5 | Status: ACTIVE | COMMUNITY
Start: 2020-05-20 | End: 1900-01-01

## 2021-11-19 NOTE — ED CDU PROVIDER DISPOSITION NOTE - NS ED MD EM SUBS DAY SELECT 1
61288 - Disposition Day Code
no chest pain/no palpitations/no dyspnea on exertion/no paroxysmal nocturnal dyspnea/no peripheral edema

## 2021-11-20 NOTE — ED ADULT TRIAGE NOTE - NS ED NURSE BANDS TYPE
Rounding completed    No complaints at this time  Awaiting lab/imaging results  Elimination assistance offered  No additional needs at this time  Call light within reach, will update patient with more information  Will continue to monitor Name band;

## 2021-12-09 ENCOUNTER — APPOINTMENT (OUTPATIENT)
Dept: INTERNAL MEDICINE | Facility: CLINIC | Age: 56
End: 2021-12-09

## 2021-12-26 NOTE — ED ADULT TRIAGE NOTE - NS ED TRIAGE AVPU SCALE
Alert-The patient is alert, awake and responds to voice. The patient is oriented to time, place, and person. The triage nurse is able to obtain subjective information.
Nikky HAYES

## 2022-01-01 ENCOUNTER — APPOINTMENT (OUTPATIENT)
Age: 57
End: 2022-01-01

## 2022-01-01 ENCOUNTER — EMERGENCY (EMERGENCY)
Facility: HOSPITAL | Age: 57
LOS: 1 days | Discharge: HOME | End: 2022-01-01
Attending: EMERGENCY MEDICINE
Payer: COMMERCIAL

## 2022-01-01 ENCOUNTER — RESULT REVIEW (OUTPATIENT)
Age: 57
End: 2022-01-01

## 2022-01-01 ENCOUNTER — APPOINTMENT (OUTPATIENT)
Dept: HEMATOLOGY ONCOLOGY | Facility: CLINIC | Age: 57
End: 2022-01-01

## 2022-01-01 ENCOUNTER — TRANSCRIPTION ENCOUNTER (OUTPATIENT)
Age: 57
End: 2022-01-01

## 2022-01-01 ENCOUNTER — OUTPATIENT (OUTPATIENT)
Dept: OUTPATIENT SERVICES | Facility: HOSPITAL | Age: 57
LOS: 1 days | End: 2022-01-01
Payer: MEDICAID

## 2022-01-01 ENCOUNTER — APPOINTMENT (OUTPATIENT)
Dept: INTERNAL MEDICINE | Facility: CLINIC | Age: 57
End: 2022-01-01

## 2022-01-01 ENCOUNTER — OUTPATIENT (OUTPATIENT)
Dept: OUTPATIENT SERVICES | Facility: HOSPITAL | Age: 57
LOS: 1 days | Discharge: HOME | End: 2022-01-01

## 2022-01-01 ENCOUNTER — EMERGENCY (EMERGENCY)
Facility: HOSPITAL | Age: 57
LOS: 1 days | End: 2022-01-01
Attending: EMERGENCY MEDICINE
Payer: COMMERCIAL

## 2022-01-01 ENCOUNTER — NON-APPOINTMENT (OUTPATIENT)
Age: 57
End: 2022-01-01

## 2022-01-01 ENCOUNTER — OUTPATIENT (OUTPATIENT)
Dept: OUTPATIENT SERVICES | Facility: HOSPITAL | Age: 57
LOS: 1 days | End: 2022-01-01

## 2022-01-01 ENCOUNTER — APPOINTMENT (OUTPATIENT)
Dept: CT IMAGING | Facility: CLINIC | Age: 57
End: 2022-01-01

## 2022-01-01 ENCOUNTER — APPOINTMENT (OUTPATIENT)
Dept: DERMATOLOGY | Facility: CLINIC | Age: 57
End: 2022-01-01

## 2022-01-01 ENCOUNTER — EMERGENCY (EMERGENCY)
Facility: HOSPITAL | Age: 57
LOS: 1 days | Discharge: HOME | End: 2022-01-01
Attending: STUDENT IN AN ORGANIZED HEALTH CARE EDUCATION/TRAINING PROGRAM
Payer: COMMERCIAL

## 2022-01-01 ENCOUNTER — EMERGENCY (EMERGENCY)
Facility: HOSPITAL | Age: 57
LOS: 1 days | Discharge: ANOTHER HOSPITAL | End: 2022-01-01
Attending: EMERGENCY MEDICINE | Admitting: EMERGENCY MEDICINE
Payer: MEDICAID

## 2022-01-01 ENCOUNTER — APPOINTMENT (OUTPATIENT)
Dept: INTERVENTIONAL RADIOLOGY/VASCULAR | Facility: CLINIC | Age: 57
End: 2022-01-01

## 2022-01-01 ENCOUNTER — EMERGENCY (EMERGENCY)
Facility: HOSPITAL | Age: 57
LOS: 0 days | Discharge: HOME | End: 2022-03-11
Attending: EMERGENCY MEDICINE
Payer: MEDICAID

## 2022-01-01 ENCOUNTER — OUTPATIENT (OUTPATIENT)
Dept: OUTPATIENT SERVICES | Facility: HOSPITAL | Age: 57
LOS: 1 days | End: 2022-01-01
Payer: COMMERCIAL

## 2022-01-01 ENCOUNTER — INPATIENT (INPATIENT)
Facility: HOSPITAL | Age: 57
LOS: 2 days | Discharge: HOSPICE | DRG: 389 | End: 2023-01-02
Attending: STUDENT IN AN ORGANIZED HEALTH CARE EDUCATION/TRAINING PROGRAM | Admitting: HOSPITALIST
Payer: COMMERCIAL

## 2022-01-01 ENCOUNTER — APPOINTMENT (OUTPATIENT)
Dept: HEMATOLOGY ONCOLOGY | Facility: CLINIC | Age: 57
End: 2022-01-01
Payer: MEDICAID

## 2022-01-01 ENCOUNTER — EMERGENCY (EMERGENCY)
Facility: HOSPITAL | Age: 57
LOS: 1 days | Discharge: HOME | End: 2022-01-01
Admitting: EMERGENCY MEDICINE
Payer: MEDICAID

## 2022-01-01 ENCOUNTER — LABORATORY RESULT (OUTPATIENT)
Age: 57
End: 2022-01-01

## 2022-01-01 VITALS
OXYGEN SATURATION: 98 % | SYSTOLIC BLOOD PRESSURE: 132 MMHG | RESPIRATION RATE: 19 BRPM | DIASTOLIC BLOOD PRESSURE: 88 MMHG | TEMPERATURE: 98 F

## 2022-01-01 VITALS
DIASTOLIC BLOOD PRESSURE: 68 MMHG | WEIGHT: 148.37 LBS | HEIGHT: 65 IN | HEART RATE: 84 BPM | TEMPERATURE: 98 F | SYSTOLIC BLOOD PRESSURE: 124 MMHG | OXYGEN SATURATION: 98 % | RESPIRATION RATE: 17 BRPM

## 2022-01-01 VITALS
TEMPERATURE: 98 F | SYSTOLIC BLOOD PRESSURE: 121 MMHG | WEIGHT: 149.91 LBS | HEIGHT: 65 IN | HEART RATE: 107 BPM | DIASTOLIC BLOOD PRESSURE: 82 MMHG | RESPIRATION RATE: 18 BRPM | OXYGEN SATURATION: 98 %

## 2022-01-01 VITALS
DIASTOLIC BLOOD PRESSURE: 77 MMHG | HEART RATE: 110 BPM | HEIGHT: 65 IN | TEMPERATURE: 98 F | RESPIRATION RATE: 16 BRPM | OXYGEN SATURATION: 98 % | SYSTOLIC BLOOD PRESSURE: 124 MMHG

## 2022-01-01 VITALS
RESPIRATION RATE: 18 BRPM | OXYGEN SATURATION: 98 % | SYSTOLIC BLOOD PRESSURE: 119 MMHG | WEIGHT: 162.04 LBS | TEMPERATURE: 99 F | HEART RATE: 100 BPM | HEIGHT: 65 IN | DIASTOLIC BLOOD PRESSURE: 81 MMHG

## 2022-01-01 VITALS
SYSTOLIC BLOOD PRESSURE: 109 MMHG | DIASTOLIC BLOOD PRESSURE: 78 MMHG | TEMPERATURE: 98 F | HEART RATE: 127 BPM | RESPIRATION RATE: 20 BRPM | WEIGHT: 149.91 LBS | OXYGEN SATURATION: 97 % | HEIGHT: 64.96 IN

## 2022-01-01 VITALS
TEMPERATURE: 98 F | HEART RATE: 91 BPM | OXYGEN SATURATION: 98 % | RESPIRATION RATE: 18 BRPM | DIASTOLIC BLOOD PRESSURE: 68 MMHG | SYSTOLIC BLOOD PRESSURE: 104 MMHG

## 2022-01-01 VITALS
HEART RATE: 118 BPM | TEMPERATURE: 98 F | SYSTOLIC BLOOD PRESSURE: 141 MMHG | RESPIRATION RATE: 20 BRPM | OXYGEN SATURATION: 95 % | DIASTOLIC BLOOD PRESSURE: 87 MMHG | HEIGHT: 65 IN

## 2022-01-01 VITALS
RESPIRATION RATE: 22 BRPM | DIASTOLIC BLOOD PRESSURE: 94 MMHG | TEMPERATURE: 99 F | SYSTOLIC BLOOD PRESSURE: 142 MMHG | HEART RATE: 109 BPM | WEIGHT: 179.9 LBS | HEIGHT: 65 IN | OXYGEN SATURATION: 96 %

## 2022-01-01 VITALS
SYSTOLIC BLOOD PRESSURE: 144 MMHG | TEMPERATURE: 98 F | HEART RATE: 104 BPM | HEIGHT: 65 IN | DIASTOLIC BLOOD PRESSURE: 80 MMHG | RESPIRATION RATE: 18 BRPM | OXYGEN SATURATION: 96 % | WEIGHT: 199.96 LBS

## 2022-01-01 VITALS
SYSTOLIC BLOOD PRESSURE: 138 MMHG | HEART RATE: 102 BPM | TEMPERATURE: 98 F | DIASTOLIC BLOOD PRESSURE: 87 MMHG | RESPIRATION RATE: 18 BRPM | OXYGEN SATURATION: 99 %

## 2022-01-01 VITALS
RESPIRATION RATE: 12 BRPM | HEIGHT: 65 IN | SYSTOLIC BLOOD PRESSURE: 112 MMHG | WEIGHT: 159 LBS | BODY MASS INDEX: 26.49 KG/M2 | DIASTOLIC BLOOD PRESSURE: 78 MMHG | HEART RATE: 68 BPM

## 2022-01-01 VITALS
RESPIRATION RATE: 18 BRPM | DIASTOLIC BLOOD PRESSURE: 76 MMHG | OXYGEN SATURATION: 96 % | HEIGHT: 65 IN | SYSTOLIC BLOOD PRESSURE: 114 MMHG | HEART RATE: 100 BPM | TEMPERATURE: 97 F

## 2022-01-01 VITALS
RESPIRATION RATE: 18 BRPM | OXYGEN SATURATION: 96 % | TEMPERATURE: 98 F | SYSTOLIC BLOOD PRESSURE: 124 MMHG | DIASTOLIC BLOOD PRESSURE: 81 MMHG | HEIGHT: 65 IN | HEART RATE: 105 BPM

## 2022-01-01 VITALS
DIASTOLIC BLOOD PRESSURE: 70 MMHG | HEART RATE: 52 BPM | RESPIRATION RATE: 18 BRPM | SYSTOLIC BLOOD PRESSURE: 126 MMHG | WEIGHT: 141.98 LBS | TEMPERATURE: 98 F | HEIGHT: 65 IN | OXYGEN SATURATION: 97 %

## 2022-01-01 VITALS
WEIGHT: 160.06 LBS | HEART RATE: 107 BPM | HEIGHT: 65 IN | DIASTOLIC BLOOD PRESSURE: 83 MMHG | RESPIRATION RATE: 15 BRPM | OXYGEN SATURATION: 98 % | TEMPERATURE: 97 F | SYSTOLIC BLOOD PRESSURE: 128 MMHG

## 2022-01-01 VITALS
TEMPERATURE: 98 F | HEART RATE: 97 BPM | OXYGEN SATURATION: 100 % | SYSTOLIC BLOOD PRESSURE: 117 MMHG | DIASTOLIC BLOOD PRESSURE: 79 MMHG | RESPIRATION RATE: 18 BRPM

## 2022-01-01 VITALS
OXYGEN SATURATION: 100 % | DIASTOLIC BLOOD PRESSURE: 103 MMHG | RESPIRATION RATE: 17 BRPM | HEIGHT: 65 IN | HEART RATE: 60 BPM | WEIGHT: 195.11 LBS | TEMPERATURE: 98 F | SYSTOLIC BLOOD PRESSURE: 132 MMHG

## 2022-01-01 VITALS
RESPIRATION RATE: 18 BRPM | DIASTOLIC BLOOD PRESSURE: 85 MMHG | TEMPERATURE: 98 F | OXYGEN SATURATION: 96 % | SYSTOLIC BLOOD PRESSURE: 121 MMHG | HEART RATE: 97 BPM

## 2022-01-01 VITALS
HEIGHT: 65 IN | WEIGHT: 149.03 LBS | DIASTOLIC BLOOD PRESSURE: 78 MMHG | HEART RATE: 88 BPM | OXYGEN SATURATION: 99 % | RESPIRATION RATE: 17 BRPM | SYSTOLIC BLOOD PRESSURE: 124 MMHG | TEMPERATURE: 99 F

## 2022-01-01 VITALS
RESPIRATION RATE: 18 BRPM | SYSTOLIC BLOOD PRESSURE: 116 MMHG | DIASTOLIC BLOOD PRESSURE: 77 MMHG | TEMPERATURE: 98 F | OXYGEN SATURATION: 98 % | HEART RATE: 87 BPM

## 2022-01-01 VITALS — HEART RATE: 78 BPM | SYSTOLIC BLOOD PRESSURE: 110 MMHG | DIASTOLIC BLOOD PRESSURE: 68 MMHG | RESPIRATION RATE: 14 BRPM

## 2022-01-01 VITALS
SYSTOLIC BLOOD PRESSURE: 130 MMHG | TEMPERATURE: 99 F | WEIGHT: 164.91 LBS | OXYGEN SATURATION: 98 % | DIASTOLIC BLOOD PRESSURE: 71 MMHG | HEART RATE: 110 BPM | RESPIRATION RATE: 20 BRPM | HEIGHT: 65 IN

## 2022-01-01 VITALS
HEIGHT: 65 IN | DIASTOLIC BLOOD PRESSURE: 74 MMHG | TEMPERATURE: 98 F | SYSTOLIC BLOOD PRESSURE: 158 MMHG | RESPIRATION RATE: 18 BRPM | HEART RATE: 84 BPM | OXYGEN SATURATION: 98 %

## 2022-01-01 VITALS
WEIGHT: 149.91 LBS | TEMPERATURE: 98 F | HEART RATE: 114 BPM | OXYGEN SATURATION: 96 % | RESPIRATION RATE: 20 BRPM | SYSTOLIC BLOOD PRESSURE: 133 MMHG | HEIGHT: 65 IN | DIASTOLIC BLOOD PRESSURE: 81 MMHG

## 2022-01-01 VITALS
OXYGEN SATURATION: 99 % | SYSTOLIC BLOOD PRESSURE: 123 MMHG | HEIGHT: 65 IN | WEIGHT: 149.03 LBS | BODY MASS INDEX: 24.83 KG/M2 | HEART RATE: 95 BPM | DIASTOLIC BLOOD PRESSURE: 82 MMHG

## 2022-01-01 VITALS
DIASTOLIC BLOOD PRESSURE: 92 MMHG | TEMPERATURE: 98 F | SYSTOLIC BLOOD PRESSURE: 138 MMHG | HEART RATE: 93 BPM | OXYGEN SATURATION: 93 % | RESPIRATION RATE: 20 BRPM

## 2022-01-01 VITALS
SYSTOLIC BLOOD PRESSURE: 130 MMHG | HEIGHT: 65 IN | DIASTOLIC BLOOD PRESSURE: 88 MMHG | WEIGHT: 169.98 LBS | OXYGEN SATURATION: 98 % | HEART RATE: 101 BPM | RESPIRATION RATE: 18 BRPM | TEMPERATURE: 98 F

## 2022-01-01 VITALS
RESPIRATION RATE: 18 BRPM | SYSTOLIC BLOOD PRESSURE: 120 MMHG | OXYGEN SATURATION: 97 % | TEMPERATURE: 98 F | HEART RATE: 97 BPM | DIASTOLIC BLOOD PRESSURE: 79 MMHG

## 2022-01-01 VITALS
OXYGEN SATURATION: 96 % | HEIGHT: 65 IN | SYSTOLIC BLOOD PRESSURE: 125 MMHG | HEART RATE: 115 BPM | TEMPERATURE: 98 F | RESPIRATION RATE: 18 BRPM | DIASTOLIC BLOOD PRESSURE: 63 MMHG

## 2022-01-01 VITALS
DIASTOLIC BLOOD PRESSURE: 68 MMHG | BODY MASS INDEX: 26.46 KG/M2 | WEIGHT: 159 LBS | SYSTOLIC BLOOD PRESSURE: 114 MMHG | RESPIRATION RATE: 12 BRPM | HEART RATE: 72 BPM

## 2022-01-01 VITALS
HEART RATE: 106 BPM | SYSTOLIC BLOOD PRESSURE: 115 MMHG | RESPIRATION RATE: 18 BRPM | OXYGEN SATURATION: 100 % | DIASTOLIC BLOOD PRESSURE: 77 MMHG | TEMPERATURE: 98 F

## 2022-01-01 VITALS
DIASTOLIC BLOOD PRESSURE: 70 MMHG | RESPIRATION RATE: 24 BRPM | HEART RATE: 119 BPM | OXYGEN SATURATION: 97 % | HEIGHT: 65 IN | SYSTOLIC BLOOD PRESSURE: 104 MMHG

## 2022-01-01 VITALS
HEART RATE: 115 BPM | TEMPERATURE: 98 F | OXYGEN SATURATION: 97 % | SYSTOLIC BLOOD PRESSURE: 99 MMHG | DIASTOLIC BLOOD PRESSURE: 68 MMHG | RESPIRATION RATE: 20 BRPM

## 2022-01-01 VITALS
WEIGHT: 145.06 LBS | OXYGEN SATURATION: 99 % | DIASTOLIC BLOOD PRESSURE: 88 MMHG | SYSTOLIC BLOOD PRESSURE: 131 MMHG | RESPIRATION RATE: 15 BRPM | HEART RATE: 104 BPM | HEIGHT: 65 IN | TEMPERATURE: 97 F

## 2022-01-01 VITALS
SYSTOLIC BLOOD PRESSURE: 92 MMHG | TEMPERATURE: 98 F | DIASTOLIC BLOOD PRESSURE: 57 MMHG | OXYGEN SATURATION: 96 % | RESPIRATION RATE: 18 BRPM | HEART RATE: 87 BPM

## 2022-01-01 VITALS
DIASTOLIC BLOOD PRESSURE: 89 MMHG | HEART RATE: 100 BPM | TEMPERATURE: 99 F | RESPIRATION RATE: 16 BRPM | OXYGEN SATURATION: 98 % | SYSTOLIC BLOOD PRESSURE: 129 MMHG

## 2022-01-01 VITALS
TEMPERATURE: 99 F | OXYGEN SATURATION: 99 % | WEIGHT: 164.91 LBS | RESPIRATION RATE: 18 BRPM | DIASTOLIC BLOOD PRESSURE: 84 MMHG | SYSTOLIC BLOOD PRESSURE: 132 MMHG | HEIGHT: 65 IN | HEART RATE: 89 BPM

## 2022-01-01 VITALS
OXYGEN SATURATION: 98 % | DIASTOLIC BLOOD PRESSURE: 73 MMHG | SYSTOLIC BLOOD PRESSURE: 115 MMHG | HEART RATE: 106 BPM | TEMPERATURE: 99 F

## 2022-01-01 VITALS
DIASTOLIC BLOOD PRESSURE: 83 MMHG | WEIGHT: 149.69 LBS | HEIGHT: 65 IN | SYSTOLIC BLOOD PRESSURE: 118 MMHG | TEMPERATURE: 99 F | HEART RATE: 88 BPM | RESPIRATION RATE: 18 BRPM | OXYGEN SATURATION: 96 %

## 2022-01-01 VITALS
SYSTOLIC BLOOD PRESSURE: 102 MMHG | TEMPERATURE: 98 F | OXYGEN SATURATION: 97 % | DIASTOLIC BLOOD PRESSURE: 68 MMHG | HEART RATE: 101 BPM | RESPIRATION RATE: 20 BRPM

## 2022-01-01 VITALS
HEART RATE: 99 BPM | DIASTOLIC BLOOD PRESSURE: 90 MMHG | OXYGEN SATURATION: 99 % | TEMPERATURE: 98 F | SYSTOLIC BLOOD PRESSURE: 125 MMHG

## 2022-01-01 VITALS
HEIGHT: 65 IN | HEART RATE: 102 BPM | DIASTOLIC BLOOD PRESSURE: 110 MMHG | RESPIRATION RATE: 20 BRPM | OXYGEN SATURATION: 98 % | WEIGHT: 190.04 LBS | SYSTOLIC BLOOD PRESSURE: 144 MMHG | TEMPERATURE: 98 F

## 2022-01-01 VITALS
SYSTOLIC BLOOD PRESSURE: 140 MMHG | HEART RATE: 101 BPM | DIASTOLIC BLOOD PRESSURE: 79 MMHG | RESPIRATION RATE: 16 BRPM | TEMPERATURE: 98 F | OXYGEN SATURATION: 96 %

## 2022-01-01 VITALS
RESPIRATION RATE: 20 BRPM | OXYGEN SATURATION: 96 % | HEART RATE: 108 BPM | TEMPERATURE: 98 F | DIASTOLIC BLOOD PRESSURE: 73 MMHG | SYSTOLIC BLOOD PRESSURE: 119 MMHG

## 2022-01-01 VITALS
DIASTOLIC BLOOD PRESSURE: 80 MMHG | RESPIRATION RATE: 18 BRPM | TEMPERATURE: 98 F | HEIGHT: 65 IN | HEART RATE: 74 BPM | WEIGHT: 160.06 LBS | SYSTOLIC BLOOD PRESSURE: 128 MMHG | OXYGEN SATURATION: 100 %

## 2022-01-01 VITALS
SYSTOLIC BLOOD PRESSURE: 131 MMHG | RESPIRATION RATE: 20 BRPM | TEMPERATURE: 98 F | HEART RATE: 118 BPM | OXYGEN SATURATION: 98 % | DIASTOLIC BLOOD PRESSURE: 67 MMHG

## 2022-01-01 VITALS
SYSTOLIC BLOOD PRESSURE: 166 MMHG | DIASTOLIC BLOOD PRESSURE: 94 MMHG | OXYGEN SATURATION: 97 % | HEART RATE: 111 BPM | TEMPERATURE: 98 F | RESPIRATION RATE: 17 BRPM

## 2022-01-01 VITALS — BODY MASS INDEX: 23.96 KG/M2 | WEIGHT: 144 LBS

## 2022-01-01 VITALS
RESPIRATION RATE: 20 BRPM | SYSTOLIC BLOOD PRESSURE: 119 MMHG | OXYGEN SATURATION: 98 % | HEART RATE: 105 BPM | DIASTOLIC BLOOD PRESSURE: 70 MMHG | TEMPERATURE: 98 F | WEIGHT: 175.05 LBS | HEIGHT: 65 IN

## 2022-01-01 VITALS
OXYGEN SATURATION: 100 % | HEART RATE: 96 BPM | DIASTOLIC BLOOD PRESSURE: 96 MMHG | TEMPERATURE: 98 F | SYSTOLIC BLOOD PRESSURE: 133 MMHG | RESPIRATION RATE: 20 BRPM

## 2022-01-01 DIAGNOSIS — Z23 ENCOUNTER FOR IMMUNIZATION: ICD-10-CM

## 2022-01-01 DIAGNOSIS — U07.1 COVID-19: ICD-10-CM

## 2022-01-01 DIAGNOSIS — F32.A DEPRESSION, UNSPECIFIED: ICD-10-CM

## 2022-01-01 DIAGNOSIS — I87.8 OTHER SPECIFIED DISORDERS OF VEINS: ICD-10-CM

## 2022-01-01 DIAGNOSIS — F17.200 NICOTINE DEPENDENCE, UNSPECIFIED, UNCOMPLICATED: ICD-10-CM

## 2022-01-01 DIAGNOSIS — R73.03 PREDIABETES.: ICD-10-CM

## 2022-01-01 DIAGNOSIS — C25.9 MALIGNANT NEOPLASM OF PANCREAS, UNSPECIFIED: ICD-10-CM

## 2022-01-01 DIAGNOSIS — Z59.00 HOMELESSNESS UNSPECIFIED: ICD-10-CM

## 2022-01-01 DIAGNOSIS — Z51.11 ENCOUNTER FOR ANTINEOPLASTIC CHEMOTHERAPY: ICD-10-CM

## 2022-01-01 DIAGNOSIS — F41.9 ANXIETY DISORDER, UNSPECIFIED: ICD-10-CM

## 2022-01-01 DIAGNOSIS — F10.10 ALCOHOL ABUSE, UNCOMPLICATED: ICD-10-CM

## 2022-01-01 DIAGNOSIS — E78.5 HYPERLIPIDEMIA, UNSPECIFIED: ICD-10-CM

## 2022-01-01 DIAGNOSIS — R00.0 TACHYCARDIA, UNSPECIFIED: ICD-10-CM

## 2022-01-01 DIAGNOSIS — J45.909 UNSPECIFIED ASTHMA, UNCOMPLICATED: ICD-10-CM

## 2022-01-01 DIAGNOSIS — Z13.89 ENCOUNTER FOR SCREENING FOR OTHER DISORDER: ICD-10-CM

## 2022-01-01 DIAGNOSIS — Z01.818 ENCOUNTER FOR OTHER PREPROCEDURAL EXAMINATION: ICD-10-CM

## 2022-01-01 DIAGNOSIS — S69.90XA UNSPECIFIED INJURY OF UNSPECIFIED WRIST, HAND AND FINGER(S), INITIAL ENCOUNTER: Chronic | ICD-10-CM

## 2022-01-01 DIAGNOSIS — F19.94 OTHER PSYCHOACTIVE SUBSTANCE USE, UNSPECIFIED WITH PSYCHOACTIVE SUBSTANCE-INDUCED MOOD DISORDER: ICD-10-CM

## 2022-01-01 DIAGNOSIS — F32.A ANXIETY DISORDER, UNSPECIFIED: ICD-10-CM

## 2022-01-01 DIAGNOSIS — E11.9 TYPE 2 DIABETES MELLITUS WITHOUT COMPLICATIONS: ICD-10-CM

## 2022-01-01 DIAGNOSIS — Z87.438 PERSONAL HISTORY OF OTHER DISEASES OF MALE GENITAL ORGANS: ICD-10-CM

## 2022-01-01 DIAGNOSIS — C78.7 MALIGNANT NEOPLASM OF PANCREAS, UNSPECIFIED: ICD-10-CM

## 2022-01-01 DIAGNOSIS — R16.0 HEPATOMEGALY, NOT ELSEWHERE CLASSIFIED: ICD-10-CM

## 2022-01-01 DIAGNOSIS — D50.9 IRON DEFICIENCY ANEMIA, UNSPECIFIED: ICD-10-CM

## 2022-01-01 DIAGNOSIS — F10.21 ALCOHOL DEPENDENCE, IN REMISSION: ICD-10-CM

## 2022-01-01 DIAGNOSIS — Z29.9 ENCOUNTER FOR PROPHYLACTIC MEASURES, UNSPECIFIED: ICD-10-CM

## 2022-01-01 DIAGNOSIS — R10.84 GENERALIZED ABDOMINAL PAIN: ICD-10-CM

## 2022-01-01 DIAGNOSIS — E78.1 PURE HYPERGLYCERIDEMIA: ICD-10-CM

## 2022-01-01 DIAGNOSIS — K56.609 UNSPECIFIED INTESTINAL OBSTRUCTION, UNSPECIFIED AS TO PARTIAL VERSUS COMPLETE OBSTRUCTION: ICD-10-CM

## 2022-01-01 DIAGNOSIS — G47.9 SLEEP DISORDER, UNSPECIFIED: ICD-10-CM

## 2022-01-01 DIAGNOSIS — K86.89 OTHER SPECIFIED DISEASES OF PANCREAS: ICD-10-CM

## 2022-01-01 DIAGNOSIS — R11.2 NAUSEA WITH VOMITING, UNSPECIFIED: ICD-10-CM

## 2022-01-01 DIAGNOSIS — I10 ESSENTIAL (PRIMARY) HYPERTENSION: ICD-10-CM

## 2022-01-01 LAB
ALBUMIN SERPL ELPH-MCNC: 3.2 G/DL — LOW (ref 3.3–5.2)
ALBUMIN SERPL ELPH-MCNC: 3.7 G/DL — SIGNIFICANT CHANGE UP (ref 3.3–5.2)
ALBUMIN SERPL ELPH-MCNC: 3.8 G/DL — SIGNIFICANT CHANGE UP (ref 3.3–5)
ALBUMIN SERPL ELPH-MCNC: 4.1 G/DL
ALBUMIN SERPL ELPH-MCNC: 4.2 G/DL — SIGNIFICANT CHANGE UP (ref 3.3–5.2)
ALBUMIN SERPL ELPH-MCNC: 4.4 G/DL — SIGNIFICANT CHANGE UP (ref 3.3–5.2)
ALBUMIN SERPL ELPH-MCNC: 4.7 G/DL — SIGNIFICANT CHANGE UP (ref 3.3–5.2)
ALBUMIN SERPL ELPH-MCNC: 4.7 G/DL — SIGNIFICANT CHANGE UP (ref 3.3–5.2)
ALBUMIN SERPL ELPH-MCNC: 4.8 G/DL
ALBUMIN SERPL ELPH-MCNC: 4.9 G/DL
ALP BLD-CCNC: 187 U/L
ALP BLD-CCNC: 328 U/L
ALP BLD-CCNC: 98 U/L
ALP SERPL-CCNC: 116 U/L — SIGNIFICANT CHANGE UP (ref 40–120)
ALP SERPL-CCNC: 442 U/L — HIGH (ref 40–120)
ALP SERPL-CCNC: 63 U/L — SIGNIFICANT CHANGE UP (ref 40–120)
ALP SERPL-CCNC: 65 U/L — SIGNIFICANT CHANGE UP (ref 40–120)
ALP SERPL-CCNC: 72 U/L — SIGNIFICANT CHANGE UP (ref 40–120)
ALP SERPL-CCNC: 73 U/L — SIGNIFICANT CHANGE UP (ref 40–120)
ALP SERPL-CCNC: 85 U/L — SIGNIFICANT CHANGE UP (ref 40–120)
ALP SERPL-CCNC: 86 U/L — SIGNIFICANT CHANGE UP (ref 40–120)
ALP SERPL-CCNC: 89 U/L — SIGNIFICANT CHANGE UP (ref 40–120)
ALP SERPL-CCNC: 92 U/L — SIGNIFICANT CHANGE UP (ref 40–120)
ALT FLD-CCNC: 15 U/L — SIGNIFICANT CHANGE UP
ALT FLD-CCNC: 15 U/L — SIGNIFICANT CHANGE UP
ALT FLD-CCNC: 16 U/L — SIGNIFICANT CHANGE UP
ALT FLD-CCNC: 18 U/L — SIGNIFICANT CHANGE UP
ALT FLD-CCNC: 21 U/L — SIGNIFICANT CHANGE UP
ALT FLD-CCNC: 21 U/L — SIGNIFICANT CHANGE UP
ALT FLD-CCNC: 28 U/L — SIGNIFICANT CHANGE UP
ALT FLD-CCNC: 29 U/L — SIGNIFICANT CHANGE UP (ref 12–78)
ALT FLD-CCNC: 32 U/L — SIGNIFICANT CHANGE UP
ALT FLD-CCNC: 51 U/L — HIGH
ALT SERPL-CCNC: 17 U/L
ALT SERPL-CCNC: 18 U/L
ALT SERPL-CCNC: 184 U/L
AMMONIA BLD-MCNC: 24 UMOL/L — SIGNIFICANT CHANGE UP (ref 11–55)
ANION GAP SERPL CALC-SCNC: 12 MMOL/L — SIGNIFICANT CHANGE UP (ref 5–17)
ANION GAP SERPL CALC-SCNC: 13 MMOL/L
ANION GAP SERPL CALC-SCNC: 15 MMOL/L — SIGNIFICANT CHANGE UP (ref 5–17)
ANION GAP SERPL CALC-SCNC: 15 MMOL/L — SIGNIFICANT CHANGE UP (ref 5–17)
ANION GAP SERPL CALC-SCNC: 16 MMOL/L — SIGNIFICANT CHANGE UP (ref 5–17)
ANION GAP SERPL CALC-SCNC: 17 MMOL/L
ANION GAP SERPL CALC-SCNC: 17 MMOL/L
ANION GAP SERPL CALC-SCNC: 17 MMOL/L — SIGNIFICANT CHANGE UP (ref 5–17)
ANION GAP SERPL CALC-SCNC: 17 MMOL/L — SIGNIFICANT CHANGE UP (ref 5–17)
ANION GAP SERPL CALC-SCNC: 18 MMOL/L — HIGH (ref 5–17)
ANION GAP SERPL CALC-SCNC: 20 MMOL/L — HIGH (ref 5–17)
ANION GAP SERPL CALC-SCNC: 20 MMOL/L — HIGH (ref 5–17)
ANION GAP SERPL CALC-SCNC: 22 MMOL/L — HIGH (ref 5–17)
ANION GAP SERPL CALC-SCNC: 9 MMOL/L — SIGNIFICANT CHANGE UP (ref 5–17)
ANISOCYTOSIS BLD QL: SLIGHT — SIGNIFICANT CHANGE UP
ANISOCYTOSIS BLD QL: SLIGHT — SIGNIFICANT CHANGE UP
APAP SERPL-MCNC: <3 UG/ML — LOW (ref 10–26)
APPEARANCE UR: ABNORMAL
APPEARANCE: CLEAR
APTT BLD: 29.5 SEC — SIGNIFICANT CHANGE UP (ref 27.5–35.5)
APTT BLD: 33.6 SEC — SIGNIFICANT CHANGE UP (ref 27.5–35.5)
APTT BLD: 35.5 SEC
APTT BLD: 42.2 SEC
AST SERPL-CCNC: 104 U/L — HIGH
AST SERPL-CCNC: 24 U/L
AST SERPL-CCNC: 24 U/L — SIGNIFICANT CHANGE UP
AST SERPL-CCNC: 27 U/L — SIGNIFICANT CHANGE UP
AST SERPL-CCNC: 31 U/L — SIGNIFICANT CHANGE UP (ref 15–37)
AST SERPL-CCNC: 34 U/L — SIGNIFICANT CHANGE UP
AST SERPL-CCNC: 349 U/L
AST SERPL-CCNC: 37 U/L
AST SERPL-CCNC: 37 U/L — SIGNIFICANT CHANGE UP
AST SERPL-CCNC: 39 U/L — SIGNIFICANT CHANGE UP
AST SERPL-CCNC: 43 U/L — HIGH
AST SERPL-CCNC: 51 U/L — HIGH
AST SERPL-CCNC: 77 U/L — HIGH
BACTERIA # UR AUTO: ABNORMAL
BASE EXCESS BLDV CALC-SCNC: 0.6 MMOL/L — SIGNIFICANT CHANGE UP (ref -2–3)
BASOPHILS # BLD AUTO: 0 K/UL — SIGNIFICANT CHANGE UP (ref 0–0.2)
BASOPHILS # BLD AUTO: 0 K/UL — SIGNIFICANT CHANGE UP (ref 0–0.2)
BASOPHILS # BLD AUTO: 0.04 K/UL — SIGNIFICANT CHANGE UP (ref 0–0.2)
BASOPHILS # BLD AUTO: 0.05 K/UL — SIGNIFICANT CHANGE UP (ref 0–0.2)
BASOPHILS # BLD AUTO: 0.06 K/UL
BASOPHILS # BLD AUTO: 0.06 K/UL — SIGNIFICANT CHANGE UP (ref 0–0.2)
BASOPHILS # BLD AUTO: 0.08 K/UL — SIGNIFICANT CHANGE UP (ref 0–0.2)
BASOPHILS # BLD AUTO: 0.09 K/UL — SIGNIFICANT CHANGE UP (ref 0–0.2)
BASOPHILS # BLD AUTO: 0.09 K/UL — SIGNIFICANT CHANGE UP (ref 0–0.2)
BASOPHILS # BLD AUTO: 0.1 K/UL — SIGNIFICANT CHANGE UP (ref 0–0.2)
BASOPHILS NFR BLD AUTO: 0 % — SIGNIFICANT CHANGE UP (ref 0–2)
BASOPHILS NFR BLD AUTO: 0.5 % — SIGNIFICANT CHANGE UP (ref 0–2)
BASOPHILS NFR BLD AUTO: 0.6 %
BASOPHILS NFR BLD AUTO: 0.6 % — SIGNIFICANT CHANGE UP (ref 0–2)
BASOPHILS NFR BLD AUTO: 0.7 % — SIGNIFICANT CHANGE UP (ref 0–2)
BASOPHILS NFR BLD AUTO: 0.9 % — SIGNIFICANT CHANGE UP (ref 0–2)
BASOPHILS NFR BLD AUTO: 1.1 % — SIGNIFICANT CHANGE UP (ref 0–2)
BASOPHILS NFR BLD AUTO: 1.1 % — SIGNIFICANT CHANGE UP (ref 0–2)
BASOPHILS NFR BLD AUTO: 1.3 % — SIGNIFICANT CHANGE UP (ref 0–2)
BASOPHILS NFR BLD AUTO: 1.3 % — SIGNIFICANT CHANGE UP (ref 0–2)
BASOPHILS NFR BLD AUTO: 1.4 % — SIGNIFICANT CHANGE UP (ref 0–2)
BASOPHILS NFR BLD AUTO: 1.4 % — SIGNIFICANT CHANGE UP (ref 0–2)
BILIRUB DIRECT SERPL-MCNC: 2.4 MG/DL — HIGH (ref 0–0.3)
BILIRUB INDIRECT FLD-MCNC: SIGNIFICANT CHANGE UP MG/DL (ref 0.2–1)
BILIRUB SERPL-MCNC: 0.2 MG/DL — LOW (ref 0.4–2)
BILIRUB SERPL-MCNC: 0.2 MG/DL — SIGNIFICANT CHANGE UP (ref 0.2–1.2)
BILIRUB SERPL-MCNC: 0.3 MG/DL
BILIRUB SERPL-MCNC: 0.3 MG/DL — LOW (ref 0.4–2)
BILIRUB SERPL-MCNC: 0.4 MG/DL
BILIRUB SERPL-MCNC: 0.5 MG/DL — SIGNIFICANT CHANGE UP (ref 0.4–2)
BILIRUB SERPL-MCNC: 1.1 MG/DL
BILIRUB SERPL-MCNC: 3.3 MG/DL — HIGH (ref 0.4–2)
BILIRUB SERPL-MCNC: <0.2 MG/DL — LOW (ref 0.4–2)
BILIRUB UR-MCNC: ABNORMAL
BILIRUBIN URINE: NEGATIVE
BLD GP AB SCN SERPL QL: SIGNIFICANT CHANGE UP
BLOOD URINE: NEGATIVE
BUN SERPL-MCNC: 10 MG/DL
BUN SERPL-MCNC: 10 MG/DL — SIGNIFICANT CHANGE UP (ref 7–23)
BUN SERPL-MCNC: 10.4 MG/DL — SIGNIFICANT CHANGE UP (ref 8–20)
BUN SERPL-MCNC: 11 MG/DL — SIGNIFICANT CHANGE UP (ref 8–20)
BUN SERPL-MCNC: 14 MG/DL
BUN SERPL-MCNC: 44.9 MG/DL — HIGH (ref 8–20)
BUN SERPL-MCNC: 45.1 MG/DL — HIGH (ref 8–20)
BUN SERPL-MCNC: 5.3 MG/DL — LOW (ref 8–20)
BUN SERPL-MCNC: 5.4 MG/DL — LOW (ref 8–20)
BUN SERPL-MCNC: 7 MG/DL
BUN SERPL-MCNC: 7.1 MG/DL — LOW (ref 8–20)
BUN SERPL-MCNC: 8.8 MG/DL — SIGNIFICANT CHANGE UP (ref 8–20)
BUN SERPL-MCNC: 8.9 MG/DL — SIGNIFICANT CHANGE UP (ref 8–20)
BUN SERPL-MCNC: 9.1 MG/DL — SIGNIFICANT CHANGE UP (ref 8–20)
BURR CELLS BLD QL SMEAR: PRESENT — SIGNIFICANT CHANGE UP
CALCIUM SERPL-MCNC: 7.2 MG/DL — LOW (ref 8.4–10.5)
CALCIUM SERPL-MCNC: 8.1 MG/DL — LOW (ref 8.6–10.2)
CALCIUM SERPL-MCNC: 8.3 MG/DL — LOW (ref 8.4–10.5)
CALCIUM SERPL-MCNC: 8.6 MG/DL — SIGNIFICANT CHANGE UP (ref 8.5–10.1)
CALCIUM SERPL-MCNC: 8.6 MG/DL — SIGNIFICANT CHANGE UP (ref 8.6–10.2)
CALCIUM SERPL-MCNC: 8.7 MG/DL — SIGNIFICANT CHANGE UP (ref 8.6–10.2)
CALCIUM SERPL-MCNC: 8.8 MG/DL — SIGNIFICANT CHANGE UP (ref 8.6–10.2)
CALCIUM SERPL-MCNC: 9.2 MG/DL — SIGNIFICANT CHANGE UP (ref 8.6–10.2)
CALCIUM SERPL-MCNC: 9.3 MG/DL — SIGNIFICANT CHANGE UP (ref 8.4–10.5)
CALCIUM SERPL-MCNC: 9.4 MG/DL
CALCIUM SERPL-MCNC: 9.4 MG/DL — SIGNIFICANT CHANGE UP (ref 8.6–10.2)
CALCIUM SERPL-MCNC: 9.8 MG/DL — SIGNIFICANT CHANGE UP (ref 8.6–10.2)
CALCIUM SERPL-MCNC: 9.9 MG/DL
CALCIUM SERPL-MCNC: 9.9 MG/DL
CANCER AG19-9 SERPL-ACNC: ABNORMAL U/ML
CHLORIDE SERPL-SCNC: 100 MMOL/L — SIGNIFICANT CHANGE UP (ref 98–107)
CHLORIDE SERPL-SCNC: 100 MMOL/L — SIGNIFICANT CHANGE UP (ref 98–107)
CHLORIDE SERPL-SCNC: 101 MMOL/L
CHLORIDE SERPL-SCNC: 101 MMOL/L — SIGNIFICANT CHANGE UP (ref 98–107)
CHLORIDE SERPL-SCNC: 101 MMOL/L — SIGNIFICANT CHANGE UP (ref 98–107)
CHLORIDE SERPL-SCNC: 107 MMOL/L — SIGNIFICANT CHANGE UP (ref 96–108)
CHLORIDE SERPL-SCNC: 77 MMOL/L — LOW (ref 96–108)
CHLORIDE SERPL-SCNC: 82 MMOL/L — LOW (ref 96–108)
CHLORIDE SERPL-SCNC: 93 MMOL/L
CHLORIDE SERPL-SCNC: 96 MMOL/L
CHLORIDE SERPL-SCNC: 96 MMOL/L — LOW (ref 98–107)
CHLORIDE SERPL-SCNC: 98 MMOL/L — SIGNIFICANT CHANGE UP (ref 96–108)
CHLORIDE SERPL-SCNC: 98 MMOL/L — SIGNIFICANT CHANGE UP (ref 98–107)
CHLORIDE SERPL-SCNC: 98 MMOL/L — SIGNIFICANT CHANGE UP (ref 98–107)
CHOLEST SERPL-MCNC: 157 MG/DL
CK MB CFR SERPL CALC: 4.2 NG/ML — SIGNIFICANT CHANGE UP (ref 0–6.7)
CK SERPL-CCNC: 716 U/L — HIGH (ref 30–200)
CO2 SERPL-SCNC: 19 MMOL/L — LOW (ref 22–29)
CO2 SERPL-SCNC: 20 MMOL/L
CO2 SERPL-SCNC: 20 MMOL/L — LOW (ref 22–29)
CO2 SERPL-SCNC: 20 MMOL/L — LOW (ref 22–29)
CO2 SERPL-SCNC: 21 MMOL/L — LOW (ref 22–29)
CO2 SERPL-SCNC: 22 MMOL/L
CO2 SERPL-SCNC: 22 MMOL/L — SIGNIFICANT CHANGE UP (ref 22–29)
CO2 SERPL-SCNC: 22 MMOL/L — SIGNIFICANT CHANGE UP (ref 22–29)
CO2 SERPL-SCNC: 23 MMOL/L — SIGNIFICANT CHANGE UP (ref 22–29)
CO2 SERPL-SCNC: 24 MMOL/L
CO2 SERPL-SCNC: 24 MMOL/L — SIGNIFICANT CHANGE UP (ref 22–29)
CO2 SERPL-SCNC: 25 MMOL/L — SIGNIFICANT CHANGE UP (ref 22–31)
COLOR SPEC: ABNORMAL
COLOR: YELLOW
CREAT SERPL-MCNC: 0.63 MG/DL — SIGNIFICANT CHANGE UP (ref 0.5–1.3)
CREAT SERPL-MCNC: 0.71 MG/DL — SIGNIFICANT CHANGE UP (ref 0.5–1.3)
CREAT SERPL-MCNC: 0.75 MG/DL — SIGNIFICANT CHANGE UP (ref 0.5–1.3)
CREAT SERPL-MCNC: 0.76 MG/DL
CREAT SERPL-MCNC: 0.78 MG/DL — SIGNIFICANT CHANGE UP (ref 0.5–1.3)
CREAT SERPL-MCNC: 0.78 MG/DL — SIGNIFICANT CHANGE UP (ref 0.5–1.3)
CREAT SERPL-MCNC: 0.79 MG/DL — SIGNIFICANT CHANGE UP (ref 0.5–1.3)
CREAT SERPL-MCNC: 0.85 MG/DL — SIGNIFICANT CHANGE UP (ref 0.5–1.3)
CREAT SERPL-MCNC: 0.88 MG/DL — SIGNIFICANT CHANGE UP (ref 0.5–1.3)
CREAT SERPL-MCNC: 0.96 MG/DL — SIGNIFICANT CHANGE UP (ref 0.5–1.3)
CREAT SERPL-MCNC: 0.97 MG/DL
CREAT SERPL-MCNC: 0.98 MG/DL — SIGNIFICANT CHANGE UP (ref 0.5–1.3)
CREAT SERPL-MCNC: 1.03 MG/DL
CREAT SERPL-MCNC: 1.1 MG/DL — SIGNIFICANT CHANGE UP (ref 0.5–1.3)
CREAT SPEC-SCNC: 82 MG/DL
CULTURE RESULTS: SIGNIFICANT CHANGE UP
DIFF PNL FLD: NEGATIVE — SIGNIFICANT CHANGE UP
EGFR: 102 ML/MIN/1.73M2 — SIGNIFICANT CHANGE UP
EGFR: 103 ML/MIN/1.73M2 — SIGNIFICANT CHANGE UP
EGFR: 104 ML/MIN/1.73M2 — SIGNIFICANT CHANGE UP
EGFR: 105 ML/MIN/1.73M2
EGFR: 105 ML/MIN/1.73M2 — SIGNIFICANT CHANGE UP
EGFR: 105 ML/MIN/1.73M2 — SIGNIFICANT CHANGE UP
EGFR: 107 ML/MIN/1.73M2 — SIGNIFICANT CHANGE UP
EGFR: 108 ML/MIN/1.73M2 — SIGNIFICANT CHANGE UP
EGFR: 112 ML/MIN/1.73M2 — SIGNIFICANT CHANGE UP
EGFR: 79 ML/MIN/1.73M2 — SIGNIFICANT CHANGE UP
EGFR: 85 ML/MIN/1.73M2
EGFR: 90 ML/MIN/1.73M2 — SIGNIFICANT CHANGE UP
EGFR: 92 ML/MIN/1.73M2
EGFR: 93 ML/MIN/1.73M2 — SIGNIFICANT CHANGE UP
ELLIPTOCYTES BLD QL SMEAR: SLIGHT — SIGNIFICANT CHANGE UP
ELLIPTOCYTES BLD QL SMEAR: SLIGHT — SIGNIFICANT CHANGE UP
EOSINOPHIL # BLD AUTO: 0 K/UL — SIGNIFICANT CHANGE UP (ref 0–0.5)
EOSINOPHIL # BLD AUTO: 0.04 K/UL — SIGNIFICANT CHANGE UP (ref 0–0.5)
EOSINOPHIL # BLD AUTO: 0.05 K/UL — SIGNIFICANT CHANGE UP (ref 0–0.5)
EOSINOPHIL # BLD AUTO: 0.06 K/UL — SIGNIFICANT CHANGE UP (ref 0–0.5)
EOSINOPHIL # BLD AUTO: 0.07 K/UL — SIGNIFICANT CHANGE UP (ref 0–0.5)
EOSINOPHIL # BLD AUTO: 0.08 K/UL — SIGNIFICANT CHANGE UP (ref 0–0.5)
EOSINOPHIL # BLD AUTO: 0.09 K/UL — SIGNIFICANT CHANGE UP (ref 0–0.5)
EOSINOPHIL # BLD AUTO: 0.1 K/UL — SIGNIFICANT CHANGE UP (ref 0–0.5)
EOSINOPHIL # BLD AUTO: 0.11 K/UL — SIGNIFICANT CHANGE UP (ref 0–0.5)
EOSINOPHIL # BLD AUTO: 0.2 K/UL — SIGNIFICANT CHANGE UP (ref 0–0.5)
EOSINOPHIL # BLD AUTO: 0.22 K/UL
EOSINOPHIL NFR BLD AUTO: 0 % — SIGNIFICANT CHANGE UP (ref 0–6)
EOSINOPHIL NFR BLD AUTO: 0 % — SIGNIFICANT CHANGE UP (ref 0–6)
EOSINOPHIL NFR BLD AUTO: 0.5 % — SIGNIFICANT CHANGE UP (ref 0–6)
EOSINOPHIL NFR BLD AUTO: 0.5 % — SIGNIFICANT CHANGE UP (ref 0–6)
EOSINOPHIL NFR BLD AUTO: 0.8 % — SIGNIFICANT CHANGE UP (ref 0–6)
EOSINOPHIL NFR BLD AUTO: 0.9 % — SIGNIFICANT CHANGE UP (ref 0–6)
EOSINOPHIL NFR BLD AUTO: 1 % — SIGNIFICANT CHANGE UP (ref 0–6)
EOSINOPHIL NFR BLD AUTO: 1.2 % — SIGNIFICANT CHANGE UP (ref 0–6)
EOSINOPHIL NFR BLD AUTO: 1.4 % — SIGNIFICANT CHANGE UP (ref 0–6)
EOSINOPHIL NFR BLD AUTO: 1.5 % — SIGNIFICANT CHANGE UP (ref 0–6)
EOSINOPHIL NFR BLD AUTO: 1.5 % — SIGNIFICANT CHANGE UP (ref 0–6)
EOSINOPHIL NFR BLD AUTO: 2 % — SIGNIFICANT CHANGE UP (ref 0–6)
EOSINOPHIL NFR BLD AUTO: 2.3 %
EOSINOPHIL NFR BLD AUTO: 2.5 % — SIGNIFICANT CHANGE UP (ref 0–6)
EPI CELLS # UR: SIGNIFICANT CHANGE UP
ESTIMATED AVERAGE GLUCOSE: 154 MG/DL
ETHANOL SERPL-MCNC: 172 MG/DL — HIGH (ref 0–10)
ETHANOL SERPL-MCNC: 216 MG/DL — HIGH (ref 0–9)
ETHANOL SERPL-MCNC: 361 MG/DL — HIGH (ref 0–9)
ETHANOL SERPL-MCNC: 458 MG/DL — HIGH (ref 0–9)
ETHANOL SERPL-MCNC: 472 MG/DL — HIGH (ref 0–9)
ETHANOL SERPL-MCNC: <10 MG/DL — SIGNIFICANT CHANGE UP (ref 0–9)
ETHANOL SERPL-MCNC: <10 MG/DL — SIGNIFICANT CHANGE UP (ref 0–9)
FLUAV AG NPH QL: SIGNIFICANT CHANGE UP
FLUBV AG NPH QL: SIGNIFICANT CHANGE UP
GIANT PLATELETS BLD QL SMEAR: PRESENT — SIGNIFICANT CHANGE UP
GIANT PLATELETS BLD QL SMEAR: PRESENT — SIGNIFICANT CHANGE UP
GLUCOSE BLDC GLUCOMTR-MCNC: 121 MG/DL — HIGH (ref 70–99)
GLUCOSE BLDC GLUCOMTR-MCNC: 177 MG/DL — HIGH (ref 70–99)
GLUCOSE QUALITATIVE U: NEGATIVE
GLUCOSE SERPL-MCNC: 102 MG/DL — HIGH (ref 70–99)
GLUCOSE SERPL-MCNC: 112 MG/DL
GLUCOSE SERPL-MCNC: 112 MG/DL — HIGH (ref 70–99)
GLUCOSE SERPL-MCNC: 114 MG/DL
GLUCOSE SERPL-MCNC: 122 MG/DL — HIGH (ref 70–99)
GLUCOSE SERPL-MCNC: 125 MG/DL
GLUCOSE SERPL-MCNC: 139 MG/DL — HIGH (ref 70–99)
GLUCOSE SERPL-MCNC: 144 MG/DL — HIGH (ref 70–99)
GLUCOSE SERPL-MCNC: 145 MG/DL — HIGH (ref 70–99)
GLUCOSE SERPL-MCNC: 152 MG/DL — HIGH (ref 70–99)
GLUCOSE SERPL-MCNC: 162 MG/DL — HIGH (ref 70–99)
GLUCOSE SERPL-MCNC: 163 MG/DL — HIGH (ref 70–99)
GLUCOSE SERPL-MCNC: 228 MG/DL — HIGH (ref 70–99)
GLUCOSE SERPL-MCNC: 99 MG/DL — SIGNIFICANT CHANGE UP (ref 70–99)
GLUCOSE UR QL: NEGATIVE — SIGNIFICANT CHANGE UP
HAV IGM SER QL: NONREACTIVE
HBA1C MFR BLD HPLC: 6.6
HBA1C MFR BLD HPLC: 7 %
HBV CORE IGM SER QL: NONREACTIVE
HBV SURFACE AG SER QL: NONREACTIVE
HCO3 BLDV-SCNC: 26 MMOL/L — SIGNIFICANT CHANGE UP (ref 22–29)
HCT VFR BLD CALC: 28.2 % — LOW (ref 39–50)
HCT VFR BLD CALC: 28.6 % — LOW (ref 39–50)
HCT VFR BLD CALC: 28.6 % — LOW (ref 39–50)
HCT VFR BLD CALC: 30.6 % — LOW (ref 39–50)
HCT VFR BLD CALC: 31.1 % — LOW (ref 39–50)
HCT VFR BLD CALC: 31.9 % — LOW (ref 39–50)
HCT VFR BLD CALC: 32.5 %
HCT VFR BLD CALC: 32.9 % — LOW (ref 39–50)
HCT VFR BLD CALC: 34.5 % — LOW (ref 39–50)
HCT VFR BLD CALC: 34.5 % — LOW (ref 39–50)
HCT VFR BLD CALC: 36.1 % — LOW (ref 39–50)
HCT VFR BLD CALC: 36.4 % — LOW (ref 39–50)
HCT VFR BLD CALC: 36.8 % — LOW (ref 39–50)
HCT VFR BLD CALC: 36.8 % — LOW (ref 39–50)
HCT VFR BLD CALC: 38.2 % — LOW (ref 39–50)
HCT VFR BLD CALC: 38.4 % — LOW (ref 39–50)
HCV AB SER QL: NONREACTIVE
HCV S/CO RATIO: 0.1 S/CO
HDLC SERPL-MCNC: 60 MG/DL
HGB BLD-MCNC: 10.3 G/DL — LOW (ref 13–17)
HGB BLD-MCNC: 11.2 G/DL — LOW (ref 13–17)
HGB BLD-MCNC: 11.3 G/DL — LOW (ref 13–17)
HGB BLD-MCNC: 11.4 G/DL — LOW (ref 13–17)
HGB BLD-MCNC: 11.6 G/DL — LOW (ref 13–17)
HGB BLD-MCNC: 11.8 G/DL — LOW (ref 13–17)
HGB BLD-MCNC: 11.9 G/DL — LOW (ref 13–17)
HGB BLD-MCNC: 11.9 G/DL — LOW (ref 13–17)
HGB BLD-MCNC: 12.1 G/DL — LOW (ref 13–17)
HGB BLD-MCNC: 8.9 G/DL — LOW (ref 13–17)
HGB BLD-MCNC: 9 G/DL — LOW (ref 13–17)
HGB BLD-MCNC: 9 G/DL — LOW (ref 13–17)
HGB BLD-MCNC: 9.5 G/DL
HGB BLD-MCNC: 9.6 G/DL — LOW (ref 13–17)
HGB BLD-MCNC: 9.6 G/DL — LOW (ref 13–17)
HGB BLD-MCNC: 9.7 G/DL — LOW (ref 13–17)
HIV 1 & 2 AB SERPL IA.RAPID: SIGNIFICANT CHANGE UP
HYPOCHROMIA BLD QL: SLIGHT — SIGNIFICANT CHANGE UP
IMM GRANULOCYTES NFR BLD AUTO: 0.3 %
IMM GRANULOCYTES NFR BLD AUTO: 0.3 % — SIGNIFICANT CHANGE UP (ref 0–1.5)
IMM GRANULOCYTES NFR BLD AUTO: 0.4 % — SIGNIFICANT CHANGE UP (ref 0–1.5)
IMM GRANULOCYTES NFR BLD AUTO: 0.4 % — SIGNIFICANT CHANGE UP (ref 0–1.5)
IMM GRANULOCYTES NFR BLD AUTO: 0.5 % — SIGNIFICANT CHANGE UP (ref 0–1.5)
IMM GRANULOCYTES NFR BLD AUTO: 1.3 % — SIGNIFICANT CHANGE UP (ref 0–1.5)
INR BLD: 0.98 RATIO — SIGNIFICANT CHANGE UP (ref 0.88–1.16)
INR BLD: 1.24 RATIO — HIGH (ref 0.88–1.16)
INR BLD: 1.32 RATIO — HIGH (ref 0.88–1.16)
INR PPP: 1.28 RATIO
INR PPP: 1.72 RATIO
KETONES UR-MCNC: ABNORMAL
KETONES URINE: NEGATIVE
LACTATE BLDV-MCNC: 2.6 MMOL/L — HIGH (ref 0.5–2)
LDLC SERPL CALC-MCNC: 85 MG/DL
LEUKOCYTE ESTERASE UR-ACNC: ABNORMAL
LEUKOCYTE ESTERASE URINE: NEGATIVE
LIDOCAIN IGE QN: 156 U/L — SIGNIFICANT CHANGE UP (ref 73–393)
LIDOCAIN IGE QN: 21 U/L — LOW (ref 22–51)
LIDOCAIN IGE QN: 25 U/L — SIGNIFICANT CHANGE UP (ref 22–51)
LIDOCAIN IGE QN: 25 U/L — SIGNIFICANT CHANGE UP (ref 22–51)
LIDOCAIN IGE QN: 253 U/L — HIGH (ref 22–51)
LIDOCAIN IGE QN: 43 U/L — SIGNIFICANT CHANGE UP (ref 22–51)
LYMPHOCYTES # BLD AUTO: 0.5 K/UL — LOW (ref 1–3.3)
LYMPHOCYTES # BLD AUTO: 0.87 K/UL — LOW (ref 1–3.3)
LYMPHOCYTES # BLD AUTO: 1 K/UL — SIGNIFICANT CHANGE UP (ref 1–3.3)
LYMPHOCYTES # BLD AUTO: 1 K/UL — SIGNIFICANT CHANGE UP (ref 1–3.3)
LYMPHOCYTES # BLD AUTO: 1.1 K/UL — SIGNIFICANT CHANGE UP (ref 1–3.3)
LYMPHOCYTES # BLD AUTO: 1.31 K/UL
LYMPHOCYTES # BLD AUTO: 1.44 K/UL — SIGNIFICANT CHANGE UP (ref 1–3.3)
LYMPHOCYTES # BLD AUTO: 1.53 K/UL — SIGNIFICANT CHANGE UP (ref 1–3.3)
LYMPHOCYTES # BLD AUTO: 1.63 K/UL — SIGNIFICANT CHANGE UP (ref 1–3.3)
LYMPHOCYTES # BLD AUTO: 1.64 K/UL — SIGNIFICANT CHANGE UP (ref 1–3.3)
LYMPHOCYTES # BLD AUTO: 1.65 K/UL — SIGNIFICANT CHANGE UP (ref 1–3.3)
LYMPHOCYTES # BLD AUTO: 1.66 K/UL — SIGNIFICANT CHANGE UP (ref 1–3.3)
LYMPHOCYTES # BLD AUTO: 1.7 K/UL — SIGNIFICANT CHANGE UP (ref 1–3.3)
LYMPHOCYTES # BLD AUTO: 1.8 K/UL — SIGNIFICANT CHANGE UP (ref 1–3.3)
LYMPHOCYTES # BLD AUTO: 10.9 % — LOW (ref 13–44)
LYMPHOCYTES # BLD AUTO: 12.5 % — LOW (ref 13–44)
LYMPHOCYTES # BLD AUTO: 13.1 % — SIGNIFICANT CHANGE UP (ref 13–44)
LYMPHOCYTES # BLD AUTO: 13.1 % — SIGNIFICANT CHANGE UP (ref 13–44)
LYMPHOCYTES # BLD AUTO: 14.1 % — SIGNIFICANT CHANGE UP (ref 13–44)
LYMPHOCYTES # BLD AUTO: 17.9 % — SIGNIFICANT CHANGE UP (ref 13–44)
LYMPHOCYTES # BLD AUTO: 24.7 % — SIGNIFICANT CHANGE UP (ref 13–44)
LYMPHOCYTES # BLD AUTO: 26.6 % — SIGNIFICANT CHANGE UP (ref 13–44)
LYMPHOCYTES # BLD AUTO: 29.4 % — SIGNIFICANT CHANGE UP (ref 13–44)
LYMPHOCYTES # BLD AUTO: 32.7 % — SIGNIFICANT CHANGE UP (ref 13–44)
LYMPHOCYTES # BLD AUTO: 32.8 % — SIGNIFICANT CHANGE UP (ref 13–44)
LYMPHOCYTES # BLD AUTO: 34.1 % — SIGNIFICANT CHANGE UP (ref 13–44)
LYMPHOCYTES # BLD AUTO: 5.2 % — LOW (ref 13–44)
LYMPHOCYTES # BLD AUTO: 8.7 % — LOW (ref 13–44)
LYMPHOCYTES # BLD AUTO: 9.6 % — LOW (ref 13–44)
LYMPHOCYTES NFR BLD AUTO: 13.5 %
MACROCYTES BLD QL: SLIGHT — SIGNIFICANT CHANGE UP
MACROCYTES BLD QL: SLIGHT — SIGNIFICANT CHANGE UP
MAGNESIUM SERPL-MCNC: 1.7 MG/DL — SIGNIFICANT CHANGE UP (ref 1.6–2.6)
MAGNESIUM SERPL-MCNC: 1.8 MG/DL
MAGNESIUM SERPL-MCNC: 2 MG/DL — SIGNIFICANT CHANGE UP (ref 1.6–2.6)
MAGNESIUM SERPL-MCNC: 2.6 MG/DL — SIGNIFICANT CHANGE UP (ref 1.8–2.6)
MAN DIFF?: NORMAL
MANUAL SMEAR VERIFICATION: SIGNIFICANT CHANGE UP
MANUAL SMEAR VERIFICATION: SIGNIFICANT CHANGE UP
MCHC RBC-ENTMCNC: 22 PG
MCHC RBC-ENTMCNC: 22 PG — LOW (ref 27–34)
MCHC RBC-ENTMCNC: 22.4 PG — LOW (ref 27–34)
MCHC RBC-ENTMCNC: 22.5 PG — LOW (ref 27–34)
MCHC RBC-ENTMCNC: 22.6 PG — LOW (ref 27–34)
MCHC RBC-ENTMCNC: 22.6 PG — LOW (ref 27–34)
MCHC RBC-ENTMCNC: 22.7 PG — LOW (ref 27–34)
MCHC RBC-ENTMCNC: 23.1 PG — LOW (ref 27–34)
MCHC RBC-ENTMCNC: 24 PG — LOW (ref 27–34)
MCHC RBC-ENTMCNC: 24.2 PG — LOW (ref 27–34)
MCHC RBC-ENTMCNC: 24.3 PG — LOW (ref 27–34)
MCHC RBC-ENTMCNC: 24.8 PG — LOW (ref 27–34)
MCHC RBC-ENTMCNC: 24.9 PG — LOW (ref 27–34)
MCHC RBC-ENTMCNC: 25.1 PG — LOW (ref 27–34)
MCHC RBC-ENTMCNC: 25.5 PG — LOW (ref 27–34)
MCHC RBC-ENTMCNC: 25.6 PG — LOW (ref 27–34)
MCHC RBC-ENTMCNC: 29.2 GM/DL
MCHC RBC-ENTMCNC: 30.4 G/DL — LOW (ref 32–36)
MCHC RBC-ENTMCNC: 30.7 GM/DL — LOW (ref 32–36)
MCHC RBC-ENTMCNC: 30.8 G/DL — LOW (ref 32–36)
MCHC RBC-ENTMCNC: 31.2 GM/DL — LOW (ref 32–36)
MCHC RBC-ENTMCNC: 31.3 GM/DL — LOW (ref 32–36)
MCHC RBC-ENTMCNC: 31.4 G/DL — LOW (ref 32–36)
MCHC RBC-ENTMCNC: 31.4 GM/DL — LOW (ref 32–36)
MCHC RBC-ENTMCNC: 31.5 GM/DL — LOW (ref 32–36)
MCHC RBC-ENTMCNC: 31.6 G/DL — LOW (ref 32–36)
MCHC RBC-ENTMCNC: 31.6 G/DL — LOW (ref 32–36)
MCHC RBC-ENTMCNC: 32.1 GM/DL — SIGNIFICANT CHANGE UP (ref 32–36)
MCHC RBC-ENTMCNC: 32.3 GM/DL — SIGNIFICANT CHANGE UP (ref 32–36)
MCHC RBC-ENTMCNC: 32.4 GM/DL — SIGNIFICANT CHANGE UP (ref 32–36)
MCHC RBC-ENTMCNC: 32.5 GM/DL — SIGNIFICANT CHANGE UP (ref 32–36)
MCHC RBC-ENTMCNC: 33 GM/DL — SIGNIFICANT CHANGE UP (ref 32–36)
MCV RBC AUTO: 66.6 FL — LOW (ref 80–100)
MCV RBC AUTO: 71 FL — LOW (ref 80–100)
MCV RBC AUTO: 71.3 FL — LOW (ref 80–100)
MCV RBC AUTO: 72.2 FL — LOW (ref 80–100)
MCV RBC AUTO: 72.2 FL — LOW (ref 80–100)
MCV RBC AUTO: 73.4 FL — LOW (ref 80–100)
MCV RBC AUTO: 75.4 FL
MCV RBC AUTO: 76.2 FL — LOW (ref 80–100)
MCV RBC AUTO: 76.3 FL — LOW (ref 80–100)
MCV RBC AUTO: 77.1 FL — LOW (ref 80–100)
MCV RBC AUTO: 77.6 FL — LOW (ref 80–100)
MCV RBC AUTO: 77.6 FL — LOW (ref 80–100)
MCV RBC AUTO: 78.1 FL — LOW (ref 80–100)
MCV RBC AUTO: 78.1 FL — LOW (ref 80–100)
MCV RBC AUTO: 78.6 FL — LOW (ref 80–100)
MCV RBC AUTO: 81.4 FL — SIGNIFICANT CHANGE UP (ref 80–100)
METAMYELOCYTES # FLD: 0.9 % — HIGH (ref 0–0)
MICROALBUMIN 24H UR DL<=1MG/L-MCNC: <1.2 MG/DL
MICROALBUMIN/CREAT 24H UR-RTO: NORMAL MG/G
MICROCYTES BLD QL: SLIGHT — SIGNIFICANT CHANGE UP
MONOCYTES # BLD AUTO: 0.2 K/UL — SIGNIFICANT CHANGE UP (ref 0–0.9)
MONOCYTES # BLD AUTO: 0.32 K/UL — SIGNIFICANT CHANGE UP (ref 0–0.9)
MONOCYTES # BLD AUTO: 0.4 K/UL — SIGNIFICANT CHANGE UP (ref 0–0.9)
MONOCYTES # BLD AUTO: 0.44 K/UL — SIGNIFICANT CHANGE UP (ref 0–0.9)
MONOCYTES # BLD AUTO: 0.52 K/UL — SIGNIFICANT CHANGE UP (ref 0–0.9)
MONOCYTES # BLD AUTO: 0.56 K/UL — SIGNIFICANT CHANGE UP (ref 0–0.9)
MONOCYTES # BLD AUTO: 0.67 K/UL — SIGNIFICANT CHANGE UP (ref 0–0.9)
MONOCYTES # BLD AUTO: 0.82 K/UL — SIGNIFICANT CHANGE UP (ref 0–0.9)
MONOCYTES # BLD AUTO: 0.84 K/UL
MONOCYTES # BLD AUTO: 0.85 K/UL — SIGNIFICANT CHANGE UP (ref 0–0.9)
MONOCYTES # BLD AUTO: 0.9 K/UL — SIGNIFICANT CHANGE UP (ref 0–0.9)
MONOCYTES # BLD AUTO: 0.9 K/UL — SIGNIFICANT CHANGE UP (ref 0–0.9)
MONOCYTES # BLD AUTO: 0.95 K/UL — HIGH (ref 0–0.9)
MONOCYTES # BLD AUTO: 1.2 K/UL — HIGH (ref 0–0.9)
MONOCYTES # BLD AUTO: 1.2 K/UL — HIGH (ref 0–0.9)
MONOCYTES # BLD AUTO: 2.17 K/UL — HIGH (ref 0–0.9)
MONOCYTES NFR BLD AUTO: 10.2 % — SIGNIFICANT CHANGE UP (ref 2–14)
MONOCYTES NFR BLD AUTO: 10.2 % — SIGNIFICANT CHANGE UP (ref 2–14)
MONOCYTES NFR BLD AUTO: 10.3 % — SIGNIFICANT CHANGE UP (ref 2–14)
MONOCYTES NFR BLD AUTO: 11.2 % — SIGNIFICANT CHANGE UP (ref 2–14)
MONOCYTES NFR BLD AUTO: 12.8 % — SIGNIFICANT CHANGE UP (ref 2–14)
MONOCYTES NFR BLD AUTO: 15 % — HIGH (ref 2–14)
MONOCYTES NFR BLD AUTO: 5.5 % — SIGNIFICANT CHANGE UP (ref 2–14)
MONOCYTES NFR BLD AUTO: 5.8 % — SIGNIFICANT CHANGE UP (ref 2–14)
MONOCYTES NFR BLD AUTO: 6.9 % — SIGNIFICANT CHANGE UP (ref 2–14)
MONOCYTES NFR BLD AUTO: 7.1 % — SIGNIFICANT CHANGE UP (ref 2–14)
MONOCYTES NFR BLD AUTO: 8.1 % — SIGNIFICANT CHANGE UP (ref 2–14)
MONOCYTES NFR BLD AUTO: 8.6 % — SIGNIFICANT CHANGE UP (ref 2–14)
MONOCYTES NFR BLD AUTO: 8.7 %
MONOCYTES NFR BLD AUTO: 9.2 % — SIGNIFICANT CHANGE UP (ref 2–14)
MONOCYTES NFR BLD AUTO: 9.5 % — SIGNIFICANT CHANGE UP (ref 2–14)
MONOCYTES NFR BLD AUTO: 9.8 % — SIGNIFICANT CHANGE UP (ref 2–14)
MYELOCYTES NFR BLD: 4.3 % — HIGH (ref 0–0)
NEUTROPHILS # BLD AUTO: 2.2 K/UL — SIGNIFICANT CHANGE UP (ref 1.8–7.4)
NEUTROPHILS # BLD AUTO: 2.35 K/UL — SIGNIFICANT CHANGE UP (ref 1.8–7.4)
NEUTROPHILS # BLD AUTO: 25.77 K/UL — HIGH (ref 1.8–7.4)
NEUTROPHILS # BLD AUTO: 3 K/UL — SIGNIFICANT CHANGE UP (ref 1.8–7.4)
NEUTROPHILS # BLD AUTO: 3.25 K/UL — SIGNIFICANT CHANGE UP (ref 1.8–7.4)
NEUTROPHILS # BLD AUTO: 3.42 K/UL — SIGNIFICANT CHANGE UP (ref 1.8–7.4)
NEUTROPHILS # BLD AUTO: 4.03 K/UL — SIGNIFICANT CHANGE UP (ref 1.8–7.4)
NEUTROPHILS # BLD AUTO: 4.08 K/UL — SIGNIFICANT CHANGE UP (ref 1.8–7.4)
NEUTROPHILS # BLD AUTO: 5.6 K/UL — SIGNIFICANT CHANGE UP (ref 1.8–7.4)
NEUTROPHILS # BLD AUTO: 5.6 K/UL — SIGNIFICANT CHANGE UP (ref 1.8–7.4)
NEUTROPHILS # BLD AUTO: 6 K/UL — SIGNIFICANT CHANGE UP (ref 1.8–7.4)
NEUTROPHILS # BLD AUTO: 6.51 K/UL — SIGNIFICANT CHANGE UP (ref 1.8–7.4)
NEUTROPHILS # BLD AUTO: 7.22 K/UL
NEUTROPHILS # BLD AUTO: 7.99 K/UL — HIGH (ref 1.8–7.4)
NEUTROPHILS # BLD AUTO: 8.2 K/UL — HIGH (ref 1.8–7.4)
NEUTROPHILS # BLD AUTO: 8.9 K/UL — HIGH (ref 1.8–7.4)
NEUTROPHILS NFR BLD AUTO: 50 % — SIGNIFICANT CHANGE UP (ref 43–77)
NEUTROPHILS NFR BLD AUTO: 52.4 % — SIGNIFICANT CHANGE UP (ref 43–77)
NEUTROPHILS NFR BLD AUTO: 59.2 % — SIGNIFICANT CHANGE UP (ref 43–77)
NEUTROPHILS NFR BLD AUTO: 60.6 % — SIGNIFICANT CHANGE UP (ref 43–77)
NEUTROPHILS NFR BLD AUTO: 61.9 % — SIGNIFICANT CHANGE UP (ref 43–77)
NEUTROPHILS NFR BLD AUTO: 63 % — SIGNIFICANT CHANGE UP (ref 43–77)
NEUTROPHILS NFR BLD AUTO: 70.2 % — SIGNIFICANT CHANGE UP (ref 43–77)
NEUTROPHILS NFR BLD AUTO: 70.7 % — SIGNIFICANT CHANGE UP (ref 43–77)
NEUTROPHILS NFR BLD AUTO: 72.8 % — SIGNIFICANT CHANGE UP (ref 43–77)
NEUTROPHILS NFR BLD AUTO: 74.6 %
NEUTROPHILS NFR BLD AUTO: 74.9 % — SIGNIFICANT CHANGE UP (ref 43–77)
NEUTROPHILS NFR BLD AUTO: 77.4 % — HIGH (ref 43–77)
NEUTROPHILS NFR BLD AUTO: 77.7 % — HIGH (ref 43–77)
NEUTROPHILS NFR BLD AUTO: 78.2 % — HIGH (ref 43–77)
NEUTROPHILS NFR BLD AUTO: 78.3 % — HIGH (ref 43–77)
NEUTROPHILS NFR BLD AUTO: 80 % — HIGH (ref 43–77)
NEUTS BAND # BLD: 2.6 % — SIGNIFICANT CHANGE UP (ref 0–8)
NEUTS BAND # BLD: 3.5 % — SIGNIFICANT CHANGE UP (ref 0–8)
NITRITE UR-MCNC: POSITIVE
NITRITE URINE: NEGATIVE
NONHDLC SERPL-MCNC: 97 MG/DL
NRBC # BLD: 0 /100 WBCS — SIGNIFICANT CHANGE UP (ref 0–0)
OVALOCYTES BLD QL SMEAR: SLIGHT — SIGNIFICANT CHANGE UP
PCO2 BLDV: 46 MMHG — SIGNIFICANT CHANGE UP (ref 42–55)
PH BLDV: 7.36 — SIGNIFICANT CHANGE UP (ref 7.32–7.43)
PH UR: 5 — SIGNIFICANT CHANGE UP (ref 5–8)
PH URINE: 6
PLAT MORPH BLD: NORMAL — SIGNIFICANT CHANGE UP
PLAT MORPH BLD: NORMAL — SIGNIFICANT CHANGE UP
PLATELET # BLD AUTO: 238 K/UL — SIGNIFICANT CHANGE UP (ref 150–400)
PLATELET # BLD AUTO: 255 K/UL — SIGNIFICANT CHANGE UP (ref 150–400)
PLATELET # BLD AUTO: 269 K/UL — SIGNIFICANT CHANGE UP (ref 150–400)
PLATELET # BLD AUTO: 284 K/UL — SIGNIFICANT CHANGE UP (ref 150–400)
PLATELET # BLD AUTO: 291 K/UL — SIGNIFICANT CHANGE UP (ref 150–400)
PLATELET # BLD AUTO: 302 K/UL — SIGNIFICANT CHANGE UP (ref 150–400)
PLATELET # BLD AUTO: 311 K/UL — SIGNIFICANT CHANGE UP (ref 150–400)
PLATELET # BLD AUTO: 357 K/UL — SIGNIFICANT CHANGE UP (ref 150–400)
PLATELET # BLD AUTO: 374 K/UL — SIGNIFICANT CHANGE UP (ref 150–400)
PLATELET # BLD AUTO: 377 K/UL — SIGNIFICANT CHANGE UP (ref 150–400)
PLATELET # BLD AUTO: 380 K/UL — SIGNIFICANT CHANGE UP (ref 150–400)
PLATELET # BLD AUTO: 383 K/UL — SIGNIFICANT CHANGE UP (ref 150–400)
PLATELET # BLD AUTO: 390 K/UL
PLATELET # BLD AUTO: 421 K/UL — HIGH (ref 150–400)
PLATELET # BLD AUTO: 586 K/UL — HIGH (ref 150–400)
PLATELET # BLD AUTO: 750 K/UL — HIGH (ref 150–400)
PO2 BLDV: <42 MMHG — SIGNIFICANT CHANGE UP (ref 25–45)
POIKILOCYTOSIS BLD QL AUTO: SIGNIFICANT CHANGE UP
POIKILOCYTOSIS BLD QL AUTO: SLIGHT — SIGNIFICANT CHANGE UP
POLYCHROMASIA BLD QL SMEAR: SIGNIFICANT CHANGE UP
POLYCHROMASIA BLD QL SMEAR: SIGNIFICANT CHANGE UP
POTASSIUM SERPL-MCNC: 3.7 MMOL/L — SIGNIFICANT CHANGE UP (ref 3.5–5.3)
POTASSIUM SERPL-MCNC: 3.9 MMOL/L — SIGNIFICANT CHANGE UP (ref 3.5–5.3)
POTASSIUM SERPL-MCNC: 4.1 MMOL/L — SIGNIFICANT CHANGE UP (ref 3.5–5.3)
POTASSIUM SERPL-MCNC: 4.1 MMOL/L — SIGNIFICANT CHANGE UP (ref 3.5–5.3)
POTASSIUM SERPL-MCNC: 4.2 MMOL/L — SIGNIFICANT CHANGE UP (ref 3.5–5.3)
POTASSIUM SERPL-MCNC: 4.4 MMOL/L — SIGNIFICANT CHANGE UP (ref 3.5–5.3)
POTASSIUM SERPL-MCNC: 4.4 MMOL/L — SIGNIFICANT CHANGE UP (ref 3.5–5.3)
POTASSIUM SERPL-MCNC: 4.5 MMOL/L — SIGNIFICANT CHANGE UP (ref 3.5–5.3)
POTASSIUM SERPL-MCNC: 5.1 MMOL/L — SIGNIFICANT CHANGE UP (ref 3.5–5.3)
POTASSIUM SERPL-MCNC: 5.2 MMOL/L — SIGNIFICANT CHANGE UP (ref 3.5–5.3)
POTASSIUM SERPL-MCNC: 5.4 MMOL/L — HIGH (ref 3.5–5.3)
POTASSIUM SERPL-SCNC: 3.7 MMOL/L — SIGNIFICANT CHANGE UP (ref 3.5–5.3)
POTASSIUM SERPL-SCNC: 3.9 MMOL/L — SIGNIFICANT CHANGE UP (ref 3.5–5.3)
POTASSIUM SERPL-SCNC: 4.1 MMOL/L — SIGNIFICANT CHANGE UP (ref 3.5–5.3)
POTASSIUM SERPL-SCNC: 4.1 MMOL/L — SIGNIFICANT CHANGE UP (ref 3.5–5.3)
POTASSIUM SERPL-SCNC: 4.2 MMOL/L — SIGNIFICANT CHANGE UP (ref 3.5–5.3)
POTASSIUM SERPL-SCNC: 4.4 MMOL/L
POTASSIUM SERPL-SCNC: 4.4 MMOL/L — SIGNIFICANT CHANGE UP (ref 3.5–5.3)
POTASSIUM SERPL-SCNC: 4.4 MMOL/L — SIGNIFICANT CHANGE UP (ref 3.5–5.3)
POTASSIUM SERPL-SCNC: 4.5 MMOL/L — SIGNIFICANT CHANGE UP (ref 3.5–5.3)
POTASSIUM SERPL-SCNC: 4.7 MMOL/L
POTASSIUM SERPL-SCNC: 4.7 MMOL/L
POTASSIUM SERPL-SCNC: 5.1 MMOL/L — SIGNIFICANT CHANGE UP (ref 3.5–5.3)
POTASSIUM SERPL-SCNC: 5.2 MMOL/L — SIGNIFICANT CHANGE UP (ref 3.5–5.3)
POTASSIUM SERPL-SCNC: 5.4 MMOL/L — HIGH (ref 3.5–5.3)
PROT SERPL-MCNC: 7 G/DL
PROT SERPL-MCNC: 7 G/DL — SIGNIFICANT CHANGE UP (ref 6.6–8.7)
PROT SERPL-MCNC: 7.1 G/DL — SIGNIFICANT CHANGE UP (ref 6.6–8.7)
PROT SERPL-MCNC: 7.2 G/DL — SIGNIFICANT CHANGE UP (ref 6.6–8.7)
PROT SERPL-MCNC: 7.2 G/DL — SIGNIFICANT CHANGE UP (ref 6.6–8.7)
PROT SERPL-MCNC: 7.4 G/DL — SIGNIFICANT CHANGE UP (ref 6.6–8.7)
PROT SERPL-MCNC: 7.5 G/DL — SIGNIFICANT CHANGE UP (ref 6.6–8.7)
PROT SERPL-MCNC: 7.5 G/DL — SIGNIFICANT CHANGE UP (ref 6–8.3)
PROT SERPL-MCNC: 7.7 G/DL
PROT SERPL-MCNC: 7.9 G/DL
PROT SERPL-MCNC: 8 G/DL — SIGNIFICANT CHANGE UP (ref 6.6–8.7)
PROT SERPL-MCNC: 8 G/DL — SIGNIFICANT CHANGE UP (ref 6.6–8.7)
PROT SERPL-MCNC: 8.2 G/DL — SIGNIFICANT CHANGE UP (ref 6.6–8.7)
PROT UR-MCNC: 15
PROTEIN URINE: NEGATIVE
PROTHROM AB SERPL-ACNC: 11.4 SEC — SIGNIFICANT CHANGE UP (ref 10.5–13.4)
PROTHROM AB SERPL-ACNC: 14.4 SEC — HIGH (ref 10.5–13.4)
PROTHROM AB SERPL-ACNC: 15.6 SEC — HIGH (ref 10.5–13.4)
PSA SERPL-MCNC: 1.56 NG/ML
PT BLD: 14.9 SEC
PT BLD: 20.1 SEC
RAPID RVP RESULT: SIGNIFICANT CHANGE UP
RBC # BLD: 3.96 M/UL — LOW (ref 4.2–5.8)
RBC # BLD: 3.96 M/UL — LOW (ref 4.2–5.8)
RBC # BLD: 4.02 M/UL — LOW (ref 4.2–5.8)
RBC # BLD: 4.19 M/UL — LOW (ref 4.2–5.8)
RBC # BLD: 4.24 M/UL — SIGNIFICANT CHANGE UP (ref 4.2–5.8)
RBC # BLD: 4.31 M/UL
RBC # BLD: 4.52 M/UL — SIGNIFICANT CHANGE UP (ref 4.2–5.8)
RBC # BLD: 4.62 M/UL — SIGNIFICANT CHANGE UP (ref 4.2–5.8)
RBC # BLD: 4.63 M/UL — SIGNIFICANT CHANGE UP (ref 4.2–5.8)
RBC # BLD: 4.71 M/UL — SIGNIFICANT CHANGE UP (ref 4.2–5.8)
RBC # BLD: 4.72 M/UL — SIGNIFICANT CHANGE UP (ref 4.2–5.8)
RBC # BLD: 4.77 M/UL — SIGNIFICANT CHANGE UP (ref 4.2–5.8)
RBC # BLD: 4.92 M/UL — SIGNIFICANT CHANGE UP (ref 4.2–5.8)
RBC # BLD: 5.18 M/UL — SIGNIFICANT CHANGE UP (ref 4.2–5.8)
RBC # FLD: 14.8 % — HIGH (ref 10.3–14.5)
RBC # FLD: 15.2 % — HIGH (ref 10.3–14.5)
RBC # FLD: 15.3 % — HIGH (ref 10.3–14.5)
RBC # FLD: 15.7 % — HIGH (ref 10.3–14.5)
RBC # FLD: 16.1 % — HIGH (ref 10.3–14.5)
RBC # FLD: 17 % — HIGH (ref 10.3–14.5)
RBC # FLD: 17.1 % — HIGH (ref 10.3–14.5)
RBC # FLD: 17.1 % — HIGH (ref 10.3–14.5)
RBC # FLD: 17.4 % — HIGH (ref 10.3–14.5)
RBC # FLD: 17.4 % — HIGH (ref 10.3–14.5)
RBC # FLD: 17.6 % — HIGH (ref 10.3–14.5)
RBC # FLD: 18.6 % — HIGH (ref 10.3–14.5)
RBC # FLD: 18.7 % — HIGH (ref 10.3–14.5)
RBC # FLD: 19.9 % — HIGH (ref 10.3–14.5)
RBC # FLD: 20.3 %
RBC # FLD: 21.3 % — HIGH (ref 10.3–14.5)
RBC BLD AUTO: ABNORMAL
RBC BLD AUTO: ABNORMAL
RBC CASTS # UR COMP ASSIST: NEGATIVE /HPF — SIGNIFICANT CHANGE UP (ref 0–4)
ROULEAUX BLD QL SMEAR: PRESENT
RSV RNA NPH QL NAA+NON-PROBE: SIGNIFICANT CHANGE UP
SALICYLATES SERPL-MCNC: <0.6 MG/DL — LOW (ref 10–20)
SAO2 % BLDV: 55.3 % — SIGNIFICANT CHANGE UP
SARS-COV-2 N GENE NPH QL NAA+PROBE: NOT DETECTED
SARS-COV-2 RNA SPEC QL NAA+PROBE: SIGNIFICANT CHANGE UP
SCHISTOCYTES BLD QL AUTO: SLIGHT — SIGNIFICANT CHANGE UP
SCHISTOCYTES BLD QL AUTO: SLIGHT — SIGNIFICANT CHANGE UP
SMUDGE CELLS # BLD: PRESENT — SIGNIFICANT CHANGE UP
SMUDGE CELLS # BLD: PRESENT — SIGNIFICANT CHANGE UP
SODIUM SERPL-SCNC: 121 MMOL/L — LOW (ref 135–145)
SODIUM SERPL-SCNC: 122 MMOL/L — LOW (ref 135–145)
SODIUM SERPL-SCNC: 132 MMOL/L
SODIUM SERPL-SCNC: 132 MMOL/L
SODIUM SERPL-SCNC: 134 MMOL/L — LOW (ref 135–145)
SODIUM SERPL-SCNC: 134 MMOL/L — LOW (ref 135–145)
SODIUM SERPL-SCNC: 135 MMOL/L — SIGNIFICANT CHANGE UP (ref 135–145)
SODIUM SERPL-SCNC: 136 MMOL/L — SIGNIFICANT CHANGE UP (ref 135–145)
SODIUM SERPL-SCNC: 138 MMOL/L — SIGNIFICANT CHANGE UP (ref 135–145)
SODIUM SERPL-SCNC: 138 MMOL/L — SIGNIFICANT CHANGE UP (ref 135–145)
SODIUM SERPL-SCNC: 139 MMOL/L
SODIUM SERPL-SCNC: 139 MMOL/L — SIGNIFICANT CHANGE UP (ref 135–145)
SODIUM SERPL-SCNC: 139 MMOL/L — SIGNIFICANT CHANGE UP (ref 135–145)
SODIUM SERPL-SCNC: 141 MMOL/L — SIGNIFICANT CHANGE UP (ref 135–145)
SP GR SPEC: 1.02 — SIGNIFICANT CHANGE UP (ref 1.01–1.02)
SPECIFIC GRAVITY URINE: 1.02
SPECIMEN SOURCE: SIGNIFICANT CHANGE UP
SURGICAL PATHOLOGY STUDY: SIGNIFICANT CHANGE UP
T4 FREE SERPL-MCNC: 1.1 NG/DL
TARGETS BLD QL SMEAR: SLIGHT — SIGNIFICANT CHANGE UP
TRIGL SERPL-MCNC: 60 MG/DL
TRIGL SERPL-MCNC: 80 MG/DL — SIGNIFICANT CHANGE UP
TROPONIN T SERPL-MCNC: <0.01 NG/ML — SIGNIFICANT CHANGE UP (ref 0–0.06)
TSH SERPL-ACNC: 2.44 UIU/ML
TSH SERPL-MCNC: 0.91 UIU/ML — SIGNIFICANT CHANGE UP (ref 0.27–4.2)
UROBILINOGEN FLD QL: 1
UROBILINOGEN URINE: NORMAL
VARIANT LYMPHS # BLD: 0.9 % — SIGNIFICANT CHANGE UP (ref 0–6)
VARIANT LYMPHS # BLD: 0.9 % — SIGNIFICANT CHANGE UP (ref 0–6)
WBC # BLD: 10.5 K/UL — SIGNIFICANT CHANGE UP (ref 3.8–10.5)
WBC # BLD: 11.4 K/UL — HIGH (ref 3.8–10.5)
WBC # BLD: 3.7 K/UL — LOW (ref 3.8–10.5)
WBC # BLD: 31.5 K/UL — HIGH (ref 3.8–10.5)
WBC # BLD: 4.39 K/UL — SIGNIFICANT CHANGE UP (ref 3.8–10.5)
WBC # BLD: 4.49 K/UL — SIGNIFICANT CHANGE UP (ref 3.8–10.5)
WBC # BLD: 5.5 K/UL — SIGNIFICANT CHANGE UP (ref 3.8–10.5)
WBC # BLD: 5.64 K/UL — SIGNIFICANT CHANGE UP (ref 3.8–10.5)
WBC # BLD: 6.4 K/UL — SIGNIFICANT CHANGE UP (ref 3.8–10.5)
WBC # BLD: 6.59 K/UL — SIGNIFICANT CHANGE UP (ref 3.8–10.5)
WBC # BLD: 7.7 K/UL — SIGNIFICANT CHANGE UP (ref 3.8–10.5)
WBC # BLD: 8 K/UL — SIGNIFICANT CHANGE UP (ref 3.8–10.5)
WBC # BLD: 8 K/UL — SIGNIFICANT CHANGE UP (ref 3.8–10.5)
WBC # BLD: 9.21 K/UL — SIGNIFICANT CHANGE UP (ref 3.8–10.5)
WBC # BLD: 9.99 K/UL — SIGNIFICANT CHANGE UP (ref 3.8–10.5)
WBC # FLD AUTO: 10.5 K/UL — SIGNIFICANT CHANGE UP (ref 3.8–10.5)
WBC # FLD AUTO: 11.4 K/UL — HIGH (ref 3.8–10.5)
WBC # FLD AUTO: 3.7 K/UL — LOW (ref 3.8–10.5)
WBC # FLD AUTO: 31.5 K/UL — HIGH (ref 3.8–10.5)
WBC # FLD AUTO: 4.39 K/UL — SIGNIFICANT CHANGE UP (ref 3.8–10.5)
WBC # FLD AUTO: 4.49 K/UL — SIGNIFICANT CHANGE UP (ref 3.8–10.5)
WBC # FLD AUTO: 5.5 K/UL — SIGNIFICANT CHANGE UP (ref 3.8–10.5)
WBC # FLD AUTO: 5.64 K/UL — SIGNIFICANT CHANGE UP (ref 3.8–10.5)
WBC # FLD AUTO: 6.4 K/UL — SIGNIFICANT CHANGE UP (ref 3.8–10.5)
WBC # FLD AUTO: 6.59 K/UL — SIGNIFICANT CHANGE UP (ref 3.8–10.5)
WBC # FLD AUTO: 7.7 K/UL — SIGNIFICANT CHANGE UP (ref 3.8–10.5)
WBC # FLD AUTO: 8 K/UL — SIGNIFICANT CHANGE UP (ref 3.8–10.5)
WBC # FLD AUTO: 8 K/UL — SIGNIFICANT CHANGE UP (ref 3.8–10.5)
WBC # FLD AUTO: 9.21 K/UL — SIGNIFICANT CHANGE UP (ref 3.8–10.5)
WBC # FLD AUTO: 9.68 K/UL
WBC # FLD AUTO: 9.99 K/UL — SIGNIFICANT CHANGE UP (ref 3.8–10.5)
WBC UR QL: SIGNIFICANT CHANGE UP /HPF (ref 0–5)

## 2022-01-01 PROCEDURE — 85025 COMPLETE CBC W/AUTO DIFF WBC: CPT

## 2022-01-01 PROCEDURE — G1004: CPT

## 2022-01-01 PROCEDURE — 99285 EMERGENCY DEPT VISIT HI MDM: CPT

## 2022-01-01 PROCEDURE — 99284 EMERGENCY DEPT VISIT MOD MDM: CPT

## 2022-01-01 PROCEDURE — 99285 EMERGENCY DEPT VISIT HI MDM: CPT | Mod: 25

## 2022-01-01 PROCEDURE — 99284 EMERGENCY DEPT VISIT MOD MDM: CPT | Mod: 25

## 2022-01-01 PROCEDURE — 72125 CT NECK SPINE W/O DYE: CPT | Mod: MG

## 2022-01-01 PROCEDURE — 99215 OFFICE O/P EST HI 40 MIN: CPT

## 2022-01-01 PROCEDURE — 80307 DRUG TEST PRSMV CHEM ANLYZR: CPT

## 2022-01-01 PROCEDURE — 93005 ELECTROCARDIOGRAM TRACING: CPT

## 2022-01-01 PROCEDURE — 87635 SARS-COV-2 COVID-19 AMP PRB: CPT

## 2022-01-01 PROCEDURE — 96361 HYDRATE IV INFUSION ADD-ON: CPT

## 2022-01-01 PROCEDURE — 96375 TX/PRO/DX INJ NEW DRUG ADDON: CPT

## 2022-01-01 PROCEDURE — 93010 ELECTROCARDIOGRAM REPORT: CPT

## 2022-01-01 PROCEDURE — G0378: CPT

## 2022-01-01 PROCEDURE — 99291 CRITICAL CARE FIRST HOUR: CPT

## 2022-01-01 PROCEDURE — 83735 ASSAY OF MAGNESIUM: CPT

## 2022-01-01 PROCEDURE — 99214 OFFICE O/P EST MOD 30 MIN: CPT

## 2022-01-01 PROCEDURE — 74177 CT ABD & PELVIS W/CONTRAST: CPT | Mod: 26

## 2022-01-01 PROCEDURE — 47000 NEEDLE BIOPSY OF LIVER PERQ: CPT

## 2022-01-01 PROCEDURE — 36561 INSERT TUNNELED CV CATH: CPT

## 2022-01-01 PROCEDURE — G0463: CPT

## 2022-01-01 PROCEDURE — 82553 CREATINE MB FRACTION: CPT

## 2022-01-01 PROCEDURE — 80053 COMPREHEN METABOLIC PANEL: CPT

## 2022-01-01 PROCEDURE — 76942 ECHO GUIDE FOR BIOPSY: CPT

## 2022-01-01 PROCEDURE — U0003: CPT

## 2022-01-01 PROCEDURE — 96376 TX/PRO/DX INJ SAME DRUG ADON: CPT

## 2022-01-01 PROCEDURE — 83036 HEMOGLOBIN GLYCOSYLATED A1C: CPT | Mod: QW

## 2022-01-01 PROCEDURE — 71045 X-RAY EXAM CHEST 1 VIEW: CPT | Mod: 26

## 2022-01-01 PROCEDURE — 99232 SBSQ HOSP IP/OBS MODERATE 35: CPT

## 2022-01-01 PROCEDURE — 74177 CT ABD & PELVIS W/CONTRAST: CPT | Mod: MA

## 2022-01-01 PROCEDURE — 96374 THER/PROPH/DIAG INJ IV PUSH: CPT

## 2022-01-01 PROCEDURE — 99223 1ST HOSP IP/OBS HIGH 75: CPT

## 2022-01-01 PROCEDURE — 83690 ASSAY OF LIPASE: CPT

## 2022-01-01 PROCEDURE — 90792 PSYCH DIAG EVAL W/MED SRVCS: CPT

## 2022-01-01 PROCEDURE — 88307 TISSUE EXAM BY PATHOLOGIST: CPT | Mod: 26

## 2022-01-01 PROCEDURE — 82962 GLUCOSE BLOOD TEST: CPT

## 2022-01-01 PROCEDURE — 74177 CT ABD & PELVIS W/CONTRAST: CPT | Mod: 26,MA

## 2022-01-01 PROCEDURE — 36415 COLL VENOUS BLD VENIPUNCTURE: CPT

## 2022-01-01 PROCEDURE — 76705 ECHO EXAM OF ABDOMEN: CPT | Mod: 26

## 2022-01-01 PROCEDURE — 74176 CT ABD & PELVIS W/O CONTRAST: CPT | Mod: 26,ME

## 2022-01-01 PROCEDURE — 90677 PCV20 VACCINE IM: CPT

## 2022-01-01 PROCEDURE — 99220: CPT

## 2022-01-01 PROCEDURE — 70450 CT HEAD/BRAIN W/O DYE: CPT | Mod: 26,MG

## 2022-01-01 PROCEDURE — 96372 THER/PROPH/DIAG INJ SC/IM: CPT

## 2022-01-01 PROCEDURE — 85610 PROTHROMBIN TIME: CPT

## 2022-01-01 PROCEDURE — 88341 IMHCHEM/IMCYTCHM EA ADD ANTB: CPT

## 2022-01-01 PROCEDURE — 96374 THER/PROPH/DIAG INJ IV PUSH: CPT | Mod: XU

## 2022-01-01 PROCEDURE — 88342 IMHCHEM/IMCYTCHM 1ST ANTB: CPT | Mod: 26

## 2022-01-01 PROCEDURE — 74176 CT ABD & PELVIS W/O CONTRAST: CPT | Mod: ME

## 2022-01-01 PROCEDURE — G0009: CPT

## 2022-01-01 PROCEDURE — 74177 CT ABD & PELVIS W/CONTRAST: CPT

## 2022-01-01 PROCEDURE — 99217: CPT

## 2022-01-01 PROCEDURE — 85730 THROMBOPLASTIN TIME PARTIAL: CPT

## 2022-01-01 PROCEDURE — 70450 CT HEAD/BRAIN W/O DYE: CPT | Mod: 26,ME

## 2022-01-01 PROCEDURE — U0005: CPT

## 2022-01-01 PROCEDURE — 77001 FLUOROGUIDE FOR VEIN DEVICE: CPT | Mod: 26

## 2022-01-01 PROCEDURE — 76705 ECHO EXAM OF ABDOMEN: CPT

## 2022-01-01 PROCEDURE — 70450 CT HEAD/BRAIN W/O DYE: CPT | Mod: MG

## 2022-01-01 PROCEDURE — 0225U NFCT DS DNA&RNA 21 SARSCOV2: CPT

## 2022-01-01 PROCEDURE — 70450 CT HEAD/BRAIN W/O DYE: CPT | Mod: ME

## 2022-01-01 PROCEDURE — 99205 OFFICE O/P NEW HI 60 MIN: CPT

## 2022-01-01 PROCEDURE — 71045 X-RAY EXAM CHEST 1 VIEW: CPT

## 2022-01-01 PROCEDURE — 90686 IIV4 VACC NO PRSV 0.5 ML IM: CPT

## 2022-01-01 PROCEDURE — 70450 CT HEAD/BRAIN W/O DYE: CPT | Mod: MA

## 2022-01-01 PROCEDURE — 82550 ASSAY OF CK (CPK): CPT

## 2022-01-01 PROCEDURE — 76942 ECHO GUIDE FOR BIOPSY: CPT | Mod: 26

## 2022-01-01 PROCEDURE — 84478 ASSAY OF TRIGLYCERIDES: CPT

## 2022-01-01 PROCEDURE — 99283 EMERGENCY DEPT VISIT LOW MDM: CPT

## 2022-01-01 PROCEDURE — 72125 CT NECK SPINE W/O DYE: CPT | Mod: 26,MG

## 2022-01-01 PROCEDURE — 76937 US GUIDE VASCULAR ACCESS: CPT | Mod: 26

## 2022-01-01 PROCEDURE — 99282 EMERGENCY DEPT VISIT SF MDM: CPT

## 2022-01-01 PROCEDURE — 88341 IMHCHEM/IMCYTCHM EA ADD ANTB: CPT | Mod: 26

## 2022-01-01 PROCEDURE — 90472 IMMUNIZATION ADMIN EACH ADD: CPT

## 2022-01-01 PROCEDURE — 96372 THER/PROPH/DIAG INJ SC/IM: CPT | Mod: XU

## 2022-01-01 PROCEDURE — 99236 HOSP IP/OBS SAME DATE HI 85: CPT

## 2022-01-01 PROCEDURE — 70450 CT HEAD/BRAIN W/O DYE: CPT | Mod: 26,MA

## 2022-01-01 PROCEDURE — 84443 ASSAY THYROID STIM HORMONE: CPT

## 2022-01-01 PROCEDURE — 88342 IMHCHEM/IMCYTCHM 1ST ANTB: CPT

## 2022-01-01 PROCEDURE — 87637 SARSCOV2&INF A&B&RSV AMP PRB: CPT

## 2022-01-01 PROCEDURE — 88307 TISSUE EXAM BY PATHOLOGIST: CPT

## 2022-01-01 PROCEDURE — 99214 OFFICE O/P EST MOD 30 MIN: CPT | Mod: 25

## 2022-01-01 PROCEDURE — 71260 CT THORAX DX C+: CPT | Mod: 26

## 2022-01-01 RX ORDER — OXYCODONE 10 MG/1
10 TABLET ORAL
Qty: 90 | Refills: 0 | Status: ACTIVE | COMMUNITY
Start: 2022-01-01 | End: 1900-01-01

## 2022-01-01 RX ORDER — SODIUM CHLORIDE 9 MG/ML
1000 INJECTION, SOLUTION INTRAVENOUS ONCE
Refills: 0 | Status: COMPLETED | OUTPATIENT
Start: 2022-01-01 | End: 2022-01-01

## 2022-01-01 RX ORDER — METOCLOPRAMIDE HCL 10 MG
10 TABLET ORAL ONCE
Refills: 0 | Status: COMPLETED | OUTPATIENT
Start: 2022-01-01 | End: 2022-01-01

## 2022-01-01 RX ORDER — HYDROMORPHONE HYDROCHLORIDE 2 MG/ML
1 INJECTION INTRAMUSCULAR; INTRAVENOUS; SUBCUTANEOUS ONCE
Refills: 0 | Status: DISCONTINUED | OUTPATIENT
Start: 2022-01-01 | End: 2022-01-01

## 2022-01-01 RX ORDER — CEFTRIAXONE 500 MG/1
1000 INJECTION, POWDER, FOR SOLUTION INTRAMUSCULAR; INTRAVENOUS ONCE
Refills: 0 | Status: DISCONTINUED | OUTPATIENT
Start: 2022-01-01 | End: 2022-01-01

## 2022-01-01 RX ORDER — SUCRALFATE 1 G
1 TABLET ORAL ONCE
Refills: 0 | Status: COMPLETED | OUTPATIENT
Start: 2022-01-01 | End: 2022-01-01

## 2022-01-01 RX ORDER — HYDROMORPHONE HYDROCHLORIDE 2 MG/ML
0.5 INJECTION INTRAMUSCULAR; INTRAVENOUS; SUBCUTANEOUS ONCE
Refills: 0 | Status: DISCONTINUED | OUTPATIENT
Start: 2022-01-01 | End: 2022-01-01

## 2022-01-01 RX ORDER — SERTRALINE HYDROCHLORIDE 100 MG/1
100 TABLET, FILM COATED ORAL
Qty: 90 | Refills: 1 | Status: ACTIVE | COMMUNITY
Start: 2022-01-01

## 2022-01-01 RX ORDER — KETAMINE HYDROCHLORIDE 100 MG/ML
200 INJECTION INTRAMUSCULAR; INTRAVENOUS ONCE
Refills: 0 | Status: DISCONTINUED | OUTPATIENT
Start: 2022-01-01 | End: 2022-01-01

## 2022-01-01 RX ORDER — SODIUM CHLORIDE 9 MG/ML
1000 INJECTION INTRAMUSCULAR; INTRAVENOUS; SUBCUTANEOUS ONCE
Refills: 0 | Status: COMPLETED | OUTPATIENT
Start: 2022-01-01 | End: 2022-01-01

## 2022-01-01 RX ORDER — METFORMIN HYDROCHLORIDE 500 MG/1
500 TABLET, COATED ORAL
Qty: 90 | Refills: 3 | Status: ACTIVE | COMMUNITY
Start: 2022-01-01 | End: 1900-01-01

## 2022-01-01 RX ORDER — FAMOTIDINE 10 MG/ML
20 INJECTION INTRAVENOUS ONCE
Refills: 0 | Status: COMPLETED | OUTPATIENT
Start: 2022-01-01 | End: 2022-01-01

## 2022-01-01 RX ORDER — DIAZEPAM 5 MG
5 TABLET ORAL ONCE
Refills: 0 | Status: DISCONTINUED | OUTPATIENT
Start: 2022-01-01 | End: 2022-01-01

## 2022-01-01 RX ORDER — OMEPRAZOLE 40 MG/1
40 CAPSULE, DELAYED RELEASE ORAL
Qty: 30 | Refills: 4 | Status: ACTIVE | COMMUNITY
Start: 2018-06-20 | End: 1900-01-01

## 2022-01-01 RX ORDER — PROCHLORPERAZINE MALEATE 10 MG/1
10 TABLET ORAL EVERY 6 HOURS
Qty: 120 | Refills: 5 | Status: ACTIVE | COMMUNITY
Start: 2022-01-01 | End: 1900-01-01

## 2022-01-01 RX ORDER — FOLIC ACID 0.8 MG
1 TABLET ORAL
Qty: 0 | Refills: 0 | DISCHARGE

## 2022-01-01 RX ORDER — FOLIC ACID 1 MG/1
1 TABLET ORAL DAILY
Qty: 1 | Refills: 1 | Status: ACTIVE | COMMUNITY
Start: 2022-01-01

## 2022-01-01 RX ORDER — MIDAZOLAM HYDROCHLORIDE 1 MG/ML
5 INJECTION, SOLUTION INTRAMUSCULAR; INTRAVENOUS ONCE
Refills: 0 | Status: DISCONTINUED | OUTPATIENT
Start: 2022-01-01 | End: 2022-01-01

## 2022-01-01 RX ORDER — HALOPERIDOL DECANOATE 100 MG/ML
5 INJECTION INTRAMUSCULAR ONCE
Refills: 0 | Status: COMPLETED | OUTPATIENT
Start: 2022-01-01 | End: 2022-01-01

## 2022-01-01 RX ORDER — ONDANSETRON 8 MG/1
4 TABLET, FILM COATED ORAL ONCE
Refills: 0 | Status: COMPLETED | OUTPATIENT
Start: 2022-01-01 | End: 2022-01-01

## 2022-01-01 RX ORDER — SODIUM CHLORIDE 9 MG/ML
1000 INJECTION INTRAMUSCULAR; INTRAVENOUS; SUBCUTANEOUS
Refills: 0 | Status: DISCONTINUED | OUTPATIENT
Start: 2022-01-01 | End: 2022-01-01

## 2022-01-01 RX ORDER — HYDROMORPHONE HYDROCHLORIDE 2 MG/ML
1 INJECTION INTRAMUSCULAR; INTRAVENOUS; SUBCUTANEOUS
Refills: 0 | Status: DISCONTINUED | OUTPATIENT
Start: 2022-01-01 | End: 2023-01-01

## 2022-01-01 RX ORDER — OLOPATADINE HYDROCHLORIDE 2 MG/ML
0.2 SOLUTION OPHTHALMIC DAILY
Qty: 1 | Refills: 4 | Status: DISCONTINUED | COMMUNITY
Start: 2019-05-29 | End: 2022-01-01

## 2022-01-01 RX ORDER — QUETIAPINE FUMARATE 200 MG/1
200 TABLET ORAL
Qty: 30 | Refills: 5 | Status: DISCONTINUED | COMMUNITY
Start: 2018-03-26 | End: 2022-01-01

## 2022-01-01 RX ORDER — METFORMIN HYDROCHLORIDE 850 MG/1
0 TABLET ORAL
Qty: 0 | Refills: 0 | DISCHARGE

## 2022-01-01 RX ORDER — IPRATROPIUM/ALBUTEROL SULFATE 18-103MCG
0 AEROSOL WITH ADAPTER (GRAM) INHALATION
Qty: 0 | Refills: 0 | DISCHARGE

## 2022-01-01 RX ORDER — SODIUM CHLORIDE 9 MG/ML
2000 INJECTION INTRAMUSCULAR; INTRAVENOUS; SUBCUTANEOUS ONCE
Refills: 0 | Status: COMPLETED | OUTPATIENT
Start: 2022-01-01 | End: 2022-01-01

## 2022-01-01 RX ORDER — RANITIDINE 150 MG/1
150 TABLET ORAL
Qty: 60 | Refills: 2 | Status: DISCONTINUED | COMMUNITY
Start: 2018-03-15 | End: 2022-01-01

## 2022-01-01 RX ORDER — MIDAZOLAM HYDROCHLORIDE 1 MG/ML
10 INJECTION, SOLUTION INTRAMUSCULAR; INTRAVENOUS ONCE
Refills: 0 | Status: DISCONTINUED | OUTPATIENT
Start: 2022-01-01 | End: 2022-01-01

## 2022-01-01 RX ORDER — TAMSULOSIN HYDROCHLORIDE 0.4 MG/1
0.4 CAPSULE ORAL
Qty: 90 | Refills: 2 | Status: ACTIVE | COMMUNITY
Start: 2022-01-01

## 2022-01-01 RX ORDER — MIDAZOLAM HYDROCHLORIDE 1 MG/ML
2 INJECTION, SOLUTION INTRAMUSCULAR; INTRAVENOUS ONCE
Refills: 0 | Status: DISCONTINUED | OUTPATIENT
Start: 2022-01-01 | End: 2022-01-01

## 2022-01-01 RX ORDER — THIAMINE MONONITRATE (VIT B1) 100 MG
100 TABLET ORAL ONCE
Refills: 0 | Status: COMPLETED | OUTPATIENT
Start: 2022-01-01 | End: 2022-01-01

## 2022-01-01 RX ORDER — SODIUM CHLORIDE 9 MG/ML
2000 INJECTION, SOLUTION INTRAVENOUS ONCE
Refills: 0 | Status: COMPLETED | OUTPATIENT
Start: 2022-01-01 | End: 2022-01-01

## 2022-01-01 RX ORDER — LUBIPROSTONE 24 UG/1
24 CAPSULE, GELATIN COATED ORAL
Qty: 30 | Refills: 5 | Status: DISCONTINUED | COMMUNITY
Start: 2019-05-07 | End: 2022-01-01

## 2022-01-01 RX ORDER — SOD SULF/SODIUM/NAHCO3/KCL/PEG
4000 SOLUTION, RECONSTITUTED, ORAL ORAL ONCE
Refills: 0 | Status: COMPLETED | OUTPATIENT
Start: 2022-01-01 | End: 2022-01-01

## 2022-01-01 RX ORDER — ONDANSETRON 8 MG/1
8 TABLET, ORALLY DISINTEGRATING ORAL EVERY 8 HOURS
Qty: 90 | Refills: 3 | Status: ACTIVE | COMMUNITY
Start: 2022-01-01 | End: 1900-01-01

## 2022-01-01 RX ORDER — CEFTRIAXONE 500 MG/1
1000 INJECTION, POWDER, FOR SOLUTION INTRAMUSCULAR; INTRAVENOUS ONCE
Refills: 0 | Status: COMPLETED | OUTPATIENT
Start: 2022-01-01 | End: 2022-01-01

## 2022-01-01 RX ORDER — MULTIVITAMIN
TABLET ORAL DAILY
Refills: 0 | Status: ACTIVE | COMMUNITY
Start: 2022-01-01

## 2022-01-01 RX ORDER — LACTULOSE 10 G/15ML
10 SOLUTION ORAL; RECTAL
Qty: 1 | Refills: 0 | Status: ACTIVE | COMMUNITY
Start: 2022-01-01 | End: 1900-01-01

## 2022-01-01 RX ORDER — PANTOPRAZOLE SODIUM 20 MG/1
40 TABLET, DELAYED RELEASE ORAL ONCE
Refills: 0 | Status: COMPLETED | OUTPATIENT
Start: 2022-01-01 | End: 2022-01-01

## 2022-01-01 RX ORDER — SODIUM CHLORIDE 9 MG/ML
3 INJECTION INTRAMUSCULAR; INTRAVENOUS; SUBCUTANEOUS ONCE
Refills: 0 | Status: DISCONTINUED | OUTPATIENT
Start: 2022-01-01 | End: 2022-01-01

## 2022-01-01 RX ORDER — SILDENAFIL 50 MG/1
50 TABLET ORAL
Qty: 10 | Refills: 5 | Status: DISCONTINUED | COMMUNITY
Start: 2020-12-07 | End: 2022-01-01

## 2022-01-01 RX ORDER — ROBINUL 0.2 MG/ML
0.4 INJECTION INTRAMUSCULAR; INTRAVENOUS
Refills: 0 | Status: DISCONTINUED | OUTPATIENT
Start: 2022-01-01 | End: 2023-01-01

## 2022-01-01 RX ORDER — THIAMINE MONONITRATE (VIT B1) 100 MG
1 TABLET ORAL
Qty: 0 | Refills: 0 | DISCHARGE

## 2022-01-01 RX ORDER — DIPHENHYDRAMINE HCL 50 MG
50 CAPSULE ORAL ONCE
Refills: 0 | Status: COMPLETED | OUTPATIENT
Start: 2022-01-01 | End: 2022-01-01

## 2022-01-01 RX ORDER — PANTOPRAZOLE SODIUM 20 MG/1
40 TABLET, DELAYED RELEASE ORAL DAILY
Refills: 0 | Status: DISCONTINUED | OUTPATIENT
Start: 2022-01-01 | End: 2022-01-01

## 2022-01-01 RX ORDER — SENNOSIDES 8.6 MG
8.6 TABLET ORAL
Refills: 0 | Status: ACTIVE | COMMUNITY
Start: 2022-01-01

## 2022-01-01 RX ORDER — PSYLLIUM SEED
48.57 PACKET (EA) ORAL
Qty: 1 | Refills: 4 | Status: DISCONTINUED | COMMUNITY
Start: 2019-05-14 | End: 2022-01-01

## 2022-01-01 RX ORDER — POLYETHYLENE GLYCOL 3350 17 G/17G
17 POWDER, FOR SOLUTION ORAL
Qty: 1 | Refills: 4 | Status: DISCONTINUED | COMMUNITY
Start: 2021-10-15 | End: 2022-01-01

## 2022-01-01 RX ORDER — PIPERACILLIN AND TAZOBACTAM 4; .5 G/20ML; G/20ML
3.38 INJECTION, POWDER, LYOPHILIZED, FOR SOLUTION INTRAVENOUS ONCE
Refills: 0 | Status: COMPLETED | OUTPATIENT
Start: 2022-01-01 | End: 2022-01-01

## 2022-01-01 RX ORDER — KETAMINE HYDROCHLORIDE 100 MG/ML
300 INJECTION INTRAMUSCULAR; INTRAVENOUS ONCE
Refills: 0 | Status: DISCONTINUED | OUTPATIENT
Start: 2022-01-01 | End: 2022-01-01

## 2022-01-01 RX ORDER — TRAMADOL HYDROCHLORIDE 50 MG/1
50 TABLET, COATED ORAL
Qty: 28 | Refills: 0 | Status: ACTIVE | COMMUNITY
Start: 2022-01-01 | End: 1900-01-01

## 2022-01-01 RX ORDER — ONDANSETRON 8 MG/1
4 TABLET, FILM COATED ORAL EVERY 6 HOURS
Refills: 0 | Status: DISCONTINUED | OUTPATIENT
Start: 2022-01-01 | End: 2023-01-01

## 2022-01-01 RX ORDER — HYDROMORPHONE HYDROCHLORIDE 2 MG/ML
0.5 INJECTION INTRAMUSCULAR; INTRAVENOUS; SUBCUTANEOUS
Refills: 0 | Status: DISCONTINUED | OUTPATIENT
Start: 2022-01-01 | End: 2023-01-01

## 2022-01-01 RX ORDER — MIRTAZAPINE 45 MG/1
1 TABLET, ORALLY DISINTEGRATING ORAL
Qty: 0 | Refills: 0 | DISCHARGE

## 2022-01-01 RX ORDER — ACETAMINOPHEN 500 MG
650 TABLET ORAL ONCE
Refills: 0 | Status: COMPLETED | OUTPATIENT
Start: 2022-01-01 | End: 2022-01-01

## 2022-01-01 RX ORDER — ZOLPIDEM TARTRATE 10 MG/1
10 TABLET ORAL
Qty: 30 | Refills: 2 | Status: ACTIVE | COMMUNITY
Start: 2022-01-01

## 2022-01-01 RX ORDER — QUETIAPINE 200 MG/1
200 TABLET, FILM COATED ORAL
Refills: 0 | Status: ACTIVE | COMMUNITY
Start: 2022-01-01

## 2022-01-01 RX ORDER — MIDAZOLAM HYDROCHLORIDE 1 MG/ML
4 INJECTION, SOLUTION INTRAMUSCULAR; INTRAVENOUS ONCE
Refills: 0 | Status: DISCONTINUED | OUTPATIENT
Start: 2022-01-01 | End: 2022-01-01

## 2022-01-01 RX ORDER — HYDROXYZINE HYDROCHLORIDE 50 MG/1
50 TABLET ORAL
Qty: 90 | Refills: 0 | Status: ACTIVE | COMMUNITY
Start: 2022-01-01

## 2022-01-01 RX ADMIN — ONDANSETRON 4 MILLIGRAM(S): 8 TABLET, FILM COATED ORAL at 07:01

## 2022-01-01 RX ADMIN — SODIUM CHLORIDE 1000 MILLILITER(S): 9 INJECTION INTRAMUSCULAR; INTRAVENOUS; SUBCUTANEOUS at 15:28

## 2022-01-01 RX ADMIN — MIDAZOLAM HYDROCHLORIDE 4 MILLIGRAM(S): 1 INJECTION, SOLUTION INTRAMUSCULAR; INTRAVENOUS at 20:16

## 2022-01-01 RX ADMIN — SODIUM CHLORIDE 1000 MILLILITER(S): 9 INJECTION, SOLUTION INTRAVENOUS at 18:08

## 2022-01-01 RX ADMIN — Medication 2 MILLIGRAM(S): at 08:28

## 2022-01-01 RX ADMIN — Medication 2 MILLIGRAM(S): at 22:22

## 2022-01-01 RX ADMIN — Medication 30 MILLILITER(S): at 03:08

## 2022-01-01 RX ADMIN — Medication 100 MILLIGRAM(S): at 03:34

## 2022-01-01 RX ADMIN — SODIUM CHLORIDE 1000 MILLILITER(S): 9 INJECTION INTRAMUSCULAR; INTRAVENOUS; SUBCUTANEOUS at 14:32

## 2022-01-01 RX ADMIN — HYDROMORPHONE HYDROCHLORIDE 1 MILLIGRAM(S): 2 INJECTION INTRAMUSCULAR; INTRAVENOUS; SUBCUTANEOUS at 21:51

## 2022-01-01 RX ADMIN — MIDAZOLAM HYDROCHLORIDE 5 MILLIGRAM(S): 1 INJECTION, SOLUTION INTRAMUSCULAR; INTRAVENOUS at 13:12

## 2022-01-01 RX ADMIN — Medication 100 MILLIGRAM(S): at 14:40

## 2022-01-01 RX ADMIN — Medication 50 MILLIGRAM(S): at 22:22

## 2022-01-01 RX ADMIN — KETAMINE HYDROCHLORIDE 300 MILLIGRAM(S): 100 INJECTION INTRAMUSCULAR; INTRAVENOUS at 15:00

## 2022-01-01 RX ADMIN — PANTOPRAZOLE SODIUM 40 MILLIGRAM(S): 20 TABLET, DELAYED RELEASE ORAL at 22:50

## 2022-01-01 RX ADMIN — HYDROMORPHONE HYDROCHLORIDE 1 MILLIGRAM(S): 2 INJECTION INTRAMUSCULAR; INTRAVENOUS; SUBCUTANEOUS at 19:13

## 2022-01-01 RX ADMIN — Medication 100 MILLIGRAM(S): at 19:37

## 2022-01-01 RX ADMIN — HALOPERIDOL DECANOATE 5 MILLIGRAM(S): 100 INJECTION INTRAMUSCULAR at 18:00

## 2022-01-01 RX ADMIN — Medication 30 MILLILITER(S): at 22:50

## 2022-01-01 RX ADMIN — MIDAZOLAM HYDROCHLORIDE 2 MILLIGRAM(S): 1 INJECTION, SOLUTION INTRAMUSCULAR; INTRAVENOUS at 17:09

## 2022-01-01 RX ADMIN — Medication 10 MILLIGRAM(S): at 12:03

## 2022-01-01 RX ADMIN — HYDROMORPHONE HYDROCHLORIDE 1 MILLIGRAM(S): 2 INJECTION INTRAMUSCULAR; INTRAVENOUS; SUBCUTANEOUS at 23:39

## 2022-01-01 RX ADMIN — Medication 1 MILLIGRAM(S): at 10:54

## 2022-01-01 RX ADMIN — ONDANSETRON 4 MILLIGRAM(S): 8 TABLET, FILM COATED ORAL at 13:44

## 2022-01-01 RX ADMIN — FAMOTIDINE 20 MILLIGRAM(S): 10 INJECTION INTRAVENOUS at 13:55

## 2022-01-01 RX ADMIN — KETAMINE HYDROCHLORIDE 200 MILLIGRAM(S): 100 INJECTION INTRAMUSCULAR; INTRAVENOUS at 22:20

## 2022-01-01 RX ADMIN — SODIUM CHLORIDE 1000 MILLILITER(S): 9 INJECTION, SOLUTION INTRAVENOUS at 12:57

## 2022-01-01 RX ADMIN — Medication 50 MILLIGRAM(S): at 18:00

## 2022-01-01 RX ADMIN — PANTOPRAZOLE SODIUM 40 MILLIGRAM(S): 20 TABLET, DELAYED RELEASE ORAL at 09:22

## 2022-01-01 RX ADMIN — Medication 1 GRAM(S): at 10:48

## 2022-01-01 RX ADMIN — HYDROMORPHONE HYDROCHLORIDE 1 MILLIGRAM(S): 2 INJECTION INTRAMUSCULAR; INTRAVENOUS; SUBCUTANEOUS at 21:21

## 2022-01-01 RX ADMIN — Medication 4000 MILLILITER(S): at 12:41

## 2022-01-01 RX ADMIN — HYDROMORPHONE HYDROCHLORIDE 1 MILLIGRAM(S): 2 INJECTION INTRAMUSCULAR; INTRAVENOUS; SUBCUTANEOUS at 02:31

## 2022-01-01 RX ADMIN — ONDANSETRON 4 MILLIGRAM(S): 8 TABLET, FILM COATED ORAL at 15:28

## 2022-01-01 RX ADMIN — KETAMINE HYDROCHLORIDE 200 MILLIGRAM(S): 100 INJECTION INTRAMUSCULAR; INTRAVENOUS at 00:20

## 2022-01-01 RX ADMIN — Medication 100 MILLIGRAM(S): at 02:45

## 2022-01-01 RX ADMIN — Medication 5 MILLIGRAM(S): at 03:47

## 2022-01-01 RX ADMIN — Medication 2 MILLIGRAM(S): at 00:39

## 2022-01-01 RX ADMIN — HYDROMORPHONE HYDROCHLORIDE 0.5 MILLIGRAM(S): 2 INJECTION INTRAMUSCULAR; INTRAVENOUS; SUBCUTANEOUS at 13:03

## 2022-01-01 RX ADMIN — HYDROMORPHONE HYDROCHLORIDE 1 MILLIGRAM(S): 2 INJECTION INTRAMUSCULAR; INTRAVENOUS; SUBCUTANEOUS at 12:11

## 2022-01-01 RX ADMIN — Medication 650 MILLIGRAM(S): at 12:03

## 2022-01-01 RX ADMIN — SODIUM CHLORIDE 1000 MILLILITER(S): 9 INJECTION INTRAMUSCULAR; INTRAVENOUS; SUBCUTANEOUS at 14:41

## 2022-01-01 RX ADMIN — HALOPERIDOL DECANOATE 5 MILLIGRAM(S): 100 INJECTION INTRAMUSCULAR at 20:16

## 2022-01-01 RX ADMIN — FAMOTIDINE 20 MILLIGRAM(S): 10 INJECTION INTRAVENOUS at 22:50

## 2022-01-01 RX ADMIN — Medication 100 MILLIGRAM(S): at 00:43

## 2022-01-01 RX ADMIN — SODIUM CHLORIDE 1000 MILLILITER(S): 9 INJECTION INTRAMUSCULAR; INTRAVENOUS; SUBCUTANEOUS at 22:50

## 2022-01-01 RX ADMIN — MIDAZOLAM HYDROCHLORIDE 5 MILLIGRAM(S): 1 INJECTION, SOLUTION INTRAMUSCULAR; INTRAVENOUS at 23:20

## 2022-01-01 RX ADMIN — HYDROMORPHONE HYDROCHLORIDE 0.5 MILLIGRAM(S): 2 INJECTION INTRAMUSCULAR; INTRAVENOUS; SUBCUTANEOUS at 05:49

## 2022-01-01 RX ADMIN — HYDROMORPHONE HYDROCHLORIDE 1 MILLIGRAM(S): 2 INJECTION INTRAMUSCULAR; INTRAVENOUS; SUBCUTANEOUS at 18:57

## 2022-01-01 RX ADMIN — SODIUM CHLORIDE 2000 MILLILITER(S): 9 INJECTION INTRAMUSCULAR; INTRAVENOUS; SUBCUTANEOUS at 06:59

## 2022-01-01 RX ADMIN — HALOPERIDOL DECANOATE 5 MILLIGRAM(S): 100 INJECTION INTRAMUSCULAR at 00:39

## 2022-01-01 RX ADMIN — KETAMINE HYDROCHLORIDE 200 MILLIGRAM(S): 100 INJECTION INTRAMUSCULAR; INTRAVENOUS at 21:03

## 2022-01-01 RX ADMIN — SODIUM CHLORIDE 2000 MILLILITER(S): 9 INJECTION, SOLUTION INTRAVENOUS at 16:35

## 2022-01-01 RX ADMIN — HYDROMORPHONE HYDROCHLORIDE 1 MILLIGRAM(S): 2 INJECTION INTRAMUSCULAR; INTRAVENOUS; SUBCUTANEOUS at 09:47

## 2022-01-01 RX ADMIN — SODIUM CHLORIDE 2000 MILLILITER(S): 9 INJECTION, SOLUTION INTRAVENOUS at 18:02

## 2022-01-01 RX ADMIN — HYDROMORPHONE HYDROCHLORIDE 1 MILLIGRAM(S): 2 INJECTION INTRAMUSCULAR; INTRAVENOUS; SUBCUTANEOUS at 00:05

## 2022-01-01 RX ADMIN — HYDROMORPHONE HYDROCHLORIDE 1 MILLIGRAM(S): 2 INJECTION INTRAMUSCULAR; INTRAVENOUS; SUBCUTANEOUS at 12:41

## 2022-01-01 RX ADMIN — SODIUM CHLORIDE 1000 MILLILITER(S): 9 INJECTION INTRAMUSCULAR; INTRAVENOUS; SUBCUTANEOUS at 19:36

## 2022-01-01 RX ADMIN — PANTOPRAZOLE SODIUM 40 MILLIGRAM(S): 20 TABLET, DELAYED RELEASE ORAL at 08:00

## 2022-01-01 RX ADMIN — HYDROMORPHONE HYDROCHLORIDE 1 MILLIGRAM(S): 2 INJECTION INTRAMUSCULAR; INTRAVENOUS; SUBCUTANEOUS at 16:30

## 2022-01-01 RX ADMIN — Medication 1 MILLIGRAM(S): at 19:36

## 2022-01-01 RX ADMIN — HYDROMORPHONE HYDROCHLORIDE 1 MILLIGRAM(S): 2 INJECTION INTRAMUSCULAR; INTRAVENOUS; SUBCUTANEOUS at 00:35

## 2022-01-01 RX ADMIN — Medication 30 MILLILITER(S): at 13:42

## 2022-01-01 RX ADMIN — HYDROMORPHONE HYDROCHLORIDE 0.5 MILLIGRAM(S): 2 INJECTION INTRAMUSCULAR; INTRAVENOUS; SUBCUTANEOUS at 05:19

## 2022-01-01 RX ADMIN — HALOPERIDOL DECANOATE 5 MILLIGRAM(S): 100 INJECTION INTRAMUSCULAR at 17:09

## 2022-01-01 RX ADMIN — Medication 5 MILLIGRAM(S): at 05:29

## 2022-01-01 RX ADMIN — PIPERACILLIN AND TAZOBACTAM 200 GRAM(S): 4; .5 INJECTION, POWDER, LYOPHILIZED, FOR SOLUTION INTRAVENOUS at 18:09

## 2022-01-01 RX ADMIN — Medication 0.5 MILLIGRAM(S): at 01:54

## 2022-01-01 RX ADMIN — MIDAZOLAM HYDROCHLORIDE 10 MILLIGRAM(S): 1 INJECTION, SOLUTION INTRAMUSCULAR; INTRAVENOUS at 23:00

## 2022-01-01 RX ADMIN — HYDROMORPHONE HYDROCHLORIDE 1 MILLIGRAM(S): 2 INJECTION INTRAMUSCULAR; INTRAVENOUS; SUBCUTANEOUS at 03:01

## 2022-01-01 RX ADMIN — SODIUM CHLORIDE 1000 MILLILITER(S): 9 INJECTION, SOLUTION INTRAVENOUS at 13:57

## 2022-01-01 RX ADMIN — CEFTRIAXONE 1000 MILLIGRAM(S): 500 INJECTION, POWDER, FOR SOLUTION INTRAMUSCULAR; INTRAVENOUS at 15:17

## 2022-01-01 RX ADMIN — HYDROMORPHONE HYDROCHLORIDE 0.5 MILLIGRAM(S): 2 INJECTION INTRAMUSCULAR; INTRAVENOUS; SUBCUTANEOUS at 15:20

## 2022-01-01 RX ADMIN — Medication 2 MILLIGRAM(S): at 18:00

## 2022-01-01 RX ADMIN — HYDROMORPHONE HYDROCHLORIDE 1 MILLIGRAM(S): 2 INJECTION INTRAMUSCULAR; INTRAVENOUS; SUBCUTANEOUS at 16:14

## 2022-01-01 RX ADMIN — ONDANSETRON 4 MILLIGRAM(S): 8 TABLET, FILM COATED ORAL at 03:08

## 2022-01-01 RX ADMIN — ONDANSETRON 4 MILLIGRAM(S): 8 TABLET, FILM COATED ORAL at 12:57

## 2022-01-01 RX ADMIN — HYDROMORPHONE HYDROCHLORIDE 1 MILLIGRAM(S): 2 INJECTION INTRAMUSCULAR; INTRAVENOUS; SUBCUTANEOUS at 10:17

## 2022-01-01 RX ADMIN — SODIUM CHLORIDE 1000 MILLILITER(S): 9 INJECTION INTRAMUSCULAR; INTRAVENOUS; SUBCUTANEOUS at 13:27

## 2022-01-01 RX ADMIN — SODIUM CHLORIDE 1000 MILLILITER(S): 9 INJECTION INTRAMUSCULAR; INTRAVENOUS; SUBCUTANEOUS at 00:02

## 2022-01-01 SDOH — ECONOMIC STABILITY - HOUSING INSECURITY: HOMELESSNESS UNSPECIFIED: Z59.00

## 2022-01-06 NOTE — ED CDU PROVIDER SUBSEQUENT DAY NOTE - NS ED ATTENDING STATEMENT MOD
Patient called back and informed that he did go to [facility] yesterday and did test positive for COVID. He was given information about the monoclonal infusion and he is interested. The [facility] told him that an order would have to come from his PCP for this. Please advise.   Attending Only

## 2022-03-09 NOTE — ED PROVIDER NOTE - PATIENT PORTAL LINK FT
You can access the FollowMyHealth Patient Portal offered by [facility] by registering at the following website: [email or web address]. By joining [facility]’s FollowMyHealth portal, you will also be able to view your health information using other applications (apps) compatible with our system.

## 2022-03-09 NOTE — ED ADULT NURSE REASSESSMENT NOTE - NS ED NURSE REASSESS COMMENT FT1
Resumed care of pt at this time. VS WNL. Pt resting bed, urinal at bedside. Pt in no acute distress at this time.

## 2022-03-09 NOTE — ED PROVIDER NOTE - OBJECTIVE STATEMENT
55yo male pmh alcohol abuse , gerd, well known to ed  admits to drinking alcohol while at [email or web address]; pt recently discharged from ed ; pt denies head injury,  chest pain or back pain;  pt denies any SI or HI

## 2022-03-09 NOTE — ED ADULT TRIAGE NOTE - CHIEF COMPLAINT QUOTE
pt found on floor in [employer]. pt reports drinking alcohol. tearful. changed into yellow gown belongings secured. GCS 14

## 2022-03-09 NOTE — ED PROVIDER NOTE - OBJECTIVE STATEMENT
57 y/o male  stated in triage that he was drinking vodka this am c/o abdominal pain and vomiting   in ED patient is asleep awakens to painful stimuli no evidence of trauma

## 2022-03-09 NOTE — ED ADULT TRIAGE NOTE - CHIEF COMPLAINT QUOTE
Pt BIBA from , c/o abd pain and vomiting, admits to drinking four lizette and vodkda this morning, pt AOx3, put into yellow gown, belongings secured Pt BIBA from , c/o abd pain and vomiting, admits to drinking four lizette and vodka this morning, pt AOx3, put into yellow gown, belongings secured, crying in triage

## 2022-03-09 NOTE — ED PROVIDER NOTE - PROGRESS NOTE DETAILS
Sasha BUITRAGO: Patient reassesed--no complaints.  Vital signs normal.  Resting comfortably.  A&Ox3.  Speech clear. Gait normal.  Clinically sober.

## 2022-03-09 NOTE — ED ADULT NURSE NOTE - OBJECTIVE STATEMENT
Late note:  56 year old male biba within 40 minutes of previous discharge for intoxication and admits to consuming ETOH after leaving hospital.  Patient reports to drinking a gallon of vodka a day, last drink was prior to returning to hospital.  No signs or symptoms of withdrawal at this time.  Responds to verbal stimuli,  Awake, alert to self and situation.  Tearful at times.  Attempting to get OOB, requiring frequent redirection to maintain safety.  No respiratory distress noted, breathing even and unlabored.  No complaints.  Patient uncooperative at times, undressing self and attempting to climb off stretcher.  Patient currently sleeping in stretcher at this time in no apparent distress.  Fall Precautions maintained at all times.  Patient in view of nurses station.

## 2022-03-09 NOTE — ED ADULT NURSE NOTE - CHIEF COMPLAINT QUOTE
Pt BIBA from , c/o abd pain and vomiting, admits to drinking four lizette and vodka this morning, pt AOx3, put into yellow gown, belongings secured, crying in triage

## 2022-03-09 NOTE — ED PROVIDER NOTE - PATIENT PORTAL LINK FT
You can access the FollowMyHealth Patient Portal offered by [facility] by registering at the following website: [email or web address]. By joining [facility]’s FollowMyHealth portal, you will also be able to view your health information using other applications (apps) compatible with our system.

## 2022-03-09 NOTE — ED ADULT NURSE NOTE - NSIMPLEMENTINTERV_GEN_ALL_ED
Implemented All Fall Risk Interventions:  [place] to call system. Call bell, personal items and telephone within reach. Instruct patient to call for assistance. Room bathroom lighting operational. Non-slip footwear when patient is off stretcher. Physically safe environment: no spills, clutter or unnecessary equipment. Stretcher in lowest position, wheels locked, appropriate side rails in place. Provide visual cue, wrist band, yellow gown, etc. Monitor gait and stability. Monitor for mental status changes and reorient to person, place, and time. Review medications for side effects contributing to fall risk. Reinforce activity limits and safety measures with patient and family.

## 2022-03-09 NOTE — ED PROVIDER NOTE - DISPOSITION TYPE
-- DO NOT REPLY / DO NOT REPLY ALL --  -- Message is from the [facility]--    COVID-19 Universal Screening: N/A - Not about scheduling    General Patient Message      Reason for Call: Patient's daughter Lisbet is calling doctor because patient is eligible to get the COVID-19 vaccine. However Lisbet would like to know if patient is physically okay to receive vaccine? Please christiane to advise    Caller Information       Type Contact Phone    02/19/2021 01:51 PM CST Phone (Incoming) LISBET VALDIVIA (Emergency Contact) [phone]          Alternative phone number: None    Turnaround time given to caller:   \"This message will be sent to [state Provider's name]. The clinical team will fulfill your request as soon as they review your message.\"     DISCHARGE

## 2022-03-09 NOTE — ED ADULT NURSE NOTE - NSIMPLEMENTINTERV_GEN_ALL_ED
Implemented All Fall Risk Interventions:  [place] to call system. Call bell, personal items and telephone within reach. Instruct patient to call for assistance. Room bathroom lighting operational. Non-slip footwear when patient is off stretcher. Physically safe environment: no spills, clutter or unnecessary equipment. Stretcher in lowest position, wheels locked, appropriate side rails in place. Provide visual cue, wrist band, yellow gown, etc. Monitor gait and stability. Monitor for mental status changes and reorient to person, place, and time. Review medications for side effects contributing to fall risk. Reinforce activity limits and safety measures with patient and family.

## 2022-03-09 NOTE — ED PROVIDER NOTE - CLINICAL SUMMARY MEDICAL DECISION MAKING FREE TEXT BOX
etoh intox , c/o abdominal pain and vomiting   labs wnl ]  reeval , llq tenderness , pt wants to leave

## 2022-03-10 NOTE — ED ADULT NURSE NOTE - OBJECTIVE STATEMENT
Pt brought in by ambulance for altered mental status. Pt received in stretcher, Pt appears anxious, attempting to get out stretcher repeatedly. Pt states that he drank one gallon of vodka today, Pt states that he generally drinks one to two gallons of vodka per day. Pt states that he is homeless and has experienced the death of his mother and girlfriend within the past two months. Pt admits to being depressed but does not want to discuss further. Pt denies illicit drug use.     Airway is patent, breathing is even and unlabored. Pt denies difficulty breathing and shortness of breath. Skin is warm, dry, and flushed; PMS x 4 extremities. Pt states that he is experiencing abdominal pain, denies nausea/vomiting/diarrhea. Pt attributes abdominal pain to the cancer he "believes he has". CIWA assessment performed, score of 4. Librium and Ativan administered as per order. Pt states he would like to take a nap. Will continue to monitor and reassess.

## 2022-03-10 NOTE — ED PROVIDER NOTE - NEUROLOGICAL, MLM
Alert and oriented, no focal deficits, no motor or sensory deficits. Slightly tremulous in hands only.

## 2022-03-10 NOTE — ED CDU PROVIDER DISPOSITION NOTE - CLINICAL COURSE
Pt is well known to this dept for frequent etoh presentations. Arrived drunk, now sober, denies Si and is kamilah for safety. Pt is awake, alert, lucid and coherent. Ambulating with steady gait, no complaints, no sign of trauma. No clinical sign of intox or withdrawal. Stable for dc.

## 2022-03-10 NOTE — ED CDU PROVIDER DISPOSITION NOTE - PATIENT PORTAL LINK FT
You can access the FollowMyHealth Patient Portal offered by [facility] by registering at the following website: [email or web address]. By joining [employer]’s FollowMyHealth portal, you will also be able to view your health information using other applications (apps) compatible with our system.

## 2022-03-10 NOTE — ED ADULT NURSE NOTE - CHIEF COMPLAINT QUOTE
walked into [facility] for evaluation. admits to alcohol consumption today, states he is in the hospital. offering no complaints at this time. GCS 14

## 2022-03-10 NOTE — ED PROVIDER NOTE - DOMESTIC TRAVEL HIGH RISK QUESTION
Refill policies:    • Allow 2-3 business days for refills; controlled substances may take longer.   • Contact your pharmacy at least 5 days prior to running out of medication and have them send an electronic request or submit request through the “request re Depending on your insurance carrier, approval may take 3-10 days. It is highly recommended patients contact their insurance carrier directly to determine coverage.   If test is done without insurance authorization, patient may be responsible for the entire No

## 2022-03-10 NOTE — ED PROVIDER NOTE - ENMT, MLM
Airway patent, Nasal mucosa clear. No tongue fasciculations. Mouth with poor dentition, normal mucosa. Throat has no vesicles, no oropharyngeal exudates and uvula is midline.

## 2022-03-10 NOTE — ED PROVIDER NOTE - CLINICAL SUMMARY MEDICAL DECISION MAKING FREE TEXT BOX
Homelessness and alcohol abuse with another ED visit today for similar symptoms.  Needs assistance with his alcohol abuse and housing.  Will get social work to see in AM.

## 2022-03-10 NOTE — ED ADULT NURSE NOTE - CHIEF COMPLAINT QUOTE
patient bibems from [facility] c/o alcohol withdrawal. patient states that he drank a gallon of vodka this morning. patient is anxious in ems triage. +aob. denies si/hi in triage.

## 2022-03-10 NOTE — ED PROVIDER NOTE - NSFOLLOWUPINSTRUCTIONS_ED_ALL_ED_FT
Alcohol Use Disorder    WHAT YOU NEED TO KNOW:    Alcohol use disorder (AUD) is problem drinking. AUD includes alcohol abuse and alcohol dependency.     DISCHARGE INSTRUCTIONS:    Seek care immediately if:     Your heart is beating faster than usual.      You have hallucinations.      You cannot remember what happens while you are drinking.      You have seizures.    Contact your healthcare provider if:     You are anxious and have nausea.      Your hands are shaky and you are sweating heavily.      You have questions or concerns about your condition or care.    Follow up with your healthcare provider as directed: Do not try to stop drinking on your own. Your healthcare provider may need to help you withdraw from alcohol safely. He may need to admit you to the hospital. You may also need any of the following treatments:    Medicines to decrease your craving for alcohol      Support groups such as Alcoholics Anonymous       Therapy from a psychiatrist or psychologist       Admission to an inpatient facility for treatment for severe AUD    Interested in discussing options to reduce your alcohol or drug use?      [facility]: [phone]   [facility] Substance Abuse Services: [phone], option #2   Methadone Maintenance & Ambulatory Opiate Detox: [phone]  [facility]: [phone]  [facility]: [phone]  [facility]: [phone]    [facility]: [phone], option #2   [facility]: [phone]    [facility]: [phone]    [facility] Central Intake: [phone]  [facility] Chemical Dependency/Ancillary Withdrawal: [phone]  [facility] Methadone Maintenance: [phone]    [facility]: [phone]  [facility] Addiction Treatment Services: [phone]    [facility]: 9-782-8-HOPENY    [facility] Office of Alcoholism and Substance Abuse Services (OASAS): https://www.oasas.[place].gov/providerdirectory/  [facility] for Addiction Services and Psychotherapy Interventions Research (CASPIR)  www.[facility].org     Interested in discussing options to reduce your tobacco use?    [facility] for Tobacco Control:  [phone]  [facility] QUITLINE: 5-404-CI-QUITS ([phone])    Interested in learning more about substance use?      http://rethinkingdrinking.niaaa.nih.gov   https://www.drugabuse.gov/patients-families     Learn more about opioid overdose prevention programs in [facility]:  http://www.[facility].ny.[facility]/diseases/aids/general/opioid_overdose_prevention/

## 2022-03-10 NOTE — ED PROVIDER NOTE - PROGRESS NOTE DETAILS
Pt re-evaluated and seen by social work, he has eaten here, he is given information and a voucher, plan is to dc.

## 2022-03-10 NOTE — ED CDU PROVIDER INITIAL DAY NOTE - OBJECTIVE STATEMENT
56 year old male well known to this dept for frequent visits for etoh abuse presents with intoxication. Pt walked into [facility] intoxicated. Denies head trauma, chest pain, abd pain, nausea, vomiting. Pt was seen here yesterday for etoh abuse, states he was discharged and immediately drank 1 L of Vodka.

## 2022-03-10 NOTE — ED PROVIDER NOTE - OBJECTIVE STATEMENT
56 year old male well known to this dept for frequent visits for etoh abuse presents with intoxication. Pt walked into [facility] intoxicated. Denies head trauma, chest pain, abd pain, nausea, vomiting. Pt was seen here yesterday for etoh abuse, states he was discharged and immediately drank 1 L of Vodka.

## 2022-03-10 NOTE — ED PROVIDER NOTE - EYES NEGATIVE STATEMENT, MLM
no discharge, no irritation, no pain, no redness, and no visual changes. Offered and patient declined Unable to offer due to clinical condition

## 2022-03-10 NOTE — ED PROVIDER NOTE - OBJECTIVE STATEMENT
Pt. is a 56 year old M with PMHx of alcohol abuse, hyperlipidemia, HTN, asthma, GERD presents BIB ambulance for alcohol withdrawal.  Patient states last thing he drank was 1L of vodka earlier today.  He was at [facility] and states he walked as far as he could and called an ambulance.  He is crying that he sleeps in the street and has nowhere to go.  Patient states he hasn't eaten anything because he has upset stomach and feels very shaky. Denies chest pain or shortness of breath. Pt. is a 56 year old M with PMHx of alcohol abuse, hyperlipidemia, HTN, asthma, GERD presents BIB ambulance for alcohol withdrawal after calling police saying he was suicidal.  Patient states last thing he drank was 1L of vodka earlier today.  He was at [facility] and states he walked as far as he could and called 911.  He is crying that he sleeps in the street and has nowhere to go.  Patient states he hasn't eaten anything because he has upset stomach and feels very shaky. Denies chest pain or shortness of breath.  Pt. complains of feeling depressed but states he is not truly suicidal.  Has no thoughts of hurting himself or others.

## 2022-03-10 NOTE — ED ADULT NURSE NOTE - ISOLATION TYPE:
8/28/2020 pts hips front portion are measuring 26 inches. That is for walker measurements.   Sincerely, Electronically signed by SARAH Melvin  on 8/28/2020 at 2:12 PM\ None

## 2022-03-10 NOTE — ED ADULT NURSE NOTE - NSIMPLEMENTINTERV_GEN_ALL_ED
Implemented All Fall Risk Interventions:  [place] to call system. Call bell, personal items and telephone within reach. Instruct patient to call for assistance. Room bathroom lighting operational. Non-slip footwear when patient is off stretcher. Physically safe environment: no spills, clutter or unnecessary equipment. Stretcher in lowest position, wheels locked, appropriate side rails in place. Provide visual cue, wrist band, yellow gown, etc. Monitor gait and stability. Monitor for mental status changes and reorient to person, place, and time. Review medications for side effects contributing to fall risk. Reinforce activity limits and safety measures with patient and family.

## 2022-03-10 NOTE — ED ADULT TRIAGE NOTE - CHIEF COMPLAINT QUOTE
patient bibems from [facility] league c/o alcohol withdrawal. patient states that he drank a gallon of vodka this morning. patient is anxious in ems triage. +aob. denies si/hi in triage.

## 2022-03-10 NOTE — ED ADULT TRIAGE NOTE - CHIEF COMPLAINT QUOTE
walked into [facility] for evaluation. admits to alcohol consumption today, states he is in the hospital. offering no complaints at this time. GCS 14

## 2022-03-10 NOTE — ED ADULT NURSE NOTE - OBJECTIVE STATEMENT
Patient brought in for alcohol ingestion. Patient brought in by EMS. Patient given 5mg of versed due to agitation.

## 2022-03-10 NOTE — ED ADULT NURSE NOTE - NSIMPLEMENTINTERV_GEN_ALL_ED
Implemented All Fall with Harm Risk Interventions:  [place] to call system. Call bell, personal items and telephone within reach. Instruct patient to call for assistance. Room bathroom lighting operational. Non-slip footwear when patient is off stretcher. Physically safe environment: no spills, clutter or unnecessary equipment. Stretcher in lowest position, wheels locked, appropriate side rails in place. Provide visual cue, wrist band, yellow gown, etc. Monitor gait and stability. Monitor for mental status changes and reorient to person, place, and time. Review medications for side effects contributing to fall risk. Reinforce activity limits and safety measures with patient and family. Provide visual clues: red socks.

## 2022-03-10 NOTE — ED PROVIDER NOTE - PATIENT PORTAL LINK FT
You can access the FollowMyHealth Patient Portal offered by [facility] by registering at the following website: [email or web address]. By joining [employer]’s FollowMyHealth portal, you will also be able to view your health information using other applications (apps) compatible with our system.

## 2022-03-11 NOTE — ED ADULT NURSE REASSESSMENT NOTE - NS ED NURSE REASSESS COMMENT FT1
Pt ambulated to bathroom with one-person standby. No difficulties noted. Pt returned to stretcher and fell asleep immediately. Will continue to monitor.

## 2022-03-11 NOTE — CHART NOTE - NSCHARTNOTEFT_GEN_A_CORE
03/11/2022 Patient was given a taxi voucher to report to [facility] in [place]. A call made to [facility] and patient was only there for a few days so can not go directly back to the shelter. Junie Guillermo LCSW

## 2022-03-11 NOTE — ED ADULT NURSE REASSESSMENT NOTE - NS ED NURSE REASSESS COMMENT FT1
Pt sleeping in stretcher. Woke Pt up to reassess, vital signs stable at this time, CIWA score of 3. Pt states he is dehydrated and requesting iced water. Will continue to monitor.

## 2022-03-11 NOTE — ED ADULT NURSE REASSESSMENT NOTE - NS ED NURSE REASSESS COMMENT FT1
Pt sleeping in stretcher. Woke Pt up to reassess, vital signs stable at this time, CIWA score of 5. Pt states he is feeling nauseous and requesting apple juice. Reviewed with MD Montilla. Will continue to monitor.

## 2022-03-11 NOTE — ED ADULT NURSE REASSESSMENT NOTE - NS ED NURSE REASSESS COMMENT FT1
Received report from DENG Fox.  patient asleep in stretcher in view of nurses station, arouses to voice and touch.  awaiting SBIRT/SW consult for housing options.  will continue plan of care.

## 2022-04-01 NOTE — ED ADULT NURSE NOTE - NSFALLRSKINDICATORS_ED_ALL_ED
Occupational Therapy  Facility/Department: [address]  Daily Treatment Note  NAME: NITA Etienne  : 1962  MRN: [id number]    Date of Service: 2022    Discharge Recommendations:    NITA Etienne scored a 16/24 on the AM-PAC ADL Inpatient form. Current research shows that an AM-PAC score of 17 or less is typically not associated with a discharge to the patient's home setting. Based on the patient's AM-PAC score and their current ADL deficits, it is recommended that the patient have 3-5 sessions per week of Occupational Therapy at d/c to increase the patient's independence. Please see assessment section for further patient specific details. If patient discharges prior to next session this note will serve as a discharge summary. Please see below for the latest assessment towards goals. OT Equipment Recommendations  ADL Assistive Devices: Reacher  Other: To further assess    Assessment   Performance deficits / Impairments: Decreased functional mobility ; Decreased ADL status; Decreased endurance  Assessment: Pt req encouragement for activity. Pt req increased assist for ADL and CG for transfer. Pt lives alone. Feel pt would benefit from further IP OT services prior to D/C home. Treatment Diagnosis: Impaired ADL and functional mobility  Prognosis: Good  OT Education: OT Role;Plan of Care  Patient Education: Pt verbalized understanding, needs reinforcement  REQUIRES OT FOLLOW UP: Yes  Activity Tolerance  Activity Tolerance: Patient limited by pain  Safety Devices  Safety Devices in place: Yes  Type of devices: Left in chair;Call light within reach; Chair alarm in place;Nurse notified     Restrictions  Position Activity Restriction  Other position/activity restrictions: Up as tolerated  Subjective   General  Chart Reviewed: Yes  Additional Pertinent Hx: Pt admitted 3/28/22 with abdominal pain, left-sided back pain, nausea, vomiting, and diarrhea.      PMH includes: COVID-19, chronic trach  Family / Caregiver Present: No  Referring Practitioner: Dr. Mariangel Shankar  Diagnosis: Left flank pain  Subjective  Subjective: Pt in bed upon entry. When can I have pain medicine? Vital Signs  Patient Currently in Pain: Yes (9/10, nurse aware)   Objective    ADL  Grooming: Setup (to wipe face, seated in chair)  LE Bathing: Contact guard assistance (to SBA for wiping yash area while standing)  LE Dressing: Maximum assistance (for socks and brief)  Toileting:  (Pure Ivin Kincaid in place.)        Balance  Sitting Balance: Stand by assistance  Standing Balance: Contact guard assistance (to SBA)  Standing Balance  Time: ~2 min  Activity: yash care  Bed mobility  Supine to Sit: Minimal assistance  Scooting: Stand by assistance  Transfers  Stand Step Transfers: Contact guard assistance (bed to chair with use of  walker)  Sit to stand: Contact guard assistance (+cues)  Stand to sit: Contact guard assistance (+cues)                       Cognition  Overall Cognitive Status: [facility]                                         Plan   Plan  Times per week: 2-5  Current Treatment Recommendations: Strengthening,ROM,Balance Training,Functional Mobility Training,Endurance Training,Self-Care / ADL,Equipment Evaluation, Education, & procurement  AM-PAC Score        AM-[facility] Inpatient Daily Activity Raw Score: 16 (03/29/22 1114)  AM-PAC Inpatient ADL T-Scale Score : 35.96 (03/29/22 1114)  ADL Inpatient CMS 0-100% Score: 53.32 (03/29/22 1114)  ADL Inpatient CMS G-Code Modifier : CK (03/29/22 1114)    Goals                     No goals met  Short term goals  Time Frame for Short term goals: Discharge  Short term goal 1: Transfer to/from toilet with supervision  Short term goal 2: Stance with supervision x5 min while engaging in ADL/functional mobility  Short term goal 3: Lower body dressing with supervision and assistive devices as needed  Patient Goals   Patient goals : Go home. Decrease pain.        Therapy Time   Individual Concurrent Group Co-treatment   Time In 0852         Time Out 0925         Minutes 33         Timed Code Treatment Minutes: Nathaniel King OTR/MARIA DEL ROSARIO [id number] yes

## 2022-04-11 PROBLEM — Z11.59 SCREENING FOR VIRAL DISEASE: Status: ACTIVE | Noted: 2020-05-19

## 2022-04-11 PROBLEM — R37 SEXUAL DYSFUNCTION: Status: ACTIVE | Noted: 2020-12-07

## 2022-04-15 NOTE — ED PROVIDER NOTE - CARDIAC, MLM
I spoke with Jorge  He has 2 months supply left.     [facility] [place] reports medication no longer being distributed, and med being phased out. [facility] believes they may be able to get some supply- so prescription sent there.  Refer to EP to evaluate next steps as med being discontinued.      Orders Placed This Encounter   • SERVICE TO ELECTROPHYSIOLOGY     Patient has been on longtime quinidine sulfate therapy for 12-15 years, and now this medication is no longer being distributed. He has about 2 month supply left and needing recommendations on alternative therapies.     Referral Priority:   Routine     Referral Type:   Consult & Treatment     Referral Reason:   Service Not Available at Clinic     Number of Visits Requested:   1     Expiration Date:   4/15/2023   • quiniDINE sulfate 300 MG tablet     Sig: Take 1 tablet by mouth daily.     Dispense:  90 tablet     Refill:  3     Requesting 1 year supply      
Name of medication:quiniDINE sulfate 300 MG tablet     Dosinmg tab    Clarification comment: Quinidine is part of a limited distribution network (LDD).  [employer] is not a vendor of this medication at the current time.  Please refer to the following vendor for additional assistance [phone]  Or visit [employer]   
Phone call placed number provided; [facility] program- closed.   Open M-F 9am-5pm Eastern   Message left requesting call back.     
Routed to MD - Please advise   
Normal rate, regular rhythm.  Heart sounds S1, S2.  No murmurs, rubs or gallops.

## 2022-04-19 NOTE — ED PROVIDER NOTE - COVID-19 ORDERING FACILITY
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    Anesthesia Type:   No value filed.     Note:    Oropharynx: oropharynx clear of all foreign objects  Level of Consciousness: iatrogenic sedation  Oxygen Supplementation: face mask  Level of Supplemental Oxygen (L/min / FiO2): 6  Independent Airway: airway patency satisfactory and stable  Dentition: dentition unchanged  Vital Signs Stable: post-procedure vital signs reviewed and stable  Report to RN Given: handoff report given  Patient transferred to: Card[facility]          Vitals:  Vitals Value Taken Time   /90 04/19/22 1111   Temp 36.7  C (98.1  F) 04/19/22 1111   Pulse 117 04/19/22 1118   Resp 17 04/19/22 1118   SpO2 100 % 04/19/22 1118   Vitals shown include unvalidated device data.    Electronically Signed By: ZAHRAA Heredia CRNA  April 19, 2022  11:18 AM  
[facility]

## 2022-04-27 NOTE — ED PROVIDER NOTE - CLINICAL SUMMARY MEDICAL DECISION MAKING FREE TEXT BOX
pt si a 55 yo male hx etoh abuse, gasritis, pt hasn't been taking prilosec and gerd worsened but pt states  no bm for 5 days. states eating normally, pt on exam with dry mm, lungs cta, with minimall llq ttp, abd nd/nm,  denies rectal pain, protonix, check labs, etoh level, ct abd, reeval. ivf

## 2022-04-27 NOTE — ED PROVIDER NOTE - NSFOLLOWUPCLINICS_GEN_ALL_ED_FT
[facility]  Family Medicine  [address] [place] [address]  Phone: [phone]  Fax:

## 2022-04-27 NOTE — ED PROVIDER NOTE - CARE PROVIDER_API CALL
Patricia Ziegler (DO)  Internal Medicine  [address] [place] [address]  Phone: [phone]  Fax: [phone]  Follow Up Time:

## 2022-04-27 NOTE — ED ADULT NURSE NOTE - NSICDXPASTMEDICALHX_GEN_ALL_CORE_FT
PAST MEDICAL HISTORY:  Alcohol abuse     Alcohol abuse     Alcohol abuse     Asthma     DM (diabetes mellitus)     GERD (gastroesophageal reflux disease)     High cholesterol     High cholesterol     HTN (hypertension)     Hypertension

## 2022-04-27 NOTE — ED PROVIDER NOTE - OBJECTIVE STATEMENT
55 y/o male with PMHx DM presents today due to abdominal pain x 1 month. Pt reports he ran out of GERD medications. Pt describes epigastric and LLQ abdominal pain as aching, non-radiating, and currently 7/10. pt admits to constipation. pt denies fever, vomiting, dysuria, hematuria, or any other complaints.

## 2022-04-27 NOTE — ED ADULT NURSE NOTE - OBJECTIVE STATEMENT
Pt presented ED c/o epigastric pain and reflux x a few days.  Pt states he is homeless and an alcoholic with his last drink being pta.  Pt states he drinks 1-2 pints of vodka daily.  Denies any chest pain/SOB.  No n/v/d.  Will continue to monitor.

## 2022-04-27 NOTE — ED PROVIDER NOTE - PHYSICAL EXAMINATION
Constitutional: Awake, Alert, non-toxic. NAD. Well appearing, well nourished.   HEAD: Normocephalic, atraumatic.   EYES: EOM intact, conjunctiva and sclera are clear bilaterally.   ENT: No rhinorrhea, patent, mucous membranes pink/moist, no drooling or stridor.   NECK: Supple, non-tender  CARDIOVASCULAR: Normal S1, S2; regular rate and rhythm.  RESPIRATORY: Normal respiratory effort; breath sounds CTAB, no wheezes, rhonchi, or rales. Speaking in full sentences. No accessory muscle use.   ABDOMEN: Soft; (+) epigastric and LLQ TTP, no guarding or rebound TTP.   EXTREMITIES: Full passive and active ROM in all extremities; non-tender to palpation; distal pulses palpable and symmetric  SKIN: Warm, dry; good skin turgor, no apparent lesions or rashes, no ecchymosis, brisk capillary refill.  NEURO: A&O x3. Sensory and motor functions are grossly intact. Speech is normal. Appearance and judgement seem appropriate for gender and age.

## 2022-04-27 NOTE — ED ADULT TRIAGE NOTE - NS ED NURSE BANDS TYPE
Alert and oriented to person, place and time. Normal mood and affect, no apparent risk to self or others
Name band;

## 2022-04-27 NOTE — ED PROVIDER NOTE - PATIENT PORTAL LINK FT
You can access the FollowMyHealth Patient Portal offered by [facility] by registering at the following website: [email or web address]. By joining [employer]’s FollowMyHealth portal, you will also be able to view your health information using other applications (apps) compatible with our system.

## 2022-04-27 NOTE — ED PROVIDER NOTE - NSFOLLOWUPINSTRUCTIONS_ED_ALL_ED_FT
1) Follow-up with your Primary Medical Doctor or referred doctor. Call today / next business day for prompt follow-up.  2) Return to Emergency room for any worsening or persistent pain, weakness, fever, vomiting, unabel to eat / drink, or any other concerning symptoms.  3) See attached instruction sheets for additional information, including information regarding signs and symptoms to look out for, reasons to seek immediate care and other important instructions.  4) you will be taken directly to [facility] for Detox / rehab treatment
yes

## 2022-04-27 NOTE — ED PROVIDER NOTE - PROGRESS NOTE DETAILS
Pt seen by Felicia - pt will need detox - DW Dr Tavares - [facility], will accept pt, pending covid res Pt seen by Felicia - pt will need detox - DW Dr Tavares - [facility], will accept pt, pending covid res

## 2022-04-27 NOTE — ED PROVIDER NOTE - NS ED ATTENDING STATEMENT MOD
This was a shared visit with the ZAFAR. I reviewed and verified the documentation and independently performed the documented:

## 2022-04-28 NOTE — DISCHARGE NOTE NURSING/CASE MANAGEMENT/SOCIAL WORK - NSSBIRTALCACTIVEREFTXDET_GEN_A_CORE
Pt requesting to go to inpatient detox. RACQUEL and MD spoke with Dr. Tavares at [facility] who is willing to accept pt to detox unit.

## 2022-04-28 NOTE — DISCHARGE NOTE NURSING/CASE MANAGEMENT/SOCIAL WORK - NSDCPEFALRISK_GEN_ALL_CORE
For information on Fall & Injury Prevention, visit: [email or web address] OR  [email or web address] OR  https://www.cdc.gov/steadi/patient.html

## 2022-04-28 NOTE — ED ADULT NURSE REASSESSMENT NOTE - NSIMPLEMENTINTERV_GEN_ALL_ED
Implemented All Universal Safety Interventions:  [place] to call system. Call bell, personal items and telephone within reach. Instruct patient to call for assistance. Room bathroom lighting operational. Non-slip footwear when patient is off stretcher. Physically safe environment: no spills, clutter or unnecessary equipment. Stretcher in lowest position, wheels locked, appropriate side rails in place.
Implemented All Universal Safety Interventions:  [place] to call system. Call bell, personal items and telephone within reach. Instruct patient to call for assistance. Room bathroom lighting operational. Non-slip footwear when patient is off stretcher. Physically safe environment: no spills, clutter or unnecessary equipment. Stretcher in lowest position, wheels locked, appropriate side rails in place.

## 2022-04-28 NOTE — DISCHARGE NOTE NURSING/CASE MANAGEMENT/SOCIAL WORK - NSDCVIVACCINE_GEN_ALL_CORE_FT
Tdap; 28-Jun-2018 22:06; Angélica Sanchez); [employer]; [id number]; IntraMuscular; Deltoid Right.; 0.5 milliLiter(s); VIS (VIS Published: 09-May-2013, VIS Presented: 28-Jun-2018);

## 2022-04-28 NOTE — ED ADULT NURSE REASSESSMENT NOTE - NS ED NURSE REASSESS COMMENT FT1
Report given to Facility to DENG Vazquez.
Social work called
social work in to see pt
Received pt in bed from [facility].  Pt alert. VS stable.  Awaiting SW for consult.  Pt having complaints of constipation.  DR Shields aware.
No issues noted overnight.  Pt states reflux has since resolved after receiving meds last night.  Pt still c/o constipation.  Pt would like to see social work this am for help with placement.  Will continue to monitor.

## 2022-04-28 NOTE — SBIRT NOTE ADULT - NSSBIRTALCACTIVEREFTXDET_GEN_A_CORE
Pt requesting to go to inpatient detox. RACQUEL and MD spoke with Dr. Tavares at [facility] who is willing to accept pt to detox unit.

## 2022-04-28 NOTE — DISCHARGE NOTE NURSING/CASE MANAGEMENT/SOCIAL WORK - PATIENT PORTAL LINK FT
You can access the FollowMyHealth Patient Portal offered by [facility] by registering at the following website: [email or web address]. By joining [employer]’s FollowMyHealth portal, you will also be able to view your health information using other applications (apps) compatible with our system.

## 2022-05-04 NOTE — ED PROVIDER NOTE - PATIENT PORTAL LINK FT
You can access the FollowMyHealth Patient Portal offered by [facility] by registering at the following website: [email or web address]. By joining [employer]’s FollowMyHealth portal, you will also be able to view your health information using other applications (apps) compatible with our system.

## 2022-05-04 NOTE — ED PROVIDER NOTE - OBJECTIVE STATEMENT
patient here requesting detox. denies SI HI alert and oriented ambulating with steady gait clinically sober,

## 2022-05-04 NOTE — ED PROVIDER NOTE - CLINICAL SUMMARY MEDICAL DECISION MAKING FREE TEXT BOX
patient clinically sober, denies SI HI no concern for issues of safety  will d/c to follow up at [facility] for detox

## 2022-05-04 NOTE — ED ADULT NURSE NOTE - OBJECTIVE STATEMENT
"I FEEL FINE AND I WANT TO LEAVE AND GO TO [facility]. WHERE IS THE DOCTOR."

## 2022-05-04 NOTE — ED ADULT TRIAGE NOTE - CHIEF COMPLAINT QUOTE
requesting detox; cocaine/etoh use.  seen in ED last week and sent to [facility]. pt does not recall when he left facility. homeless. denies SI/HI. + depression

## 2022-05-04 NOTE — ED PROVIDER NOTE - PHYSICAL EXAMINATION
***GEN - NAD; well appearing; A+O x3 ***HEAD - NC/AT ***EYES/NOSE - PERRL, EOMI, mucous membranes moist, no discharge ***THROAT: Oral cavity and pharynx normal. No inflammation, swelling, exudate, or lesions.  ***NECK: Neck supple, non-tender without lymphadenopathy, no masses, no thyromegaly.   ***PULMONARY - CTA b/l, symmetric breath sounds. ***CARDIAC -s1s2, RRR, no M,G,R  ***ABDOMEN - +BS, ND, NT, soft, no guarding, no rebound, no masses    ***EXTREMITIES - symmetric pulses, 2+ dp, capillary refill < 2 seconds,  no edema ***SKIN - no rash or bruising   ***NEUROLOGIC - alert, oriented, motor sensory intact all 4 extremities  , ***PSYCH - insight and judgment nl, memory nl, affect nl, thought nl

## 2022-05-05 PROBLEM — E11.9 TYPE 2 DIABETES MELLITUS WITHOUT COMPLICATIONS: Chronic | Status: ACTIVE | Noted: 2022-01-01

## 2022-05-12 ENCOUNTER — EMERGENCY (EMERGENCY)
Facility: HOSPITAL | Age: 57
LOS: 1 days | Discharge: HOME | End: 2022-05-12
Attending: EMERGENCY MEDICINE
Payer: COMMERCIAL

## 2022-05-12 VITALS
DIASTOLIC BLOOD PRESSURE: 74 MMHG | HEART RATE: 97 BPM | OXYGEN SATURATION: 94 % | TEMPERATURE: 98 F | SYSTOLIC BLOOD PRESSURE: 116 MMHG | RESPIRATION RATE: 18 BRPM

## 2022-05-12 PROCEDURE — 70450 CT HEAD/BRAIN W/O DYE: CPT | Mod: MA

## 2022-05-12 PROCEDURE — 99284 EMERGENCY DEPT VISIT MOD MDM: CPT

## 2022-05-12 PROCEDURE — 70450 CT HEAD/BRAIN W/O DYE: CPT | Mod: 26,MA

## 2022-05-12 PROCEDURE — 99285 EMERGENCY DEPT VISIT HI MDM: CPT | Mod: 25

## 2022-05-12 NOTE — ED PROVIDER NOTE - OBJECTIVE STATEMENT
The patient presents with alcohol intoxication found in the taxi,  The patient is just released from rehab and drank in the taxi and his brother won't take the patient back so he called 911

## 2022-05-12 NOTE — ED PROVIDER NOTE - ATTENDING CONTRIBUTION TO CARE
The patient seen and examined    Alcohol Abuse    I, Blaine Pinto, performed the initial face to face bedside interview with this patient regarding history of present illness, review of symptoms and relevant past medical, social and family history.  I completed an independent physical examination.  I was the initial provider who evaluated this patient. I have signed out the follow up of any pending tests (i.e. labs, radiological studies) to the resident.  I have communicated the patient’s plan of care and disposition with the resident.

## 2022-05-12 NOTE — ED ADULT TRIAGE NOTE - CHIEF COMPLAINT QUOTE
Coming to ED for ETOH.  Drank a personal bottle of vodka. Pt is awake and alert and crying.  No signs of trauma.

## 2022-05-12 NOTE — ED ADULT NURSE REASSESSMENT NOTE - NS ED NURSE REASSESS COMMENT FT1
Assumed care of patient. patient non complaint trying to get OOB. RN tried to explain the importance to patient. Patient wants to go home. DR LAUREL Stern made aware.

## 2022-05-12 NOTE — ED PROVIDER NOTE - OBJECTIVE STATEMENT
56yom brought in by EMS from the streets for intoxication. Drank vodka today. No report of any trauma.

## 2022-05-12 NOTE — ED PROVIDER NOTE - CLINICAL SUMMARY MEDICAL DECISION MAKING FREE TEXT BOX
Alcohol intoxication, no trauma, will monitor until returned to normal mental status to ensure safe dc.

## 2022-05-12 NOTE — ED PROVIDER NOTE - NSFOLLOWUPINSTRUCTIONS_ED_ALL_ED_FT
Alcohol Abuse    Alcohol intoxication occurs when the amount of alcohol that a person has consumed impairs his or her ability to mentally and physically function. Chronic alcohol consumption can also lead to a variety of health issues including neurological disease, stomach disease, heart disease, liver disease, etc. Do not drive after drinking alcohol. Drinking enough alcohol to end up in an Emergency Room suggests you may have an alcohol abuse problem. Seek help at a drug addiction center.    1) Consider attending an AA meeting   2) If AA meetings are not working well, consider trying a SMART Recovery meeting. There are meetings in person and on zoom. The website is: [email or web address]   3) Consider seeing a primary care physician to discuss medications that can help decrease your craving for alcohol       SEEK IMMEDIATE MEDICAL CARE IF YOU HAVE ANY OF THE FOLLOWING SYMPTOMS: seizures, vomiting blood, blood in your stool, lightheadedness/dizziness, or becoming shaky to tremulous when you stop drinking.

## 2022-05-12 NOTE — ED PROVIDER NOTE - CLINICAL SUMMARY MEDICAL DECISION MAKING FREE TEXT BOX
55 y/o M with PMHx alcohol use disorder who presents to the ED intoxicated. CT Head negative. Patient now sober. Able to ambulate without assistance. VSS. Stable for d/c.

## 2022-05-12 NOTE — ED PROVIDER NOTE - PATIENT PORTAL LINK FT
You can access the FollowMyHealth Patient Portal offered by [facility] by registering at the following website: [email or web address]. By joining [employer]’s FollowMyHealth portal, you will also be able to view your health information using other applications (apps) compatible with our system.

## 2022-05-12 NOTE — ED PROVIDER NOTE - PATIENT PORTAL LINK FT
You can access the FollowMyHealth Patient Portal offered by [facility] by registering at the following website: [email or web address]. By joining [employer]’s FollowMyHealth portal, you will also be able to view your health information using other applications (apps) compatible with our system.

## 2022-05-12 NOTE — ED ADULT TRIAGE NOTE - CHIEF COMPLAINT QUOTE
patient just released from rehab center drank alcohol in cab and became upset.  EMS reports patient was alert and crying but has since fallen asleep. patient changed into yellow gown, belongings out of reach, dr. hernandez called for eval

## 2022-05-13 NOTE — ED PROVIDER NOTE - PATIENT PORTAL LINK FT
You can access the FollowMyHealth Patient Portal offered by [facility] by registering at the following website: [email or web address]. By joining [employer]’s FollowMyHealth portal, you will also be able to view your health information using other applications (apps) compatible with our system.

## 2022-05-13 NOTE — ED ADULT TRIAGE NOTE - CHIEF COMPLAINT QUOTE
Pt BIBA after being found intoxicated in front of the liquor store with 2 empty pints of vielka vodka next to him.  Pt d/c from [facility] earlier this morning.  Pt arrives intoxicated with slurred and incomprehensible speech.  Pt crying on arrival.  No obvious injury noted.  Pt undressed and placed in yellow gown for safety.  MD Henson called to evaluate pt

## 2022-05-13 NOTE — ED PROVIDER NOTE - OBJECTIVE STATEMENT
Pertinent PMH/PSH/FHx/SHx and Review of Systems contained within:  Patient presents to the ED for reported EtOH intox.  VSS.  Per chart, PMH of EtOH, GERD, DM, HTn, HLD, asthma.  Patient discharged Pertinent PMH/PSH/FHx/SHx and Review of Systems contained within:  Patient presents to the ED for reported EtOH intox.  VSS.  Per chart, PMH of EtOH, GERD, DM, HTn, HLD, asthma.  Patient discharged earlier today due to EtOH intox.  BIBA without intervention and reportedly found intoxicated and returned to ED. Patient is tachycardic to 110s.  Upon history, patient is responsive to pain, moving all extremities, no signs of trauma.  Patient placed on continuous monitoring.  Unable to attain further ROS/PMH/PSH/FHx/SHx due to clinical condition.

## 2022-05-13 NOTE — ED PROVIDER NOTE - PHYSICAL EXAMINATION
Gen: Alert, NAD  Head: NC, AT, PERRL, EOMI, normal lids/conjunctiva  ENT: normal hearing, patent oropharynx without erythema/exudate, uvula midline  Neck: +supple, no tenderness/meningismus/JVD, +Trachea midline  Pulm: Bilateral BS, normal resp effort, no wheeze/stridor/retractions  CV: RRR, no R/G, +dist pulses  Abd: soft, NT/ND, +BS, no hepatosplenomegaly  Mskel: no edema/erythema/cyanosis  Skin: no rash, dried adhesive RLE  Neuro: Alert to pain, moving all extremities

## 2022-05-13 NOTE — ED PROVIDER NOTE - PROGRESS NOTE DETAILS
Fidel: Pt signed out to me by dr. burnham pending ivf.  pt complaining of mild upper abd pain. no vomiting. labs and CT reviewed.  abd pain c/w gastritis from chronic alcoholism.  Pt feeling better. Pt clinically sober. pt ambulating with steady gait.  HR now 99 bpm on cardiac monitoring. will d/c with outpatient f/up. Fidel: Pt signed out to me by dr. burnham pending ivf.  pt complaining of mild upper abd pain. no vomiting. labs and CT reviewed.  abd pain c/w gastritis from chronic alcoholism.  CT with questionable aspiration. no fever. no leukocytosis. no hypoxia.  pt symptoms not c/w aspiration pneumonitis/pna. Pt feeling better. Pt clinically sober. pt ambulating with steady gait.  HR now 99 bpm on cardiac monitoring. will d/c with outpatient f/up.

## 2022-05-13 NOTE — ED ADULT NURSE NOTE - CHIEF COMPLAINT QUOTE
Pt BIBA after being found intoxicated in front of the liquor store with 2 empty pints of vielka vodka next to him.  Pt d/c from [facility] earlier this morning.  Pt arrives intoxicated with slurred and incomprehensible speech.  Pt crying on arrival.  No obvious injury noted.  Pt undressed and placed in yellow gown for safety.  MD Henson called to evaluate pt

## 2022-05-13 NOTE — ED ADULT NURSE NOTE - CAS DISCH CONDITION
able to ambulate to bathroom and void w/o assist or difficulty, in no acute distress, has no labored breathing/Stable

## 2022-05-13 NOTE — ED PROVIDER NOTE - CLINICAL SUMMARY MEDICAL DECISION MAKING FREE TEXT BOX
Patient with EtOH intox.  no signs of trauma.  VSS.  Uneventful ED observation period. Non toxic.  Well appearing. Patient signed out to incoming physician.  All decisions regarding the progression of care will be made at their discretion.

## 2022-05-14 NOTE — ED PROVIDER NOTE - PHYSICAL EXAMINATION
Constitutional - well-developed. no signs of trauma.  Head - NCAT. Airway patent.   Eyes - PERRL.   CV - RRR. no murmur. no edema.   Pulm - CTAB.   Abd - soft, nt. no rebound. no guarding.   Neuro - A&Ox3. JOLLY  Skin - No rash. .  MSK - normal ROM.

## 2022-05-14 NOTE — ED PROVIDER NOTE - PROGRESS NOTE DETAILS
Pt awake and alert at this time and requesting d/c.  Pt denies any SI/HI or hallucinations.  Pt clinically sober for d/c

## 2022-05-14 NOTE — ED PROVIDER NOTE - PATIENT PORTAL LINK FT
You can access the FollowMyHealth Patient Portal offered by [facility] by registering at the following website: [email or web address]. By joining [employer]’s FollowMyHealth portal, you will also be able to view your health information using other applications (apps) compatible with our system.

## 2022-05-14 NOTE — ED PROVIDER NOTE - OBJECTIVE STATEMENT
Pt is a 55 yo M here for alcohol intoxication.  Pt was discharged from here 1.5 hours ago. pt states that he left here and drank a gallon of vodka and is now back intoxicated. Pt with no complaints.

## 2022-05-14 NOTE — ED ADULT NURSE NOTE - OBJECTIVE STATEMENT
pt BIBA after leaving ED to drink alcohol. pt admits to drinking 1 gallon of vodka. pt with no noted trauma, frequents this ED for same issues, multiple times per week. pt with ALOOB, otherwise even and unlabored resps and stable vital signs.

## 2022-05-14 NOTE — ED ADULT NURSE REASSESSMENT NOTE - NS ED NURSE REASSESS COMMENT FT1
pt awake and alert received in shift change report. pt IV removed, VSS, aware of plan for discharge. even and unlabored resps present, skin warm dry and intact.. CIWA 5 as documented, awaiting SW for placement. Covid  swab sent as ordered. pt cooperative and ambulatory to waiting room.
Patient received awake and alert x4 in cart, on cardiac monitor reads 112 sinus tach, SpO2 reads 99% on RA, patient asking to be admitted to hospital.  Patient with patent iv site in left hand 20g.

## 2022-05-14 NOTE — ED PROVIDER NOTE - NSFOLLOWUPINSTRUCTIONS_ED_ALL_ED_FT
Stop drinking !!  Follow up with detox as an outpatient  Return sooner for any problems      Alcohol Abuse    Alcohol intoxication occurs when the amount of alcohol that a person has consumed impairs his or her ability to mentally and physically function. Chronic alcohol consumption can also lead to a variety of health issues including neurological disease, stomach disease, heart disease, liver disease, etc. Do not drive after drinking alcohol. Drinking enough alcohol to end up in an Emergency Room suggests you may have an alcohol abuse problem. Seek help at a drug addiction center.    SEEK IMMEDIATE MEDICAL CARE IF YOU HAVE ANY OF THE FOLLOWING SYMPTOMS: seizures, vomiting blood, blood in your stool, lightheadedness/dizziness, or becoming shaky to tremulous when you stop drinking.

## 2022-05-14 NOTE — ED ADULT TRIAGE NOTE - CHIEF COMPLAINT QUOTE
pt BIBA 1 hour after discharge intoxicated, admits to drinking 1 gallon of vodka. pt with no injries, tearful, JOLLY.

## 2022-05-14 NOTE — ED ADULT NURSE REASSESSMENT NOTE - NS ED NURSE REASSESS COMMENT FT1
pt becoming agitated and aggressive, getting out of bed constantly with unsteady gait observed. ER MD at bedside and pt to be medicated for agitation and safety.

## 2022-05-14 NOTE — ED PROVIDER NOTE - CLINICAL SUMMARY MEDICAL DECISION MAKING FREE TEXT BOX
Pt became agitated and was trying to leave the hospital but was still visibly intoxicated. Pt was given medication for agitation. Pt signed out to dr. burnham pending sobriety.

## 2022-05-14 NOTE — ED ADULT TRIAGE NOTE - BP NONINVASIVE SYSTOLIC (MM HG)
Thank you for letting us take care of you today. We hope all your questions were addressed. If a question was overlooked or something else comes to mind after you return home, please contact a member of your Care Team listed below. Please make sure you have a routine office visit set up to follow-up on [address]. Your Care Team at [place] [address] is Team #4  Mirtha Ibarra MD (Faculty)  Jerel Cuevas MD (Faculty  Sandrastormy Jung MD (Resident)  Maruice Su MD (Resident)  Jennifer Delvalle MD (Resident)  Linnea Gilbert MD (Resident)  Susan Smith MD (Resident)  DEEPALI Cano ,DOTTIE MERRITT,DOTTIE HAWTHORNE, DOTTIE Barber ([address] [facility] office)  Rosalea Scheuermann [facility] office)  Yee Berry [facility] office)  Benigno Bailey, [address] ([address] [place] )  Adrien Mcadams [facility] (Clinical Pharmacist)       Office phone number: [phone]    If you need to get in right away due to illness, please be advised we have \"Same Day\" appointments available Monday-Friday. Please call us at [phone] option #3 to schedule your \"Same Day\" appointment. [phone]

## 2022-05-14 NOTE — ED ADULT NURSE NOTE - CHIEF COMPLAINT QUOTE
Pt BIBA, found wandering around [employer], stumbling by bystander. PT denies injury, denies pain. Pt in hospital gown, discharged yesterday from ED. Pt reports hit head, no obvious signs of trauma, no injuries or bleeding noted. Pt alert oriented and awake. Stated " I drank vodka tonight" clothing removed and secured. Valuables locked up with security, #[id number] Throughout the shift patient was frequently getting out of bed due to loose stools. Patient is alert and orientated to self and impulsive. High fall risk precautions maintained and frequent rounding performed due to patient having frequent stools and on heparin drip. Patient pulled out a saline on the previous shift and zosyn/heparin administered together via [facility]

## 2022-06-09 NOTE — ED PROVIDER NOTE - NSICDXPASTMEDICALHX_GEN_ALL_CORE_FT
PAST MEDICAL HISTORY:  Alcohol abuse     Asthma     DM (diabetes mellitus)     GERD (gastroesophageal reflux disease)     High cholesterol     Hypertension

## 2022-06-09 NOTE — ED PROVIDER NOTE - PROGRESS NOTE DETAILS
Patient reassesed--no complaints.  Vital signs normal.  Resting comfortably.  A&Ox3.  Speech clear. Tolerating PO. Gait normal.  Clinically sober.

## 2022-06-09 NOTE — ED PROVIDER NOTE - PHYSICAL EXAMINATION
Gen: Well appearing in NAD  Head: NC/AT  Neck: trachea midline  Resp:  No distress  Ext: no deformities, no obvious signs of trauma   Neuro:  A&O appears non focal  Skin:  Warm and dry as visualized  Psych:  Normal affect and mood, no SI

## 2022-06-09 NOTE — ED PROVIDER NOTE - CLINICAL SUMMARY MEDICAL DECISION MAKING FREE TEXT BOX
Patient appears intoxicated.   - Fall precautions, FSG  - No evidence of falls or trauma.  - Will clear when clinically sober.

## 2022-06-09 NOTE — ED PROVIDER NOTE - OBJECTIVE STATEMENT
55 y/o male with PMHx of etoh abuse presents to the ED after drinking alcohol. Pt admits to alcohol use. Pt denies SI, HI. No obvious signs of trauma.

## 2022-06-09 NOTE — ED ADULT TRIAGE NOTE - CHIEF COMPLAINT QUOTE
pt presents to ED after being found in [employer] parking lot with empty alcohol bottles nearby crying and telling EMS that he wants to die. Admits to drinking, denies SI at this time. pt changed to yellow gown. belongings secured.

## 2022-06-09 NOTE — ED PROVIDER NOTE - PATIENT PORTAL LINK FT
You can access the FollowMyHealth Patient Portal offered by [facility] by registering at the following website: [email or web address]. By joining [employer]’s FollowMyHealth portal, you will also be able to view your health information using other applications (apps) compatible with our system.

## 2022-06-10 NOTE — ED PROVIDER NOTE - NEUROLOGICAL, MLM
moves all extremities equally, no focal defcitis, lethargic but arouses and makes purposeful movement to noxious stimuli (GCS 9 [id number])

## 2022-06-10 NOTE — ED ADULT NURSE NOTE - OBJECTIVE STATEMENT
Assumed care of pt at 0000. Pt received in yellow gown with no belongings at bedside. Pt appears lethargic, responsive to vocal and painful stimuli. GCS 15, ETOH smelled on breath, pt appears unkempt and not domiciled. Pt  Pt presents to ED form wandering around [employer]. No evidence of brusing or trauma noted to head upon assesment. Pt denies CP/SOB. PO nutriton provided.

## 2022-06-10 NOTE — ED ADULT NURSE NOTE - CHIEF COMPLAINT QUOTE
pt presents to ED after being found in [employer] parking lot with empty alcohol bottles nearby crying and telling EMS that he wants to die. Admits to drinking, denies SI at this time. pt changed to yellow gown. belongings secured.

## 2022-06-10 NOTE — ED ADULT TRIAGE NOTE - CHIEF COMPLAINT QUOTE
Pt arrives after being found lying on sidewalk next to empty bottle of vodka and brought to [facility], no obvious injuries/deformities noted. Pt is hard to elicit a painful response from and is unable to keep himself sitting up. MD Walden called for STAT eval.

## 2022-06-10 NOTE — ED PROVIDER NOTE - OBJECTIVE STATEMENT
56 year old male well known to this dept for freqeunt visit of etoh abuse presents with intoxication. the pt was found sleeping on sidewalk next to a bottle of Jaswinder Vodka. NO external signs of trauma. Pt is somnolent and unable to provide any elements of the Hx currently.

## 2022-06-10 NOTE — ED ADULT NURSE NOTE - NS ED NURSE LEVEL OF CONSCIOUSNESS ORIENTATION
Normal vision: sees adequately in most situations; can see medication labels, newsprint Oriented - self; Oriented - place; Oriented - time

## 2022-06-10 NOTE — ED ADULT NURSE REASSESSMENT NOTE - NS ED NURSE REASSESS COMMENT FT1
Assumed care of pt from previous RN. Pt is resting with eyes closed. Vital signs remain stable. Bed in lowest position with wheels locked.

## 2022-06-10 NOTE — ED ADULT NURSE NOTE - OBJECTIVE STATEMENT
pt BIBA after ingesting large amounts of vodka. pt with no obvious injury noted, even and unlabored resps. JOLLY, no supplemental 02 needed.

## 2022-06-11 NOTE — ED CDU PROVIDER DISPOSITION NOTE - ATTENDING CONTRIBUTION TO CARE
pt is sober and no more SI and wants to go home, psych consult not warranted, pt ute to walk and discharged

## 2022-06-11 NOTE — ED PROVIDER NOTE - PATIENT PORTAL LINK FT
You can access the FollowMyHealth Patient Portal offered by [facility] by registering at the following website: [email or web address]. By joining [employer]’s FollowMyHealth portal, you will also be able to view your health information using other applications (apps) compatible with our system.

## 2022-06-11 NOTE — ED ADULT NURSE REASSESSMENT NOTE - NS ED NURSE REASSESS COMMENT FT1
Pt repeatedly trying to get out of bed despite RN redirection. Pt endorsing SI. MD at bedside to evaluate. 1:1 ordered for pt safety. 1:1 aide at bedside. Environment checked and safety measures maintained.

## 2022-06-11 NOTE — ED CDU PROVIDER SUBSEQUENT DAY NOTE - ATTENDING CONTRIBUTION TO CARE
I, Adán Walden, performed a face to face bedside interview with this patient regarding history of present illness, review of symptoms and relevant past medical, social and family history.  I completed an independent physical examination. I have communicated the patient’s plan of care and disposition with the resident.

## 2022-06-11 NOTE — ED BEHAVIORAL HEALTH ASSESSMENT NOTE - HPI (INCLUDE ILLNESS QUALITY, SEVERITY, DURATION, TIMING, CONTEXT, MODIFYING FACTORS, ASSOCIATED SIGNS AND SYMPTOMS)
Patient a 57 y/o single male, un-domiciled, no past Psychiatric hx, hx of daily alcohol abuse, [facility], denied other drug abuse hx, hx of SA many years ago by OD, no medical issues was BIB/EMS as he was unresponsive on the sidewalk.    Patient in bed, tearful, had an alcohol level of 472 on admission yesterday, was kept overnight on 1:1 , tearful and sad and homeless with no benefits except PA. He endorses that " I'm going to kill; myself " I have nothing to loose " and continues to reiterate that he is going to do something in tearful voice, has no social support, denied any siblings or children at this time. He is fair to good sleep/appetite. No withdrawal symptoms at this time. He is depressed and to curb his depressed feelings he has been drinking daily 1.75 L vodka daily and spending $5. 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 He denied A/V/h, denied paranoid feelings and remains tearful expressing SI with no plans at this time. He remains skeptical what to do other than SI with no plans, he was asked for rehab, and endorses SI. No medical issues and has no allergies. He is unsure what  meds he took in the past, memory seems intact. He is well known to ED at [facility] and has been frequently visiting ED for the same reason for alcohol intoxication.

## 2022-06-11 NOTE — ED PROVIDER NOTE - PROGRESS NOTE DETAILS
Patient signed out to me by Dr. Gaines. Patient reassessed- still intoxicated with unsteady gait. Also requesting to speak with SW because he is homeless. Kristel Babcock DO pt feels better. sobe rnow, wants to defecate and has not defecate din 2 wks, gven golytely 2 cups and coffee and has bowel movement, pt discharged home Advancement-Rotation Flap Text: The defect edges were debeveled with a #15 scalpel blade.  Given the location of the defect, shape of the defect and the proximity to free margins an advancement-rotation flap was deemed most appropriate.  Using a sterile surgical marker, an appropriate flap was drawn incorporating the defect and placing the expected incisions within the relaxed skin tension lines where possible. The area thus outlined was incised deep to adipose tissue with a #15 scalpel blade.  The skin margins were undermined to an appropriate distance in all directions utilizing iris scissors.

## 2022-06-11 NOTE — ED PROVIDER NOTE - CONDITION AT DISCHARGE:
[FreeTextEntry1] : \par \par AICD interrogation today: Normal CRT-D function . PAF . Pt is asymptomatic . Rate controlled. \par 1 episode of NSVT , no shocks . Thoracic impedance - stable . All parameters stable \par AFIB burden <0.1%\par Currently on  mg \par \par Plan \par -Continue  mg daily \par -Continue remote monitoring\par -F/U in 6 months  \par I have also advised the patient to go to the nearest emergency room if he experiences any chest pain, dyspnea, syncope, or has any other compelling symptoms.\par \par \par \par \par \par \par 
Satisfactory

## 2022-06-11 NOTE — ED ADULT TRIAGE NOTE - CHIEF COMPLAINT QUOTE
Patient presents to ER visibly intoxicated, patient is calm and cooperative, resp even/unlabored, no obvious signs of injury,

## 2022-06-11 NOTE — ED CDU PROVIDER DISPOSITION NOTE - CLINICAL COURSE
56y M presented for alcohol intoxication. Pt expressed SI while in the ED. Pt seen by psych; now clinically sober and denying any SI. Stable for discharge.

## 2022-06-11 NOTE — ED ADULT NURSE REASSESSMENT NOTE - NS ED NURSE REASSESS COMMENT FT1
pt clinically sober at this time, denies SI at this time. pt wishes to leave at this time. pt ambulating to baseline, tolerating PO. pt d/c in stable condition, no apparent distress noted at this time. pt A&Ox3. pt able to ambulate with steady gait. pt in no distress at d/c.

## 2022-06-11 NOTE — ED BEHAVIORAL HEALTH ASSESSMENT NOTE - SUMMARY
Patient a 57 y/o single male, un-domiciled, no past Psychiatric hx, hx of daily alcohol abuse, [facility], denied other drug abuse hx, hx of SA many years ago by OD, no medical issues was BIB/EMS as he was unresponsive on the sidewalk.    Patient in bed, tearful, had an alcohol level of 472 on admission yesterday, was kept overnight on 1:1 , tearful and sad and homeless with no benefits except PA. He endorses that " I'm going to kill; myself " I have nothing to loose " and continues to reiterate that he is going to do something in tearful voice, has no social support, denied any siblings or children at this time. He is fair to good sleep/appetite. No withdrawal symptoms at this time. He is depressed and to curb his depressed feelings he has been drinking daily 1.75 L vodka daily and spending $5. 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 He denied A/V/h, denied paranoid feelings and remains tearful expressing SI with no plans at this time. He remains skeptical what to do other than SI with no plans, he was asked for rehab, and endorses SI. No medical issues and has no allergies. He is unsure what  meds he took in the past, memory seems intact. He is well known to ED at [facility] and has been frequently visiting ED for the same reason for alcohol intoxication.    Plan: Need in-patient admission for stability or if stable may qualify for Rehab if willing         Observe for stability

## 2022-06-11 NOTE — ED CDU PROVIDER SUBSEQUENT DAY NOTE - PROGRESS NOTE DETAILS
Pt endorses suicidal thoughts, states he will "tear his throat out" if he leaves. Still appears intoxicated. Will hold for reassessment in the morning.

## 2022-06-11 NOTE — ED BEHAVIORAL HEALTH ASSESSMENT NOTE - RISK ASSESSMENT
Moderate Acute Suicide Risk Moderate risk- Hx of prior SA, endorsing  SI now with no plans. Denied othjer drug abuse

## 2022-06-11 NOTE — ED PROVIDER NOTE - OBJECTIVE STATEMENT
57 y/o M with h/o alcoholism and recently discharged from ED for alcohol intoxication and returns with alcohol intoxication and crying after drinking  but no SI. No trauma

## 2022-06-11 NOTE — ED ADULT NURSE REASSESSMENT NOTE - NS ED NURSE REASSESS COMMENT FT1
assumed care of pt @ 0730, report received from night RN. pt resting in NAD, 1:1 at bedside, yellow gown on, belongings secured. respirations even and unlabored. Vital Signs Stable. awaiting psych eval.

## 2022-06-11 NOTE — ED CDU PROVIDER DISPOSITION NOTE - PATIENT PORTAL LINK FT
You can access the FollowMyHealth Patient Portal offered by [facility] by registering at the following website: [email or web address]. By joining [employer]’s FollowMyHealth portal, you will also be able to view your health information using other applications (apps) compatible with our system.

## 2022-06-11 NOTE — ED CDU PROVIDER DISPOSITION NOTE - NSFOLLOWUPINSTRUCTIONS_ED_ALL_ED_FT
Alcohol Use Disorder      Alcohol use disorder is a condition in which drinking disrupts daily life. People with this condition drink too much alcohol and cannot control their drinking.    Alcohol use disorder can cause serious problems with physical health. It can affect the brain, heart, and other internal organs. This disorder can raise the risk for certain cancers and cause problems with mental health, such as depression or anxiety.      What are the causes?    This condition is caused by drinking too much alcohol over time. Some people with this condition drink to cope with or escape from negative life events. Others drink to relieve pain or symptoms of mental illness.      What increases the risk?    You are more likely to develop this condition if:  •You have a family history of alcohol use disorder.      •Your culture encourages drinking to the point of becoming drunk (intoxication).      •You had a mood or conduct disorder in childhood.      •You have been abused.    •You are an adolescent and you:  •Have poor performance in school.      •Have poor supervision or guidance.      •Act on impulse and like taking risks.          What are the signs or symptoms?    Symptoms of this condition include:  •Drinking more than you want to.      •Trying several times without success to drink less.      •Spending a lot of time thinking about alcohol, getting alcohol, drinking, or recovering from drinking.      •Continuing to drink even when it is causing serious problems in your daily life.      •Drinking when it is dangerous to drink, such as before driving a car.      •Needing more and more alcohol to get the same effect you want (building up tolerance).    •Having symptoms of withdrawal when you stop drinking. Withdrawal symptoms may include:  •Trouble sleeping, leading to tiredness (fatigue).      •Mood swings of depression and anxiety.      •Physical symptoms, such as a fast heart rate, rapid breathing, high blood pressure (hypertension), fever, cold sweats, or nausea.      •Seizures.      •Severe confusion.      •Feeling or seeing things that are not there (hallucinations).      •Shaking movements that you cannot control (tremors).          How is this diagnosed?    This condition is diagnosed with an assessment. Your health care provider may start by asking three or four questions about your drinking, or he or she may give you a simple test to take. This helps to get clear information from you.    You may also have a physical exam or lab tests. You may be referred to a substance abuse counselor.      How is this treated?     With education, some people with alcohol use disorder are able to reduce their drinking. Many with this disorder cannot change their drinking behavior on their own and need help from substance use specialists. These specialists are counselors who can help diagnose how severe your disorder is and what type of treatment you need. Treatments may include:  •Detoxification. Detoxification involves quitting drinking with supervision and direction of health care providers. Your health care provider may prescribe prescription medicines within the first week to help lessen withdrawal symptoms. Alcohol withdrawal can be dangerous and life-threatening. Detoxification may be provided in a home, community, or primary care setting, or in a hospital or substance use treatment facility.    •Counseling. This may involve motivational interviewing (MI), family therapy, or cognitive behavioral therapy (CBT). It is provided by substance use treatment counselors or professional therapists. A counselor can address the things you can do to change your drinking behavior and how to maintain the changes. Talk therapy aims to:  •Identify your positive motivations to change.      •Identify and avoid the things that trigger your drinking.      •Help you learn how to plan your behavior change.      •Develop support systems that can help you sustain the change.      •Medicines. Medicines can help treat this disorder by:  •Decreasing cravings.      •Decreasing the positive feeling you have when you drink.      •Causing an uncomfortable physical reaction when you drink (aversion therapy).        •[place] help groups such as Alcoholics Anonymous (AA). These groups are led by people who have quit drinking. The groups provide emotional support, advice, and guidance.      Some people with this condition benefit from a combination of treatments provided by specialized substance use treatment centers.      Follow these instructions at home:     Medicines     •Take over-the-counter and prescription medicines only as told by your health care provider.      •Ask before starting any new medicines, herbs, or supplements.      General instructions     •Ask friends and family members to support your choice to stay sober.      •Avoid situations where alcohol is served.      •Create a plan to deal with tempting situations.      •Attend support groups regularly.      •Practice hobbies or activities you enjoy.      • Do not drink and drive.      •Keep all follow-up visits as told by your health care provider. This is important.        How is this prevented?  •If you drink alcohol:•Limit how much you use to:  •0–1 drink a day for nonpregnant women.      •0–2 drinks a day for men.        •Be aware of how much alcohol is in your drink. In the [place] one drink equals one 12 oz bottle of beer (355 mL), one 5 oz glass of wine (148 mL), or one 1½ oz glass of hard liquor (44 mL).      •If you have a mental health condition, seek treatment. Develop a healthy lifestyle through:  •Meditation or deep breathing.      •Exercise.      •Spending time in nature.       •Listening to music.      •Talking with a trusted friend or family member.      •If you are an adolescent:  •Do not drink alcohol. Avoid gatherings where you might be tempted.      •Do not be afraid to say no if someone offers you alcohol. Speak up about why you do not want to drink. Set a positive example for others around you by not drinking.      •Build relationships with friends who do not drink.          Where to find more information    •Substance Abuse and Mental Health Services Administration: samhsa.gov      •Alcoholics Anonymous: aa.org        Contact a health care provider if:    •You cannot take your medicines as told.      •Your symptoms get worse or you experience symptoms of withdrawal when you stop drinking.      •You start drinking again (relapse) and your symptoms get worse.        Get help right away if:    •You have thoughts about hurting yourself or others.      If you ever feel like you may hurt yourself or others, or have thoughts about taking your own life, get help right away. Go to your nearest emergency department or:    • Call your local emergency services (248 in the [place]       • Call a suicide crisis helpline, such as the National Suicide Prevention Lifeline at 1-764.227.4655. This is open 24 hours a day in the [place]       • Text the Crisis Text Line at 644600 (in the [place]         Summary    •Alcohol use disorder is a condition in which drinking disrupts daily life. People with this condition drink too much alcohol and cannot control their drinking.      •Treatment may include detoxification, counseling, medicines, and support groups.      •Ask friends and family members to support you. Avoid situations where alcohol is served.      •Get help right away if you have thoughts about hurting yourself or others.      This information is not intended to replace advice given to you by your health care provider. Make sure you discuss any questions you have with your health care provider.

## 2022-06-12 NOTE — ED ADULT TRIAGE NOTE - CHIEF COMPLAINT QUOTE
Patient BIBA to ED today after being found outside of [facility] with his pants down intoxicated.

## 2022-06-12 NOTE — ED ADULT TRIAGE NOTE - CHIEF COMPLAINT QUOTE
Pt. BIBA for ingestion. Pt. was here 2x today for same complaint. Crying in triage. Pt. changed into yellow gown and belongings secured.

## 2022-06-12 NOTE — ED PROVIDER NOTE - PHYSICAL EXAMINATION
Constitutional - well-developed.   Head - NCAT. Airway patent.   Eyes - PERRL.   CV - RRR. no murmur. no edema.   Pulm - CTAB.   Abd - soft, nt. no rebound. no guarding.   Neuro - A&Ox3. JOLLY  Skin - No rash. .  MSK - normal ROM.

## 2022-06-12 NOTE — ED PROVIDER NOTE - ATTENDING CONTRIBUTION TO CARE
pt with history alcohol abuse with + use; pt not visible signs of head injury;   pe heent ncat neck supple cor s2 s2 lungs clear abd soft nontender neuro nonfocal dx alcohol intoxiciation

## 2022-06-12 NOTE — ED PROVIDER NOTE - PATIENT PORTAL LINK FT
You can access the FollowMyHealth Patient Portal offered by [facility] by registering at the following website: [email or web address]. By joining [employer]’s FollowMyHealth portal, you will also be able to view your health information using other applications (apps) compatible with our system.

## 2022-06-12 NOTE — ED ADULT NURSE NOTE - NSIMPLEMENTINTERV_GEN_ALL_ED
Implemented All Fall Risk Interventions:  [place] to call system. Call bell, personal items and telephone within reach. Instruct patient to call for assistance. Room bathroom lighting operational. Non-slip footwear when patient is off stretcher. Physically safe environment: no spills, clutter or unnecessary equipment. Stretcher in lowest position, wheels locked, appropriate side rails in place. Provide visual cue, wrist band, yellow gown, etc. Monitor gait and stability. Monitor for mental status changes and reorient to person, place, and time. Review medications for side effects contributing to fall risk. Reinforce activity limits and safety measures with patient and family.

## 2022-06-12 NOTE — ED PROVIDER NOTE - OBJECTIVE STATEMENT
Pt is known well to this ER and in fact this is his third visit to the hospital today for alcohol intoxication. Pt last discharged 5 h ago and admits to drinking after leaving. no trauma. Pt found intoxicated on the street. no other signs of trauma.

## 2022-06-12 NOTE — ED PROVIDER NOTE - OBJECTIVE STATEMENT
56y M w/ hx EtOH abuse, GERD, DM, HTN, HLD, asthma, presenting for intoxication. Pt was just discharged from this ED earlier this afternoon. Pt was then found outside a restaurant with his pants down just prior to arrival. Denies any specific complaints. Has been seen in this ED multiple times over the past few days for alcohol intoxication.

## 2022-06-12 NOTE — ED ADULT NURSE NOTE - OBJECTIVE STATEMENT
Assumed care of pt at 2320. Pt received in yellow gown with no belongings at bedside. Pt appears lethargic, responsive to vocal and painful stimuli. GCS 10, ETOH smelled on breath, pt appears unkempt and not domiciled. Pt presents to ED from outside. . No evidence of bruising or trauma noted to head upon assessment. Pt denies CP/SOB. PO nutrition provided.

## 2022-06-12 NOTE — ED PROVIDER NOTE - PATIENT PORTAL LINK FT
You can access the FollowMyHealth Patient Portal offered by [facility] by registering at the following website: [email or web address]. By joining [employer]’s FollowMyHealth portal, you will also be able to view your health information using other applications (apps) compatible with our system.

## 2022-06-12 NOTE — ED PROVIDER NOTE - NSFOLLOWUPINSTRUCTIONS_ED_ALL_ED_FT
Alcohol Use Disorder      Alcohol use disorder is a condition in which drinking disrupts daily life. People with this condition drink too much alcohol and cannot control their drinking.    Alcohol use disorder can cause serious problems with physical health. It can affect the brain, heart, and other internal organs. This disorder can raise the risk for certain cancers and cause problems with mental health, such as depression or anxiety.      What are the causes?    This condition is caused by drinking too much alcohol over time. Some people with this condition drink to cope with or escape from negative life events. Others drink to relieve pain or symptoms of mental illness.      What increases the risk?    You are more likely to develop this condition if:  •You have a family history of alcohol use disorder.      •Your culture encourages drinking to the point of becoming drunk (intoxication).      •You had a mood or conduct disorder in childhood.      •You have been abused.    •You are an adolescent and you:  •Have poor performance in school.      •Have poor supervision or guidance.      •Act on impulse and like taking risks.          What are the signs or symptoms?    Symptoms of this condition include:  •Drinking more than you want to.      •Trying several times without success to drink less.      •Spending a lot of time thinking about alcohol, getting alcohol, drinking, or recovering from drinking.      •Continuing to drink even when it is causing serious problems in your daily life.      •Drinking when it is dangerous to drink, such as before driving a car.      •Needing more and more alcohol to get the same effect you want (building up tolerance).    •Having symptoms of withdrawal when you stop drinking. Withdrawal symptoms may include:  •Trouble sleeping, leading to tiredness (fatigue).      •Mood swings of depression and anxiety.      •Physical symptoms, such as a fast heart rate, rapid breathing, high blood pressure (hypertension), fever, cold sweats, or nausea.      •Seizures.      •Severe confusion.      •Feeling or seeing things that are not there (hallucinations).      •Shaking movements that you cannot control (tremors).          How is this diagnosed?    This condition is diagnosed with an assessment. Your health care provider may start by asking three or four questions about your drinking, or he or she may give you a simple test to take. This helps to get clear information from you.    You may also have a physical exam or lab tests. You may be referred to a substance abuse counselor.      How is this treated?     With education, some people with alcohol use disorder are able to reduce their drinking. Many with this disorder cannot change their drinking behavior on their own and need help from substance use specialists. These specialists are counselors who can help diagnose how severe your disorder is and what type of treatment you need. Treatments may include:  •Detoxification. Detoxification involves quitting drinking with supervision and direction of health care providers. Your health care provider may prescribe prescription medicines within the first week to help lessen withdrawal symptoms. Alcohol withdrawal can be dangerous and life-threatening. Detoxification may be provided in a home, community, or primary care setting, or in a hospital or substance use treatment facility.    •Counseling. This may involve motivational interviewing (MI), family therapy, or cognitive behavioral therapy (CBT). It is provided by substance use treatment counselors or professional therapists. A counselor can address the things you can do to change your drinking behavior and how to maintain the changes. Talk therapy aims to:  •Identify your positive motivations to change.      •Identify and avoid the things that trigger your drinking.      •Help you learn how to plan your behavior change.      •Develop support systems that can help you sustain the change.      •Medicines. Medicines can help treat this disorder by:  •Decreasing cravings.      •Decreasing the positive feeling you have when you drink.      •Causing an uncomfortable physical reaction when you drink (aversion therapy).        •[place] help groups such as Alcoholics Anonymous (AA). These groups are led by people who have quit drinking. The groups provide emotional support, advice, and guidance.      Some people with this condition benefit from a combination of treatments provided by specialized substance use treatment centers.      Follow these instructions at home:     Medicines     •Take over-the-counter and prescription medicines only as told by your health care provider.      •Ask before starting any new medicines, herbs, or supplements.      General instructions     •Ask friends and family members to support your choice to stay sober.      •Avoid situations where alcohol is served.      •Create a plan to deal with tempting situations.      •Attend support groups regularly.      •Practice hobbies or activities you enjoy.      • Do not drink and drive.      •Keep all follow-up visits as told by your health care provider. This is important.        How is this prevented?  •If you drink alcohol:•Limit how much you use to:  •0–1 drink a day for nonpregnant women.      •0–2 drinks a day for men.        •Be aware of how much alcohol is in your drink. In the [place] one drink equals one 12 oz bottle of beer (355 mL), one 5 oz glass of wine (148 mL), or one 1½ oz glass of hard liquor (44 mL).      •If you have a mental health condition, seek treatment. Develop a healthy lifestyle through:  •Meditation or deep breathing.      •Exercise.      •Spending time in nature.       •Listening to music.      •Talking with a trusted friend or family member.      •If you are an adolescent:  •Do not drink alcohol. Avoid gatherings where you might be tempted.      •Do not be afraid to say no if someone offers you alcohol. Speak up about why you do not want to drink. Set a positive example for others around you by not drinking.      •Build relationships with friends who do not drink.          Where to find more information    •Substance Abuse and Mental Health Services Administration: samhsa.gov      •Alcoholics Anonymous: aa.org      Contact a health care provider if:    •You cannot take your medicines as told.      •Your symptoms get worse or you experience symptoms of withdrawal when you stop drinking.      •You start drinking again (relapse) and your symptoms get worse.      Get help right away if:    •You have thoughts about hurting yourself or others.      If you ever feel like you may hurt yourself or others, or have thoughts about taking your own life, get help right away. Go to your nearest emergency department or:    • Call your local emergency services (701 in the [place]       • Call a suicide crisis helpline, such as the National Suicide Prevention Lifeline at 1-456.845.3140. This is open 24 hours a day in the [place]       • Text the Crisis Text Line at 335112 (in the [place]       Summary    •Alcohol use disorder is a condition in which drinking disrupts daily life. People with this condition drink too much alcohol and cannot control their drinking.      •Treatment may include detoxification, counseling, medicines, and support groups.      •Ask friends and family members to support you. Avoid situations where alcohol is served.      •Get help right away if you have thoughts about hurting yourself or others.      This information is not intended to replace advice given to you by your health care provider. Make sure you discuss any questions you have with your health care provider.

## 2022-06-12 NOTE — ED ADULT NURSE REASSESSMENT NOTE - NS ED NURSE REASSESS COMMENT FT1
assumed care of pt at 7:30. report received from bonilla castillo. charting as noted. rr even and unlabored. anox4. denies chest pain or sob. continued to be in yellow gown, no belongings at bedside. denies si/hi at this time. 1:1 at bedside to maintain safety. pt educated on plan of care, pt able to successfully teach back plan of care to RN, RN will continue to reeducate pt during hospital stay.

## 2022-06-13 NOTE — ED ADULT NURSE NOTE - SUICIDE SCREENING QUESTION 3
Final labs sent to [facility]. Please advise on note for letter and I will send with any information needed. Reviewed chart however am not seeing a result note on [facility] at this time. Regarding labs for 6/23  William Clay CMA on 7/30/2021 at 9:42 AM     Patient unable to complete

## 2022-06-13 NOTE — ED PROVIDER NOTE - ATTENDING CONTRIBUTION TO CARE
I have personally performed a face to face medical and diagnostic evaluation of the patient. I have discussed the case with the resident and reviewed their addition to the note. Please see the HPI, ROS, PE and MDM as authored by me.

## 2022-06-13 NOTE — ED PROVIDER NOTE - OBJECTIVE STATEMENT
55yo man was brought in by EMS after being found unresponsive in lawn chair with empty bottle of alcohol.  in field. Pt frequently seen in ED for alcohol intoxication. Noted to be diaphoretic and in sweat shirt. Pt is not arousable to stimuli.

## 2022-06-13 NOTE — ED PROVIDER NOTE - NSFOLLOWUPINSTRUCTIONS_ED_ALL_ED_FT
Please follow up with your primary care provider in the next few days.    General instructions:  Avoid drinking alcohol on an empty stomach.  Stay hydrated. Drink enough fluid to keep your urine pale yellow.   Avoid drinking more than one drink per hour.  When having multiple drinks, drink water or a non-alcoholic beverage between alcoholic drinks.  Do not drive after drinking any amount of alcohol. Plan for a designated  or another way to go home.  Have someone responsible stay with you while you are intoxicated. You should not be left alone.    Return to the emergency room if you have any of the following symptoms:  Moderate to severe trouble with coordination, speech, memory, or attention.  Trouble staying awake.  Severe confusion.  A seizure.  Light-headedness.  Fainting.  Vomiting bright red blood or material that looks like coffee grounds.  Bloody stool (feces). The blood may be bright red, or it may make stool look black and tarry and make it smell bad.  Shakiness when trying to stop drinking.  Thoughts about hurting yourself or others.

## 2022-06-13 NOTE — ED PROVIDER NOTE - PATIENT PORTAL LINK FT
You can access the FollowMyHealth Patient Portal offered by [facility] by registering at the following website: [email or web address]. By joining [employer]’s FollowMyHealth portal, you will also be able to view your health information using other applications (apps) compatible with our system.

## 2022-06-13 NOTE — ED PROVIDER NOTE - CLINICAL SUMMARY MEDICAL DECISION MAKING FREE TEXT BOX
55yo man presents unresponsive. Rectal temp 97.4F on arrival. Pt is breathing spontaneously. Concern for trauma vs intoxication vs metabolic derangement vs neurologic vs trauma. Blood work, EKG, CTH, CXR. give fluids.

## 2022-06-13 NOTE — ED ADULT NURSE NOTE - NSIMPLEMENTINTERV_GEN_ALL_ED
Implemented All Fall Risk Interventions:  [place] to call system. Call bell, personal items and telephone within reach. Instruct patient to call for assistance. Room bathroom lighting operational. Non-slip footwear when patient is off stretcher. Physically safe environment: no spills, clutter or unnecessary equipment. Stretcher in lowest position, wheels locked, appropriate side rails in place. Provide visual cue, wrist band, yellow gown, etc. Monitor gait and stability. Monitor for mental status changes and reorient to person, place, and time. Review medications for side effects contributing to fall risk. Reinforce activity limits and safety measures with patient and family.

## 2022-06-13 NOTE — ED PROVIDER NOTE - PROGRESS NOTE DETAILS
José Miguel: appears intoxicated, will observe for metabolism of alcohol. fluids given. Richard: received sign out, patient awake and alerty, ambnulatory, making coffeem, takling PO without difficulty, ok for d/c

## 2022-06-13 NOTE — ED ADULT NURSE REASSESSMENT NOTE - NS ED NURSE REASSESS COMMENT FT1
pt resting comfortably in bed showing no signs of respiratory distress or pain, pt is calm and cooperative
pt resting comfortably in bed showing no signs of respiratory distress or pain, pt is calm and cooperative, pt got up to maker himself some coffee

## 2022-06-13 NOTE — ED ADULT NURSE REASSESSMENT NOTE - NS ED NURSE REASSESS COMMENT FT1
Pt is ambulatory, walking to the rest room, making coffee, pt is A&O4, pt is able to express needs, pt is not in any distress, VS recorded in the EMR.

## 2022-06-13 NOTE — ED ADULT NURSE REASSESSMENT NOTE - NS ED NURSE REASSESS COMMENT FT1
received pt s/p being cared for in critical and assumed care. pt sleeping in bed. breathing even and unlabored. awakens to painful stimuli. awaiting sobriety. will continue to monitor.

## 2022-06-13 NOTE — ED ADULT TRIAGE NOTE - CHIEF COMPLAINT QUOTE
BIBA c/o being unresponsive on brother lawn, pt has been in sun. Patient is alcohol intox, pt has been seen for past 3 days here.

## 2022-06-13 NOTE — ED ADULT NURSE REASSESSMENT NOTE - NS ED NURSE REASSESS COMMENT FT1
Assuming care from DENG Santana, review of patients history, reason for admission, medication administered, tests performed/to be performed reviewed, introduced to pt at bedside, provided follow up information at change of shift, IV site assessed, clean dry and intact, updated patient as to the plan of care and disposition, pt educated on current plan of care and education deemed successful after teach back shows proficiency. Pt provided ample time for question at answers at conclusion of interview. Bed is locked, in the lowest position, side rails are up and call bell is within reach.

## 2022-06-13 NOTE — ED PROVIDER NOTE - PHYSICAL EXAMINATION
General: unresponsive man  Head: normocephalic, atraumatic  Eyes: PERRL, 4mm bilaterally  Mouth: dry mucous membranes  Neck: supple neck  CV: mild tachycardia, no LE edema, peripheral pulses 2+ bilateral UE and LE  Respiratory: spontaneous respirations  Abdomen: soft, nondistended  : normal external genitalia  MSK: no joint deformities  Neuro: unresponsive and does not withdraw to pain  Skin: no rash noted

## 2022-06-13 NOTE — ED ADULT NURSE NOTE - OBJECTIVE STATEMENT
Pt BIBA found unresponsive on lawn chair at [facility].  Pt found with emp Pt BIBA found unresponsive on lawn chair at [facility].  Pt found with empty vielka bottle next to him.  Pt minimally responsive to painful stimuli.  Pt 92% on room air; placed on 2 L supplemental O2.  No obvious injury noted to pt.

## 2022-06-13 NOTE — ED PROVIDER NOTE - UNABLE TO OBTAIN
Unresponsive
Text: The above diagnosis and findings were noted on the exam but not discussed at this time with the patient.
Detail Level: Simple

## 2022-06-14 NOTE — ED PROVIDER NOTE - PATIENT PORTAL LINK FT
You can access the FollowMyHealth Patient Portal offered by [facility] by registering at the following website: [email or web address]. By joining [employer]’s FollowMyHealth portal, you will also be able to view your health information using other applications (apps) compatible with our system.

## 2022-06-14 NOTE — ED ADULT NURSE REASSESSMENT NOTE - NS ED NURSE REASSESS COMMENT FT1
pt status unchanged, refer to flowsheet and chart, pt safety maintained, pt hemodynamically stable. Pt agitated he was woken up to change soiled linen. Linen changed, urinal provided for pt to use. Pt remains intoxicated, cursing at staff.

## 2022-06-14 NOTE — ED ADULT NURSE NOTE - OBJECTIVE STATEMENT
Pt with PMHx of ETOH presents for intoxication. Pt well known to ED, discharged yesterday for same thing. Pt awakens with mild sternal rub. Pt ALOOB. Pt placed on pulse ox, O2 2L. Pt sleeping. Unable to get more info due to pt condition. FS completed.

## 2022-06-14 NOTE — ED ADULT NURSE REASSESSMENT NOTE - NS ED NURSE REASSESS COMMENT FT1
Pt discharged from hospital, IVs removed from bilateral upper extremities, bleeding controlled, educated patient on alcohol abuse and affects on the body, pt asked lots of questions and answers were provided with amble amount of time, pt slept through the night, was given a meal prior to leaving the hospital, education deemed successful after pt states understanding, pt calm and appropriate at discharge, pt ambulated out of the hospital after his clothing was retrieved from behavioral health.

## 2022-06-14 NOTE — ED PROVIDER NOTE - OBJECTIVE STATEMENT
55yo M p/w alcohol intox. no head/face trauma. moving all extremeties. . not tremulous , neck supple. PERRLA. arousable to sternal rub. AOB

## 2022-06-14 NOTE — ED PROVIDER NOTE - NSFOLLOWUPINSTRUCTIONS_ED_ALL_ED_FT
Alcohol Use Disorder      Alcohol use disorder is a condition in which drinking disrupts daily life. People with this condition drink too much alcohol and cannot control their drinking.    Alcohol use disorder can cause serious problems with physical health. It can affect the brain, heart, and other internal organs. This disorder can raise the risk for certain cancers and cause problems with mental health, such as depression or anxiety.      What are the causes?    This condition is caused by drinking too much alcohol over time. Some people with this condition drink to cope with or escape from negative life events. Others drink to relieve pain or symptoms of mental illness.      What increases the risk?    You are more likely to develop this condition if:  •You have a family history of alcohol use disorder.      •Your culture encourages drinking to the point of becoming drunk (intoxication).      •You had a mood or conduct disorder in childhood.      •You have been abused.    •You are an adolescent and you:  •Have poor performance in school.      •Have poor supervision or guidance.      •Act on impulse and like taking risks.          What are the signs or symptoms?    Symptoms of this condition include:  •Drinking more than you want to.      •Trying several times without success to drink less.      •Spending a lot of time thinking about alcohol, getting alcohol, drinking, or recovering from drinking.      •Continuing to drink even when it is causing serious problems in your daily life.      •Drinking when it is dangerous to drink, such as before driving a car.      •Needing more and more alcohol to get the same effect you want (building up tolerance).    •Having symptoms of withdrawal when you stop drinking. Withdrawal symptoms may include:  •Trouble sleeping, leading to tiredness (fatigue).      •Mood swings of depression and anxiety.      •Physical symptoms, such as a fast heart rate, rapid breathing, high blood pressure (hypertension), fever, cold sweats, or nausea.      •Seizures.      •Severe confusion.      •Feeling or seeing things that are not there (hallucinations).      •Shaking movements that you cannot control (tremors).          How is this diagnosed?    This condition is diagnosed with an assessment. Your health care provider may start by asking three or four questions about your drinking, or he or she may give you a simple test to take. This helps to get clear information from you.    You may also have a physical exam or lab tests. You may be referred to a substance abuse counselor.      How is this treated?     With education, some people with alcohol use disorder are able to reduce their drinking. Many with this disorder cannot change their drinking behavior on their own and need help from substance use specialists. These specialists are counselors who can help diagnose how severe your disorder is and what type of treatment you need. Treatments may include:  •Detoxification. Detoxification involves quitting drinking with supervision and direction of health care providers. Your health care provider may prescribe prescription medicines within the first week to help lessen withdrawal symptoms. Alcohol withdrawal can be dangerous and life-threatening. Detoxification may be provided in a home, community, or primary care setting, or in a hospital or substance use treatment facility.    •Counseling. This may involve motivational interviewing (MI), family therapy, or cognitive behavioral therapy (CBT). It is provided by substance use treatment counselors or professional therapists. A counselor can address the things you can do to change your drinking behavior and how to maintain the changes. Talk therapy aims to:  •Identify your positive motivations to change.      •Identify and avoid the things that trigger your drinking.      •Help you learn how to plan your behavior change.      •Develop support systems that can help you sustain the change.      •Medicines. Medicines can help treat this disorder by:  •Decreasing cravings.      •Decreasing the positive feeling you have when you drink.      •Causing an uncomfortable physical reaction when you drink (aversion therapy).        •Ames help groups such as Alcoholics Anonymous (AA). These groups are led by people who have quit drinking. The groups provide emotional support, advice, and guidance.      Some people with this condition benefit from a combination of treatments provided by specialized substance use treatment centers.      Follow these instructions at home:     Medicines     •Take over-the-counter and prescription medicines only as told by your health care provider.      •Ask before starting any new medicines, herbs, or supplements.      General instructions     •Ask friends and family members to support your choice to stay sober.      •Avoid situations where alcohol is served.      •Create a plan to deal with tempting situations.      •Attend support groups regularly.      •Practice hobbies or activities you enjoy.      • Do not drink and drive.      •Keep all follow-up visits as told by your health care provider. This is important.        How is this prevented?  •If you drink alcohol:•Limit how much you use to:  •0–1 drink a day for nonpregnant women.      •0–2 drinks a day for men.        •Be aware of how much alcohol is in your drink. In the [place] one drink equals one 12 oz bottle of beer (355 mL), one 5 oz glass of wine (148 mL), or one 1½ oz glass of hard liquor (44 mL).      •If you have a mental health condition, seek treatment. Develop a healthy lifestyle through:  •Meditation or deep breathing.      •Exercise.      •Spending time in nature.       •Listening to music.      •Talking with a trusted friend or family member.      •If you are an adolescent:  •Do not drink alcohol. Avoid gatherings where you might be tempted.      •Do not be afraid to say no if someone offers you alcohol. Speak up about why you do not want to drink. Set a positive example for others around you by not drinking.      •Build relationships with friends who do not drink.          Where to find more information    •Substance Abuse and Mental Health Services Administration: samhsa.gov      •Alcoholics Anonymous: aa.org        Contact a health care provider if:    •You cannot take your medicines as told.      •Your symptoms get worse or you experience symptoms of withdrawal when you stop drinking.      •You start drinking again (relapse) and your symptoms get worse.        Get help right away if:    •You have thoughts about hurting yourself or others.      If you ever feel like you may hurt yourself or others, or have thoughts about taking your own life, get help right away. Go to your nearest emergency department or:    • Call your local emergency services (790 in the [place]       • Call a suicide crisis helpline, such as the National Suicide Prevention Lifeline at 1-526.631.9442. This is open 24 hours a day in the [place]       • Text the Crisis Text Line at 715331 (in the [place]         Summary    •Alcohol use disorder is a condition in which drinking disrupts daily life. People with this condition drink too much alcohol and cannot control their drinking.      •Treatment may include detoxification, counseling, medicines, and support groups.      •Ask friends and family members to support you. Avoid situations where alcohol is served.      •Get help right away if you have thoughts about hurting yourself or others.      This information is not intended to replace advice given to you by your health care provider. Make sure you discuss any questions you have with your health care provider.

## 2022-06-14 NOTE — ED PROVIDER NOTE - CLINICAL SUMMARY MEDICAL DECISION MAKING FREE TEXT BOX
intox arousable to sternal rub ; neck supple moving all extr--fs - pending sobriety  seen here for alcohol abuse /intox last night

## 2022-06-14 NOTE — ED PROVIDER NOTE - PHYSICAL EXAMINATION
Head: atraumatic, normacephalic  Face: atraumatic, no crepitus no orbiral/maxillary/mandibular ttp  eyes: perrla eomi  heart: rrr s1s2  lungs: ctab  abd: soft, nt nd +bs no rebound/guarding no cva ttp  skin: warm  LE: no swelling, no calf ttp  neuro: intox

## 2022-06-15 NOTE — ED PROVIDER NOTE - CLINICAL SUMMARY MEDICAL DECISION MAKING FREE TEXT BOX
pt presenting for etoh intoxication. No signs of trauma, cooperative. Will obs for sobriety and reeval.

## 2022-06-15 NOTE — ED ADULT TRIAGE NOTE - BP NONINVASIVE DIASTOLIC (MM HG)
Left message asking patient if had her flu shot this season; call back to let us know when and where done or schedule appointment to have injection with our office.   81

## 2022-06-15 NOTE — ED PROVIDER NOTE - PATIENT PORTAL LINK FT
You can access the FollowMyHealth Patient Portal offered by [facility] by registering at the following website: [email or web address]. By joining [employer]’s FollowMyHealth portal, you will also be able to view your health information using other applications (apps) compatible with our system.

## 2022-06-15 NOTE — ED ADULT TRIAGE NOTE - CHIEF COMPLAINT QUOTE
bystanders called EMS after pt was found laying under tree after drinking a gallon of vodka. pt changed to yellow gown, belongings secured.

## 2022-06-15 NOTE — ED ADULT NURSE REASSESSMENT NOTE - NS ED NURSE REASSESS COMMENT FT1
Pt DC by MD Jin, MD Jin told Pt he will call security if Pt does not leave. Pt not staying for DC paper work. Pt evaluated by MD Jin, Pt DC by MD Jin, MD Jin told Pt he will call security if Pt does not leave. Pt not staying for paper work.

## 2022-06-15 NOTE — ED ADULT NURSE NOTE - OBJECTIVE STATEMENT
Pt presents to ED after drinking a gallon of beer. Pt Hx of ETOH abuse Pt Denies any chest pain, shortness of breath, nausea or dizziness at this time. Pt last drink was today prior to arriving in the ED. Pt VSS at this time, breathing is even and unlabored, Pt sleeping in bed no other complaints for RN FS checked as per orders. will continue to monitor.

## 2022-06-15 NOTE — ED PROVIDER NOTE - PHYSICAL EXAMINATION
Gen: no acute distress, appears intoxicated, etoh on breath  Head: normocephalic, atraumatic  Lung: CTAB, no respiratory distress, no wheezing, rales, rhonchi  CV: normal s1/s2, rrr,   Abd: soft, no tenderness, non-distended  MSK: No edema, no visible deformities, full range of motion in all 4 extremities  Neuro: No focal neurologic deficits  Skin: No rash   Psych: normal affect Gen: no acute distress, appears intoxicated, etoh on breath  Head: normocephalic, atraumatic  Lung: CTAB, no respiratory distress, no wheezing, rales, rhonchi  CV: normal s1/s2, rrr,   Abd: soft, no tenderness, non-distended  MSK: No edema, no visible deformities, full range of motion in all 4 extremities  Neuro: No focal neurologic deficits  Skin: No rash   Psych: normal affect.

## 2022-06-15 NOTE — ED PROVIDER NOTE - OBJECTIVE STATEMENT
57 y/o M pt with hx of etoh abuse, well known to this ED for frequent visits presenting today for evaluation of etoh intoxication. Admits to drinking "a gallon" of beer today. States he drinks daily, last drink was just pta. Denies other drug use. Denies si/hi. Pt denies any chest pain, dyspnea, fevers, n/v/d, abdominal pain, dysuria, headache, cough, congestion, sore throat, neck pain, back pain, weakness, numbness, tingling, dizziness, syncope, or other complaint. 56 year old M pt with hx of etoh abuse, well known to this ED for frequent visits presenting today for evaluation of etoh intoxication. Admits to drinking "a gallon" of beer today. States he drinks daily, last drink was just pta. Denies other drug use. Denies si/hi. Pt denies any chest pain, dyspnea, fevers, n/v/d, abdominal pain, dysuria, headache, cough, congestion, sore throat, neck pain, back pain, weakness, numbness, tingling, dizziness, syncope, or other complaint.

## 2022-06-15 NOTE — ED ADULT NURSE REASSESSMENT NOTE - NS ED NURSE REASSESS COMMENT FT1
RN providing break coverage [id number]. Patient is asleep; arousable with alcohol on breath. Patient with no complaints at this time.

## 2022-06-23 NOTE — ED PROVIDER NOTE - PATIENT PORTAL LINK FT
You can access the FollowMyHealth Patient Portal offered by [facility] by registering at the following website: [email or web address]. By joining [employer]’s FollowMyHealth portal, you will also be able to view your health information using other applications (apps) compatible with our system.

## 2022-06-23 NOTE — ED ADULT TRIAGE NOTE - CHIEF COMPLAINT QUOTE
Patient presents to ER with alcohol intoxication, patient calm and cooperative, making suicidal statements, no obvious signs of injury, resp even/unlabored, patient undressed, belongings in safety.

## 2022-06-23 NOTE — ED PROVIDER NOTE - PROGRESS NOTE DETAILS
Todd: Patient got up from bed and fell in ED. Put in for CT head. Now on 1:1 observation Todd: Ambulating independently in ED. Tolerating PO. Stable for dc. Patient understands return precautions and f/u.

## 2022-06-23 NOTE — ED PROVIDER NOTE - PHYSICAL EXAMINATION
Gen: No acute distress, non toxic  HEENT: Mucous membranes moist, pink conjunctivae, EOMI. ncat  CV: RRR, nl s1/s2.  Resp: CTAB, normal rate and effort  GI: Abdomen soft, NT, ND. No rebound, no guarding  : No CVAT  Neuro: moving all extremities, with some slurred speech.   MSK: No spine or joint tenderness to palpation  Skin: No rashes. intact and perfused.

## 2022-06-23 NOTE — ED ADULT NURSE REASSESSMENT NOTE - NS ED NURSE REASSESS COMMENT FT1
Despite frequent rounding and reeducation concerting the patient staying in bed. Patient climbed out of bed and did fall. MD and charge nurse made aware. Patient reports making contact with the head. MD to order 1:1 for patient safety and a head CT. Patient assessed by this nurse. Patient no defects were observed. Patient helped back to bed by 3 nurses.

## 2022-06-23 NOTE — ED PROVIDER NOTE - CLINICAL SUMMARY MEDICAL DECISION MAKING FREE TEXT BOX
intox, ?si, poor historian and frequent visits to ED. labs/sobriety, reassess need for psych if sober.

## 2022-06-23 NOTE — ED ADULT NURSE REASSESSMENT NOTE - NS ED NURSE REASSESS COMMENT FT1
pt walking to bathroom and states 'I am ready to go I need to catch the bus'  'this isn't nazi joana ' 'I can leave whenever I want'   dr cook aware  security at bedside  1:1 conts

## 2022-06-23 NOTE — ED PROVIDER NOTE - PENDING LAB RAD OPT OUT
Renal condition is unchanged.  Continue current treatment regimen.  Renal condition will be reassessed in 6 months.  No nsaids  10/18 creat=1.72 stable c/w CKD3   Exclude Pending Lab, Cardiology, and Radiology orders from printing on the Patient's Discharge Instructions, due to Privacy Concerns.

## 2022-06-23 NOTE — ED PROVIDER NOTE - PROGRESS NOTE DETAILS
Joanne: pt requesting to leave, denies si, states wants to leave before sun goes down so he can take a bus. when asked why he said si statements earlier states sometimes just states the si things but doesn't actually mean it, usually with high etoh levels, currently clinically appropriate. reutrn precautions.

## 2022-06-23 NOTE — ED PROVIDER NOTE - OBJECTIVE STATEMENT
57 y/o male hx etoh abuse, known to ED biba for intox/?si. per triage ems states pt was stating was going to jump on train track. no trauma. pt states drank a gallon of vodka earlier today. states he feels sick. does not provide other history.     limitd ros given intox.

## 2022-06-23 NOTE — ED PROVIDER NOTE - ATTENDING CONTRIBUTION TO CARE
Alcohol intoxication, no evidence of trauma, maintain safety until returned to normal mental status for safe dc.

## 2022-06-23 NOTE — ED PROVIDER NOTE - PATIENT PORTAL LINK FT
You can access the FollowMyHealth Patient Portal offered by [facility] by registering at the following website: [email or web address]. By joining [employer]’s FollowMyHealth portal, you will also be able to view your health information using other applications (apps) compatible with our system.

## 2022-06-23 NOTE — ED ADULT NURSE REASSESSMENT NOTE - NS ED NURSE REASSESS COMMENT FT1
Patient in a yellow gown for patient safety. Patient educated as to  fall risk. Patient told to remain in bed and to only get out of bed with this nurses assistance. PAtient continues to attempt to get out of bed without assistance. Will frequently round for safety.

## 2022-06-23 NOTE — ED ADULT NURSE NOTE - OBJECTIVE STATEMENT
pt here for drinking  'I drank a gallon of vodka'   'I would kill myself I have a great plan, I would throw myself in front of the train'   juice given. walked to bathroom withg aide   pleasant and cooperative

## 2022-06-23 NOTE — ED ADULT NURSE REASSESSMENT NOTE - NS ED NURSE REASSESS COMMENT FT1
Patient still making multiple attempts to get out of bed. Patient continuing to be reeducated as to the importance to not getting out of bed while intoxicated.

## 2022-06-23 NOTE — ED ADULT TRIAGE NOTE - CHIEF COMPLAINT QUOTE
BIBEMS for ETOH. PT found wonder the streets. GCS15. Denies injury. No drug use. Changed into yellow gown.

## 2022-06-23 NOTE — ED PROVIDER NOTE - OBJECTIVE STATEMENT
57 yo male with hx of etoh abuse presents to ED intoxicated. Patient found walking around intoxicated outside. Brought to ED by EMS. Patient here earlier for similar thing. Has no complaints. Requesting emergency housing.

## 2022-06-24 NOTE — ED ADULT NURSE NOTE - OBJECTIVE STATEMENT
Pt received intoxicated admits to drinking unknown amounts of alcohol. Only information pt able to give RN @ this time. Pt placed in yellow gown and belongings secured. Pt safety maintained. Pt made aware of plan of care and verbalized understanding.

## 2022-06-24 NOTE — ED PROVIDER NOTE - PROGRESS NOTE DETAILS
Sarah Stahl for ED attending, Dr. Herrera: Pt becoming agitated, aggressive and cursing at staff, verbal de-escalation attempted unsuccessfully, provided medication to manage patient's agitation. Todd: Ambulating independently in ED. Tolerating PO. Stable for dc. Patient understands return precautions and f/u.

## 2022-06-24 NOTE — ED PROVIDER NOTE - PATIENT PORTAL LINK FT
You can access the FollowMyHealth Patient Portal offered by [facility] by registering at the following website: [email or web address]. By joining [employer]’s FollowMyHealth portal, you will also be able to view your health information using other applications (apps) compatible with our system.

## 2022-06-24 NOTE — ED ADULT NURSE REASSESSMENT NOTE - NS ED NURSE REASSESS COMMENT FT1
Pt becoming verbally aggressive towards staff and continuing to get out of bed. Multiple failed attempts to reorient pt. MD Herrera gave verbal order of 5 mg Versed IM to be given for pt safety. No apparent distress present.

## 2022-06-24 NOTE — ED ADULT NURSE NOTE - CAS ELECT INFOMATION PROVIDED
Pt ambulated independently w/steady gait. Pt resp even and unlabored. VSS. No acute distress present. Pt given back belongings. Pt verbalized understanding of discharge instructions./DC instructions

## 2022-06-24 NOTE — ED ADULT NURSE NOTE - NSIMPLEMENTINTERV_GEN_ALL_ED
Implemented All Fall Risk Interventions:  [place] to call system. Call bell, personal items and telephone within reach. Instruct patient to call for assistance. Room bathroom lighting operational. Non-slip footwear when patient is off stretcher. Physically safe environment: no spills, clutter or unnecessary equipment. Stretcher in lowest position, wheels locked, appropriate side rails in place. Provide visual cue, wrist band, yellow gown, etc. Monitor gait and stability. Monitor for mental status changes and reorient to person, place, and time. Review medications for side effects contributing to fall risk. Reinforce activity limits and safety measures with patient and family.

## 2022-06-24 NOTE — ED ADULT NURSE NOTE - INTERVENTIONS DEFINITIONS
[place] to call system/Instruct patient to call for assistance/Non-slip footwear when patient is off stretcher/Stretcher in lowest position, wheels locked, appropriate side rails in place/Provide visual cue, wrist band, yellow gown, etc./Monitor gait and stability/Monitor for mental status changes and reorient to person, place, and time/Review medications for side effects contributing to fall risk/Reinforce activity limits and safety measures with patient and family/Provide visual clues: red socks

## 2022-06-24 NOTE — ED ADULT NURSE REASSESSMENT NOTE - NS ED NURSE REASSESS COMMENT FT1
Pt continues to verbally assault staff and continues to get out of bed. Multiple failed attempts to redirect pt. Meds given earlier for pt safety, failed. MD Herrera at bedside. 2nd verbal order of 5mg Versed IM to be given as per MD Herrera. Side rails remain up and bed in lowest position. Respirations even & unlabored. NAD present.

## 2022-06-24 NOTE — ED PROVIDER NOTE - OBJECTIVE STATEMENT
[de-identified] : [facility]M RX\par Sensing 0.4 mV\par  Charles episodes c/w undersensing\par 
55 y/o male etoh abuse well known to this ED for frequent visits presents to then ED for alcohol intoxication. Pt admits to alcohol use. Pt has no acute complaints at this time. No obvious signs of trauma.

## 2022-06-24 NOTE — ED PROVIDER NOTE - ATTENDING CONTRIBUTION TO CARE
55 yo M with hx of EtOH abuse s/p d.c from Ed this AM for same presents to Ed after increased alcohol intake this evening,  no signs of trauma, PE otherwise unremarkable.  will observe in ED for clinical sobriety and d/c when appropriate

## 2022-06-24 NOTE — ED PROVIDER NOTE - PHYSICAL EXAMINATION
General: no signs of trauma, somnolent, in no apparent distress.  Head: AT, NC  HEENT: Airway patent., no carrillo sign, no raccoon eyes, no hemotypanum   Eyes: clear bilaterally, pupils equal, round and reactive to light.  Cardiac: Normal rate, regular rhythm.  Heart sounds S1, S2.    Respiratory: Breath sounds clear and equal bilaterally.  Abdomen soft, non-tender, no guarding.  MSK: moving all extremities x 4  Neuro: somnolent, follows commands,   Skin: normal color. General: no signs of trauma, somnolent, in no apparent distress.  Head: NC/AT   HEENT: Airway patent., no carrillo sign, no raccoon eyes,  Eyes: clear bilaterally, pupils equal, round and reactive to light.  Cardiac: Normal rate, regular rhythm.  Heart sounds S1, S2.    Respiratory: Breath sounds clear and equal bilaterally.  Abdomen soft, non-tender, no guarding.  MSK: moving all extremities x 4  Neuro: somnolent, follows commands,   Skin: normal color.

## 2022-06-25 NOTE — ED PROVIDER NOTE - PATIENT PORTAL LINK FT
You can access the FollowMyHealth Patient Portal offered by [facility] by registering at the following website: [email or web address]. By joining [employer]’s FollowMyHealth portal, you will also be able to view your health information using other applications (apps) compatible with our system.

## 2022-06-25 NOTE — ED PROVIDER NOTE - NSICDXFAMILYHX_GEN_ALL_CORE_FT
----- Message from Magen Osuna MD sent at 9/28/2018 10:04 AM EDT -----  The labs were reviewed. Please inform patient that labs were normal.   FAMILY HISTORY:  Grandparent  Still living? Unknown  FH: heart disease, Age at diagnosis: Age Unknown

## 2022-06-25 NOTE — ED ADULT NURSE REASSESSMENT NOTE - NS ED NURSE REASSESS COMMENT FT1
Despite, medical interventions to calm pt, pt continued to try to get out of bed and verbally abused staff. Multiple failed attempts to verbally redirect pt. Pt brought to CC and given 200 mg ketamine IM as per MD Herrera. Pt placed on  @ 96% RA. Respirations even & unlabored. NAD present. Pt safety maintained.

## 2022-06-25 NOTE — ED PROVIDER NOTE - ATTENDING CONTRIBUTION TO CARE
The patient seen and examined    Alcohol Intoxication    I, Onel Smith, performed the initial face to face bedside interview with this patient regarding history of present illness, review of symptoms and relevant past medical, social and family history.  I completed an independent physical examination.  I was the initial provider who evaluated this patient. I have signed out the follow up of any pending tests (i.e. labs, radiological studies) to the resident.  I have communicated the patient’s plan of care and disposition with the resident.

## 2022-06-25 NOTE — ED ADULT NURSE NOTE - CAS ELECT INFOMATION PROVIDED
Pt refused DC instructions. VS documented per flow prior to DC. Pt with no apparent acute distress observed at time of discharge. Safety maintained in ED./DC instructions

## 2022-06-25 NOTE — ED PROVIDER NOTE - CLINICAL SUMMARY MEDICAL DECISION MAKING FREE TEXT BOX
Given intox and history of intox and pe highly concerned for aspiration pna. Will get labs, cxr, ecg.

## 2022-06-25 NOTE — ED PROVIDER NOTE - PROGRESS NOTE DETAILS
Walden: Pt is awake, alert, lucid and coherent. Ambulating with steady gait, no complaints, no sign of trauma. No clinical sign of intox or withdrawal. Stable for dc.

## 2022-06-25 NOTE — ED PROVIDER NOTE - PHYSICAL EXAMINATION
General: ill appearing, NAD  Head: Altered, no signs of trauma, no bruises, hematomas, lacs, abrasions.  EENT: EOMI, no scleral icterus, perrl  Cardiac: RRR, no apparent murmurs, no lower extremity edema  Respiratory: no respiratory distress, crackles in right lower lobe  Abdomen: soft, ND, NT, nonperitonitic  MSK/Vascular: full ROM, soft compartments, warm extremities, moving   Neuro: AAOx0, sensation to light touch intact

## 2022-06-25 NOTE — ED ADULT NURSE NOTE - OBJECTIVE STATEMENT
Pt presenting to the ED intoxicated. Pt crawling out of stretcher. Breathing even and unlabored. Pt in yellow gown, belongings removed. Pt safety maintained.

## 2022-06-25 NOTE — ED ADULT TRIAGE NOTE - CHIEF COMPLAINT QUOTE
Patient BIBA to ED today from behind store after being found intoxicated, patient admits to drinking.

## 2022-06-27 NOTE — ED ADULT NURSE REASSESSMENT NOTE - NS ED NURSE REASSESS COMMENT FT1
Patient sleeping in bed, breathing freely on room air at 99%. Safety maintained, rails up x 2 and lays in yellow gown

## 2022-06-27 NOTE — ED ADULT NURSE REASSESSMENT NOTE - NS ED NURSE REASSESS COMMENT FT1
Pt resting comfortably, respirations even and unlabored on RA, no signs of distress.  in place, bed locked, in the lowest position side rails up.

## 2022-06-27 NOTE — ED PROVIDER NOTE - PHYSICAL EXAMINATION
General: intoxicated appearing male in no acute distress  HEENT: Normocephalic, atraumatic. Moist mucous membranes. Oropharynx clear. No lymphadenopathy.  Eyes: No scleral icterus. EOMI. WALTER.  Neck:. Soft and supple. Full ROM without pain. No midline tenderness  Cardiac: Regular rate and regular rhythm. No murmurs, rubs, gallops. Peripheral pulses 2+ and symmetric. No LE edema.  Resp: Lungs CTAB. Speaking in full sentences. No wheezes, rales or rhonchi.  Abd: Soft, non-tender, non-distended. No guarding or rebound. No scars, masses, or lesions.  Back: Spine midline and non-tender. No CVA tenderness.    Skin: No rashes, abrasions, or lacerations.  Neuro: Moves all extremities symmetrically. Motor strength and sensation grossly intact.

## 2022-06-27 NOTE — ED ADULT NURSE REASSESSMENT NOTE - NS ED NURSE REASSESS COMMENT FT1
Pt woke up, continues to attempt to get OOB. Pt is educated on fall risk, multiple attempts made to redirect pt to stay in bed. Dr. Rubio made aware, Dr. Rubio at bedside for eval, see new orders.

## 2022-06-27 NOTE — ED ADULT NURSE REASSESSMENT NOTE - NS ED NURSE REASSESS COMMENT FT1
Continued rounding completed on pt, pt remains in yellow gown, bed in the lowest position, side rails up, bed locked. Pt continues to attempt to get OOB, continuous attempts made to keep pt in bed, edicatin on fall risk. Dr. Rubio made aware.

## 2022-06-27 NOTE — ED PROVIDER NOTE - CLINICAL SUMMARY MEDICAL DECISION MAKING FREE TEXT BOX
55 y/o M hx of alcohol abuse, frequent flier to the emergency department, brought in by EMS for altered mental status. was found underneath tree outside.  endorses drinking "a lot" of alcohol. attempting to get out of bed, fall risk. ketamine 300mg IM given for sedation. pulse ox, end tidal. monitor for sobriety. no signs of external trauma.

## 2022-06-27 NOTE — ED ADULT NURSE REASSESSMENT NOTE - NS ED NURSE REASSESS COMMENT FT1
Pt woke up, attempting to get OOB. Pt educated on importance of staying in bed, and fall risk. Pt proceeded to attempt to get OOB. Dr. Rubio made aware and at bedside for eval. See new orders.

## 2022-06-27 NOTE — ED ADULT NURSE NOTE - OBJECTIVE STATEMENT
Patient bibems s/p having bizarre behavior. Prior team gave medications. Patient on cardiac monitor with ETCO2. Patient in yellow gown. Safety maintained, will CTM.

## 2022-06-27 NOTE — ED ADULT NURSE REASSESSMENT NOTE - NS ED NURSE REASSESS COMMENT FT1
Assumed care of pt from previous RN at 1930 POC reviewed. Pt laying in bed in yellow gown, bed in the lowest position, side rails up, and locked. Pt continuously tying to get OOB, mumbling and slurring his words. Pt educated on fall risk and to stay in bed. Pt rain sin bed at this time, urinal provided. Pt remains in yellow gown,  in place.

## 2022-06-27 NOTE — ED ADULT NURSE REASSESSMENT NOTE - NS ED NURSE REASSESS COMMENT FT1
Pt continues to attempt to get OOB, Pt eductaed on fall risk. Dr. Rubio made aware and at bedside for eval. See new orders.

## 2022-06-27 NOTE — ED ADULT TRIAGE NOTE - CHIEF COMPLAINT QUOTE
pt awake and alert, BIBA s/p found sleeping under tree outside intoxicated. pt crying, offers no complaints. refusing to provide further information. pt changed into yellow gown, belongings secured and labeled.

## 2022-06-27 NOTE — ED PROVIDER NOTE - OBJECTIVE STATEMENT
57 y/o M hx of alcohol abuse, frequent flier to the emergency department, brought in by EMS for altered mental status. was found underneath tree outside. patient crying, states he "drank a lot." denies other ilicit drug use. denies chest pain/sob. denies trauma. denies nausea/vomiting. denies abdominal pain.

## 2022-06-27 NOTE — ED ADULT NURSE REASSESSMENT NOTE - NS ED NURSE REASSESS COMMENT FT1
Pt continually tried to get OOB, slurring his words, speaking incoherently, pt educated on fall risk, multiple attempts made to redirect pt back to bed. Dr. cSott made aware, Dr. Scott and Dr. Mai at bedside for eval, see new orders.

## 2022-06-27 NOTE — ED PROVIDER NOTE - ATTENDING CONTRIBUTION TO CARE
Joanne: I performed a face to face evaluation of this patient and performed a full history and physical examination on the patient.  I agree with the resident's history, physical examination, and plan of the patient unless otherwise noted. My brief assessment is as follows: well knwoon to ED, etoh abuse, found crying/agitated under tree. uncooperative on arrival, verbal deescalation attempted unsuccessfully. reports drinking, denies other complaints. ctab, rrr, ncat, abd benign, no other trauma. meds given for agitation. observe, reassess

## 2022-06-27 NOTE — ED ADULT NURSE REASSESSMENT NOTE - NS ED NURSE REASSESS COMMENT FT1
Pt resting comfortably, respirations even and unlabored on RA, no signs of distress.  in place, bed in the lowest position, locked, side rials up.

## 2022-06-27 NOTE — ED PROVIDER NOTE - PATIENT PORTAL LINK FT
You can access the FollowMyHealth Patient Portal offered by [facility] by registering at the following website: [email or web address]. By joining [employer]’s FollowMyHealth portal, you will also be able to view your health information using other applications (apps) compatible with our system.

## 2022-06-27 NOTE — ED PROVIDER NOTE - PROGRESS NOTE DETAILS
Joanne: pt repeatedly trying to get up  but still intoxicated/unsteady on feet. many attempts ~every 15 minutes at verbal deescalation. required multiple doses of medication for cooperation given pt is risk to self with falls/intoxication. on monitor. Tyler: Patient signed out to me by previous team. Patient evaluated bedside and is sleeping at this time. Vitals stable. Tyler: Pt awake and agitated. Trying to get out of bed but poses risk to self due to AMS, poor coordination. Not responding to attempts to verbally deescalate. Plan for versed and continued observation. Tyler: PT awake and interacting appropriately. A&Ox3. Ambulating w/ coordinated gait. No complaints. Pt asking to be discharged.

## 2022-06-28 NOTE — ED ADULT NURSE REASSESSMENT NOTE - NS ED NURSE REASSESS COMMENT FT1
Pt awake, AOx4, speaking coherently, respirations even and unlabored on RA. Dr. Scott at bedside to eval pt for DC. Pt able to ambulate with steady gait. Pt awake, AOx4, speaking coherently, respirations even and unlabored on RA. Dr. Scott at bedside to eval pt for DC. Pt able to ambulate with steady gait. Belongings retrieved from  and returned to pt at this time

## 2022-06-28 NOTE — ED ADULT NURSE REASSESSMENT NOTE - NS ED NURSE REASSESS COMMENT FT1
Pt woke up, attempted to get OOB and was able to be redirected to stay in bed. Dr. Scott made aware and came to bedside for eval. Pt ambulated with assistance to restroom with Dr. Scott. Pt returned to his bed, resting comfortably, respirations even and unlabored on R, skin warm and dry,  in place, bed locked, in the lowest position, side rails up.

## 2022-06-28 NOTE — ED ADULT NURSE REASSESSMENT NOTE - NS ED NURSE REASSESS COMMENT FT1
Pt resting comfortably, respirations even and unlabored on RA no signs of distress.  remains in place, bed in the lowest position, side rails up.

## 2022-06-28 NOTE — ED ADULT NURSE REASSESSMENT NOTE - NS ED NURSE REASSESS COMMENT FT1
Pt awake, speaking coherently, respirations even and unlabored on RA, skin warm and dry. Pt given food and drink.  in place, bed locked, in the lowest position, side rails up.

## 2022-07-03 NOTE — ED ADULT TRIAGE NOTE - ACCOMPANIED BY
[FreeTextEntry1] : 5-year-old male with history of constipation with stool withholding behavior here for follow-up.  He is currently on 2 Ex-Lax squares per day and overall doing well wiith near daily bowel movements. They tried adding in fiber and he did not weaning the ex-lax and has some withholding behavior.  He is a picky diet with a low fiber intake which is likely exacerbating the problem.\par - continue ex-lax 2 sq per day, titrate to yield soft daily bowel movements. add miralax 1 tsp, then if tolerated decrease ex-lax to 1.5sq/day, consider increasing miralax to 2 tsp daily and weaning  ex-lax as tolerated to 1 sq/d. Dad should monitor stools for about 2 wks between changes. If any issues (hard stools, decreased size, frequency, ect) should go back to the previous dose of miralax/ex-lax that was working well. \par - labs can be done at PMD visit\par -Discussed strategies for picky toddler eating and increasing fiber and fluids\par - Family instructed to call for lab results or if any questions or concerns. Family was asked to make a follow-up visit to be seen in 2-3 mo EMT/paramedic

## 2022-09-28 NOTE — ED ADULT NURSE NOTE - CAS EDN DISCHARGE INTERVENTIONS
Attending and PA/NP shared services statement (NON-critical care): IV discontinued, cath removed intact

## 2022-10-07 PROBLEM — Z23 NEED FOR PNEUMOCOCCAL VACCINATION: Status: ACTIVE | Noted: 2018-09-20

## 2022-10-07 PROBLEM — F41.9 ANXIETY AND DEPRESSION: Status: ACTIVE | Noted: 2018-03-26

## 2022-10-07 PROBLEM — Z87.438 HISTORY OF BENIGN PROSTATIC HYPERPLASIA: Status: RESOLVED | Noted: 2022-01-01 | Resolved: 2022-01-01

## 2022-10-07 PROBLEM — G47.9 SLEEP DISTURBANCES: Status: ACTIVE | Noted: 2022-01-01

## 2022-10-07 PROBLEM — Z23 NEED FOR INFLUENZA VACCINATION: Status: ACTIVE | Noted: 2018-09-20

## 2022-10-07 NOTE — HISTORY OF PRESENT ILLNESS
[FreeTextEntry1] : for follow up [de-identified] : for follow up\par has been in rehab, still there\par alcohol free for 6 months\par is borderline dm a1c is 6.6

## 2022-10-07 NOTE — PHYSICAL EXAM
[No Acute Distress] : no acute distress [Well Nourished] : well nourished [Well Developed] : well developed [Well-Appearing] : well-appearing [Normal Sclera/Conjunctiva] : normal sclera/conjunctiva [PERRL] : pupils equal round and reactive to light [EOMI] : extraocular movements intact [Normal Outer Ear/Nose] : the outer ears and nose were normal in appearance [Normal Oropharynx] : the oropharynx was normal [No JVD] : no jugular venous distention [No Lymphadenopathy] : no lymphadenopathy [Supple] : supple [Thyroid Normal, No Nodules] : the thyroid was normal and there were no nodules present [No Respiratory Distress] : no respiratory distress  [No Accessory Muscle Use] : no accessory muscle use [Clear to Auscultation] : lungs were clear to auscultation bilaterally [Normal Rate] : normal rate  [Regular Rhythm] : with a regular rhythm [Normal S1, S2] : normal S1 and S2 [No Murmur] : no murmur heard [No Carotid Bruits] : no carotid bruits [No Abdominal Bruit] : a ~M bruit was not heard ~T in the abdomen [No Varicosities] : no varicosities [Pedal Pulses Present] : the pedal pulses are present [No Edema] : there was no peripheral edema [No Palpable Aorta] : no palpable aorta [No Extremity Clubbing/Cyanosis] : no extremity clubbing/cyanosis [Soft] : abdomen soft [Non Tender] : non-tender [Non-distended] : non-distended [No Masses] : no abdominal mass palpated [No HSM] : no HSM [Normal Bowel Sounds] : normal bowel sounds [Normal Posterior Cervical Nodes] : no posterior cervical lymphadenopathy [Normal Anterior Cervical Nodes] : no anterior cervical lymphadenopathy [No CVA Tenderness] : no CVA  tenderness [No Spinal Tenderness] : no spinal tenderness [No Joint Swelling] : no joint swelling [Grossly Normal Strength/Tone] : grossly normal strength/tone [No Rash] : no rash [Coordination Grossly Intact] : coordination grossly intact [No Focal Deficits] : no focal deficits [Normal Gait] : normal gait [Deep Tendon Reflexes (DTR)] : deep tendon reflexes were 2+ and symmetric [Normal Affect] : the affect was normal [Normal Insight/Judgement] : insight and judgment were intact [de-identified] : cerumen removed from left ear

## 2022-10-07 NOTE — PHYSICAL EXAM
[No Acute Distress] : no acute distress [Well Nourished] : well nourished [Well Developed] : well developed [Well-Appearing] : well-appearing [Normal Sclera/Conjunctiva] : normal sclera/conjunctiva [PERRL] : pupils equal round and reactive to light [EOMI] : extraocular movements intact [Normal Outer Ear/Nose] : the outer ears and nose were normal in appearance [Normal Oropharynx] : the oropharynx was normal [No JVD] : no jugular venous distention [No Lymphadenopathy] : no lymphadenopathy [Supple] : supple [Thyroid Normal, No Nodules] : the thyroid was normal and there were no nodules present [No Respiratory Distress] : no respiratory distress  [No Accessory Muscle Use] : no accessory muscle use [Clear to Auscultation] : lungs were clear to auscultation bilaterally [Normal Rate] : normal rate  [Regular Rhythm] : with a regular rhythm [Normal S1, S2] : normal S1 and S2 [No Murmur] : no murmur heard [No Carotid Bruits] : no carotid bruits [No Abdominal Bruit] : a ~M bruit was not heard ~T in the abdomen [No Varicosities] : no varicosities [Pedal Pulses Present] : the pedal pulses are present [No Edema] : there was no peripheral edema [No Palpable Aorta] : no palpable aorta [No Extremity Clubbing/Cyanosis] : no extremity clubbing/cyanosis [Soft] : abdomen soft [Non Tender] : non-tender [Non-distended] : non-distended [No Masses] : no abdominal mass palpated [No HSM] : no HSM [Normal Bowel Sounds] : normal bowel sounds [Normal Posterior Cervical Nodes] : no posterior cervical lymphadenopathy [Normal Anterior Cervical Nodes] : no anterior cervical lymphadenopathy [No CVA Tenderness] : no CVA  tenderness [No Spinal Tenderness] : no spinal tenderness [No Joint Swelling] : no joint swelling [Grossly Normal Strength/Tone] : grossly normal strength/tone [No Rash] : no rash [Coordination Grossly Intact] : coordination grossly intact [No Focal Deficits] : no focal deficits [Normal Gait] : normal gait [Deep Tendon Reflexes (DTR)] : deep tendon reflexes were 2+ and symmetric [Normal Affect] : the affect was normal [Normal Insight/Judgement] : insight and judgment were intact [de-identified] : cerumen removed from left ear

## 2022-10-07 NOTE — HEALTH RISK ASSESSMENT
[Never] : Never [No] : No [No falls in past year] : Patient reported no falls in the past year [0] : 2) Feeling down, depressed, or hopeless: Not at all (0) [PHQ-2 Negative - No further assessment needed] : PHQ-2 Negative - No further assessment needed [de-identified] : alcohol free for 6months

## 2022-10-07 NOTE — HEALTH RISK ASSESSMENT
[Never] : Never [No] : No [No falls in past year] : Patient reported no falls in the past year [0] : 2) Feeling down, depressed, or hopeless: Not at all (0) [PHQ-2 Negative - No further assessment needed] : PHQ-2 Negative - No further assessment needed [de-identified] : alcohol free for 6months

## 2022-10-07 NOTE — HISTORY OF PRESENT ILLNESS
[FreeTextEntry1] : for follow up [de-identified] : for follow up\par has been in rehab, still there\par alcohol free for 6 months\par is borderline dm a1c is 6.6

## 2022-10-07 NOTE — ASSESSMENT
[FreeTextEntry1] : happy to see alcohol free\par a1c is 6.6\par hld continue meds\par bp stable\par seeing therapist\par flu and prevnar

## 2022-10-25 NOTE — ED PROVIDER NOTE - CLINICAL SUMMARY MEDICAL DECISION MAKING FREE TEXT BOX
Pt is a 58 yo M, hx of ETOH use disorder (last drink 4mo), fatty liver disease, DM, presenting with RUQ pain. Labs significant for Rouleaux formation, microcytic anemia. RUQ sono significant for gallstones and possible liver metastasis. CT abdomen/pelvis ordered. Pt is a 58 yo M, hx of ETOH use disorder (last drink 4mo), fatty liver disease, DM, presenting with RUQ pain. Labs significant for Rouleaux formation, microcytic anemia. RUQ sono significant for gallstones and possible liver metastasis. CT abdomen/pelvis ordered. Patient unable to stay for CT 2/2 living situation. Work up thus far explained to patient and he was urge to come back if worsening and to follow up with PCP for further work up of possible liver mets.

## 2022-10-25 NOTE — ED ADULT TRIAGE NOTE - CHIEF COMPLAINT QUOTE
pt c/o upper right side abdominal pain, states he has not drank in 4 months , in rehab, blood work, shows anemia  A&Ox3, resp wnl, c/o abd pain

## 2022-10-25 NOTE — ED PROVIDER NOTE - PATIENT PORTAL LINK FT
You can access the FollowMyHealth Patient Portal offered by [facility] by registering at the following website: [email or web address]. By joining [employer]’s FollowMyHealth portal, you will also be able to view your health information using other applications (apps) compatible with our system.

## 2022-10-25 NOTE — ED PROVIDER NOTE - OBJECTIVE STATEMENT
Pt is a 56 yo M, hx of ETOH use disorder (last drink 4mo), fatty liver disease, DM, presenting with RUQ pain. The pain started 1 week ago as a constant dull 2/10 pain that has progressed to sharp 10/10 pain on palpation in the RUQ and epigastric region. States his diet has consisted of mainly fat rich foods; however, the pain is not exacerbated by eating, or activity. Has not taken medication for the pain as it is mainly when he presses on it. Denies any associated fevers. chills, n/v, chest pain, sob. Reports since his last drink he has been regularly taking his fenofibrate and he was also started on  metformin for DM 1 week ago. Of note, he is being worked up outpatient for anemia with a colonoscopy later this week.

## 2022-10-25 NOTE — ED PROVIDER NOTE - ATTENDING CONTRIBUTION TO CARE
I personally saw the patient with the resident, and completed the key components of the history and physical exam. I then discussed the management plan with the resident.    56 y/o M with PMH EtOH abuse (sober x 4 months in a rehab facility) presents for 1 week of constant, dull RUQ pain and epigastric pain, no association with eating, no radiation to chest, no associated nausea, vomiting or SOB. No prior history. Was recently started on metformin.    PE - NAD, well groomed, RRR, lungs CTA b/l, and soft, + RUQ TTP with guarding, no abdominal distension or discoloration, no LE edema.    Labs, US, pain control - US concerning for metastatic disease - will order CT and reassess.

## 2022-10-25 NOTE — ED ADULT NURSE NOTE - NSFALLRSKASSESASSIST_ED_ALL_ED
no

## 2022-10-25 NOTE — ED PROVIDER NOTE - CARE PROVIDER_API CALL
Agnieszka Fuentes)  HematologyOncology; Internal Medicine; Oncology  [address] [place], [place] [id number]  Phone: [phone]  Fax: [phone]  Follow Up Time: Urgent

## 2022-10-25 NOTE — ED PROVIDER NOTE - IV ALTEPLASE EXCL REL HIDDEN
Postpartum # 1    Patient reports: some point tenderness at site of epidural placement.  No neurologic symptoms.  Ambulating without difficulty.    Visit Vitals  /80 (BP Location: LUE - Left upper extremity, Patient Position: Sitting)   Pulse 88   Temp 98 °F (36.7 °C) (Oral)   Resp 16   Ht 5' 4\" (1.626 m)   Wt 87.5 kg   LMP 2019 (Approximate)   SpO2 97%   Breastfeeding Yes   BMI 33.13 kg/m²     Alert and smiling  Uterine fundus firm, nontender.    Extremities: Nontender, trace edema    HCT (%)   Date Value   2019 31.5 (L)       Impression: Doing well. S/p   Asymptomatic anemia  Back pain-epidural  Observation.     Plan: Routine postpartum care.     show

## 2022-10-25 NOTE — ED PROVIDER NOTE - PHYSICAL EXAMINATION
General: AAOx3, well appearing  Head: Normocephalic atraumatic  EENT: EOMI, no scleral icterus, poor dentition  Cardiac: RRR, no apparent murmurs, no lower extremity edema  Respiratory: clear to auscultation bilaterally, no respiratory distress   Abdomen: soft, non-distended, Marked tenderness with guarding in the RUQ and epigastric region; + Aldridge's sign  MSK/Vascular: full ROM, soft compartments, warm extremities  Neuro: AAOx3, sensation to light touch intact  Psych: calm, cooperative

## 2022-10-25 NOTE — ED ADULT NURSE NOTE - OBJECTIVE STATEMENT
Pt arrived in ED with c/o right upper quad abd discomfort. Pt verbalized he has abdominal discomfort when he presses on his abdomen. Denies CP, n/v, diarrhea/constipation, bloody stool/urine. Pt is A&Ox4, communicates effectively. Skin warm dry and intact. Resps reg, nonlabored. IV heplock inserted, labs collected. Nursing will continue to monitor.

## 2022-10-25 NOTE — ED PROVIDER NOTE - PROGRESS NOTE DETAILS
Craig: Patient states that he cannot stay due to his housing situation and needs to leave.  He is sober, able to verbalize risks of leaving against medical advice and understands that we cannot appropriately evaluate for metastatic disease without further imaging. He is aware that the ED is open 24/7/365 and he can return at any time, also verbalizes understanding of the importance of proper outpatient follow up.

## 2022-10-27 PROBLEM — E78.1 HYPERTRIGLYCERIDEMIA, ESSENTIAL: Status: ACTIVE | Noted: 2020-05-20

## 2022-10-27 PROBLEM — D50.9 IRON DEFICIENCY ANEMIA: Status: ACTIVE | Noted: 2022-01-01

## 2022-10-27 PROBLEM — R73.03 BORDERLINE DIABETES: Status: ACTIVE | Noted: 2022-01-01

## 2022-10-27 NOTE — HISTORY OF PRESENT ILLNESS
[FreeTextEntry1] : was in ER with abd pain  [de-identified] : long history of alcohol abuse, now alcohol free\par sono was done showed multple lesions on his liver\par he has no fever no sob nvd no jaundice\par does have ruq pain and sono showed multiple masses in his liver\par and cholelithiasis

## 2022-10-27 NOTE — PHYSICAL EXAM
[No Acute Distress] : no acute distress [Well Nourished] : well nourished [Well Developed] : well developed [Well-Appearing] : well-appearing [Normal Sclera/Conjunctiva] : normal sclera/conjunctiva [PERRL] : pupils equal round and reactive to light [EOMI] : extraocular movements intact [Normal Outer Ear/Nose] : the outer ears and nose were normal in appearance [Normal Oropharynx] : the oropharynx was normal [No JVD] : no jugular venous distention [No Lymphadenopathy] : no lymphadenopathy [Supple] : supple [Thyroid Normal, No Nodules] : the thyroid was normal and there were no nodules present [No Respiratory Distress] : no respiratory distress  [No Accessory Muscle Use] : no accessory muscle use [Clear to Auscultation] : lungs were clear to auscultation bilaterally [Normal Rate] : normal rate  [Regular Rhythm] : with a regular rhythm [Normal S1, S2] : normal S1 and S2 [No Murmur] : no murmur heard [No Carotid Bruits] : no carotid bruits [No Abdominal Bruit] : a ~M bruit was not heard ~T in the abdomen [No Varicosities] : no varicosities [Pedal Pulses Present] : the pedal pulses are present [No Edema] : there was no peripheral edema [No Palpable Aorta] : no palpable aorta [No Extremity Clubbing/Cyanosis] : no extremity clubbing/cyanosis [Soft] : abdomen soft [Non Tender] : non-tender [Non-distended] : non-distended [No HSM] : no HSM [Normal Bowel Sounds] : normal bowel sounds [Normal Posterior Cervical Nodes] : no posterior cervical lymphadenopathy [Normal Anterior Cervical Nodes] : no anterior cervical lymphadenopathy [No CVA Tenderness] : no CVA  tenderness [No Spinal Tenderness] : no spinal tenderness [No Joint Swelling] : no joint swelling [Grossly Normal Strength/Tone] : grossly normal strength/tone [No Rash] : no rash [Coordination Grossly Intact] : coordination grossly intact [No Focal Deficits] : no focal deficits [Normal Gait] : normal gait [Deep Tendon Reflexes (DTR)] : deep tendon reflexes were 2+ and symmetric [Normal Affect] : the affect was normal [Normal Insight/Judgement] : insight and judgment were intact [de-identified] : enlarged liver no distinct tenderness ruq more mid epigastrci

## 2022-10-27 NOTE — ASSESSMENT
[FreeTextEntry1] : liver masses \par anemia\par concerning for malignancy\par obtain ct abd and pelvis with IV contrast\par gi appt tomorrow\par will need biopsy of something to ascertain if this is hepatoma or some other\par cancer\par patient understandly worried as am I

## 2022-11-09 NOTE — HISTORY OF PRESENT ILLNESS
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  Abdominal U/S showed cholelithiasis without secondary signs of acute cholecystitis or biliary dilatation.  Enlarged heterogeneous liver with multiple lesions concerning for metastatic disease.  Patient left the ED AMA without further work up.\par \par CT on 10/29/22 ordered by his PMD showed malignant pancreatic body mass with tumor encasement of the celiac axis, SMA and splenic vein/portal confluence. Numerous hepatic metastases, largest measuring up to 9 cm. Retroperitoneal adenopathy.\par \par  [de-identified] : The patient is a 57 year old man with metastatic adenocarcinoma of the pancreas with evidence of metastatic disease to the liver. \par \par At the time of my initial assessment, the patient states that he has significant weight loss and RUQ abdominal pain. He has a poor appetite.  He denies worsening chest pain, SOB, nausea, vomiting, diarrhea. \par \par The patient is currently living at [place]; he states that he is homeless. As per his brother, the patient has a significant past medical history of alcohol and cocaine abuse, which is in remission.

## 2022-11-09 NOTE — RESULTS/DATA
[FreeTextEntry1] : 10/31/22: CT abdomen/pelvis\par IMPRESSION:\par *  Malignant pancreatic body mass with tumor encasement of the celiac axis, SMA and splenic vein/portal confluence.\par *  Numerous hepatic metastases, largest measuring up to 9 cm.\par *  Retroperitoneal adenopathy.

## 2022-11-09 NOTE — REVIEW OF SYSTEMS
[Fatigue] : fatigue [Recent Change In Weight] : ~T recent weight change [Abdominal Pain] : abdominal pain [Fever] : no fever [Chills] : no chills [Night Sweats] : no night sweats [Dysphagia] : no dysphagia [Odynophagia] : no odynophagia [Chest Pain] : no chest pain [Shortness Of Breath] : no shortness of breath [Vomiting] : no vomiting [Constipation] : no constipation [Joint Pain] : no joint pain [Easy Bleeding] : no tendency for easy bleeding [Easy Bruising] : no tendency for easy bruising [Swollen Glands] : no swollen glands

## 2022-11-09 NOTE — PHYSICAL EXAM
[Fully active, able to carry on all pre-disease performance without restriction] : Status 0 - Fully active, able to carry on all pre-disease performance without restriction [Normal] : grossly intact [de-identified] : RUQ pain on deep palpation [de-identified] : Patient at times calm, but easily agitated leading to explosive, but not violent response.

## 2022-11-09 NOTE — ASSESSMENT
[FreeTextEntry1] : 57 year old man with history of alcoholism with pancreatic head mass and evidence of metastases to the liver. The patient is homeless, and has little social support. I discussed the need for a biopsy, and will have this scheduled with IR ASAP. The patient has a high likelihood of pancreatic cancer based on imaging findigns and elevated CA-19-9. In regards to treatment, I discussed the possibility of FOLFIRINOX, but given lack of social supports, concern regarding ability to manage pump, and likely increased side effects of FOLFINOX relative to other regimens,  I feel that gem/abraxane may be the more appropriate option with him and consented him for treatment. \par \par # Pancreatic head mass \par - follow up in 3 weeks\par - Will schedule for CT biopsy ASAP \par - will also need CT of the chest to complete work up. \par - will plan to treat with [place]/Abraxane; high suspicion for primary pancreatic tumor \par \par # Pain \par - secondary to pancreatic head mass. \par - On tylenol and ibuprofen without much relief. \par - The patient has a history of alcohol and cocaine abuse. It is unlikely, however, that he will achieve a reasonable amount of pain relief without the addition of opiates. I discussed that opiates are addictive and that his history of alcohol and cocaine abuse puts him at increased risk for addiction. The patient is understanding. I discussed this with his  at [place], who stated that his medications are dispensed from a pharmacy, which I hope will decrease his risk for abuse. Will start oxycodone at low dose at 5 mg to be given q4 hours. \par \par Thanh Irene [phone] ext: [phone]

## 2022-11-15 NOTE — H&P PST ADULT - NSALCOHOLUSE_GEN_A_CORE_FT
Last time he drinks 2 weeks ago. Pt with hx of alcohol abuse and currently lives in an outreach program

## 2022-11-15 NOTE — CHART NOTE - NSCHARTNOTEFT_GEN_A_CORE
Unable to obtain repeat CBC and CMP, A1c,  except for PT/PTT/INR. Spoke with radiology and Pt. will obtain repeat cbc day of procedure. Pt. has cbc on file for 11/3/2022 and CMP 10/25/2022.

## 2022-11-15 NOTE — H&P PST ADULT - ASSESSMENT
56 yo M with history of ETOH use disorder (last drink 2 weeks ago), fatty liver disease, HLD, asthma, GERD, depression, and DMII now with malignant neoplasm of pancreas scheduled for CT guided liver biopsy with anesthesia 2022.     - Medical evaluation pending   -NPO after midnight except for meds  -Educated on NSAIDS, multivitamins and herbals that increase the risk of bleeding and need to be stopped 5 days before procedure  -Educated on infection prevention  -Covid testing completed- results pending  -Tylenol can be taken if needed for pain  - Metformin will be held 24 hours before surgery   -Will continue all other medications as prescribed  -Verbalized understanding of all instructions    OPIOID RISK TOOL    APOORVA EACH BOX THAT APPLIES AND ADD TOTALS AT THE END    FAMILY HISTORY OF SUBSTANCE ABUSE                 FEMALE         MALE                                                Alcohol                             [  ]1 pt          [  ]3pts                                               Illegal Durgs                     [  ]2 pts        [  ]3pts                                               Rx Drugs                           [  ]4 pts        [  ]4 pts    PERSONAL HISTORY OF SUBSTANCE ABUSE                                                                                          Alcohol                             [  ]3 pts       [ x ]3 pts                                               Illegal Drugs                     [  ]4 pts        [  ]4 pts                                               Rx Drugs                           [  ]5 pts        [  ]5 pts    AGE BETWEEN 16-45 YEARS                                      [  ]1 pt         [  ]1 pt    HISTORY OF PREADOLESCENT   SEXUAL ABUSE                                                             [  ]3 pts        [  ]0pts    PSYCHOLOGICAL DISEASE                     ADD, OCD, Bipolar, Schizophrenia        [  ]2 pts         [  ]2 pts                      Depression                                               [  ]1 pt           [  ]1 pt           SCORING TOTAL   (add numbers and type here)              (0)                                     A score of 3 or lower indicated LOW risk for future opioid abuse  A score of 4 to 7 indicated moderate risk for future opioid abuse  A score of 8 or higher indicates a high risk for opioid abuse    CAPRINI SCORE [CLOT]    AGE RELATED RISK FACTORS                                                       MOBILITY RELATED FACTORS  [x] Age 41-60 years                                            (1 Point)                  [ ] Bed rest                                                        (1 Point)  [ ] Age: 61-74 years                                           (2 Points)                 [ ] Plaster cast                                                   (2 Points)  [ ] Age= 75 years                                              (3 Points)                 [ ] Bed bound for more than 72 hours                 (2 Points)    DISEASE RELATED RISK FACTORS                                               GENDER SPECIFIC FACTORS  [ ] Edema in the lower extremities                       (1 Point)                  [ ] Pregnancy                                                     (1 Point)  [ ] Varicose veins                                               (1 Point)                  [ ] Post-partum < 6 weeks                                   (1 Point)             [ ] BMI > 25 Kg/m2                                            (1 Point)                  [ ] Hormonal therapy  or oral contraception          (1 Point)                 [ ] Sepsis (in the previous month)                        (1 Point)                  [ ] History of pregnancy complications                 (1 point)  [ ] Pneumonia or serious lung disease                                               [ ] Unexplained or recurrent                     (1 Point)           (in the previous month)                               (1 Point)  [ ] Abnormal pulmonary function test                     (1 Point)                 SURGERY RELATED RISK FACTORS  [ ] Acute myocardial infarction                              (1 Point)                 [ ]  Section                                             (1 Point)  [ ] Congestive heart failure (in the previous month)  (1 Point)               [ ] Minor surgery                                                  (1 Point)   [ ] Inflammatory bowel disease                             (1 Point)                 [ ] Arthroscopic surgery                                        (2 Points)  [ ] Central venous access                                      (2 Points)                [x] General surgery lasting more than 45 minutes   (2 Points)       [ ] Stroke (in the previous month)                          (5 Points)               [ ] Elective arthroplasty                                         (5 Points)                                                                                                                                               HEMATOLOGY RELATED FACTORS                                                 TRAUMA RELATED RISK FACTORS  [ ] Prior episodes of VTE                                     (3 Points)                [ ] Fracture of the hip, pelvis, or leg                       (5 Points)  [ ] Positive family history for VTE                         (3 Points)                 [ ] Acute spinal cord injury (in the previous month)  (5 Points)  [ ] Prothrombin 55976 A                                     (3 Points)                 [ ] Paralysis  (less than 1 month)                             (5 Points)  [ ] Factor V Leiden                                             (3 Points)                  [ ] Multiple Trauma within 1 month                        (5 Points)  [ ] Lupus anticoagulants                                     (3 Points)                                                           [ ] Anticardiolipin antibodies                               (3 Points)                                                       [ ] High homocysteine in the blood                      (3 Points)                                             [ ] Other congenital or acquired thrombophilia      (3 Points)                                                [ ] Heparin induced thrombocytopenia                  (3 Points)                                          Total Score [     3     ]    Caprini Score 0 - 2:  Low Risk, No VTE Prophylaxis required for most patients, encourage ambulation  Caprini Score 3 - 6:  At Risk, pharmacologic VTE prophylaxis is indicated for most patients (in the absence of a contraindication)  Caprini Score Greater than or = 7:  High Risk, pharmacologic VTE prophylaxis is indicated for most patients (in the absence of a contraindication)

## 2022-11-15 NOTE — H&P PST ADULT - NSICDXPASTMEDICALHX_GEN_ALL_CORE_FT
PAST MEDICAL HISTORY:  Alcohol abuse     Anxiety and depression     Asthma     DM (diabetes mellitus)     GERD (gastroesophageal reflux disease)     High cholesterol      PAST MEDICAL HISTORY:  Alcohol abuse Currently in an outreach program    Anxiety and depression     Asthma     DM (diabetes mellitus)     GERD (gastroesophageal reflux disease)     High cholesterol

## 2022-11-15 NOTE — H&P PST ADULT - HISTORY OF PRESENT ILLNESS
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    He presented to the ED with abdominal pain. Abdominal U/S showed cholelithiasis without secondary signs of acute cholecystitis or biliary dilatation. Enlarged heterogeneous liver with multiple lesions concerning for metastatic disease. Patient left the ED AMA without further work up.    CT on 10/29/22 ordered by his PMD showed malignant pancreatic body mass with tumor encasement of the celiac axis, SMA and splenic vein/portal confluence. Numerous hepatic metastases, largest measuring up to 9 cm. Retroperitoneal adenopathy.  Interval History: The patient is a 57 year old man with metastatic adenocarcinoma of the pancreas with evidence of metastatic disease to the liver.     At the time of my initial assessment, the patient states that he has significant weight loss and RUQ abdominal pain. He has a poor appetite. He denies worsening chest pain, SOB, nausea, vomiting, diarrhea.     The patient is currently living at [place]; he states that he is homeless. As per his brother, the patient has a significant past medical history of alcohol and cocaine abuse, which is in remission. 56 yo M with history of ETOH use disorder (last drink 2 weeks ago), fatty liver disease, depression, and DMII presents to PST today. Reports left to mid upper abdominal pain about 2 weeks ago then f/u with Dr. Baker/Karine. Pain is worse with hiccups and when "breathig in". Describes pain as ache 9/10 on the pain scale. Pt. then reports that he had pancreatic cancer. Reports taking oxycodone PRN for pain with minimal effect. Denies any N/V. Reports 10 pounds weight loss in about 1 month.     He presented to the ED with abdominal pain. Abdominal U/S showed cholelithiasis without secondary signs of acute cholecystitis or biliary dilatation. Enlarged heterogeneous liver with multiple lesions concerning for metastatic disease. Patient left the ED AMA without further work up.    CT on 10/29/22 ordered by his PMD showed malignant pancreatic body mass with tumor encasement of the celiac axis, SMA and splenic vein/portal confluence. Numerous hepatic metastases, largest measuring up to 9 cm. Retroperitoneal adenopathy.  Interval History: The patient is a 57 year old man with metastatic adenocarcinoma of the pancreas with evidence of metastatic disease to the liver.     At the time of my initial assessment, the patient states that he has significant weight loss and RUQ abdominal pain. He has a poor appetite. He denies worsening chest pain, SOB, nausea, vomiting, diarrhea.     The patient is currently living at [place]; he states that he is homeless. As per his brother, the patient has a significant past medical history of alcohol and cocaine abuse, which is in remission. 58 yo M with history of ETOH use disorder (last drink 2 weeks ago), fatty liver disease, asthma, GERD, HLD, depression, and DMII presents to PST today. Reports left to mid upper abdominal pain  about 2 weeks ago then f/u with Dr. Baker/Karine. Pain is worse with hiccups and when "breathing in" as per pt. Describes pain as ache 9/10 on the pain scale.  Abdominal U/S showed cholelithiasis without secondary signs of acute cholecystitis or biliary dilatation. Enlarged heterogeneous liver with multiple lesions concerning for metastatic disease per Dr. Milton. Pt. reports that he was recently told that he has pancreatic cancer.  Reports taking oxycodone PRN for pain with minimal effect. Denies any N/V. Reports 10 pounds weight loss in about 1 month. Denies N/V, change in bowel/bladder habits.  Scheduled for CT guided liver biopsy with anesthesia 11/18/2022.     CT on 10/29/22 ordered by his PMD showed malignant pancreatic body mass with tumor encasement of the celiac axis, SMA and splenic vein/portal confluence. Numerous hepatic metastases, largest measuring up to 9 cm. Retroperitoneal adenopathy.

## 2022-11-15 NOTE — H&P PST ADULT - PROBLEM SELECTOR PLAN 4
Caprini - 3moderate risk   Surgical team please assess/recommend mechanical/pharmacological measures for VTE prophylaxis

## 2022-11-15 NOTE — H&P PST ADULT - PROBLEM SELECTOR PLAN 1
58 yo M with history of ETOH use disorder (last drink 2 weeks ago), fatty liver disease, HLD, asthma, GERD, depression, and DMII now with malignant neoplasm of pancreas scheduled for CT guided liver biopsy with anesthesia 11/18/2022.     - Medical evaluation pending   -NPO after midnight except for meds  -Educated on NSAIDS, multivitamins and herbals that increase the risk of bleeding and need to be stopped 5 days before procedure  -Educated on infection prevention  -Covid testing completed- results pending  -Tylenol can be taken if needed for pain  - Metformin will be held 24 hours before surgery   -Will continue all other medications as prescribed  -Verbalized understanding of all instructions

## 2022-11-15 NOTE — H&P PST ADULT - NSICDXFAMILYHX_GEN_ALL_CORE_FT
FAMILY HISTORY:  Father  Still living? Unknown  FH: heart disease, Age at diagnosis: Age Unknown    Mother  Still living? Unknown  FH: heart disease, Age at diagnosis: Age Unknown    Grandparent  Still living? Unknown  FH: brain cancer, Age at diagnosis: Age Unknown

## 2022-11-15 NOTE — H&P PST ADULT - PRO TOBACCO TYPE
Smoked 1 cigarette "for a few years" unable to recall how long. last time smoked about 5b years ago/cigarettes

## 2022-11-16 PROBLEM — R73.03 PREDIABETES: Status: ACTIVE | Noted: 2018-09-20

## 2022-11-16 PROBLEM — F10.21 ALCOHOL DEPENDENCE IN REMISSION: Status: ACTIVE | Noted: 2018-09-20

## 2022-11-16 PROBLEM — R16.0 LIVER MASSES: Status: ACTIVE | Noted: 2022-01-01

## 2022-11-16 PROBLEM — Z01.818 PREOP EXAMINATION: Status: ACTIVE | Noted: 2022-01-01

## 2022-11-16 NOTE — RESULTS/DATA
[] : results reviewed [de-identified] : hgb 9.7 [de-identified] : normal [de-identified] : normal

## 2022-11-16 NOTE — ASSESSMENT
[Patient Optimized for Surgery] : Patient optimized for surgery [No Further Testing Recommended] : no further testing recommended [As per surgery] : as per surgery [FreeTextEntry4] : Patient with pancreatic cancer with mets to liver who will undergo liver biopsy.  He is currently stable for planned procedure [FreeTextEntry7] : hold metformin 24 hour to procedure

## 2022-11-16 NOTE — HISTORY OF PRESENT ILLNESS
[Aortic Stenosis] : no aortic stenosis [Atrial Fibrillation] : no atrial fibrillation [Coronary Artery Disease] : no coronary artery disease [Recent Myocardial Infarction] : no recent myocardial infarction [Implantable Device/Pacemaker] : no implantable device/pacemaker [No Pertinent Pulmonary History] : no history of asthma, COPD, sleep apnea, or smoking [Asthma] : no asthma [COPD] : no COPD [Sleep Apnea] : no sleep apnea [Smoker] : not a smoker [No Adverse Anesthesia Reaction] : no adverse anesthesia reaction in self or family member [Chronic Anticoagulation] : no chronic anticoagulation [Chronic Kidney Disease] : no chronic kidney disease [Diabetes] : diabetes [Moderate (4-6 METs)] : Moderate (4-6 METs) [FreeTextEntry1] : Pancreatic Cancer on CT [FreeTextEntry2] : 11/18/22 [FreeTextEntry3] : Dr. Milton/Special Procedures [FreeTextEntry4] : Patient with history hyperlipiemia, alcohol abuse now in remission, niddm, iron deficiency who will undergo liver biopsy for presumed pancreatic cancer.

## 2022-11-16 NOTE — PHYSICAL EXAM
[No Acute Distress] : no acute distress [Well Nourished] : well nourished [Well Developed] : well developed [Well-Appearing] : well-appearing [Normal Sclera/Conjunctiva] : normal sclera/conjunctiva [PERRL] : pupils equal round and reactive to light [EOMI] : extraocular movements intact [Normal Outer Ear/Nose] : the outer ears and nose were normal in appearance [Normal Oropharynx] : the oropharynx was normal [No JVD] : no jugular venous distention [No Lymphadenopathy] : no lymphadenopathy [Supple] : supple [Thyroid Normal, No Nodules] : the thyroid was normal and there were no nodules present [No Respiratory Distress] : no respiratory distress  [No Accessory Muscle Use] : no accessory muscle use [Clear to Auscultation] : lungs were clear to auscultation bilaterally [Normal Rate] : normal rate  [Regular Rhythm] : with a regular rhythm [Normal S1, S2] : normal S1 and S2 [No Murmur] : no murmur heard [No Carotid Bruits] : no carotid bruits [No Abdominal Bruit] : a ~M bruit was not heard ~T in the abdomen [No Varicosities] : no varicosities [Pedal Pulses Present] : the pedal pulses are present [No Edema] : there was no peripheral edema [No Palpable Aorta] : no palpable aorta [No Extremity Clubbing/Cyanosis] : no extremity clubbing/cyanosis [Soft] : abdomen soft [No HSM] : no HSM [Normal Bowel Sounds] : normal bowel sounds [Normal Posterior Cervical Nodes] : no posterior cervical lymphadenopathy [Normal Anterior Cervical Nodes] : no anterior cervical lymphadenopathy [No CVA Tenderness] : no CVA  tenderness [No Spinal Tenderness] : no spinal tenderness [No Joint Swelling] : no joint swelling [Grossly Normal Strength/Tone] : grossly normal strength/tone [No Rash] : no rash [Coordination Grossly Intact] : coordination grossly intact [No Focal Deficits] : no focal deficits [Normal Gait] : normal gait [Deep Tendon Reflexes (DTR)] : deep tendon reflexes were 2+ and symmetric [Normal Affect] : the affect was normal [Normal Insight/Judgement] : insight and judgment were intact [de-identified] : ruq pain, nodullar liver

## 2022-11-17 PROBLEM — F41.9 ANXIETY DISORDER, UNSPECIFIED: Chronic | Status: ACTIVE | Noted: 2022-01-01

## 2022-11-27 NOTE — ED PROVIDER NOTE - IV ALTEPLASE DOOR HIDDEN
Pt presents from home for persistent cough and redness to left eye. Was put on polymixin last week. Has been using this without relief and now having itchiness to right eye. Using day and nyquil. Pt A&Ox4 and ambulatory    show

## 2022-11-30 NOTE — ED ADULT NURSE NOTE - CHIEF COMPLAINT QUOTE
pt BIBA s/p being found laying in the middle of the road. + ETOH. Pt just d/c'd from [facility]. No injuries noted. Dr Parra called to virgil. Pt changed into yellow gown with belongings secured 30-Nov-2022 13:01

## 2022-12-12 NOTE — ED PROVIDER NOTE - CROS ED RESP ALL NEG
Received message Via Pt portal       Appointment For: Carin Harkins ([id number])  Visit Type: MY OFFICE VISIT (2750)     12/20/2022   4:30 PM  10 mins.  Dago Yancey MD     EM SURGERY     Patient Comments:  I was supposed to have a colonoscopy scheduled and no one called the last time I met with him. I am not sure this appt is needed for this. Would like to get this done asap.    Please advise [phone]     negative...

## 2022-12-13 PROBLEM — C25.9 PANCREATIC CANCER METASTASIZED TO LIVER: Status: ACTIVE | Noted: 2022-01-01

## 2022-12-14 NOTE — ED PROVIDER NOTE - MUSCULOSKELETAL, MLM
[de-identified] : Patient here for Doxil today. Pt c/o sob x 2 days and worsening productive white phlegm cough x 10 days. On exam VS: BP:139/94, HR:138 , SPO2 69% ORA , RR: 30s , afebrile.Denies cp, dizziness, recent fever or chills. On exam female with +accessory muscle use with breathing, +shallow breathing noted. Lungs diminished at bases b/l , unable to take full deep breaths, HR:Tachycardic. Given Albuterol nebulizer with improvement in O2 to 90's , placed on 2L NC. Primary team at chair side, recc pt go to ED for imaging, possibly worsening pleural effusions related to disease vs other etiology. Pt understands and agrees with plan.  Spine appears normal, range of motion is not limited, no muscle or joint tenderness

## 2022-12-24 NOTE — REASON FOR VISIT
[Procedure: _________] : a [unfilled] procedure visit [FreeTextEntry1] : Diagnosis of pancreatic cancer with mets to liver

## 2022-12-24 NOTE — HISTORY OF PRESENT ILLNESS
[FreeTextEntry1] : PRE CALL PRIOR TO APPOINTMENT:  \par \par  Phone Number: [phone]\par \par  COVID-19 SWAB:  advised\par \par  RX on file:  yes\par \par  Referring MD:  Karine\par \par  Appointment date:  12/21/22\par \par  Currently on Chemotherapy:     yes         \par \par  CBC within 48 hours:  WBC:       ANC:  \par \par  Diabetic:  yes\par \par  Clotting or Bleeding disorders:  no\par \par  PPM / Defibrillator:  no\par \par  NPO status advised:  yes\par \par  History of fall:     no\par \par  Assistant device for walking:  no\par \par   home:  confirmed\par \par  Blood Thinners:  no\par \par  Prescribing MD agree to have medication held:  na\par \par  Capacity to make decisions: yes\par \par  HCP:  no\par \par  DNR:  no\par \par  Person contact for Pre-Call:  \par \par  \par \par  Guidelines for Screening Anesthesia / Sedation Cases	(TYPE YES OR NO)  \par \par   Obtain Prescription / Authorization from Referring Physician: YES	 \par \par Medical Necessity (Justification of the need for Anesthesia / Sedation) from referring Physician: YES	 \par \par  Have you taken oral sedation? (e.g. Xanax, Valium, and other oral sedatives):  NO	 \par \par  Clearance to receive Anesthesia / Sedation: YES- BY Dr. Giang 	 \par \par  \par \par  ANESTHESIA EXCLUSION CRITERIA QUESTIONNAIRE:	 \par \par Difficult Airway: (TYPE YES OR NO) 	 \par \par Previous Problem with Anesthesia or sedation: NO	 \par \par  History of Difficult IntubatioN: NO	 \par \par  Stridor, Snoring, Sleep Apnea: NO	 \par \par  Tonsils / Adenoids present: YES	 \par \par  Dysmorphic Facial Features: NO	 \par \par  Cervical Spine Fusion/ Cervical Spine Surgery: NO	 \par \par  Tumor in Airway: NO	 \par \par  Trauma to Airway: NO	 \par \par  Radiation therapy to head / neck: NO	 \par \par  Advanced Rheumatoid ArthritiS: NO	 \par \par  Active Cardiac Ischemia (as defined by EKG or Laboratory  Examination): NO	 \par \par  Severe COPD / Home O2: NO	 \par \par  Known or Suspected Increased Intracranial pressure: NO	 \par \par  Patient with BMI of 40 or greater : NO    Weight (kgs.) _____ Height (ft.) ______       BMI_______	 	 \par \par  Patients with Increased Risk of Aspiration: (TYPE YES OR NO) 		 \par \par  Abnormal Airway: NO	 \par \par  Hiatal Hernia with Reflux: NO	 \par \par  Pregnancy: NO	 	 \par \par  Altered Mental State: NO 	 	 \par \par  Spinal Cord Injury with Paraplegia or Quadriplegia: NO	 	 \par \par  History of delayed Gastric Emptying: NO 	 	 \par \par  ASA Physical Status of 3 or greater (See Appendix 1 from policy ANSL.5502) 	 	 \par \par \par Malignant Hyperthermia Screening: (TYPE YES OR NO) 	 \par \par Family history unexpected death following anesthesia: NO	 \par \par  Family or personal history of Malignant Hyperthermia: NO  	 \par \par   A muscle or neuromuscular disorder: NO 	 \par \par  \par \par  Marry -Operative Assessment ( Day of Procedure)  \par  \par  Procedure:  mediport insertion\par \par  Indication:  pancreatic ca\par \par  NPO status: 5p 12/20/22\par \par  Accompanied by:  Uncle Max  [phone]\par \par  Falls risk:  no\par \par \par  IVL:  22g right wrist\par \par  IR MD: Dr. Nenita Adler  \par \par  Urine Pregnancy: na\par \par  Pre-Op instructions: provided\par \par  POST-OP teaching initiated: yes \par \par  Allergy bracelet on: na\par \par  Anesthesia plan discussed with IR MD:  yes\par \par  Antibiotic given: yes\par

## 2022-12-28 NOTE — HISTORY OF PRESENT ILLNESS
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  Abdominal U/S showed cholelithiasis without secondary signs of acute cholecystitis or biliary dilatation.  Enlarged heterogeneous liver with multiple lesions concerning for metastatic disease.  Patient left the ED AMA without further work up.\par \par CT on 10/29/22 ordered by his PMD showed malignant pancreatic body mass with tumor encasement of the celiac axis, SMA and splenic vein/portal confluence. Numerous hepatic metastases, largest measuring up to 9 cm. Retroperitoneal adenopathy.\par \par  [de-identified] : Patient presents on C1D8 of Wilson/Abraxane for SIV pancreatic cancer \par + Mild fatigue. + Rash on face, no pruritus or pain. + N/V, responds well to zofran. + Decreased appetite. + Dysgeusia. + Sore throat, no fevers or chills. No edema, neuropathy, alopecia, D/C, arthralgia/myalgia or SOB.

## 2022-12-28 NOTE — PHYSICAL EXAM
[Fully active, able to carry on all pre-disease performance without restriction] : Status 0 - Fully active, able to carry on all pre-disease performance without restriction [Normal] : grossly intact [de-identified] : RUQ pain on deep palpation [de-identified] : Patient at times calm, but easily agitated leading to explosive, but not violent response.

## 2022-12-29 NOTE — ED CDU PROVIDER SUBSEQUENT DAY NOTE - NS ED MD CDU HISTORY DATE TIME DT
19-Mar-2021 05:35 [employer] Counseling:  I discussed with the patient the risks of mycophenolate mofetil including but not limited to infection/immunosuppression, GI upset, hypokalemia, hypercholesterolemia, bone marrow suppression, lymphoproliferative disorders, malignancy, GI ulceration/bleed/perforation, colitis, interstitial lung disease, kidney failure, progressive multifocal leukoencephalopathy, and birth defects.  The patient understands that monitoring is required including a baseline creatinine and regular CBC testing. In addition, patient must alert us immediately if symptoms of infection or other concerning signs are noted.

## 2022-12-30 NOTE — ED PROVIDER NOTE - ATTENDING CONTRIBUTION TO CARE
Joanne: I performed a face to face evaluation of this patient and performed a full history and physical examination on the patient.  I agree with the resident's history, physical examination, and plan of the patient unless otherwise noted. My brief assessment is as follows: pmh as documneted, metastatic liver/pancreatic cancer getting chemo, with abd pain x 4 weeks with no bm per pt. n/?vomiting. denies passing gas. no f/c. states is at rehab. etoh sober for months per pt. uncomfortable, cachectic, tachy ~120, ctab, abd with generalzed ttp, distended. no rebound/guarding. labs, ct, symptom control, reassess.

## 2022-12-30 NOTE — ED PROVIDER NOTE - PHYSICAL EXAMINATION
General: ill appearing, NAD, jaundice  Head: NC, AT  EENT: EOMI, +scleral icterus  Cardiac: tachycardic, no apparent murmurs, no lower extremity edema  Respiratory: CTABL, no respiratory distress   Abdomen: soft, distended, diffusely tender, nonperitonitic  MSK/Vascular: full ROM, soft compartments, warm extremities  Neuro: AAOx3, sensation to light touch intact  Psych: calm, cooperative

## 2022-12-30 NOTE — CONSULT NOTE ADULT - ASSESSMENT
57M, PMH of pancreatic cancer, ETOH abuse, started on chemotherapy 8 weeks ago according to the patient but was initiated base on our records on 12/6/22, he said he is no longer receiving chemotherapy because hes refusing, it makes him feel unwell and does not want it any longer.  He presents today after a 8 week complaint of abdominal pain, occasional nausea and occasional vomiting, he has been having weight loss, he knows his cancer is terminal and he has been suffering from the diagnosis and the mental toll it does have on the patient, hes unsure the last time he had a bowel movement, unsure the last time he passed flatus, he vomited once today.  When asked if he would be interested in surgical intervention he was not sure, he wants to have further discussions, when asked about a NG tube placement as he was advised he appears obstructed, he denied having one placed.  He has not had a goals of care discussion as far as he knows and he is interested in one.      Recommendations:     Patient declining NG tube placement   Patient requires resuscitation and medical optimization   Recommending malignant small bowel obstruction protocol; Decadron 4mg IV q8h, Octreotide 50mg IV q8h, Reglan 10mg IV q6h   No surgical intervention at this time   If patient has worsening nausea and vomiting, please place NG tube if patient agreeable   Palliative care evaluation for [facility]   Medicine evaluation and admission with thanks   Serial Abdominal Exams     [facility] Oncologist, Kurt Zhao MD 
56 y/o M with PMHx EtOH abuse and recently diagnosed metastatic pancreatic cancer admitted with:    Small bowel obstruction    PLAN:  - Comfort measures only.  - See goals of care discussion note.  - Palliative care consult.    Case discussed with ICU attending, Dr. Sanchez.

## 2022-12-30 NOTE — CONSULT NOTE ADULT - NS PANP COMMENT GEN_ALL_CORE FT
57M w/ hx of EtOH abuse, recently diagnosed metastatic pancreatic cancer recently started on chemotherapy presented to the ED with abdominal pain, nausea and vomiting found to have evidence of small bowel obstruction. Pt reports he felt poorly after his last session of chemotherapy and due to this he does not want to pursue further chemotherapy. Pt was seen by surgery service but declined the NG tube placement advised and recommended serial abdominal exams with no acute surgical intervention. Pt was noted to be in SVT to the 160S-170s but maintaining MAPs. Pt received 57M w/ hx of EtOH abuse, recently diagnosed metastatic pancreatic cancer recently started on chemotherapy presented to the ED with abdominal pain, nausea and vomiting found to have evidence of small bowel obstruction. Pt reports he felt poorly after his last session of chemotherapy and due to this he does not want to pursue further chemotherapy. Pt was seen by surgery service but declined the NG tube placement advised and recommended serial abdominal exams with no acute surgical intervention. Pt was noted to be in SVT to the 160S-170s but maintaining MAPs. Pt received 4L fluids with persistent tachycardia. MICU consulted given ongoing tachycardia. On evaluation, pt was in distress and tearful secondary to pain. Pt indicated clearly that he does not want any interventions and just wants to be made comfortable. Pt understands that refusing to pursue treatments may lead to his death and he indicates that he would like to be DNR/DNI. MOLST form filled out to this effect. Given pt is currently not interested in pursuing aggressive care, he is not a candidate for ICU care at this time. Pt's SVT is also possibly partly related to his underlying pain, so would recommend appropriate pain control. Recommend palliative care consult. Can start recommended malignant small bowel regimen as per surgery recs as may help with underlying symptoms if patient amenable. Can start on rate control agents if persistent tachycardia and patient amenable. Please call back with any questions or concerns.

## 2022-12-30 NOTE — ED ADULT TRIAGE NOTE - CHIEF COMPLAINT QUOTE
C/o RUQ abdominal pain. +Nausea. Pt states, "I have stage IV pancreatic cancer and it hurts so bad". Pt states he is currently undergoing chemotherapy treatments.

## 2022-12-30 NOTE — ED PROVIDER NOTE - CARE PLAN
1 Principal Discharge DX:	Bowel obstruction  Secondary Diagnosis:	Hyponatremia   Principal Discharge DX:	Bowel obstruction  Secondary Diagnosis:	Hyponatremia  Secondary Diagnosis:	Pancreatic cancer metastasized to liver

## 2022-12-30 NOTE — GOALS OF CARE CONVERSATION - ADVANCED CARE PLANNING - CONVERSATION DETAILS
Pt explained that he was diagnosed with metastatic pancreatic cancer in 10/2022. He had been receiving chemotherapy, last treatment was on 12/22. Pt stated that after his last treatment, he had decided that he was no longer going to continue undergoing chemotherapy because the side effects were too awful. Pt was able to re-iterate the discussion that he had with the surgical team regarding diagnosis of small bowel obstruction. Pt stated that he received surgical intervention, and at this time would like to focus only on pain relief. I asked pt what his wishes would be regarding heroic measures such as CPR or intubation. Pt stated that if his heart were to stop, he wishes to pass peacefully and would not want to be placed on a ventilator. I explained to pt that at this time, if he does not want to continue with any further medical interventions, we can transition to comfort measures meaning we would only give him medications to relieve pain and anxiety. Pt stated that he has already discussed this with his brother and sister whom are his support system, and at this time would like to transition to comfort measures.

## 2022-12-30 NOTE — ED PROVIDER NOTE - OBJECTIVE STATEMENT
57 y m with pmh of etoh abuse and stage 4 pancreatic cancer presenting for abdominal pain. Pt poor historian. Says he has been having worsenign ab pain for the last 3 weeks. No BM or gas for 4 weeks. Decreased po intake given worsening ab pain with po. Decreased urine and dark brown color. Has had n/v but blood. Pt uncertain of green. Pt denies cp, dysuria, ha, f/c.

## 2022-12-30 NOTE — ED PROVIDER NOTE - PROGRESS NOTE DETAILS
Joanne: pt refusing ngt or any surgical care. received abx and 4L fluid, bp ~100, remains tachycardic, possibly compensatory. micu consulted awaiting micu. Terrance: Pt received in signout from Dr. Rubio. Remains tachycardic to 171; BP stable. Pending MICU evaluation. Terrance: Pt currently AAOx2, complaining of persistent pain. Spoke with pt's sister Yoko on the phone; she states that pt was sent from [facility]. Last received chemo last week and recently had chest port placed. States that she is HCP and will be in the ED to see pt. Terrance: Pt currently AAOx3, complaining of persistent pain. Spoke with pt's sister Yoko on the phone; she states that pt was sent from [facility]. Last received chemo last week and recently had chest port placed. States that she is HCP and will be in the ED to see pt. Terrance: MICU completed MOLST with pt; pt elected to be DNR/DNI/comfort measures only. Terrance: Pt endorsed to hospitalist. Will admit.

## 2022-12-30 NOTE — ED PROVIDER NOTE - CLINICAL SUMMARY MEDICAL DECISION MAKING FREE TEXT BOX
Pt w ho pancreatic cancer presenting with symptoms concerning for SBO. Treating pain, giving fluids. Getting labs, ct ab pelv, ua, uc.

## 2022-12-30 NOTE — CHART NOTE - NSCHARTNOTEFT_GEN_A_CORE
RACQUEL Note: RACQUEL received call from [facility]ACQUEL [facility]- [phone]. [facility] reports pt has been in their program for a few months, however they are unable to accept pt back at this time as he has rapidly declined physically and can no longer care for self. Pt dx w/ stage 4 pancreatic cancer. Beba states that staff at facility is not equipped to assist w/ pt on this level and he would need a higher level of care upon d/c. RACQUEL made ED provider aware who states pt is likely being admitted and not doing well medically, pt since moved to critical, SW following

## 2022-12-30 NOTE — CONSULT NOTE ADULT - SUBJECTIVE AND OBJECTIVE BOX
SURGICAL ONCOLOGY CONSULT    HPI:  This is a 57M, PMH of pancreatic cancer, on Abraxane/Gemzar, ETOH abuse, started on chemotherapy 8 weeks ago according to the patient but was initiated base on our records on 22, he said he is no longer receiving chemotherapy because hes refusing, it makes him feel unwell and does not want it any longer.  He presents today after a 8 week complaint of abdominal pain, occasional nausea and occasional vomiting, he has been having weight loss, he knows his cancer is terminal and he has been suffering from the diagnosis and the mental toll it does have on the patient, hes unsure the last time he had a bowel movement, unsure the last time he passed flatus, he vomited once today.  When asked if he would be interested in surgical intervention he was not sure, he wants to have further discussions, when asked about a NG tube placement as he was advised he appears obstructed, he denied having one placed.  He has not had a goals of care discussion as far as he knows and he is interested in one.  He denies fever or chills, denies worsening abdominal pain, no additional complaints. Denies recent ETOH abuse.      PAST MEDICAL HISTORY:  Alcohol abuse    Hypertension    High cholesterol    Alcohol abuse    GERD (gastroesophageal reflux disease)    HTN (hypertension)    High cholesterol    No pertinent past medical history    Alcohol abuse    Asthma    No pertinent past medical history    DM (diabetes mellitus)    Anxiety and depression        PAST SURGICAL HISTORY:  No significant past surgical history    No significant past surgical history    No significant past surgical history    Finger injury        ALLERGIES:  No Known Allergies      FAMILY HISTORY: Noncontributory    SOCIAL HISTORY: Denies tobacco, EtOH, illicit substance use.     HOME MEDICATIONS:    MEDICATIONS  (STANDING):    MEDICATIONS  (PRN):      VITALS & I/Os:  Vital Signs Last 24 Hrs  T(C): 36.4 (30 Dec 2022 16:28), Max: 36.7 (30 Dec 2022 09:56)  T(F): 97.5 (30 Dec 2022 16:28), Max: 98 (30 Dec 2022 09:56)  HR: 165 (30 Dec 2022 17:20) (127 - 170)  BP: 111/71 (30 Dec 2022 17:20) (89/61 - 111/71)  BP(mean): --  RR: 18 (30 Dec 2022 17:20) (16 - 20)  SpO2: 97% (30 Dec 2022 17:20) (97% - 98%)    Parameters below as of 30 Dec 2022 17:20  Patient On (Oxygen Delivery Method): room air      CAPILLARY BLOOD GLUCOSE          I&O's Summary      GENERAL: Patient is ill appearing, cachectic  MENTAL STATUS: AAOx3. Appropriate affect.  HEENT: PERRLA. EOMI. MMM.  Trachea midline. No lymph node swelling or tenderness.  RESPIRATORY: CTAB. No wheezing, rales or rhonchi.  CARDIOVASCULAR: RRR. No audible murmurs, rubs or gallops.   GASTROINTESTINAL: Abdomen diffusely distended, no masses, no rebound, no guarding.        LABS:                        11.4   31.50 )-----------( 750      ( 30 Dec 2022 12:15 )             34.5     12    121<L>  |  77<L>  |  44.9<H>  ----------------------------<  152<H>  5.2   |  22.0  |  0.79    Ca    8.3<L>      30 Dec 2022 12:15    TPro  7.0  /  Alb  3.2<L>  /  TBili  3.3<H>  /  DBili  2.4<H>  /  AST  104<H>  /  ALT  51<H>  /  AlkPhos  442<H>  1230    Lactate:        CARDIAC MARKERS ( 30 Dec 2022 12:15 )  x     / <0.01 ng/mL / x     / x     / x          12 @ 14:45  2.60    Urinalysis Basic - ( 30 Dec 2022 11:08 )    Color: Pushpa / Appearance: Slightly Turbid / S.020 / pH: x  Gluc: x / Ketone: Small  / Bili: Moderate / Urobili: 1   Blood: x / Protein: 15 / Nitrite: Positive   Leuk Esterase: Trace / RBC: Negative /HPF / WBC 3-5 /HPF   Sq Epi: x / Non Sq Epi: Few / Bacteria: Moderate        IMAGING:    Findings:    LOWER CHEST: The included lung bases demonstrate nonspecific, mild groundglass opacities in the right middle lobe and right lower lobe, new from prior. A 3 mm nodule in the right middle lobe is stable. Coronary artery calcifications are present. The distal tip of a right internal jugular approach Mediport is seen with the tip terminating at the cavoatrial junction. Small pericardial effusion, slightly increased from prior.    LIVER: Innumerable liver masses are seen. The liver is mildly enlarged, measuring up to 20 cm, stable.  BILE DUCTS: Grossly normal caliber.  GALLBLADDER: A gallstone is seen within the gallbladder. No CT evidence for gallbladder inflammation.  SPLEEN: Unenhanced spleen is mildly enlarged, measuring up to 13 cm in length, without significant change.    PANCREAS: Limited evaluation of the known pancreatic malignancy on this noncontrast examination. ADRENALS: Unenhanced adrenals appear within normal limits.  KIDNEYS/URETERS: Unenhanced kidneys appear within normal limits. No calculi are seen within the bilateral kidneys or ureters. There is no hydroureteronephrosis seen bilaterally.    BLADDER: Limited due to underdistention. There may be mild diffuse bladder wall thickening.    REPRODUCTIVE ORGANS: Within normal limits.    BOWEL: The stomach is dilated, and the distal esophagus is dilated and fluid-filled. Dilated small bowel loops are seen throughout the abdomen/pelvis. The distal transition point is seen at the distal small bowel within the pelvis (image 120, series 3). Small bowel loops in the pelvis just proximal to this distal transition demonstrate dilatation, mesenteric edema and concentric bowel wall thickening. Additional possible more proximal small bowel luminal narrowing (image 124, series 3) raises the possibility of closed loop small bowel obstruction. The most distal small bowel is collapsed. The colon is collapsed. There is no free air. No evidence for pneumatosis. Normal appendix.    PERITONEUM: Trace ascites.  VESSELS: Moderate aortoiliac atherosclerotic disease is seen without aneurysm.  LYMPH NODES: Limited evaluation of known upper abdominal and retroperitoneal lymphadenopathy due to the lack of IV contrast, however appears grossly similar.  ABDOMINAL WALL: Within normal limits.  BONES: No suspicious osseous lesion. Sclerotic focus at the left iliac bone is stable and probably represents a bone island. The level degenerative changes are seen throughout the visualized spine.  OTHER: None    IMPRESSION:  Limited noncontrast exam. Although the study was ordered with contrast, the right upper extremity IV line infiltrated with IV contrast injection. Post extravasation care was performed and is documented.    1. Small bowel obstruction is present with transition point in the pelvis (image 120, series 3) where there are dilated, thick walled, inflamed appearing small bowel loops with mesenteric edema. Additional possible more proximal small bowel luminal narrowing (image 124, series 3) raises the possibility of closed loop small bowel obstruction. Inflammatory or malignant etiologies for small bowel obstruction are also considered. No pneumatosis. No free air. Recommend clinical correlation. Consider surgical consultation.    2. Limited evaluation of known pancreatic malignancy with liver metastases and upper abdominal/retroperitoneal lymphadenopathy due to the lack of IV contrast, however appears grossly similar.    3. Limited evaluation of the urinary bladder due to underdistention. Question mild diffuse bladder wall thickening. Amenable to correlation with urinalysis.    4. Stable hepatosplenomegaly.    5. Other findings, as above.  
Patient is a 58 y/o male who presents with a chief complaint of SBO (31 Dec 2022 00:05)    BRIEF HOSPITAL COURSE: 58 y/o M with PMHx EtOH abuse and recently diagnosed metastatic pancreatic cancer who presented to ER complaining of abdominal pain, nausea, and vomiting. CT abd/pelvis revealed small bowel obstruction with the transition point in the pelvis, liver metastases, upper abdominal/retroperitoneal lymphadenopathy, and hepatosplenomegaly. Evaluated by general surgery, but declined surgical intervention. ICU consulted for tachycardia.    PAST MEDICAL & SURGICAL HISTORY:  High cholesterol  GERD (gastroesophageal reflux disease)  Alcohol abuse  Currently in an outreach program  Asthma  DM (diabetes mellitus)  Anxiety and depression  Finger injury    Allergies  No Known Allergies    Intolerances    FAMILY HISTORY:  FH: heart disease (Father, Mother)  FH: brain cancer (Grandparent)    SOCIAL HISTORY:   Hx of EtOH abuse and cocaine abuse.    Review of Systems:  CONSTITUTIONAL: No fever, chills, or fatigue.  EYES: No eye pain, visual disturbances, or discharge.  ENMT:  No difficulty hearing, tinnitus, or vertigo. No sinus or throat pain.  NECK: No pain or stiffness.  RESPIRATORY: No shortness of breath, cough, or wheezing.  CARDIOVASCULAR: No chest pain, palpitations, dizziness, or leg swelling.  GASTROINTESTINAL: + abdominal pain, + nausea, + vomiting. No abdominal or epigastric pain. No diarrhea or constipation. No hematemesis, melena, or hematochezia.  GENITOURINARY: No dysuria, frequency, hematuria, or incontinence.  NEUROLOGICAL: No headaches, memory loss, loss of strength, numbness, or tremors.  SKIN: No itching, burning, rashes, or lesions.  MUSCULOSKELETAL: No joint pain or swelling; No muscle, back, or extremity pain.  PSYCHIATRIC: No depression, anxiety, mood swings, or difficulty sleeping.    Medications:  HYDROmorphone  Injectable 0.5 milliGRAM(s) IV Push every 2 hours PRN  HYDROmorphone  Injectable 1 milliGRAM(s) IV Push every 2 hours PRN  LORazepam   Injectable 0.5 milliGRAM(s) IV Push every 4 hours PRN  ondansetron Injectable 4 milliGRAM(s) IV Push every 6 hours PRN  bisacodyl Suppository 10 milliGRAM(s) Rectal daily PRN  glycopyrrolate Injectable 0.4 milliGRAM(s) IV Push four times a day PRN    ICU Vital Signs Last 24 Hrs  T(C): 36.6 (30 Dec 2022 18:44), Max: 36.7 (30 Dec 2022 09:56)  T(F): 97.9 (30 Dec 2022 18:44), Max: 98 (30 Dec 2022 09:56)  HR: 166 (30 Dec 2022 21:22) (127 - 174)  BP: 92/74 (30 Dec 2022 21:22) (89/61 - 111/71)  BP(mean): 79 (30 Dec 2022 21:22) (79 - 79)  ABP: --  ABP(mean): --  RR: 16 (30 Dec 2022 21:22) (16 - 20)  SpO2: 99% (30 Dec 2022 21:22) (97% - 100%)    O2 Parameters below as of 30 Dec 2022 21:22  Patient On (Oxygen Delivery Method): room air    Vital Signs Last 24 Hrs  T(C): 36.6 (30 Dec 2022 18:44), Max: 36.7 (30 Dec 2022 09:56)  T(F): 97.9 (30 Dec 2022 18:44), Max: 98 (30 Dec 2022 09:56)  HR: 166 (30 Dec 2022 21:22) (127 - 174)  BP: 92/74 (30 Dec 2022 21:22) (89/61 - 111/71)  BP(mean): 79 (30 Dec 2022 21:22) (79 - 79)  RR: 16 (30 Dec 2022 21:22) (16 - 20)  SpO2: 99% (30 Dec 2022 21:22) (97% - 100%)    Parameters below as of 30 Dec 2022 21:22  Patient On (Oxygen Delivery Method): room air    I&O's Detail    LABS:                        11.4   31.50 )-----------( 750      ( 30 Dec 2022 12:15 )             34.5     12-30    122<L>  |  82<L>  |  45.1<H>  ----------------------------<  122<H>  5.4<H>   |  23.0  |  0.63    Ca    7.2<L>      30 Dec 2022 18:15  Mg     2.6     12-30    TPro  7.0  /  Alb  3.2<L>  /  TBili  3.3<H>  /  DBili  2.4<H>  /  AST  104<H>  /  ALT  51<H>  /  AlkPhos  442<H>      CARDIAC MARKERS ( 30 Dec 2022 12:15 )  x     / <0.01 ng/mL / x     / x     / x        CAPILLARY BLOOD GLUCOSE    Urinalysis Basic - ( 30 Dec 2022 11:08 )  Color: Pushpa / Appearance: Slightly Turbid / S.020 / pH: x  Gluc: x / Ketone: Small  / Bili: Moderate / Urobili: 1   Blood: x / Protein: 15 / Nitrite: Positive   Leuk Esterase: Trace / RBC: Negative /HPF / WBC 3-5 /HPF   Sq Epi: x / Non Sq Epi: Few / Bacteria: Moderate    CULTURES:    Physical Examination:    General: Well appearing, lying in bed in NAD.    HEENT: Pupils equal, reactive to light. Symmetric. No scleral icterus or injection.    PULM: Clear to auscultation B/L. No wheezes, rales, or rhonchi appreciated. No significant sputum production or increased respiratory effort.    NECK: Supple, no lymphadenopathy, trachea midline.    CVS: Regular rate and rhythm, no murmurs appreciated, +s1/s2.    ABD: Soft, nondistended, nontender, normoactive bowel sounds.    EXT: No edema, nontender.    SKIN: Warm and well perfused, no rashes noted.    NEURO: Alert, oriented, interactive, nonfocal.    RADIOLOGY:   < from: CT Abdomen and Pelvis w/ IV Cont (22 @ 14:19) >  ACC: [id number] EXAM:  CT ABDOMEN AND PELVIS IC                          PROCEDURE DATE:  2022      INTERPRETATION:  CT ABDOMEN AND PELVIS WITHOUT CONTRAST    Clinical Indication: Abdominal distention, history of pancreatic cancer    Technique: Axial CT images of the abdomen and pelvis were obtained from   the lung bases to the pubic symphysis without oral or IV contrast.    Coronal and sagittal reformatted images were created and reviewed.    CONTRAST/COMPLICATIONS:  IV Contrast: Omnipaque 350  93 cc administered   7 cc discarded  Oral Contrast: NONE  Complications: Extravasation    Comparison: Prior CT of the chest from 2022. Prior CT of the abdomen   and pelvis from 10/29/2022 and 2022.    Technologist note: pt unable tofollow direction, pt moving and iv   infiltrated. Er dr said to not rescan. 93 cc of IV contrast infiltrated   to the right upper extremity. Right upper extremity pulses were   documented.  Dr. Bong Feliz and RN Cindy Rodriguez were involved in   the care after IV infiltrated.    Findings:    LOWER CHEST: The included lung bases demonstrate nonspecific, mild   groundglass opacities in the right middle lobe and right lower lobe, new   from prior. A 3 mm nodule in the right middle lobe is stable. Coronary   artery calcifications are present. The distal tip of a right internal   jugular approach Mediport is seen with the tip terminating at the   cavoatrial junction. Small pericardial effusion, slightly increased from   prior.    LIVER: Innumerable liver masses are seen. The liver is mildly enlarged,   measuring up to 20 cm, stable.  BILE DUCTS: Grossly normal caliber.  GALLBLADDER: A gallstone is seen within the gallbladder. No CT evidence   for gallbladder inflammation.  SPLEEN: Unenhanced spleen is mildly enlarged, measuring up to 13 cm in   length, without significant change.    PANCREAS: Limited evaluation of the known pancreatic malignancy on this   noncontrast examination. ADRENALS: Unenhanced adrenals appear within   normal limits.  KIDNEYS/URETERS: Unenhanced kidneys appear within normal limits. No   calculi are seen within the bilateral kidneys or ureters. There is no   hydroureteronephrosis seen bilaterally.    BLADDER: Limited due to underdistention. There may be milddiffuse   bladder wall thickening.    REPRODUCTIVE ORGANS: Within normal limits.    BOWEL: The stomach is dilated, and the distal esophagus is dilated and   fluid-filled. Dilated small bowel loops are seen throughout the   abdomen/pelvis.   The distal transition point is seen at the distal small   bowel within the pelvis (image 120, series 3). Small bowel loops in the   pelvis just proximal to this distal transition demonstrate dilatation,   mesenteric edema and concentric bowel wall thickening.Additional   possible more proximal small bowel luminal narrowing (image 124, series   3) raises the possibility of closed loop small bowel obstruction.  The   most distal small bowel is collapsed. The colon is collapsed. There is no   free air. No evidence for pneumatosis. Normal appendix.    PERITONEUM: Trace ascites.  VESSELS: Moderate aortoiliac atherosclerotic disease is seen without   aneurysm.  LYMPH NODES: Limited evaluation of known upper abdominal and   retroperitoneal lymphadenopathy due to the lack of IV contrast, however   appears grossly similar.  ABDOMINAL WALL: Within normal limits.  BONES: No suspicious osseous lesion. Sclerotic focus at the left iliac   bone is stable and probably represents a bone island. The level   degenerative changes are seen throughout the visualized spine.  OTHER:  None    IMPRESSION:  Limited noncontrast exam. Although the study was ordered with contrast,   the right upper extremity IV line infiltrated with IV contrast injection.    Post extravasation care was performed and is documented.    1.  Small bowel obstruction is present with transition point in the   pelvis (image 120, series 3) where there are dilated, thick walled,   inflamed appearing small bowel loops with mesenteric edema.  Additional   possible more proximal small bowel luminal narrowing (image 124, series   3) raises the possibility of closed loop small bowel obstruction.   Inflammatory or malignant etiologies for small bowel obstruction are also   considered. No pneumatosis. No free air. Recommend clinical correlation.   Consider surgical consultation.    2.  Limited evaluation of known pancreatic malignancy with liver   metastases and upper abdominal/retroperitoneal lymphadenopathy due to the   lack of IV contrast, however appears grossly similar.    3.  Limited evaluation of the urinary bladder due to underdistention.   Question mild diffuse bladder wall thickening. Amenable to correlation   with urinalysis.    4.  Stable hepatosplenomegaly.    5.  Other findings, as above.    Dr. Fidelia Yung discussed findings of # 1 and 2 with ordering clinician   Dr. Bong Feliz at 4:17 pm on 2022.    --- End of Report ---    FIDELIA YUNG MD; Attending Radiologist  This document has been electronically signed. Dec 30 2022  4:30PM    < end of copied text >

## 2022-12-31 NOTE — H&P ADULT - NSHPPHYSICALEXAM_GEN_ALL_CORE
Vital Signs Last 24 Hrs  T(C): 36.6 (30 Dec 2022 18:44), Max: 36.7 (30 Dec 2022 09:56)  T(F): 97.9 (30 Dec 2022 18:44), Max: 98 (30 Dec 2022 09:56)  HR: 166 (30 Dec 2022 21:22) (127 - 174)  BP: 92/74 (30 Dec 2022 21:22) (89/61 - 111/71)  BP(mean): 79 (30 Dec 2022 21:22) (79 - 79)  RR: 16 (30 Dec 2022 21:22) (16 - 20)  SpO2: 99% (30 Dec 2022 21:22) (97% - 100%)    Parameters below as of 30 Dec 2022 21:22  Patient On (Oxygen Delivery Method): room air    General: Age-appearing, in no acute distress  Head: Normochephalic, atraumatic  ENMT: EOMI, neck supple  Cardiovascular: +S1, S2; Regular rate and rhythm, no murmurs, rubs, gallops  Respiratory: CTA BL, no wheezes, rales, rhonchi  Gastrointestinal: Abdomen soft, non-tender, +BS in all 4 quadrants  Extremities: No clubbing, cyanosis, or edema  Vascular: 2+ pulses, cap refill < 2 seconds  Neuro: Non-focal, AAOx4, sensation intact BL  Musculoskeletal: Normal tone, no deformities  Skin: Warm, dry; no acute rash seen  Psych: Appropriate, cooperative

## 2022-12-31 NOTE — H&P ADULT - HISTORY OF PRESENT ILLNESS
58 yo male with pmhx pancreatic cancer, EtOH abuse presenting with worsening abdominal, nausea, and vomiting.  56 yo male with pmhx pancreatic cancer, EtOH abuse presenting with worsening abdominal, nausea, and vomiting. Patient is tearful during the interview. He explains he was diagnosed with pancreatic cancer metastatic to the liver. He was started on chemo but has since decided to stop as he was feeling so poorly with the treatment. He has been having worsening abdominal pain with N/V, he cannot tell me exactly how long this has been going on for. He cannot remember his last BM or when he last passed flatus. He was evaluated but surgery, but declined all interventions including surgical and NGT placement. He was also evaluated by MICU given hypotension and tachycardia, and he decided to transition to full comfort measures. He denies chest pain, SOB, palpitations.  I again explained comfort measures and patient confirmed his wishes.  MOLST filled out by MICU.

## 2022-12-31 NOTE — PATIENT PROFILE ADULT - OVER THE PAST TWO WEEKS HAVE YOU FELT DOWN, DEPRESSED OR HOPELESS?
Attempted to reach pt to discuss scheduling a VV, LVM.----- Message from Misael Ann sent at 3/26/2020  3:57 PM CDT -----  Contact: Jacqueline (reyes) @ [phone] or   States pt is not getting any relief from pain, still wearing brace    
yes

## 2022-12-31 NOTE — H&P ADULT - ASSESSMENT
58 yo male with pmhx pancreatic cancer, EtOH abuse presenting with worsening abdominal, nausea, and vomiting.     Pancreatic Cancer metastatic to the liver  Small Bowel Obstruction  -Admit to medicine  -Patient is now declining chemotherapy, declining surgery or NGT for SBO; requesting transition to comfort measures  -Dilaudid Q2h for pain, Ativan Q4 for anxiety, Robinul Q6 PRN secretions  -No vital signs/blood draws/DVTppx  -Single room, lighting for comfort  -MOLST in chart     56 yo male with pmhx pancreatic cancer, EtOH abuse presenting with worsening abdominal, nausea, and vomiting.     Pancreatic Cancer metastatic to the liver  Small Bowel Obstruction  -Admit to medicine  -Patient is now declining chemotherapy, declining surgery or NGT for SBO; requesting transition to comfort measures  -Dilaudid Q2h for pain, Ativan Q4 for anxiety, Robinul Q6 PRN secretions  -No vital signs/blood draws/DVTppx  -Single room, lighting for comfort  -Palliative consulted  -MOLST in chart

## 2022-12-31 NOTE — H&P ADULT - NSHPLABSRESULTS_GEN_ALL_CORE
CARDIAC MARKERS ( 30 Dec 2022 12:15 )  x     / <0.01 ng/mL / x     / x     / x                                11.4   31.50 )-----------( 750      ( 30 Dec 2022 12:15 )             34.5     30 Dec 2022 18:15    122    |  82     |  45.1   ----------------------------<  122    5.4     |  23.0   |  0.63     Ca    7.2        30 Dec 2022 18:15  Mg     2.6       30 Dec 2022 18:15    TPro  7.0    /  Alb  3.2    /  TBili  3.3    /  DBili  2.4    /  AST  104    /  ALT  51     /  AlkPhos  442    30 Dec 2022 12:15      CAPILLARY BLOOD GLUCOSE        LIVER FUNCTIONS - ( 30 Dec 2022 12:15 )  Alb: 3.2 g/dL / Pro: 7.0 g/dL / ALK PHOS: 442 U/L / ALT: 51 U/L / AST: 104 U/L / GGT: x           Urinalysis Basic - ( 30 Dec 2022 11:08 )    Color: Pushpa / Appearance: Slightly Turbid / S.020 / pH: x  Gluc: x / Ketone: Small  / Bili: Moderate / Urobili: 1   Blood: x / Protein: 15 / Nitrite: Positive   Leuk Esterase: Trace / RBC: Negative /HPF / WBC 3-5 /HPF   Sq Epi: x / Non Sq Epi: Few / Bacteria: Moderate      < from: CT Abdomen and Pelvis w/ IV Cont (22 @ 14:19) >    IMPRESSION:  Limited noncontrast exam. Although the study was ordered with contrast,   the right upper extremity IV line infiltrated with IV contrast injection.    Post extravasation care was performed and is documented.    1.  Small bowel obstruction is present with transition point in the   pelvis (image 120, series 3) where there are dilated, thick walled,   inflamed appearing small bowel loops with mesenteric edema.  Additional   possible more proximal small bowel luminal narrowing (image 124, series   3) raises the possibility of closed loop small bowel obstruction.   Inflammatory or malignant etiologies for small bowel obstruction are also   considered. No pneumatosis. No free air. Recommend clinical correlation.   Consider surgical consultation.    2.  Limited evaluation of known pancreatic malignancy with liver   metastases and upper abdominal/retroperitoneal lymphadenopathy due to the   lack of IV contrast, however appears grossly similar.    3.  Limited evaluation of the urinary bladder due to underdistention.   Question mild diffuse bladder wall thickening. Amenable to correlation   with urinalysis.    4.  Stable hepatosplenomegaly.    5.  Other findings, as above.    Dr. Natasha Hawkins discussed findings of # 1 and 2 with ordering clinician   Dr. Bong Feliz at 4:17 pm on 2022.      --- End of Report ---    < end of copied text >

## 2022-12-31 NOTE — H&P ADULT - NSICDXPASTMEDICALHX_GEN_ALL_CORE_FT
PAST MEDICAL HISTORY:  Alcohol abuse Currently in an outreach program    Anxiety and depression     Asthma     DM (diabetes mellitus)     GERD (gastroesophageal reflux disease)     High cholesterol

## 2023-01-01 ENCOUNTER — TRANSCRIPTION ENCOUNTER (OUTPATIENT)
Age: 58
End: 2023-01-01

## 2023-01-01 VITALS
OXYGEN SATURATION: 94 % | HEART RATE: 65 BPM | DIASTOLIC BLOOD PRESSURE: 82 MMHG | SYSTOLIC BLOOD PRESSURE: 107 MMHG | RESPIRATION RATE: 18 BRPM | TEMPERATURE: 98 F

## 2023-01-01 PROCEDURE — 71045 X-RAY EXAM CHEST 1 VIEW: CPT

## 2023-01-01 PROCEDURE — 82140 ASSAY OF AMMONIA: CPT

## 2023-01-01 PROCEDURE — 86850 RBC ANTIBODY SCREEN: CPT

## 2023-01-01 PROCEDURE — 96361 HYDRATE IV INFUSION ADD-ON: CPT

## 2023-01-01 PROCEDURE — 74177 CT ABD & PELVIS W/CONTRAST: CPT | Mod: MA

## 2023-01-01 PROCEDURE — 83735 ASSAY OF MAGNESIUM: CPT

## 2023-01-01 PROCEDURE — 86901 BLOOD TYPING SEROLOGIC RH(D): CPT

## 2023-01-01 PROCEDURE — 81001 URINALYSIS AUTO W/SCOPE: CPT

## 2023-01-01 PROCEDURE — 93005 ELECTROCARDIOGRAM TRACING: CPT

## 2023-01-01 PROCEDURE — 96376 TX/PRO/DX INJ SAME DRUG ADON: CPT

## 2023-01-01 PROCEDURE — 82803 BLOOD GASES ANY COMBINATION: CPT

## 2023-01-01 PROCEDURE — 83605 ASSAY OF LACTIC ACID: CPT

## 2023-01-01 PROCEDURE — 80076 HEPATIC FUNCTION PANEL: CPT

## 2023-01-01 PROCEDURE — 87086 URINE CULTURE/COLONY COUNT: CPT

## 2023-01-01 PROCEDURE — 84484 ASSAY OF TROPONIN QUANT: CPT

## 2023-01-01 PROCEDURE — 99239 HOSP IP/OBS DSCHRG MGMT >30: CPT

## 2023-01-01 PROCEDURE — 85025 COMPLETE CBC W/AUTO DIFF WBC: CPT

## 2023-01-01 PROCEDURE — 86703 HIV-1/HIV-2 1 RESULT ANTBDY: CPT

## 2023-01-01 PROCEDURE — 80048 BASIC METABOLIC PNL TOTAL CA: CPT

## 2023-01-01 PROCEDURE — U0003: CPT

## 2023-01-01 PROCEDURE — 36415 COLL VENOUS BLD VENIPUNCTURE: CPT

## 2023-01-01 PROCEDURE — 96375 TX/PRO/DX INJ NEW DRUG ADDON: CPT

## 2023-01-01 PROCEDURE — 99285 EMERGENCY DEPT VISIT HI MDM: CPT

## 2023-01-01 PROCEDURE — 86900 BLOOD TYPING SEROLOGIC ABO: CPT

## 2023-01-01 PROCEDURE — 83690 ASSAY OF LIPASE: CPT

## 2023-01-01 PROCEDURE — 87040 BLOOD CULTURE FOR BACTERIA: CPT

## 2023-01-01 PROCEDURE — 99232 SBSQ HOSP IP/OBS MODERATE 35: CPT

## 2023-01-01 PROCEDURE — 96374 THER/PROPH/DIAG INJ IV PUSH: CPT

## 2023-01-01 PROCEDURE — 80307 DRUG TEST PRSMV CHEM ANLYZR: CPT

## 2023-01-01 PROCEDURE — U0005: CPT

## 2023-01-01 RX ORDER — ONDANSETRON 8 MG/1
4 TABLET, FILM COATED ORAL
Qty: 0 | Refills: 0 | DISCHARGE

## 2023-01-01 RX ORDER — HYDROMORPHONE HYDROCHLORIDE 2 MG/ML
1 INJECTION INTRAMUSCULAR; INTRAVENOUS; SUBCUTANEOUS ONCE
Refills: 0 | Status: DISCONTINUED | OUTPATIENT
Start: 2023-01-01 | End: 2023-01-01

## 2023-01-01 RX ORDER — ROBINUL 0.2 MG/ML
0.4 INJECTION INTRAMUSCULAR; INTRAVENOUS
Qty: 0 | Refills: 0 | DISCHARGE

## 2023-01-01 RX ORDER — OXYCODONE HYDROCHLORIDE 5 MG/1
1 TABLET ORAL
Qty: 0 | Refills: 0 | DISCHARGE

## 2023-01-01 RX ORDER — ALBUTEROL 90 UG/1
2 AEROSOL, METERED ORAL
Qty: 0 | Refills: 0 | DISCHARGE

## 2023-01-01 RX ORDER — HYDROMORPHONE HYDROCHLORIDE 2 MG/ML
0.5 INJECTION INTRAMUSCULAR; INTRAVENOUS; SUBCUTANEOUS
Qty: 0 | Refills: 0 | DISCHARGE

## 2023-01-01 RX ORDER — OMEPRAZOLE 10 MG/1
1 CAPSULE, DELAYED RELEASE ORAL
Qty: 0 | Refills: 0 | DISCHARGE

## 2023-01-01 RX ORDER — SERTRALINE 25 MG/1
1 TABLET, FILM COATED ORAL
Qty: 0 | Refills: 0 | DISCHARGE

## 2023-01-01 RX ORDER — FENOFIBRATE,MICRONIZED 130 MG
1 CAPSULE ORAL
Qty: 0 | Refills: 0 | DISCHARGE

## 2023-01-01 RX ORDER — LABETALOL HCL 100 MG
10 TABLET ORAL ONCE
Refills: 0 | Status: DISCONTINUED | OUTPATIENT
Start: 2023-01-01 | End: 2023-01-01

## 2023-01-01 RX ORDER — METFORMIN HYDROCHLORIDE 850 MG/1
0 TABLET ORAL
Qty: 0 | Refills: 0 | DISCHARGE

## 2023-01-01 RX ORDER — ZOLPIDEM TARTRATE 10 MG/1
1 TABLET ORAL
Qty: 0 | Refills: 0 | DISCHARGE

## 2023-01-01 RX ADMIN — HYDROMORPHONE HYDROCHLORIDE 1 MILLIGRAM(S): 2 INJECTION INTRAMUSCULAR; INTRAVENOUS; SUBCUTANEOUS at 20:00

## 2023-01-01 RX ADMIN — HYDROMORPHONE HYDROCHLORIDE 1 MILLIGRAM(S): 2 INJECTION INTRAMUSCULAR; INTRAVENOUS; SUBCUTANEOUS at 02:20

## 2023-01-01 RX ADMIN — HYDROMORPHONE HYDROCHLORIDE 1 MILLIGRAM(S): 2 INJECTION INTRAMUSCULAR; INTRAVENOUS; SUBCUTANEOUS at 06:23

## 2023-01-01 RX ADMIN — HYDROMORPHONE HYDROCHLORIDE 1 MILLIGRAM(S): 2 INJECTION INTRAMUSCULAR; INTRAVENOUS; SUBCUTANEOUS at 01:48

## 2023-01-01 RX ADMIN — HYDROMORPHONE HYDROCHLORIDE 1 MILLIGRAM(S): 2 INJECTION INTRAMUSCULAR; INTRAVENOUS; SUBCUTANEOUS at 03:54

## 2023-01-01 RX ADMIN — HYDROMORPHONE HYDROCHLORIDE 1 MILLIGRAM(S): 2 INJECTION INTRAMUSCULAR; INTRAVENOUS; SUBCUTANEOUS at 00:00

## 2023-01-01 RX ADMIN — HYDROMORPHONE HYDROCHLORIDE 1 MILLIGRAM(S): 2 INJECTION INTRAMUSCULAR; INTRAVENOUS; SUBCUTANEOUS at 04:25

## 2023-01-01 RX ADMIN — HYDROMORPHONE HYDROCHLORIDE 1 MILLIGRAM(S): 2 INJECTION INTRAMUSCULAR; INTRAVENOUS; SUBCUTANEOUS at 20:30

## 2023-01-01 RX ADMIN — HYDROMORPHONE HYDROCHLORIDE 0.5 MILLIGRAM(S): 2 INJECTION INTRAMUSCULAR; INTRAVENOUS; SUBCUTANEOUS at 13:51

## 2023-01-01 RX ADMIN — HYDROMORPHONE HYDROCHLORIDE 1 MILLIGRAM(S): 2 INJECTION INTRAMUSCULAR; INTRAVENOUS; SUBCUTANEOUS at 06:11

## 2023-01-01 RX ADMIN — HYDROMORPHONE HYDROCHLORIDE 1 MILLIGRAM(S): 2 INJECTION INTRAMUSCULAR; INTRAVENOUS; SUBCUTANEOUS at 10:16

## 2023-01-01 RX ADMIN — Medication 0.5 MILLIGRAM(S): at 03:54

## 2023-01-01 RX ADMIN — HYDROMORPHONE HYDROCHLORIDE 1 MILLIGRAM(S): 2 INJECTION INTRAMUSCULAR; INTRAVENOUS; SUBCUTANEOUS at 15:22

## 2023-01-01 RX ADMIN — HYDROMORPHONE HYDROCHLORIDE 1 MILLIGRAM(S): 2 INJECTION INTRAMUSCULAR; INTRAVENOUS; SUBCUTANEOUS at 06:08

## 2023-01-01 RX ADMIN — HYDROMORPHONE HYDROCHLORIDE 1 MILLIGRAM(S): 2 INJECTION INTRAMUSCULAR; INTRAVENOUS; SUBCUTANEOUS at 03:47

## 2023-01-01 RX ADMIN — HYDROMORPHONE HYDROCHLORIDE 1 MILLIGRAM(S): 2 INJECTION INTRAMUSCULAR; INTRAVENOUS; SUBCUTANEOUS at 11:39

## 2023-01-01 RX ADMIN — HYDROMORPHONE HYDROCHLORIDE 1 MILLIGRAM(S): 2 INJECTION INTRAMUSCULAR; INTRAVENOUS; SUBCUTANEOUS at 18:40

## 2023-01-01 RX ADMIN — HYDROMORPHONE HYDROCHLORIDE 1 MILLIGRAM(S): 2 INJECTION INTRAMUSCULAR; INTRAVENOUS; SUBCUTANEOUS at 03:32

## 2023-01-01 RX ADMIN — HYDROMORPHONE HYDROCHLORIDE 1 MILLIGRAM(S): 2 INJECTION INTRAMUSCULAR; INTRAVENOUS; SUBCUTANEOUS at 12:29

## 2023-01-01 RX ADMIN — HYDROMORPHONE HYDROCHLORIDE 1 MILLIGRAM(S): 2 INJECTION INTRAMUSCULAR; INTRAVENOUS; SUBCUTANEOUS at 22:30

## 2023-01-01 RX ADMIN — HYDROMORPHONE HYDROCHLORIDE 1 MILLIGRAM(S): 2 INJECTION INTRAMUSCULAR; INTRAVENOUS; SUBCUTANEOUS at 10:06

## 2023-01-01 RX ADMIN — HYDROMORPHONE HYDROCHLORIDE 1 MILLIGRAM(S): 2 INJECTION INTRAMUSCULAR; INTRAVENOUS; SUBCUTANEOUS at 02:15

## 2023-01-01 RX ADMIN — HYDROMORPHONE HYDROCHLORIDE 1 MILLIGRAM(S): 2 INJECTION INTRAMUSCULAR; INTRAVENOUS; SUBCUTANEOUS at 21:59

## 2023-01-01 RX ADMIN — HYDROMORPHONE HYDROCHLORIDE 1 MILLIGRAM(S): 2 INJECTION INTRAMUSCULAR; INTRAVENOUS; SUBCUTANEOUS at 02:00

## 2023-01-01 RX ADMIN — HYDROMORPHONE HYDROCHLORIDE 1 MILLIGRAM(S): 2 INJECTION INTRAMUSCULAR; INTRAVENOUS; SUBCUTANEOUS at 18:05

## 2023-01-01 RX ADMIN — HYDROMORPHONE HYDROCHLORIDE 1 MILLIGRAM(S): 2 INJECTION INTRAMUSCULAR; INTRAVENOUS; SUBCUTANEOUS at 06:40

## 2023-01-01 RX ADMIN — HYDROMORPHONE HYDROCHLORIDE 1 MILLIGRAM(S): 2 INJECTION INTRAMUSCULAR; INTRAVENOUS; SUBCUTANEOUS at 14:52

## 2023-01-01 RX ADMIN — HYDROMORPHONE HYDROCHLORIDE 0.5 MILLIGRAM(S): 2 INJECTION INTRAMUSCULAR; INTRAVENOUS; SUBCUTANEOUS at 14:02

## 2023-01-02 NOTE — DISCHARGE NOTE PROVIDER - NSDCCPCAREPLAN_GEN_ALL_CORE_FT
PRINCIPAL DISCHARGE DIAGNOSIS  Diagnosis: Bowel obstruction  Assessment and Plan of Treatment: You opted for comfort measures. You will be transferred to hospice facility for further management.      SECONDARY DISCHARGE DIAGNOSES  Diagnosis: Hyponatremia  Assessment and Plan of Treatment:     Diagnosis: Pancreatic cancer metastasized to liver  Assessment and Plan of Treatment:

## 2023-01-02 NOTE — DISCHARGE NOTE PROVIDER - HOSPITAL COURSE
56 yo male with pmhx pancreatic cancer, EtOH abuse presenting with worsening abdominal, nausea, and vomiting. Patient found to have liver mets and small bowel obstruction. Patient declined chemotherapy, 56 yo male with pmhx pancreatic cancer, EtOH abuse presenting with worsening abdominal, nausea, and vomiting. Patient found to have liver mets and small bowel obstruction. Patient declined chemotherapy, surgery and NGT. Per discussion with patient and family, transferred to comfort measures. Patient medically optimized for transfer to [facility] for further care and symptom management.

## 2023-01-02 NOTE — DISCHARGE NOTE PROVIDER - NSDCMRMEDTOKEN_GEN_ALL_CORE_FT
Ambien 10 mg oral tablet: 1 tab(s) orally once a day (at bedtime)  fenofibrate 30 mg oral capsule: 1 cap(s) orally once a day  ferrous sulfate 325 mg (65 mg elemental iron) oral tablet:   folic acid 1 mg oral tablet: 1 tab(s) orally once a day  Generlac 10 g/15 mL oral and rectal liquid:   hydrOXYzine hydrochloride 50 mg oral tablet:   metFORMIN 500 mg oral tablet: orally once a day  omeprazole 20 mg oral delayed release capsule: 1 cap(s) orally once a day  ondansetron 8 mg oral tablet:   oxyCODONE 10 mg oral tablet, extended release: 1 tab(s) orally every 12 hours  prochlorperazine 10 mg oral capsule, extended release:   QUEtiapine 300 mg oral tablet, extended release: 1 tab(s) orally once a day (in the evening)   Ventolin HFA 90 mcg/inh inhalation aerosol: 2 puff(s) inhaled every 6 hours, As Needed  Zoloft 25 mg oral tablet: 1 tab(s) orally once a day   bisacodyl 10 mg rectal suppository: 1 suppository(ies) rectal once a day, As needed, Constipation  glycopyrrolate 0.2 mg/mL injectable solution: 0.4 milligram(s) injectable every 6 hours, As Needed for secretions.  HYDROmorphone 0.5 mg/0.5 mL injectable solution: 0.5 milligram(s) injectable every 2 hours, As Needed for severe pain and RR&gt; 22.  LORazepam 2 mg/mL injectable solution: 0.5 milligram(s) injectable every 4 hours, As Needed anxiety  Zofran 2 mg/mL injectable solution: 4 milligram(s) injectable every 6 hours, As Needed nausea and/or vomiting

## 2023-01-02 NOTE — DISCHARGE NOTE PROVIDER - ATTENDING DISCHARGE PHYSICAL EXAMINATION:
Vital Signs Last 24 Hrs  T(F): --  HR: --  BP: --  RR: --  SpO2: --    Physical Exam:  CONSTITUTIONAL: NAD, moderate distress 2/2 pain  RESPIRATORY: Normal respiratory effort; lungs are clear to auscultation bilaterally  CARDIOVASCULAR: Regular rate and rhythm, normal S1 and S2, no murmur/rub/gallop; No lower extremity edema  ABDOMEN: abdominal tender to palpation, normoactive bowel sounds, no rebound/guarding  PSYCH: A+O to person, place, and time; affect appropriate  NEUROLOGY: CN 2-12 are intact and symmetric; no gross sensory deficits

## 2023-01-02 NOTE — DISCHARGE NOTE PROVIDER - NSDCFUSCHEDAPPT_GEN_ALL_CORE_FT
Sumi Milton  [facility]  [facility]  Scheduled Appointment: 01/05/2023    [facility]  [facility]  Scheduled Appointment: 01/05/2023    [facility]  [facility]  Scheduled Appointment: 01/12/2023    [facility]  [facility]  Scheduled Appointment: 01/12/2023    [facility]  [facility]  Scheduled Appointment: 01/19/2023    [facility]  [facility]  Scheduled Appointment: 01/19/2023    [facility]  [facility]  Scheduled Appointment: 02/02/2023    [facility]  [facility]  Scheduled Appointment: 02/02/2023    [facility]  [facility]  Scheduled Appointment: 02/09/2023    [facility]  [facility]  Scheduled Appointment: 02/09/2023    [facility]  [facility]  Scheduled Appointment: 02/16/2023

## 2023-01-02 NOTE — PROGRESS NOTE ADULT - REASON FOR ADMISSION
Pancreatic cancer with small bowel obstruction; Comfort measures

## 2023-01-02 NOTE — PROGRESS NOTE ADULT - ASSESSMENT
56 yo M w/ known pancreatic hx and pmh of EtOH abuse presents with worsening abdominal pain, nausea, and vomiting.     PLAN  - malignant small bowel protocol  - no surgical intervention recommended  - encourage NGT (pt refused)  - palliative consult  - TERRY  - rest of care per primary team 
58 yo male with pmhx pancreatic cancer, EtOH abuse presenting with worsening abdominal, nausea, and vomiting.     Pancreatic Cancer metastatic to the liver  Small Bowel Obstruction  -Admit to medicine  -Patient is now declining chemotherapy, declining surgery or NGT for SBO; requesting transition to comfort measures  -Dilaudid Q2h for pain, Ativan Q4 for anxiety, Robinul Q6 PRN secretions  -No vital signs/blood draws/DVTppx  -Single room, lighting for comfort  -Palliative consulted  -MOLST in chart    Family interested in inpatient hospice. Updated patient's sister, Yoko ([phone]) on 1/1. Discussed with hospice SW on 1/1. 
· Assessment	  58 yo M w/ known pancreatic hx and pmh of EtOH abuse presents with worsening abdominal pain, nausea, and vomiting.     PLAN  - malignant small bowel protocol  - no surgical intervention recommended  - encourage NGT (pt refused)  - palliative consult  - TERRY  - rest of care per primary team 
58 yo male with pmhx pancreatic cancer, EtOH abuse presenting with worsening abdominal, nausea, and vomiting.     Pancreatic Cancer metastatic to the liver  Small Bowel Obstruction  -Admit to medicine  -Patient is now declining chemotherapy, declining surgery or NGT for SBO; requesting transition to comfort measures  -Dilaudid Q2h for pain, Ativan Q4 for anxiety, Robinul Q6 PRN secretions  -No vital signs/blood draws/DVTppx  -Single room, lighting for comfort  -Palliative consulted  -MOLST in chart    Family interested in inpatient hospice. Updated patient's sister, Yoko ([phone]) on 12/31. Discussed with hospice SW on 12/31. 
56 yo M w/ known pancreatic hx and pmh of EtOH abuse presents with worsening abdominal pain, nausea, and vomiting.     PLAN  - malignant small bowel protocol  - no surgical intervention recommended  - encourage NGT (pt refused)  - palliative consult  - TERRY  - rest of care per primary team

## 2023-01-02 NOTE — PROGRESS NOTE ADULT - SUBJECTIVE AND OBJECTIVE BOX
INTERVAL HPI/OVERNIGHT EVENTS:    Patient evaluated at bedside. No acute distress. No acute events overnight.    MEDICATIONS  (STANDING):    MEDICATIONS  (PRN):  bisacodyl Suppository 10 milliGRAM(s) Rectal daily PRN Constipation  glycopyrrolate Injectable 0.4 milliGRAM(s) IV Push four times a day PRN Secretions  HYDROmorphone  Injectable 0.5 milliGRAM(s) IV Push every 2 hours PRN Moderate pain (4-6), Severe pain (7-10), Respiratory rate greater than 22  HYDROmorphone  Injectable 1 milliGRAM(s) IV Push every 2 hours PRN Severe Pain (7 - 10)  LORazepam   Injectable 0.5 milliGRAM(s) IV Push every 4 hours PRN Anxiety  ondansetron Injectable 4 milliGRAM(s) IV Push every 6 hours PRN Nausea and/or Vomiting      Vital Signs Last 24 Hrs  T(C): --  T(F): --  HR: --  BP: --  BP(mean): --  RR: --  SpO2: --        Physical Exam:    Constitutional: NAD  HEENT: PERRL, EOMI  Neck: No JVD, FROM without pain  Respiratory: Respirations non-labored, no accessory muscle use  Gastrointestinal: Soft, non-tender, non-distended  Extremities: No peripheral edema, No cyanosis  Neurological: A&O x 3; without gross deficit  Musculoskeletal: No joint pain, swelling, deformity, or point tenderness; no limitation of movement      I&O's Detail    31 Dec 2022 07:01  -  01 Jan 2023 07:00  --------------------------------------------------------  IN:  Total IN: 0 mL    OUT:    Voided (mL): 825 mL  Total OUT: 825 mL    Total NET: -825 mL      01 Jan 2023 07:01  -  02 Jan 2023 03:54  --------------------------------------------------------  IN:  Total IN: 0 mL    OUT:    Voided (mL): 300 mL  Total OUT: 300 mL    Total NET: -300 mL          LABS:                RADIOLOGY & ADDITIONAL STUDIES:
[facility] Division of Hospital Medicine    Chief Complaint: Pancreatic cancer with small bowel obstruction; Comfort measures     SUBJECTIVE / OVERNIGHT EVENTS: No acute events overnight. Patient continues to endorse abdominal pain and is requesting pain medications.     MEDICATIONS  (STANDING):    MEDICATIONS  (PRN):  bisacodyl Suppository 10 milliGRAM(s) Rectal daily PRN Constipation  glycopyrrolate Injectable 0.4 milliGRAM(s) IV Push four times a day PRN Secretions  HYDROmorphone  Injectable 0.5 milliGRAM(s) IV Push every 2 hours PRN Moderate pain (4-6), Severe pain (7-10), Respiratory rate greater than 22  HYDROmorphone  Injectable 1 milliGRAM(s) IV Push every 2 hours PRN Severe Pain (7 - 10)  LORazepam   Injectable 0.5 milliGRAM(s) IV Push every 4 hours PRN Anxiety  ondansetron Injectable 4 milliGRAM(s) IV Push every 6 hours PRN Nausea and/or Vomiting        I&O's Summary    31 Dec 2022 07:01  -  01 Jan 2023 07:00  --------------------------------------------------------  IN: 0 mL / OUT: 825 mL / NET: -825 mL        PHYSICAL EXAM:  Vital Signs Last 24 Hrs  T(C): --  T(F): --  HR: --  BP: --  BP(mean): --  RR: --  SpO2: --          CONSTITUTIONAL: NAD, moderate distress 2/2 pain  RESPIRATORY: Normal respiratory effort; lungs are clear to auscultation bilaterally  CARDIOVASCULAR: Regular rate and rhythm, normal S1 and S2, no murmur/rub/gallop; No lower extremity edema  ABDOMEN: abdominal tender to palpation, normoactive bowel sounds, no rebound/guarding  PSYCH: A+O to person, place, and time; affect appropriate  NEUROLOGY: CN 2-12 are intact and symmetric; no gross sensory deficits    LABS:    12-30    122<L>  |  82<L>  |  45.1<H>  ----------------------------<  122<H>  5.4<H>   |  23.0  |  0.63    Ca    7.2<L>      30 Dec 2022 18:15  Mg     2.6     12-30                Culture - Blood (collected 30 Dec 2022 14:45)  Source: .Blood Blood  Preliminary Report (31 Dec 2022 19:01):    No growth to date.    Culture - Blood (collected 30 Dec 2022 14:45)  Source: .Blood Blood  Preliminary Report (31 Dec 2022 19:01):    No growth to date.    Culture - Urine (collected 30 Dec 2022 11:08)  Source: Clean Catch Clean Catch (Midstream)  Final Report (31 Dec 2022 13:26):    <10,000 CFU/mL Normal Urogenital Linda      CAPILLARY BLOOD GLUCOSE            RADIOLOGY & ADDITIONAL TESTS:  Results Reviewed:   Imaging Personally Reviewed:  Electrocardiogram Personally Reviewed:                                          
HPI/OVERNIGHT EVENTS: Pt sleeping comfortably. Previously stated that he declined NGT, chemo, and surgery. Vitals stable, no acute events overnight. Vital Signs Last 24 Hrs    T(C): --  T(F): --  HR: --  BP: --  BP(mean): --  RR: --  SpO2: --        I&O's Detail    31 Dec 2022 07:01  -  2023 02:14  --------------------------------------------------------  IN:  Total IN: 0 mL    OUT:    Voided (mL): 625 mL  Total OUT: 625 mL    Total NET: -625 mL          Physical Exam:    Constitutional: NAD  HEENT: PERRL, EOMI  Neck: No JVD, FROM without pain  Respiratory: Respirations non-labored, no accessory muscle use  Gastrointestinal: Soft, non-tender, non-distended  Extremities: No peripheral edema, No cyanosis  Neurological: A&O x 3; without gross deficit  Musculoskeletal: No joint pain, swelling, deformity, or point tenderness; no limitation of movement    LABS:                        11.4   31.50 )-----------( 750      ( 30 Dec 2022 12:15 )             34.5         122<L>  |  82<L>  |  45.1<H>  ----------------------------<  122<H>  5.4<H>   |  23.0  |  0.63    Ca    7.2<L>      30 Dec 2022 18:15  Mg     2.6         TPro  7.0  /  Alb  3.2<L>  /  TBili  3.3<H>  /  DBili  2.4<H>  /  AST  104<H>  /  ALT  51<H>  /  AlkPhos  442<H>        Urinalysis Basic - ( 30 Dec 2022 11:08 )    Color: Pushpa / Appearance: Slightly Turbid / S.020 / pH: x  Gluc: x / Ketone: Small  / Bili: Moderate / Urobili: 1   Blood: x / Protein: 15 / Nitrite: Positive   Leuk Esterase: Trace / RBC: Negative /HPF / WBC 3-5 /HPF   Sq Epi: x / Non Sq Epi: Few / Bacteria: Moderate        MEDICATIONS  (STANDING):    MEDICATIONS  (PRN):  bisacodyl Suppository 10 milliGRAM(s) Rectal daily PRN Constipation  glycopyrrolate Injectable 0.4 milliGRAM(s) IV Push four times a day PRN Secretions  HYDROmorphone  Injectable 0.5 milliGRAM(s) IV Push every 2 hours PRN Moderate pain (4-6), Severe pain (7-10), Respiratory rate greater than 22  HYDROmorphone  Injectable 1 milliGRAM(s) IV Push every 2 hours PRN Severe Pain (7 - 10)  LORazepam   Injectable 0.5 milliGRAM(s) IV Push every 4 hours PRN Anxiety  ondansetron Injectable 4 milliGRAM(s) IV Push every 6 hours PRN Nausea and/or Vomiting      MICRO:   Cultures     STUDIES:   EKG, CXR, U/S, CT, MRI   
[facility] Division of Hospital Medicine    Chief Complaint:  Pancreatic cancer with small bowel obstruction    SUBJECTIVE / OVERNIGHT EVENTS: No acute events overnight. Patient endorses diffuse pain.     Patient denies chest pain, SOB, abd pain, N/V, fever, chills, dysuria or any other complaints. All remainder ROS negative.     MEDICATIONS  (STANDING):    MEDICATIONS  (PRN):  bisacodyl Suppository 10 milliGRAM(s) Rectal daily PRN Constipation  glycopyrrolate Injectable 0.4 milliGRAM(s) IV Push four times a day PRN Secretions  HYDROmorphone  Injectable 0.5 milliGRAM(s) IV Push every 2 hours PRN Moderate pain (4-6), Severe pain (7-10), Respiratory rate greater than 22  HYDROmorphone  Injectable 1 milliGRAM(s) IV Push every 2 hours PRN Severe Pain (7 - 10)  LORazepam   Injectable 0.5 milliGRAM(s) IV Push every 4 hours PRN Anxiety  ondansetron Injectable 4 milliGRAM(s) IV Push every 6 hours PRN Nausea and/or Vomiting        I&O's Summary    31 Dec 2022 07:01  -  31 Dec 2022 12:38  --------------------------------------------------------  IN: 0 mL / OUT: 100 mL / NET: -100 mL        PHYSICAL EXAM:  Vital Signs Last 24 Hrs  T(C): 36.6 (30 Dec 2022 18:44), Max: 36.6 (30 Dec 2022 18:44)  T(F): 97.9 (30 Dec 2022 18:44), Max: 97.9 (30 Dec 2022 18:44)  HR: 166 (30 Dec 2022 21:22) (165 - 174)  BP: 92/74 (30 Dec 2022 21:22) (89/61 - 111/71)  BP(mean): 79 (30 Dec 2022 21:22) (79 - 79)  RR: 16 (30 Dec 2022 21:22) (16 - 20)  SpO2: 99% (30 Dec 2022 21:22) (97% - 100%)    Parameters below as of 30 Dec 2022 21:22  Patient On (Oxygen Delivery Method): room air    CONSTITUTIONAL: NAD, moderate distress 2/2 pain  RESPIRATORY: Normal respiratory effort; lungs are clear to auscultation bilaterally  CARDIOVASCULAR: Regular rate and rhythm, normal S1 and S2, no murmur/rub/gallop; No lower extremity edema  ABDOMEN: Nontender to palpation, normoactive bowel sounds, no rebound/guarding  PSYCH: A+O to person, place, and time; affect appropriate  NEUROLOGY: CN 2-12 are intact and symmetric; no gross sensory deficits    LABS:                        11.4   31.50 )-----------( 750      ( 30 Dec 2022 12:15 )             34.5     12    122<L>  |  82<L>  |  45.1<H>  ----------------------------<  122<H>  5.4<H>   |  23.0  |  0.63    Ca    7.2<L>      30 Dec 2022 18:15  Mg     2.6         TPro  7.0  /  Alb  3.2<L>  /  TBili  3.3<H>  /  DBili  2.4<H>  /  AST  104<H>  /  ALT  51<H>  /  AlkPhos  442<H>  12      CARDIAC MARKERS ( 30 Dec 2022 12:15 )  x     / <0.01 ng/mL / x     / x     / x          Urinalysis Basic - ( 30 Dec 2022 11:08 )    Color: Pushpa / Appearance: Slightly Turbid / S.020 / pH: x  Gluc: x / Ketone: Small  / Bili: Moderate / Urobili: 1   Blood: x / Protein: 15 / Nitrite: Positive   Leuk Esterase: Trace / RBC: Negative /HPF / WBC 3-5 /HPF   Sq Epi: x / Non Sq Epi: Few / Bacteria: Moderate        CAPILLARY BLOOD GLUCOSE            RADIOLOGY & ADDITIONAL TESTS:  Results Reviewed:   Imaging Personally Reviewed:  Electrocardiogram Personally Reviewed:                                          
Subjective: Pt sleeping comfortably. Previously stated that he declined NGT, chemo, and surgery. Vitals stable, no acute events overnight.       MEDICATIONS  (STANDING):    MEDICATIONS  (PRN):  bisacodyl Suppository 10 milliGRAM(s) Rectal daily PRN Constipation  glycopyrrolate Injectable 0.4 milliGRAM(s) IV Push four times a day PRN Secretions  HYDROmorphone  Injectable 0.5 milliGRAM(s) IV Push every 2 hours PRN Moderate pain (4-6), Severe pain (7-10), Respiratory rate greater than 22  HYDROmorphone  Injectable 1 milliGRAM(s) IV Push every 2 hours PRN Severe Pain (7 - 10)  LORazepam   Injectable 0.5 milliGRAM(s) IV Push every 4 hours PRN Anxiety  ondansetron Injectable 4 milliGRAM(s) IV Push every 6 hours PRN Nausea and/or Vomiting      Vital Signs Last 24 Hrs  T(C): 36.6 (30 Dec 2022 18:44), Max: 36.7 (30 Dec 2022 09:56)  T(F): 97.9 (30 Dec 2022 18:44), Max: 98 (30 Dec 2022 09:56)  HR: 166 (30 Dec 2022 21:22) (127 - 174)  BP: 92/74 (30 Dec 2022 21:22) (89/61 - 111/71)  BP(mean): 79 (30 Dec 2022 21:22) (79 - 79)  RR: 16 (30 Dec 2022 21:22) (16 - 20)  SpO2: 99% (30 Dec 2022 21:22) (97% - 100%)    Parameters below as of 30 Dec 2022 21:22  Patient On (Oxygen Delivery Method): room air        Physical Exam:    Constitutional: NAD  HEENT: PERRL, EOMI  Neck: No JVD, FROM without pain  Respiratory: Respirations non-labored, no accessory muscle use  Gastrointestinal: Soft, non-tender, non-distended  Extremities: No peripheral edema, No cyanosis  Neurological: A&O x 3; without gross deficit  Musculoskeletal: No joint pain, swelling, deformity, or point tenderness; no limitation of movement      LABS:                        11.4   31.50 )-----------( 750      ( 30 Dec 2022 12:15 )             34.5     12-30    122<L>  |  82<L>  |  45.1<H>  ----------------------------<  122<H>  5.4<H>   |  23.0  |  0.63    Ca    7.2<L>      30 Dec 2022 18:15  Mg     2.6         TPro  7.0  /  Alb  3.2<L>  /  TBili  3.3<H>  /  DBili  2.4<H>  /  AST  104<H>  /  ALT  51<H>  /  AlkPhos  442<H>        Urinalysis Basic - ( 30 Dec 2022 11:08 )    Color: Pushpa / Appearance: Slightly Turbid / S.020 / pH: x  Gluc: x / Ketone: Small  / Bili: Moderate / Urobili: 1   Blood: x / Protein: 15 / Nitrite: Positive   Leuk Esterase: Trace / RBC: Negative /HPF / WBC 3-5 /HPF   Sq Epi: x / Non Sq Epi: Few / Bacteria: Moderate

## 2023-01-02 NOTE — DISCHARGE NOTE NURSING/CASE MANAGEMENT/SOCIAL WORK - PATIENT PORTAL LINK FT
You can access the FollowMyHealth Patient Portal offered by [facility] by registering at the following website: [email or web address]. By joining [email or web address] FollowMyHealth portal, you will also be able to view your health information using other applications (apps) compatible with our system.

## 2023-01-04 LAB
CULTURE RESULTS: SIGNIFICANT CHANGE UP
CULTURE RESULTS: SIGNIFICANT CHANGE UP
SPECIMEN SOURCE: SIGNIFICANT CHANGE UP
SPECIMEN SOURCE: SIGNIFICANT CHANGE UP

## 2023-01-05 ENCOUNTER — APPOINTMENT (OUTPATIENT)
Dept: HEMATOLOGY ONCOLOGY | Facility: CLINIC | Age: 58
End: 2023-01-05

## 2023-01-05 ENCOUNTER — APPOINTMENT (OUTPATIENT)
Age: 58
End: 2023-01-05

## 2023-01-12 ENCOUNTER — APPOINTMENT (OUTPATIENT)
Dept: HEMATOLOGY ONCOLOGY | Facility: CLINIC | Age: 58
End: 2023-01-12

## 2023-01-12 ENCOUNTER — APPOINTMENT (OUTPATIENT)
Age: 58
End: 2023-01-12

## 2023-01-19 ENCOUNTER — APPOINTMENT (OUTPATIENT)
Age: 58
End: 2023-01-19

## 2023-01-19 ENCOUNTER — APPOINTMENT (OUTPATIENT)
Dept: HEMATOLOGY ONCOLOGY | Facility: CLINIC | Age: 58
End: 2023-01-19

## 2023-01-31 ENCOUNTER — APPOINTMENT (OUTPATIENT)
Dept: HEMATOLOGY ONCOLOGY | Facility: CLINIC | Age: 58
End: 2023-01-31

## 2023-02-02 ENCOUNTER — APPOINTMENT (OUTPATIENT)
Age: 58
End: 2023-02-02

## 2023-02-02 ENCOUNTER — APPOINTMENT (OUTPATIENT)
Dept: HEMATOLOGY ONCOLOGY | Facility: CLINIC | Age: 58
End: 2023-02-02

## 2023-02-08 NOTE — ED ADULT NURSE NOTE - NS ED BHA HEROIN OPIATES
None known Advancement-Rotation Flap Text: The defect edges were debeveled with a #15 scalpel blade.  Given the location of the defect, shape of the defect and the proximity to free margins an advancement-rotation flap was deemed most appropriate.  Using a sterile surgical marker, an appropriate flap was drawn incorporating the defect and placing the expected incisions within the relaxed skin tension lines where possible. The area thus outlined was incised deep to adipose tissue with a #15 scalpel blade.  The skin margins were undermined to an appropriate distance in all directions utilizing iris scissors.

## 2023-02-09 ENCOUNTER — APPOINTMENT (OUTPATIENT)
Dept: HEMATOLOGY ONCOLOGY | Facility: CLINIC | Age: 58
End: 2023-02-09

## 2023-02-09 ENCOUNTER — APPOINTMENT (OUTPATIENT)
Age: 58
End: 2023-02-09

## 2023-02-16 ENCOUNTER — APPOINTMENT (OUTPATIENT)
Dept: HEMATOLOGY ONCOLOGY | Facility: CLINIC | Age: 58
End: 2023-02-16

## 2023-02-24 NOTE — ED PROCEDURE NOTE - NS ED PERI NEURO POS
Assessment/Plan:  Ok to cancel the OT     And son would like PT   I think in the home is better for her at this time   We will try to OP PT program    Not eating so well   BG in am can be 70   drpping lantus to 6u a1c 5 6    donepazil really helping her halcuinations     1  Diabetes mellitus type 2, insulin dependent (HCC)  -     IRIS Diabetic eye exam  -     Lantus SoloStar 100 units/mL SOPN; Inject 0 06 mL (6 Units total) under the skin daily at bedtime    2  Cerebral amyloid angiopathy ([facility] )  -     Ambulatory Referral to Physical Therapy; Future    3  Post-menopausal  -     DXA bone density spine hip and pelvis; Future; Expected date: 02/24/2023    4  Chronic congestive heart failure, unspecified heart failure type ([facility] )    5  Type 2 diabetes mellitus with stage 3b chronic kidney disease, with long-term current use of insulin ([facility] )    6  Absent pulse in lower extremity  -     VAS lower limb arterial duplex, complete bilateral; Future; Expected date: 02/24/2023    7  Atrial flutter, paroxysmal ([facility] )  -     Ambulatory Referral to Cardiology; Future       Subjective:      Patient ID: Isma Vickers is a 69 y o  female  HPI   Here to go over chronic issues and labs / imaging studies if applicable  Saw nephro dropped bumex to 1mg   Has been going to OT and they are working on [employer] like balance   And she feels more confident and fell yesterday since she felt she didn't need the walker          The following portions of the patient's history were reviewed and updated as appropriate: allergies, current medications, past family history, past medical history, past social history, past surgical history and problem list     Review of Systems   Constitutional: Negative for fever and unexpected weight change  HENT: Negative for nosebleeds and trouble swallowing  Eyes: Negative for visual disturbance  Respiratory: Negative for chest tightness and shortness of breath      Cardiovascular: Negative for chest pain, palpitations and leg swelling  Gastrointestinal: Negative for abdominal pain, constipation, diarrhea and nausea  Endocrine: Negative for cold intolerance  Genitourinary: Negative for dysuria and urgency  Musculoskeletal: Positive for arthralgias and gait problem  Negative for joint swelling and myalgias  Skin: Negative for rash  Neurological: Positive for weakness  Negative for tremors, seizures and syncope  Hematological: Does not bruise/bleed easily  Psychiatric/Behavioral: Negative for hallucinations and suicidal ideas  Objective:      /80 (BP Location: Left arm, Patient Position: Sitting, Cuff Size: Standard)   Pulse 74   Resp 16   Ht 5' 3" (1 6 m)   Wt 68 kg (150 lb)   SpO2 98%   BMI 26 57 kg/m²     No visits with results within 2 Week(s) from this visit  Latest known visit with results is:   Appointment on 02/01/2023   Component Date Value   • Sodium 02/01/2023 136    • Potassium 02/01/2023 4 6    • Chloride 02/01/2023 97    • CO2 02/01/2023 28    • ANION GAP 02/01/2023 11    • BUN 02/01/2023 52 (H)    • Creatinine 02/01/2023 3 32 (H)    • Glucose 02/01/2023 87    • Calcium 02/01/2023 8 2 (L)    • eGFR 02/01/2023 12    • Hemoglobin A1C 02/01/2023 5 6    • EAG 02/01/2023 114           Physical Exam  Vitals and nursing note reviewed  Constitutional:       Appearance: She is well-developed  She is obese  Comments: wheelchair   HENT:      Head: Normocephalic and atraumatic  Eyes:      Comments: Dysconjugate   Cardiovascular:      Rate and Rhythm: Normal rate and regular rhythm  Pulses: Pulses are weak  Dorsalis pedis pulses are 0 on the right side and 0 on the left side  Heart sounds: Normal heart sounds  No murmur heard  Pulmonary:      Effort: Pulmonary effort is normal       Breath sounds: Normal breath sounds  No wheezing or rales  Abdominal:      General: Bowel sounds are normal  There is no distension        Palpations: Abdomen is soft       Tenderness: There is no abdominal tenderness  Musculoskeletal:         General: No tenderness  Cervical back: Normal range of motion and neck supple  Feet:      Right foot:      Skin integrity: No ulcer, skin breakdown, erythema, warmth, callus or dry skin  Left foot:      Skin integrity: No ulcer, skin breakdown, erythema, warmth, callus or dry skin  Lymphadenopathy:      Cervical: No cervical adenopathy  Skin:     General: Skin is warm and dry  Capillary Refill: Capillary refill takes less than 2 seconds  Findings: No rash  Neurological:      Mental Status: She is alert and oriented to person, place, and time  Cranial Nerves: No cranial nerve deficit  Sensory: No sensory deficit  Motor: Weakness present  No abnormal muscle tone  Gait: Gait abnormal    Psychiatric:         Behavior: Behavior normal          Thought Content: Thought content normal          Judgment: Judgment normal              Patient's shoes and socks removed  Right Foot/Ankle   Right Foot Inspection  Skin Exam: skin normal and skin intact  No dry skin, no warmth, no callus, no erythema, no maceration, no abnormal color, no pre-ulcer, no ulcer and no callus  Toe Exam: ROM and strength within normal limits  Sensory   Monofilament testing: diminished    Vascular  Capillary refills: elevated  The right DP pulse is 0  Left Foot/Ankle  Left Foot Inspection  Skin Exam: skin normal and skin intact  No dry skin, no warmth, no erythema, no maceration, normal color, no pre-ulcer, no ulcer and no callus  Toe Exam: ROM and strength within normal limits  Sensory   Monofilament testing: diminished    Vascular  Capillary refills: elevated  The left DP pulse is 0       Assign Risk Category  No deformity present  Loss of protective sensation  Weak pulses  Risk: 2      Mary Robbins MD  [place] [id number] Pre-application: Motor, sensory, and vascular responses NOT intact in the injured extremity.

## 2023-03-06 NOTE — ED ADULT TRIAGE NOTE - MEANS OF ARRIVAL
Claudia Dobbs is a 30 year old female presenting with ST just this AM. Pt is a . Some nausea but is 12 weeks pregnant. Pt is .  Pt has slight baseline nuase with the pregnancy.     Patient Active Problem List   Diagnosis   • Dysmenorrhea   • Family history of ovarian cancer   • Anxiety associated with depression      Past Medical History:   Diagnosis Date   • Family history of ovarian cancer    • Negative History of CA, HTN, DM, CAD, CVA, DVT, Asthma NONE      General:   alert, in no acute distress  HEENT:   TM's  bilaterally normal light reflex, without erythema and external auditory canal's clear bilaterally  Oropharynx:   moist mucosa, uvula midline and slight injection   Neck:   supple and small, benign anterior cervical nodes bilaterally  Lungs:   clear to auscultation bilaterally  CV:   regular rate and rhythm, without murmur    ASSESSMENT: Strep throat  (primary encounter diagnosis)  Plan: amoxicillin (AMOXIL) 875 MG tablet    Sore throat  Plan: GROUP A STREP THROAT PCR   GAS is POS> Symptomatic relief discussed with patient, push clear fluids, F/U (follow up) prn. See avs for addl comments.     ambulatory

## 2023-03-17 NOTE — ED CDU PROVIDER INITIAL DAY NOTE - MUSCULOSKELETAL, MLM
"  Subjective   Patient ID: Lucita Chandra is a 89 y.o. female who presents for New Patient Visit (Former [facility] ).    77-year-old female presents today to establish her care after the nursing home of her PCP.  Last visit was November 2022.  Detailed review of the patient's medical record performed as part of her visit today.  She reports no acute concerns at this moment and is here to establish specifically for care.  I reviewed the patient's chart and found that her last blood work was approximately 1 year ago and advised her of this and encouraged her to allow me to update testing to assure her medication\" dosed for her thyroid.  The patient specifically requested that this be delayed until her follow-up appointment she was \"not interested in doing blood work today.  Given that she reports being excellent and consistent with her medications I was agreeable to delaying for 1 visit the need for this testing but we will definitely be updating this in the future appointment.    + cane, balance disorder, declined PT today.   Lives alone, split level home. Stairs present and used.   No longer drives, family transportation.   No hospitalizations in over 1 year.          Review of Systems   Constitutional:  Negative for chills and fever.   Respiratory:  Negative for cough and shortness of breath.    Cardiovascular:  Negative for chest pain and palpitations.   Endocrine: Negative for polydipsia and polyuria.       Objective   /74   Pulse 103   Temp 36.6 °C (97.9 °F) (Oral)   Ht 1.575 m (5' 2\")   Wt 52.6 kg (116 lb)   BMI 21.22 kg/m²     Physical Exam  Constitutional:       Appearance: Normal appearance.   HENT:      Head: Normocephalic and atraumatic.   Eyes:      Extraocular Movements: Extraocular movements intact.      Pupils: Pupils are equal, round, and reactive to light.   Neck:      Thyroid: No thyroid mass or thyromegaly.      Vascular: No carotid bruit.   Cardiovascular:      Rate and Rhythm: " Normal rate and regular rhythm.      Heart sounds: No murmur heard.     No friction rub. No gallop.   Pulmonary:      Effort: No respiratory distress.      Breath sounds: No wheezing, rhonchi or rales.   Musculoskeletal:      Cervical back: Neck supple.      Right lower leg: No edema.      Left lower leg: No edema.   Lymphadenopathy:      Cervical: No cervical adenopathy.   Neurological:      Mental Status: She is alert.         Assessment/Plan   Problem List Items Addressed This Visit          Circulatory    Hypertension    Relevant Medications    levothyroxine (Synthroid, Levoxyl) 50 mcg tablet    amLODIPine (Norvasc) 2.5 mg tablet       Endocrine/Metabolic    Hypothyroidism - Primary    Relevant Medications    levothyroxine (Synthroid, Levoxyl) 50 mcg tablet    amLODIPine (Norvasc) 2.5 mg tablet     Other Visit Diagnoses       Balance disorder        Relevant Medications    levothyroxine (Synthroid, Levoxyl) 50 mcg tablet    amLODIPine (Norvasc) 2.5 mg tablet    cane device                Spine appears normal, range of motion is not limited, no muscle or joint tenderness

## 2023-03-28 NOTE — ED PROVIDER NOTE - INTERNATIONAL TRAVEL
Initial Anesthesia Post-op Note    Patient: Candelario Avecilla  Procedure(s) Performed: RIGHT WRIST ARTERIOVENOUS FISTULA CREATION - RIGHT  Anesthesia type: General    Vitals Value Taken Time   Temp 36.1 03/28/23 1315   Pulse 110 03/28/23 1315   Resp 16 03/28/23 1315   SpO2 100 % 03/28/23 1313   /86 03/28/23 1315   Vitals shown include unvalidated device data.      Patient Location: PACU Phase 1  Post-op Vital Signs:stable  Level of Consciousness: awake and alert  Respiratory Status: spontaneous ventilation  Cardiovascular stable  Hydration: euvolemic  Pain Management: adequately controlled  Handoff: Handoff to receiving clinician was performed and questions were answered  Vomiting: none  Nausea: None  Airway Patency:patent  Post-op Assessment: no complications and patient tolerated procedure well with no complications      No notable events documented.   Unable to Assess

## 2023-04-04 NOTE — ED PROVIDER NOTE - CLINICAL SUMMARY MEDICAL DECISION MAKING FREE TEXT BOX
Face to face
Pt is a 55 y.o. M hx EtOHism presenting with clincalinically intoxicated. Labs, meds prn, pulse ox.

## 2023-05-20 NOTE — ED ADULT NURSE NOTE - ISOLATION TYPE:
"  Reason for Disposition  • Child sounds very sick or weak to the triager     Child mentions being dizzy    Additional Information  • Negative: [1] Difficulty breathing AND [2] severe (struggling for each breath, unable to cry or speak, stridor, severe retractions, etc)  • Negative: Bluish (or gray) lips or face now  • Negative: Slow, shallow, weak breathing  • Negative: [1] Drooling or spitting out saliva (because can't swallow) AND [2] any difficulty breathing  • Negative: Sounds like a life-threatening emergency to the triager  • Negative: [1] Diagnosed strep throat AND [2] taking antibiotic AND [3] symptoms continue  • Negative: Exposure to strep throat (Includes exposed patients with or without symptoms)  • Negative: Throat culture results, calls about  • Negative: Mononucleosis recently diagnosed  • Negative: Tonsil and/or adenoid surgery in the last month  • Negative: [1] Age < 2 years AND [2] swallowing difficulty  • Negative: [1] Age < 2 years AND [2] fluid intake is decreased  • Negative: Croup is main symptom  • Negative: Cough is main symptom  • Negative: Hoarseness is main symptom  • Negative: Runny nose is the main symptom  • Negative: Difficulty breathing (per caller) but not severe  • Negative: [1] Drooling or spitting out saliva (because can't swallow) AND [2] normal breathing  • Negative: [1] Can't move neck normally AND [2] fever  • Negative: [1] Drinking very little AND [2] signs of dehydration (no urine > 12 hours, very dry mouth, no tears, etc.)  • Negative: [1] Throat surgery within last week AND [2] minor bleeding  • Negative: [1] Fever AND [2] > 105 F (40.6 C) NOW or RECURRENT by any route OR axillary > 104 F (40 C)  • Negative: [1] Fever AND [2] weak immune system (sickle cell disease, HIV, chemotherapy, organ transplant, adrenal insufficiency, chronic oral steroids, etc)    Answer Assessment - Initial Assessment Questions  1. ONSET: \"When did the throat start hurting?\" (Hours or days ago) " "  Started this morning    2. SEVERITY: \"How bad is the sore throat?\"   MODERATE: interferes with eating some solids and normal activities    3. STREP EXPOSURE: \"Has there been any exposure to strep within the past week?\" If so, ask: \"What type of contact occurred?\"   Strep and pneumonia exposure this morning    4. VIRAL SYMPTOMS: \"Are there any symptoms of a cold, such as a runny nose, cough, hoarse voice/cry or red eyes?\"   Cough, belly pain, fever, sore throat    5. FEVER: \"Does your child have a fever?\" If so, ask: \"What is it?\", \"How was it measured?\" and \"When did it start?\"   102    6. PUS ON THE TONSILS: Only ask about this if the caller has already told you that they've looked at the throat.   Child is autistic- hard for mother to look  However does appear red    7. CHILD'S APPEARANCE: \"How sick is your child acting?\" \" What is he doing right now?\" If asleep, ask: \"How was he acting before he went to sleep?\"  Appears he does not feel well    Protocols used: SORE THROAT-PEDIATRIC-    " None

## 2023-05-23 NOTE — ED ADULT NURSE NOTE - NSFALLRSKUNASSIST_ED_ALL_ED
Contacted and spoke to patient. Patient asked for a refill of amiodarone for a 15 day supply and if she has to continue it she will request further refills at her next upcoming appt with Dr Ferrari.     Approved metoprolol refill 90 day supply.    Patient was informed refills for warfarin is on file at her pharmacy       No further action needed   
Pt is wondering if she continue putting in a refill request for her amiodarone, metoprolol, and warfarin.    Please advise Rhonda [phone]  
yes

## 2023-05-31 NOTE — ED PROVIDER NOTE - PROGRESS NOTE
Y 89 Delivery Discharge Instructions:     Remove groin dressing 24 hours after procedure.  Clean area with gentle soap and water, do not pick at any scab formation.  Monitor groin site for bleeding. If bleeding occurs, apply firm manual-pressure and seek medical assistance immediately!  Monitor groin site for signs and symptoms of infection.  Stay three feet away from people especially pregnant people, small pets, and small children for 3 days.  Sleep alone for the next three days.  You can use the restroom as the same family members; waste is not radio active.    Recovering at home  Once at home, be sure to:  Drink plenty of water to help flush any remaining contrast fluid out of your body. This may take up to 24 hours.  Take all medicines as directed. Some medicines should not be resumed after the angiogram to prevent an interaction with the contrast dye or damage to your kidneys. Be sure to ask your healthcare team about any potential reactions.  Care for the catheter insertion site as directed. You may remove the bandage after 24 hours.  Check for signs of infection at the catheter insertion site (see below).  Do not bathe or shower until your bandage is removed. If you wish, you may wash with a sponge or washcloth.  Avoid heavy lifting or other strenuous activities as directed to prevent the site from opening and bleeding.    When to call your healthcare provider  Call your healthcare provider if you have any of the following:  Fever of 100.4°F (38°C) or higher, or as directed by your healthcare provider  Signs of allergic reaction to the contrast fluid, such as rash, nausea or vomiting, or trouble breathing  Signs of infection at the catheter insertion site, such as increased pain, redness, swelling, warmth, bleeding, or foul-smelling discharge  Coldness, tingling, or numbness in the arm or leg near the catheter insertion site  Rapid, pounding, or irregular heartbeat  Sudden pain or swelling in the legs        Stable.

## 2023-08-08 NOTE — ED ADULT NURSE NOTE - HIV OFFER
[FreeTextEntry1] : right GSV RFA Opt out [FreeTextEntry3] : Procedural safety checklist and time out completed: Confirmed patient identification (Patient Name, , and/or medical record number including when possible affirmation by patient or parent/family/other). Confirmed procedure with the patient. Consent present, accurate and signed.  Confirmed special equipment and supplies are present. Sterility confirmed. Position verified.  Site/ side is marked and visible and confirmed.  Procedure confirmed by consent. Accurate consent including side and site. Review of medical records, including venous ultrasound, noting correct procedure including site and side. MD/PA verifies presence and review of imaging studies and or written report of imaging studies. Agreement on the procedure to be performed Time out completed. All of the above has been confirmed by the team. All patient-specific concerns have been addressed.   Indication: right lower extremity varicose veins with inflammation, leg pain, leg swelling, and leg cramping.  Venous insufficiency/ reflux.  Procedure: radiofrequency ablation of the right great saphenous vein.  	 Ms. BINH JEFFREY is a 53 year old F with a history of right lower extremity varicose veins previously seen in the office.  Ultrasound examination demonstrated venous insufficiency. A trial of compression stockings, exercise, elevation, and pain medication was attempted without relief and definitive treatment with radiofrequency ablation was offered.   The patient has come for radiofrequency ablation treatment of the right great saphenous vein. I have discussed the risks of the procedure at length with the patient. The risks discussed were inclusive of but not limited to infection, irritation at the site of infiltration of local anesthesia, and also rare risk of deep venous thrombosis and pulmonary emboli. The patient agrees to proceed with the procedure.  The patient was escorted into the procedure room and a time out called. The entire limb was prepped and draped in sterile fashion. The RF fiber was placed on the sterile field and connected by a sterile cable. Actuation, temperature, and impedance testing were performed to ensure that all components were connected and operating properly.  The patient was placed on the procedure table and local anesthesia was instilled in the skin overlying the access site. Under ultrasound guidance, the vein was punctured with a micropuncture needle, using the anterior wall technique. A guide wire was now introduced through the needle, and the needle was then exchanged over the guide wire for a 7F sheath. The guide wire was removed and the RF probe was then placed into the right great saphenous vein through the sheath and position confirmed using ultrasound guidance. After the RF probe position was verified by ultrasound, tumescent anesthesia consisting of normal saline, 1% lidocaine with 8.4% sodium bicarbonate was infiltrated, under ultrasound guidance, precisely into the perivenous compartment along the entire length of the vein until a halo of fluid was noted around the vein. After RF probe position was again confirmed with ultrasound imaging, RF energy was applied. The probe was gradually and carefully withdrawn at a rate of 6.5cm/20seconds.   3 _cycles of RF performed using the _7 cm probe Total treatment time was 60 seconds. The total volume injected [id number] cc Treatment length was13.5 cm and  The probe is 3.6 cm from the SFJ.  Estimated Blood Loss: minimal Repeat ultrasound of the treated vein was performed confirming successful treatment. The catheter and sheath were withdrawn and hemostasis established with direct pressure. After assuring hemostasis, a sterile 4x4 was placed on the access site and an ACE compression wrap was applied. Patient tolerated procedure well. Patient was given post-procedure instructions and follow up appointment was scheduled.

## 2023-08-08 NOTE — ED PROVIDER NOTE - CROS ED ENMT ALL NEG
Please call the patient regarding her abnormal result.   Xray showing some arthritis; able to see narrowing mostly in the lateral joint  Recommend continue with recommendations  I will place referral for ortho so she can see about injection.   Also - please verify which BP medication she is taking; both valsartan and losartan are listed; should not be on both  negative...

## 2023-08-12 NOTE — ED ADULT NURSE NOTE - NS ED NURSE RECORD ANOTHER HT AND WT
Lab Results   Component Value Date    HGBA1C 6.1 (H) 07/05/2023       Recent Labs     08/11/23  1200 08/11/23  1605 08/11/23  2117 08/12/23  0659   POCGLU 195* 262* 177* 152*       Blood Sugar Average: Last 72 hrs:  (P) 189.8     -Plan of care per primary team Yes

## 2023-08-16 NOTE — ED PROVIDER NOTE - OBSERVING MD:
"  Assessment & Plan     Type 2 diabetes mellitus without complication, without long-term current use of insulin (H)  Overall stable, A1c at 5.9% today, has not taken his medication for the past 3 months, we will just have him continue with metformin daily, hold on Januvia, continue to work on healthy lifestyle changes, monitor blood sugar at home and follow-up in about 6 months.  - Adult Eye  Referral; Future  - Comprehensive metabolic panel  - Hemoglobin A1c  - metFORMIN (GLUCOPHAGE) 500 MG tablet; Take 1 tablet (500 mg) by mouth 2 times daily (with meals)    Screening for HIV (human immunodeficiency virus)    - HIV Antigen Antibody Combo; Future  - HIV Antigen Antibody Combo    Need for hepatitis C screening test    - Hepatitis C Screen Reflex to HCV RNA Quant and Genotype; Future  - Hepatitis C Screen Reflex to HCV RNA Quant and Genotype    Benign essential hypertension  Stable, lower losartan from 50 mg a day to 25 mg a day, monitor blood pressure.  - losartan (COZAAR) 25 MG tablet; Take 1 tablet (25 mg) by mouth daily    Mixed hyperlipidemia    - atorvastatin (LIPITOR) 20 MG tablet; Take 1 tablet (20 mg) by mouth daily    Screening for viral disease  Mildly elevated ALT AST, check hep B and C viruses  - Hepatitis B Surface Antibody; Future  - Hepatitis B surface antigen; Future    Review of external notes as documented elsewhere in note  40 minutes spent by me on the date of the encounter doing chart review, review of outside records, review of test results, interpretation of tests, patient visit, and documentation        BMI:   Estimated body mass index is 28.62 kg/m  as calculated from the following:    Height as of this encounter: 1.568 m (5' 1.73\").    Weight as of this encounter: 70.4 kg (155 lb 1.9 oz).   Weight management plan: Discussed healthy diet and exercise guidelines    Work on weight loss  Regular exercise    Nader Lopez MD  [facility]    Subjective   Be is a " "37 year old, presenting for the following health issues:  Diabetes check      8/16/2023     5:05 PM   Additional Questions   Roomed by Meme CLARK   Accompanied by self       History of Present Illness       Diabetes:   He presents for follow up of diabetes.    He is not checking blood glucose.         He has no concerns regarding his diabetes at this time.   He is not experiencing numbness or burning in feet, excessive thirst, blurry vision, weight changes or redness, sores or blisters on feet. The patient has had a diabetic eye exam in the last 12 months. Eye exam performed on 2022. Location of last eye exam Not remember the placed.        He eats 2-3 servings of fruits and vegetables daily.He consumes 0 sweetened beverage(s) daily.He exercises with enough effort to increase his heart rate 9 or less minutes per day.  He exercises with enough effort to increase his heart rate 3 or less days per week.   He is taking medications regularly.       Diabetes type 2 follow-up, unfortunately not taking his medication for the past 3 months, he ran out and did not ask for a refill.  Rarely checking his blood sugar at home usually around 130s.  Denies any hypoglycemic episode.  And again not taking any medication, he moved here from [place] few months ago, he works in a factory.  Seen non-smoker.  A1c is at 5.9% today, similar to about 3 months ago.      Review of Systems   Constitutional, HEENT, cardiovascular, pulmonary, gi and gu systems are negative, except as otherwise noted.      Objective    /89 (BP Location: Left arm, Patient Position: Sitting, Cuff Size: Adult Large)   Pulse 91   Temp 98.4  F (36.9  C) (Temporal)   Resp 20   Ht 1.568 m (5' 1.73\")   Wt 70.4 kg (155 lb 1.9 oz)   SpO2 98%   BMI 28.62 kg/m    Body mass index is 28.62 kg/m .  Physical Exam   GENERAL: healthy, alert and no distress  NECK: no adenopathy, no asymmetry, masses, or scars and thyroid normal to palpation  RESP: lungs clear to " auscultation - no rales, rhonchi or wheezes  CV: regular rate and rhythm, normal S1 S2, no S3 or S4, no murmur, click or rub, no peripheral edema and peripheral pulses strong  ABDOMEN: soft, nontender, no hepatosplenomegaly, no masses and bowel sounds normal  MS: no gross musculoskeletal defects noted, no edema  Diabetic foot exam: normal DP and PT pulses, no trophic changes or ulcerative lesions, and normal sensory exam    Results for orders placed or performed in visit on 08/16/23   Comprehensive metabolic panel     Status: Abnormal   Result Value Ref Range    Sodium 139 136 - 145 mmol/L    Potassium 3.6 3.4 - 5.3 mmol/L    Chloride 102 98 - 107 mmol/L    Carbon Dioxide (CO2) 24 22 - 29 mmol/L    Anion Gap 13 7 - 15 mmol/L    Urea Nitrogen 14.5 6.0 - 20.0 mg/dL    Creatinine 0.90 0.67 - 1.17 mg/dL    Calcium 9.7 8.6 - 10.0 mg/dL    Glucose 156 (H) 70 - 99 mg/dL    Alkaline Phosphatase 57 40 - 129 U/L    AST 68 (H) 0 - 45 U/L    ALT 82 (H) 0 - 70 U/L    Protein Total 7.8 6.4 - 8.3 g/dL    Albumin 5.0 3.5 - 5.2 g/dL    Bilirubin Total 0.6 <=1.2 mg/dL    GFR Estimate >90 >60 mL/min/1.73m2   Hemoglobin A1c     Status: Abnormal   Result Value Ref Range    Hemoglobin A1C 5.9 (H) 0.0 - 5.6 %       This note was completed in part using a voice recognition software, any grammatical or context distortion are unintentional and inherent to the software.                 dr Enriquez

## 2023-08-17 NOTE — DIETITIAN INITIAL EVALUATION ADULT. - ETIOLOGY
related to poor nutrient absorption associated daily ETOH abuse Birth Control Pills Counseling: Birth Control Pill Counseling: I discussed with the patient the potential side effects of OCPs including but not limited to increased risk of stroke, heart attack, thrombophlebitis, deep venous thrombosis, hepatic adenomas, breast changes, GI upset, headaches, and depression.  The patient verbalized understanding of the proper use and possible adverse effects of OCPs. All of the patient's questions and concerns were addressed.

## 2023-08-24 NOTE — ED ADULT NURSE NOTE - NSFALLRSKUNASSIST_ED_ALL_ED
no
You can access the FollowMyHealth Patient Portal offered by [facility] by registering at the following website: [email or web address]. By joining [employer]’s FollowMyHealth portal, you will also be able to view your health information using other applications (apps) compatible with our system.

## 2023-09-26 NOTE — ED PROVIDER NOTE - PSH
Caller: Devorah Dc    Relationship to patient: Emergency Contact    Patient is needing: PATIENT'S WIFE IS CALLING STATING THAT THE PATIENT HAD A PRE ADMISSION SURGERY VISIT FOR SURGERY ON 9/28.   WIFE STATES THEY ARE TELLING THE PATIENT THAT HAS TO HAVE BEEN ON BOTH JARDIANCE AND RYBELSUS IN ORDER TO HAVE SURGERY ON THURSDAY 9/28, BY DR. CUADRA.        WIFE IS ASKING THAT DOCUMENTATION THAT PATIENT HAS BEEN ON BOTH OF THESE 2 MEDICATIONS BE SENT TO [facility] PREADMISSION DEPARTMENT.  THAT PHONE NUMBER  [phone].          PLEASE CALL THE PATIENT WITH ANY QUESTIONS  [phone].       
Spoke with Pt and informed him of what Dr. Eaton's message to him was. Pt. gave verbal understanding.  
No significant past surgical history    No significant past surgical history    No significant past surgical history

## 2023-10-01 PROBLEM — K86.89 PANCREATIC MASS: Status: ACTIVE | Noted: 2022-01-01

## 2023-10-02 NOTE — ED PROVIDER NOTE - CROS ED CONS ALL NEG
Pt calling in asking for refill of Adderall, informed her that the last refill was sent on 09/05, so it may not be sent or ready until 10/05. Pt understood and states she was just calling it in so she did not forget. - - -

## 2023-10-19 NOTE — ED ADULT TRIAGE NOTE - BP NONINVASIVE DIASTOLIC (MM HG)
Emergency Department Provider Note       PCP: Michael Alvarez MD   Age: 81 y.o. Sex: female     DISPOSITION Decision To Transfer 10/19/2023 10:59:31 AM       ICD-10-CM    1. Closed fracture of right hip, initial encounter ([address] )  S72.001A       2. Nontraumatic compression fracture of L3 vertebra, initial encounter [facility])  M48.56XA           Medical Decision Making     Complexity of Problems Addressed:  1 or more acute illnesses that pose a threat to life or bodily function. Data Reviewed and Analyzed:  I independently ordered and reviewed each unique test.         I interpreted the X-rays femur fracture. Discussion of management or test interpretation. In summary this is a 20-year-old female patient who presented for evaluation of mechanical trip and fall complaining of right hip pain. Was unable to bear weight. On exam was with clearly externally rotated and shortened right lower extremity. Neurovascular intact without signs of compartment syndrome. X-ray showed obvious intertrochanteric fracture of the right hip. Also showed indeterminate age compression fracture of L3. We will admit the patient after consult with Ortho to likely bring her to surgery. Hospitalist consulted for admission. Patient stable at time of admission. The patient was admitted and I have discussed patient management with the admitting provider. The management of this patient was discussed with an external consultant. Risk of Complications and/or Morbidity of Patient Management:  Parental controlled substances given in the ED. Drug therapy given requiring intensive monitoring for toxicity. Discussion with external consultants. Shared medical decision making was utilized in creating the patients health plan today.     ED Course as of 10/19/23 1522   u Oct 19, 2023   1033 10:33 AM  Consulted Kayla Butts of ortho service regarding patient hip fracture [NR]   1101 11:01 AM  Orthopedics advised having while waiting for  in the waiting room. Fall  with possible head injury. COMPARISON: None. Multiple axial images were obtained through the cervical spine without  intravenous contrast. Coronal and sagittal reformatted images were also  reviewed. Radiation dose reduction techniques were used for this study: All CT  scans performed at this facility use one or all of the following: Automated  exposure control, adjustment of the mA and/or kVp according to patient's size,  iterative reconstruction. FINDINGS:  Moderate atlantodental interval degenerative change. Moderate multilevel facet  arthropathy. Superior endplate compression deformity of C7 and T1 and inferior  endplate compression deformity of the C7 vertebral body all of which appears  chronic with approximately 40% height loss at C7 and 20% height loss of T1. Thyroidectomy evident. No prevertebral soft tissue swelling. No significant  spondylolisthesis. Moderate degenerative disc changes C4-C5 through C7-T1. No  evidence of acute cervical spine fracture. Impression    1. No evidence of acute cervical spine fracture. 2.  Chronic appearing height loss of the C7 and T1 vertebral bodies. XR FEMUR RIGHT (MIN 2 VIEWS)    Narrative    EXAM: XR 2 views of the right femur. INDICATION: Surgical planning. COMPARISON: Same day hip radiographs. FINDINGS:   Diffuse osteopenia. Comminuted intratrochanteric fracture of the right femur  with foreshortening and with displacement of the lesser trochanter. Right hip  joint degenerative changes. Moderate medial lateral compartment degenerative  changes of the knee evidence. Impression    1. Comminuted intertrochanteric acute fracture of the right femur with  foreshortening and with displacement of the lesser trochanter.                CBC with Auto Differential   Result Value Ref Range    WBC 5.7 4.3 - 11.1 K/uL    RBC 4.49 4.05 - 5.2 M/uL    Hemoglobin 13.1 11.7 - 15.4 g/dL    Hematocrit 80

## 2023-11-01 NOTE — ED ADULT NURSE NOTE - NSIMPLEMENTINTERV_GEN_ALL_ED
[place] [phone] called, saying her ECHO appt will not work for her schedule due to being out of town.     Echo has been rescheduled for 11/17/2023 11:20am at [facility].  
Angel
Implemented All Fall with Harm Risk Interventions:  [place] to call system. Call bell, personal items and telephone within reach. Instruct patient to call for assistance. Room bathroom lighting operational. Non-slip footwear when patient is off stretcher. Physically safe environment: no spills, clutter or unnecessary equipment. Stretcher in lowest position, wheels locked, appropriate side rails in place. Provide visual cue, wrist band, yellow gown, etc. Monitor gait and stability. Monitor for mental status changes and reorient to person, place, and time. Review medications for side effects contributing to fall risk. Reinforce activity limits and safety measures with patient and family. Provide visual clues: red socks.

## 2023-12-04 NOTE — ED ADULT TRIAGE NOTE - AS O2 DELIVERY
HISTORY OF PRESENT ILLNESS     This is a 24 year old female here today for   Chief Complaint   Patient presents with    Headache     room5   .  HPI  Charlene Ayon is a 24 year old female who presents to urgent care for headache since yesterday morning. Reports a history of frequent headaches over the last several months, but has not discussed with her PCP. Last few headache episodes have been more severe. This current episode started at 6am yesterday morning. Associated symptoms include nausea without emesis, blurry vision, feeling off balance, blurry vision. No photophobia or phonophobia. Has not tried any OTC medications for her headaches. In the past has tried advil but not sure if it was helpful. Was hoping to just sleep with improvement. Denies any fever/chills, cough, congestion, rhinorrhea. The headache is more localized to posterior head, starts mostly on the left side. Does endorse some associated neck tension as well. Not sure what triggers headaches, but thinking it could be stress related. Does admit to a lot of stress recently. Also does work at a computer all day as well. Tried a heating pad to the head/neck without significant relief. Her friend did have a benign brain tumor and was wondering if she may need a MRI. She has been eating and drinking without difficulty.     PAST MEDICAL, FAMILY AND SOCIAL HISTORY     I have personally reviewed and updated the following EMR sections:  Allergies, Problem List, Past Medical History, Past Surgical History, Social History, and Family History    REVIEW OF SYSTEMS   Review of Systems   Constitutional:  Negative for chills and fever.   HENT: Negative.     Respiratory: Negative.     Musculoskeletal:  Positive for gait problem (feeling \"off balance\" with ambulation) and neck pain. Negative for back pain and neck stiffness.   Neurological:  Positive for weakness and headaches. Negative for dizziness, syncope and light-headedness.     Negative other than  above    PHYSICAL EXAM   Blood pressure 128/77, pulse 78, temperature 98.2 °F (36.8 °C), temperature source Oral, resp. rate 16, height 5' 9\" (1.753 m), weight 75.3 kg (166 lb), last menstrual period 11/13/2023, SpO2 98 %.  Body mass index is 24.51 kg/m².  Physical Exam  Vitals and nursing note reviewed.   Constitutional:       General: She is not in acute distress.     Appearance: Normal appearance. She is not ill-appearing or toxic-appearing.   HENT:      Head: Normocephalic and atraumatic.      Right Ear: Tympanic membrane, ear canal and external ear normal.      Left Ear: Tympanic membrane, ear canal and external ear normal.      Nose: Nose normal.      Mouth/Throat:      Mouth: Mucous membranes are moist.      Pharynx: Oropharynx is clear.   Eyes:      Extraocular Movements: Extraocular movements intact.      Conjunctiva/sclera: Conjunctivae normal.      Pupils: Pupils are equal, round, and reactive to light.   Cardiovascular:      Rate and Rhythm: Normal rate and regular rhythm.      Pulses: Normal pulses.      Heart sounds: Normal heart sounds.   Pulmonary:      Effort: Pulmonary effort is normal. No respiratory distress.      Breath sounds: Normal breath sounds. No stridor. No wheezing, rhonchi or rales.   Musculoskeletal:      Cervical back: Normal range of motion.   Skin:     General: Skin is warm.      Capillary Refill: Capillary refill takes less than 2 seconds.   Neurological:      General: No focal deficit present.      Mental Status: She is alert and oriented to person, place, and time.      GCS: GCS eye subscore is 4. GCS verbal subscore is 5. GCS motor subscore is 6.      Cranial Nerves: Cranial nerves 2-12 are intact. No cranial nerve deficit, dysarthria or facial asymmetry.      Sensory: Sensation is intact. No sensory deficit.      Motor: Motor function is intact. No weakness.      Coordination: Coordination is intact.      Gait: Gait is intact. Gait and tandem walk normal.      Deep Tendon  Reflexes: Reflexes normal.      Reflex Scores:       Patellar reflexes are 2+ on the right side and 2+ on the left side.      ASSESSMENT/PLAN   Charlene was seen today for headache.    Diagnoses and all orders for this visit:    Acute intractable headache, unspecified headache type  -     ketorolac (TORADOL) injection 30 mg  -     ondansetron (ZOFRAN ODT) disintegrating tablet 4 mg  -     ondansetron (ZOFRAN ODT) 4 MG disintegrating tablet; Place 1 tablet onto the tongue every 8 hours as needed for Nausea.    -Patient presents with headache since yesterday. Has not tried any OTC medications for her symptoms. Reports frequent headaches over the last several months, has not discussed with her PCP. On exam, patient is non-toxic appearing and in no acute distress. Vital signs are stable. Neurological exam is unremarkable. No ataxia noted on exam. Strength intact throughout. Brisk patellar tendon reflexes. No speech abnormalities. Visual acuity 20/25.   -Low suspicion for acute intra-cranial etiology at this time. She has had ongoing headaches for several months. Neurological exam is benign. No indication for emergent CT imaging. She was given toradol and zofran for symptoms with some improvement. Patient does report some improvement in headache symptoms and good relief of her nausea. She would like rx for zofran. Seems more tension type headache given location and current stress. Recommended continued NSAID use starting tomorrow given toradol administration today, can do tylenol as well. I discussed we do not have ability to do CT/MRI in urgent care setting. I recommended scheduling appointment with PCP to discuss ongoing headache concerns, she is agreeable to this. Encouraged rest and adequate fluids. Declined need for work note. All questions answered prior to discharge. Discussed ER precautions.     EDUCATION  Charlene Ayon educated as to the above assessment and plan and did express understanding and  agreement.     RETURN  Patient advised they can return to urgent care or the emergency room if their symptoms worsen and they are unable to see their primary care physician.    I was wearing full PPE including an mask, and gloves during this encounter.     The patient was stable for discharge. Diagnosis and discharge instructions were reviewed with patient. Discussed red flags signs and symptoms that would require emergent medical attention. Patient is in agreement with plan and disposition.     Supervising physician for shift: Dr. Estuardo Lauren    Total time of visit 30 minutes, counseling and coordinating care regarding pt's diagnosis, next steps in evaluation and treatment options including review of laboratories, medication options, and any alternative treatment options.      Guille Quarles PA-C     room air

## 2023-12-20 NOTE — ASSESSMENT
[FreeTextEntry1] : 57 year old man with history of alcoholism with pancreatic head mass and evidence of metastases to the liver. The patient is homeless, and has little social support. I discussed the need for a biopsy, and will have this scheduled with IR ASAP. The patient has a high likelihood of pancreatic cancer based on imaging findigns and elevated CA-19-9. In regards to treatment, I discussed the possibility of FOLFIRINOX, but given lack of social supports, concern regarding ability to manage pump, and likely increased side effects of FOLFINOX relative to other regimens,  I feel that gem/abraxane may be the more appropriate option with him and consented him for treatment. \par \par # Pancreatic head mass \par - CT guided liver biopsy on 11/18/22: adenocarcinoma, morphologically and immunohistochemistry compatible with pancreatic adenocarcinoma\par - staging CT chest on 12/2/22: Innumerable small bilateral pulmonary metastases and multistation thoracic lymphadenopathy, new since May 2021.  Enlarging hepatic metastases since October 2022 abdominal CT. Partially imaged retroperitoneal lymphadenopathy.\par - started on [place]/Abraxane on 12/6/22.  Today is C1D8\par - RUQ abdominal pain currently well controlled with oxycodone 10mg Q4 PRN\par - RD Justina will follow up regarding decreased appetite and dysgeusia. Will also consider Creon \par - will order a mediport as treatment room nurses having difficulty getting peripheral access \par - MD/PA follow up Q3-4 weeks\par \par # Pain \par - The patient has a history of alcohol and cocaine abuse. It is unlikely, however, that he will achieve a reasonable amount of pain relief without the addition of opiates. Dr. Milton discussed that opiates are addictive and that his history of alcohol and cocaine abuse puts him at increased risk for addiction. The patient is understanding. Dr. Milton discussed this with his  at [place], who stated that his medications are dispensed from a pharmacy, which we hope will decrease his risk for abuse. Started oxycodone at 5 mg to be given Q4 hours and increased to 10 mg with good pain control at this time. \par \par RACQUEL Thanh Irene [phone] ext: [phone]
20-Dec-2023 19:16

## 2023-12-28 NOTE — ED ADULT NURSE NOTE - COVID-19 RESULT DATE/TIME
Anesthesia Pre Eval Note    Anesthesia ROS/Med Hx    Overall Review:  EKG was reviewed and Echo was reviewed     Anesthetic Complication History:    Patient does not have a history of anesthetic complications      Pulmonary Review:  Patient does not have a pulmonary history      Neuro/Psych Review:       Positive for CVA    Cardiovascular Review:     Positive for hypertension  Positive for hyperlipidemia    GI/HEPATIC/RENAL Review:  Patient does not have a GI/hepatic/renalhistory       End/Other Review:    Positive for chronic pain  Additional Results:      ALLERGIES:   -- Aloe -- RASH   -- Atorvastatin -- ARTHRALGIA   -- Eggs Or Egg-Derived Products   (Food Or Med) -- Other (See Comments)    --  Allergy to raw egg only per pt   -- Eucalyptus Oil -- Other (See Comments)    --  Used in nose with a cold and had back pain   -- Seasonal -- Runny Nose   Last Labs        Component                Value               Date/Time                  WBC                      7.5                 12/01/2023 1450            RBC                      4.11                12/01/2023 1450            HGB                      13.4                12/01/2023 1450            HCT                      38.4                12/01/2023 1450            MCV                      93.4                12/01/2023 1450            MCH                      32.6                12/01/2023 1450            MCHC                     34.9                12/01/2023 1450            RDW-CV                   12.9                12/01/2023 1450            Sodium                   136                 12/01/2023 1450            Potassium                4.2                 12/01/2023 1450            Chloride                 102                 12/01/2023 1450            Carbon Dioxide           27                  12/01/2023 1450            Glucose                  83                  12/01/2023 1450            BUN                      18                  12/01/2023 1450             Creatinine               0.74                12/01/2023 1450            Glomerular Filtrati*     86                  12/01/2023 1450            Calcium                  9.9                 12/01/2023 1450            PLT                      256                 12/01/2023 1450            PTT                      28                  12/01/2023 1450            INR                      1.0                 12/01/2023 1450        Past Medical History:  No date: Essential (primary) hypertension  No date: Mixed hyperlipidemia  No date: Osteoarthritis  9/2021: Osteoarthritis of left knee  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 Yes, Authorizing Provider Steven Arceo MD    Medication clopidogrel (PLAVIX) 75 MG tablet, Sig TAKE 1 TABLET BY MOUTH DAILY, Start Date 12/18/23, End Date , Taking? Yes, Authorizing Provider Steven Arceo MD    Medication metoPROLOL succinate (TOPROL-XL) 50 MG 24 hr tablet, Sig Take 1 tablet by mouth daily., Start Date 12/13/23, End Date , Taking? Yes, Authorizing Provider Cuco Youngblood MD    Medication fluticasone (FLONASE) 50 MCG/ACT nasal spray, Sig SHAKE LIQUID AND USE 1 SPRAY IN EACH NOSTRIL TWICE DAILY, Start Date 10/9/23, End Date , Taking? Yes, Authorizing Provider Steven Arceo MD    Medication triamcinolone (ARISTOCORT) 0.1 % cream, Sig Apply 1 application. topically in the morning and 1 application. in the evening.  Patient taking differently: Apply 1 application. topically as needed., Start Date 9/5/23, End Date , Taking? Yes, Authorizing Provider Steven Arceo MD    Medication famotidine (PEPCID) 20 MG tablet, Sig TAKE 1 TABLET BY MOUTH DAILY  Patient taking  differently: Take 20 mg by mouth daily as needed., Start Date 22, End Date , Taking? Yes, Authorizing Provider Steven Arceo MD    Medication Valacyclovir HCl 1000 MG Tab, Sig Take 2 tablets by mouth 2 times daily. For 1 day at first onset of symptoms.  Patient taking differently: Take 2,000 mg by mouth in the morning and 2,000 mg in the evening. For 1 day at first onset of symptoms. prn, Start Date 21, End Date , Taking? Yes, Authorizing Provider Steven Arceo MD    Medication Multiple Vitamin (MULTIVITAMIN ADULT PO), Sig Take by mouth daily., Start Date , End Date , Taking? Yes, Authorizing Provider Provider, Outside    Medication cholecalciferol (VITAMIN D) 25 mcg (1,000 units) tablet, Sig Take 25 mcg by mouth daily., Start Date , End Date , Taking? Yes, Authorizing Provider Provider, Outside    Medication Lactobacillus (PROBIOTIC ACIDOPHILUS PO), Sig , Start Date , End Date , Taking? , Authorizing Provider Provider, Outside    Medication Methylcellulose, Laxative, (CITRUCEL PO), Sig Take 4 tablets by mouth daily., Start Date , End Date , Taking? , Authorizing Provider Provider, Outside         Patient Vitals for the past 24 hrs:   BP Temp Temp src Pulse Resp SpO2 Weight   23 1124 129/80 36.1 °C (97 °F) Temporal (!) 54 16 95 % 69.7 kg (153 lb 10.6 oz)       Social history reviewed:  Social History     Tobacco Use   Smoking Status Former    Current packs/day: 0.00    Types: Cigarettes    Quit date:     Years since quittin.0   Smokeless Tobacco Never        E-Cigarette/Vaping Substances & Devices    E-Cigarette/Vaping Use Never Used     Nicotine No     THC No     CBD No        Social History     Substance and Sexual Activity   Alcohol Use Yes    Alcohol/week: 8.0 standard drinks of alcohol    Types: 8 Standard drinks or equivalent per week           Relevant Problems   No relevant active problems       Physical Exam     Airway   Mallampati: II  TM Distance: >3 FB  Neck ROM:  23-Jun-2022 17:56 Full  Neck: Non-tender and Able to place in sniff position  TMJ Mobility: Good    Cardiovascular  Cardiovascular exam normal  Cardio Rhythm: Regular  Cardio Rate: Normal    Head Assessment  Head assessment: Normocephalic and Atraumatic    General Assessment  General Assessment: Alert and oriented and No acute distress    Dental Exam  Dental exam normal    Pulmonary Exam  Pulmonary exam normal  Breath sounds clear to auscultation:  Yes    Abdominal Exam  Abdominal exam normal      Anesthesia Plan:    ASA Status: 3  Anesthesia Type: General    Induction: Intravenous  Preferred Airway Type: LMA  Maintenance: Inhalational    Post-op Pain Management: Per Surgeon and Single Shot Block      Checklist  Reviewed: NPO Status, Allergies, Medications, Problem list and Past Med History  Consent/Risks Discussed Statement:  The proposed anesthetic plan, including its risks and benefits, have been discussed with the Patient along with the risks and benefits of alternatives. Questions were encouraged and answered and the patient and/or representative understands and agrees to proceed.        I discussed with the patient (and/or patient's legal representative) the risks and benefits of the proposed anesthesia plan, General, which may include services performed by other anesthesia providers.    Alternative anesthesia plans, if available, were reviewed with the patient (and/or patient's legal representative). Discussion has been held with the patient (and/or patient's legal representative) regarding risks of anesthesia, which include vomiting, nausea and dental injury and emergent situations that may require change in anesthesia plan.    The patient (and/or patient's legal representative) has indicated understanding, his/her questions have been answered, and he/she wishes to proceed with the planned anesthetic.    Blood Products: Not Anticipated

## 2024-01-19 NOTE — ED PROVIDER NOTE - PRO INTERPRETER NEED 2
English 52F hx of GERD anemia with no prior transfusions sent in by PMD for blood transfusion. No hx of prior transfusions. States colonoscopy 3 years ago and endoscopy x2 with no ulcers. Denies any blood in stool,  gum bleeding. States hx of heavy menstrual periods. States 1 month fatigue. Currently well appearing and feels well. Will provide pRBCs and obtain repeat hgb further anemia workup to be done as outpatient.     General: Well appearing, well nourished, in no distress  Head: Normocephalic, atraumatic  Eyes: Conjunctiva clear, sclera non-icteric  Mouth: Mucous membranes moist, no mucosal lesions.  Neck: Supple  Heart: Regular rate and rhythm, no murmur or gallop  Lungs: Clear to auscultation  Abdomen: soft, no tenderness, non distended  Extremities: No amputations or deformities, cyanosis, edema  Musculoskeletal: No crepitation, defects or decreased range of motion, strength intact throughout, pulses intact  Neurologic: No gross deficits Normal gait   Psychiatric: Oriented X3, normal mood and affect  Skin: Warm,dry. no palmar pallor

## 2024-01-22 NOTE — ED PROVIDER NOTE - PRINCIPAL DIAGNOSIS
Patient scheduled for VNG testing 3/1 at 10 AM.     Electronically signed by Aminah Osborn on 1/22/2024 at 11:17 AM    
Alcohol abuse

## 2024-02-01 NOTE — ED PROVIDER NOTE - SKIN, MLM
old scabbed abrasions to face from recent fall
PAST MEDICAL HISTORY:  Anxiety     Cancer of fallopian tube     Lower back pain     Major depression     Ovarian ca     Vertigo

## 2024-03-08 NOTE — ED ADULT TRIAGE NOTE - CHIEF COMPLAINT QUOTE
pt arrives by ambulance from [facility], pt states "I want to die" [Alert] : alert [Sclera] : the sclera and conjunctiva were normal [Normal Appearance] : the appearance of the neck was normal [None] : no edema [Abdomen Soft] : soft [Abnormal Walk] : normal gait [Normal Color / Pigmentation] : normal skin color and pigmentation [Cranial Nerves Intact] : cranial nerves 2-12 were intact [Oriented To Time, Place, And Person] : oriented to person, place, and time [de-identified] : Deferred pending colonoscopy

## 2024-03-13 NOTE — ED ADULT TRIAGE NOTE - CHIEF COMPLAINT QUOTE
Anesthesia Post-op Note    Patient: Janice Camp  Procedure(s) Performed: COLONOSCOPY (Anus)  Anesthesia type: MAC    Vitals Value Taken Time   Temp 36.1 °C (97 °F) 03/13/24 0854   Pulse 82 03/13/24 0854   Resp 16 03/13/24 0854   SpO2 95 % 03/13/24 0854   /67 03/13/24 0854         Patient Location: Phase II  Post-op Vital Signs:stable  Level of Consciousness: participates in exam, awake, alert, oriented, follows commands, return to baseline and responds to stimulation  Respiratory Status: spontaneous ventilation and room air  Cardiovascular blood pressure returned to baseline and stable  Hydration: euvolemic  Pain Management: well controlled  Handoff: Handoff to receiving clinician was performed and questions were answered  Vomiting: none  Nausea: None  Airway Patency:patent  Post-op Assessment: awake, alert, appropriately conversant, or baseline, no complications, patient tolerated procedure well, no evidence of recall, dentition within defined limits, moving all extremities and No Corneal Abrasion  Comments: Pt is stable and resting comfortable in Phase 2. No complains of Pain or Nausea. Report given to RN. Pt is awake and ready for discharge.    Doing well. Ready for home.         No notable events documented.                       Pt admits to drinking alcohol tonight, pt with superficial lac to forehead, bleeding controlled at this time, "I might have fallen I don't know"

## 2024-03-13 NOTE — ED ADULT NURSE NOTE - PLAN OF CARE
[Change in Activity] : no change in activity [Rash] : no rash [Back Pain] : ~T no back pain [Skin Lesions] : no skin lesions [Chest Pain] : no chest pain [Cough] : no cough [Change in Appetite] : no change in appetite [Shortness of Breath] : no shortness of breath [Abdominal Pain] : no abdominal pain [Constipation] : no constipation [Sleep Disturbances] : ~T no sleep disturbances [Cold Intolerance] : cold tolerant [Headache] : no headache [Heat Intolerance] : heat tolerant Call bell/Explanation of exam/test/Fall precautions

## 2024-04-18 NOTE — ED PROVIDER NOTE - DISCHARGE DATE
MT Recruitment: [facility] insurance     Referral outreach attempt #1 on April 18, 2024      Outcome: call attempted, patient requested ITYZ message of information so can call back at their convenience, MyChart sent - Patient's  is in the hospital so she prefers to postpone discussing visit    Bakari Slaughter, JamalD, [facility] Hospital[facility]  Medication Therapy Management Pharmacist  Voicemail: [phone]      
12-Jun-2018

## 2024-04-24 NOTE — ED BEHAVIORAL HEALTH NOTE - BEHAVIORAL HEALTH NOTE
screened by [facility] - Tentative acceptance but no bed availability at present - will treat alcohol detox and hold in obs status for possible discharge in AM - orders written PAST SURGICAL HISTORY:  H/O hernia repair

## 2024-04-30 NOTE — ED ADULT TRIAGE NOTE - NS ED TRIAGE AVPU SCALE
The pharmacy is  requesting a refill of the below medication which has been pended for you:     Requested Prescriptions     Pending Prescriptions Disp Refills    escitalopram (LEXAPRO) 5 MG tablet [Pharmacy Med Name: ESCITALOPRAM 5 MG TABLET] 90 tablet 0     Sig: take 1 tablet by mouth once daily       Last Appointment Date: 4/22/2024  Next Appointment Date: 5/3/2024    Allergies   Allergen Reactions    Zoloft [Sertraline Hcl] Other (See Comments)     Headaches, sweats, bad dreams      Alert-The patient is alert, awake and responds to voice. The patient is oriented to time, place, and person. The triage nurse is able to obtain subjective information.

## 2024-05-13 NOTE — ED PROVIDER NOTE - PRINCIPAL DIAGNOSIS
Spoke to pt and she is going to call her insurance co to see who is covered for her to go see.     She will call us back when she finds one.    0.18 Alcohol abuse

## 2024-06-04 NOTE — DISCHARGE NOTE PROVIDER - HOSPITAL COURSE
54 y/o M w/ a PMH of ETOH abuse (1 gallon of vodka daily as per pt), HTN, HLD, depression, asthma, GERD comes in w/ suicidal ideations.  Psych was consulted for evaluation and confirms pt has SI and needs inpt however concern for ETOH withdrawal.  On initial exam pt is exhibiting signs of active withdrawal.  Will admit pt and place on CIWA protocol.  Pt also has active SI and will therefore c/w 1:1 monitoring. Patient initially stated that he wanted to harm himself and was intoxicated at that time. Patient was seen by psychiatoy who recommended initially for inpatient management and to admit to medicine for alcohol withdrawal. Patient was started on a librium taper and his CIWA has been down to basically a 1 now. Patients AGMA improved. Patient was seen by psychiatry after withdrawal taper was completed and stated that patient currently has no suicide ideations and would not benefit from any inpatient psych facility. As per psych: Patient is Psychiatrically cleared, as he is not Suicidal nor does exhibit any acute issues needing any psych Intervention. No psych Meds at this time. Patient refuses any social work assistance and states that he will go to his uncles house.  Medically stable for discharge.   Time spent on patients discharge 32 minutes Discharge Diagnosis  Hypertensive urgency, resolved  History of left partial retinal occlusion in 2021  Hypothyroidism  Dyslipidemia  Left partial retinal occlusion  Asthma  IBS  Neuropathic pain  Generalized anxiety disorder  PTSD  ADHD  Insomnia  Menopause    Issues Requiring Follow-Up  Take Nitrofurantoin for the next 3 days for UTI    Take amlodipine 5mg twice daily for elevated BP    Take Bentyl as needed for stomach pain    Take atorvastatin daily for elevated cholesterol     Discharge Meds     Your medication list        START taking these medications        Instructions Last Dose Given Next Dose Due   amLODIPine 5 mg tablet  Commonly known as: Norvasc      Take 1 tablet (5 mg) by mouth 2 times a day.       atorvastatin 40 mg tablet  Commonly known as: Lipitor      Take 1 tablet (40 mg) by mouth once daily at bedtime.       dicyclomine 10 mg capsule  Commonly known as: Bentyl      Take 1 capsule (10 mg) by mouth 3 times a day as needed (abdominal pain or cramps) for up to 15 doses.       nitrofurantoin (macrocrystal-monohydrate) 100 mg capsule  Commonly known as: Macrobid      Take 1 capsule (100 mg) by mouth 2 times a day for 3 days.              CONTINUE taking these medications        Instructions Last Dose Given Next Dose Due   amphetamine-dextroamphetamine 15 mg tablet  Commonly known as: AdderalL      Take 1 tablet (15 mg) by mouth 3 times a day.       amphetamine-dextroamphetamine 15 mg tablet  Commonly known as: AdderalL      Take 1 tablet (15 mg) by mouth 3 times a day. Do not fill before June 1, 2024.       amphetamine-dextroamphetamine 15 mg tablet  Commonly known as: AdderalL  Start taking on: July 1, 2024      Take 1 tablet (15 mg) by mouth 3 times a day. Do not fill before July 1, 2024.       cetirizine 10 mg tablet  Commonly known as: ZyrTEC      TAKE 1 TABLET BY MOUTH EVERY DAY       finasteride 1 mg tablet  Commonly known as: Propecia      TAKE ONE TABLET BY MOUTH DAILY AS DIRECTED        gabapentin 300 mg capsule  Commonly known as: Neurontin      Take 1 capsule (300 mg) by mouth 3 times a day.       hydrOXYzine HCL 25 mg tablet  Commonly known as: Atarax      TAKE ONE TABLET BY MOUTH THREE TIMES A DAY AS NEEDED FOR ANXIETY       levothyroxine 88 mcg tablet  Commonly known as: Synthroid, Levoxyl      TAKE ONE TABLET BY MOUTH EVERY DAY       montelukast 10 mg tablet  Commonly known as: Singulair      TAKE ONE TABLET BY MOUTH EVERY DAY       Premarin 1.25 mg tablet  Generic drug: estrogens (conjugated)           suvorexant 5 mg tablet  Commonly known as: Belsomra      Take 1 tablet (5 mg) by mouth as needed at bedtime for sleep.              STOP taking these medications      propranolol 20 mg tablet  Commonly known as: Inderal                  Where to Get Your Medications        These medications were sent to [facility]  [address] [place] [address]      Hours: 8:30 AM to 5:00 PM Mon - Fri Phone: [phone]   amLODIPine 5 mg tablet  atorvastatin 40 mg tablet  dicyclomine 10 mg capsule  nitrofurantoin (macrocrystal-monohydrate) 100 mg capsule         Test Results Pending At Discharge  Pending Labs       Order Current Status    Extra Urine Gray Tube Collected (06/01/24 0706)    Urinalysis with Reflex Culture and Microscopic In process            Hospital Course   This is a 62 years old female with underlying generalized anxiety disorder, PTSD, ADHD, hypothyroidism, left partial retinal occlusion, dyslipidemia, asthma, and IBS who presented to the ED on 5/31/2024 due to dizziness/vertigo for the past month that is getting progressively worse associated with headaches-frontal and occipital region, generalized weakness, nauseas, and back pain.  Patient was seen by a nurse at the [facility] and her blood pressure was 202/109.  The nurse referred her to [facility].  On arrival her blood pressure was 234/109.  In the past was placed on propranolol to help with her anxiety  but she had some side effects and stopped taking it.  He is under a lot of stress.  She lost her son on 5/20/2024 after opioid overdose.  She usually follows with psychiatrist due to problems mentioned above.  States that her blood pressure at home is around 140s and is high only when she feels anxious.  When the blood pressure is high she usually does some breathing exercises with improvement. Denied: fever, chest pain, palpitations, shortness of breath, abdomen pain, urinary symptoms, bloody stools, falls, and loss of consciousness. Patient has visual changes left sided due to left retinal occlusion and is following with ophthalmologist.  Patient was seen and evaluated by neurology, MRI brain/MRA head and neck which were unremarkable.  Echo with preserved ejection fraction with diastolic dysfunction and mild aortic stenosis.  Also grew gram-negative bacilli in her urine, received antibiotics while hospitalized.  Will be discharged home with atorvastatin, short course of oral antibiotics, Bentyl for her IBS Holter monitor, amlodipine for her hypertension.  This was discussed with the patient she is in agreement.  She will follow-up with her PCP.    Pertinent Physical Exam At Time of Discharge  Physical Exam  HENT:      Head: Normocephalic and atraumatic.   Eyes:      Extraocular Movements: Extraocular movements intact.      Pupils: Pupils are equal, round, and reactive to light.   Cardiovascular:      Rate and Rhythm: Normal rate and regular rhythm.   Pulmonary:      Effort: Pulmonary effort is normal. No respiratory distress.   Abdominal:      General: Abdomen is flat.      Palpations: Abdomen is soft.   Musculoskeletal:      Right lower leg: No edema.      Left lower leg: No edema.   Skin:     General: Skin is warm and dry.      Capillary Refill: Capillary refill takes 2 to 3 seconds.   Neurological:      General: No focal deficit present.      Mental Status: Mental status is at baseline.   Psychiatric:          Mood and Affect: Mood normal.         Behavior: Behavior normal.         Outpatient Follow-Up  Future Appointments   Date Time Provider Department Center   6/5/2024  9:30 AM Frederick Banda MD [id number]   6/6/2024  3:00 PM Adelia Dejesus MD PhD [id number]         Maxim Saadia

## 2024-06-25 NOTE — ED ADULT TRIAGE NOTE - TEMPERATURE IN CELSIUS (DEGREES C)
Medication: rosuvastatin passed protocol.   Last office visit date: 6/24/24  Next appointment scheduled?: Yes   Number of refills given: 0    Upcoming appointment scheduled on: 10/7/2024   Per last office visit, patient is to follow up 10/24/24.   Last refill on: 4/2/24      
36.4

## 2024-07-29 NOTE — ED ADULT NURSE NOTE - PAIN RATING/NUMBER SCALE (0-10): ACTIVITY
Detail Level: Zone Render In Strict Bullet Format?: No Plan: Will provide no shave waiver due to work 0

## 2024-08-23 NOTE — ED PROVIDER NOTE - PROGRESS NOTE DETAILS
Reason for call:  pt needs a letter sent to her son's  stating that he his her primary care giver,  Son's name is Domitila Juarez ( please confirm name with pt)   the letter is needed for immigration issues.  Please send to:  Brit[facility] Strange  [phone]  fax [phone]  [address] [place] [address]  Please call pt when this is done she would like a copy of the letter and will pick it up     Is this a new problem: Yes    Date of last appointment:  4/23/2024     Can we respond via [employer]: No    Best call back number:     Nelly Cm (Self) [phone] (Home)      Richard: reassessed, awake and alert, fully ambulatory, making self cup of coffee; ok for d/c

## 2024-08-28 NOTE — ED PROVIDER NOTE - CCCP TRG CHIEF CMPLNT
08/28/24 11:41 AM    Patient contacted to bring Advance Directive, POLST, or Living Will document to next scheduled pcp visit.[facility] Department spoke with patient/ caregiver.    Thank you.  Bryn Posada MA  [facility]    
altered mental status

## 2024-09-21 NOTE — ED ADULT TRIAGE NOTE - CADM TRG TX PRIOR TO ARRIVAL
Date of Service/Encounter:  9/23/2024    Subjective:      Ortega Malhotra is a 58 year old female seen for chief complaint of Asthma and Allergies  .  She was asked to be seen by Boone Landers MD.     History obtained from:  chart review and the patient.    She is followed by Pili Mcdonald PA-C. She was previously evaluated by Dr. Brian Lopez at [facility] 2/27/2019.    Asthma/Respiratory Symptom History:  Ortega is currently treated with  Flovent HFA 220mcg 1p BID since 4/12/2024  Xopenex HFA prn - has palpitations/pounding in her chest if uses Albuterol, tolerates Xopenex  She was previously recommended  Montelukast 10mg daily - did not start when Xopenex treated symptoms  Her history is significant for  Asthma diagnosed in 4th grade  2/27/2019 spirometry normal without bronchodilator responsiveness   11/14/2021 SARS-CoV-2 by PCR + with pneumonia, 11/14/2024 CXR showed extensive ill-defined diffuse bilateral parenchymal lung infiltrates compatible with Covid 19 pneumonia and CT PE protocol showed Extensive bilateral pneumonia   4/30/2024 PFT normal with partial bronchodilator response - FEV1 improved 200mL and 7%  Ortega has symptoms of [x] cough, [x] shortness of breath, [x] wheeze, [x] chest tightness with worsening of symptoms following SARS-CoV-2 infection 11/14/2021. She was seen by Dr. Landers and started on Flovent HFA 220mcg 1p BID son 4/12/2024 with improvement. She has had the best spring she has ever had. She reports feel shortness every day and is using Xopenex 1-2 times a day and does not feel it is as effective as when she first started to use it.   Triggers (home/work/school): exercise and humidity, spring and fall seasons  c-ACT score: 14  Hospitalizations for respiratory symptoms (self report):  0  ED or Urgent Care visits for respiratory symptoms (self report):  3 11/14/2021 (SARS-CoV-2 by PCR +/COVID pneumonia), 11/27/2023, 12/26/2023  Oral/systemic corticosteroids for  respiratory symptoms:  3 11/14/2021, 11/27/2023, 12/26/2023     Latest Reference Range & Units 04/12/24 11:07 07/19/24 11:43   Absolute Eos 0.0 - 0.5 K/mcL 0.4 0.2       Component      Latest Ref Rng 4/12/2024  11:07 AM   IGE      <=99.0 IUnits/mL 121.9 (H)       Legend:  (H) High    CXR 12/26/2023  Narrative & Impression      EXAM: FRONTAL AND LATERAL CHEST   CLINICAL INFORMATION: ongoing cough 10 weeks, Acute cough   COMPARISON: None   FINDINGS: Cardiac silhouette is within normal limits in size. Mediastinal  and hilar contours are within normal limits. There is no evidence of focal  consolidation, effusion, edema or pneumothorax.    IMPRESSION:   No acute cardiopulmonary process.   Electronically Signed by: Malick Haddad MD  Signed on: 12/26/2023 6:24 PM  Created on Workstation ID: [id number]  Signed on Workstation ID: [id number]         COVID (SARS-COV-2) BY PCR  Component  Ref Range & Units 2 yr ago Comments   SARS-CoV-2 Source NASAL    SARS-CoV-2 by PCR  NOTD Detected Abnormal     Resulting Agency [facility]    Specimen Collected: 11/14/21 11:59    Performed by: [facility] Last Resulted: 11/14/21 13:11             Ortega has [x] headache, [x] eye itching, [x] redness, [x] watering, [x] sneezing, [x] nasal itching, [x] runny nose, [x] nasal congestion, [x] post nasal drip, [x] throat clearing and [x] throat itching worse in spring and fall, around cats and dogs.   History of allergen immunotherapy as a child (4th grade); used to be administered by her grandmother  2/27/2019 SPT + tree, grass, weed, cat, dog, dust mites, cockroach  4/12/2024 serological testing showed Tree Birch IgE: 0.63 kU/l (H), Weed Ragweed Short IgE: 7.77 kU/l (H), Animal Cat Dander IgE: 1.45 kU/l (H), Animal Dog Dander IgE: 3.12 kU/l (H), Mites D. farinae IgE: 3.27 kU/l (H), Allergen D. pteronyssinus IgE: 1.74 kU/l (H)    Component      Latest Ref Rng 4/12/2024  11:07 AM   Tree Elm IgE Allergen      <0.35 kU/l  <0.35    Weed Ragweed Short IgE  Allergen      <0.35 kU/l 7.77 (H)    Mites D. farinae IgE Allergen      <0.35 kU/l 3.27 (H)    Fungi/Mold A. tenuis IgE Allergen      <0.35 kU/l <0.35    Weed Rough Marshelder IgE Allergen      <0.35 kU/l <0.35    Allergen D. pteronyssinus IgE Allergen      <0.35 kU/l 1.74 (H)    Tree Birch IgE Allergen      <0.35 kU/l 0.63 (H)    Grass Redtop IgE Allergen      <0.35 kU/l <0.35    Tree Box Elder IgE Allergen      <0.35 kU/l <0.35    Animal Cat Dander IgE Allergen      <0.35 kU/l 1.45 (H)    Fungi/Mold A. fumigatus IgE Allergen      <0.35 kU/l <0.35    Tree Oak IgE Allergen      <0.35 kU/l <0.35    Animal Dog Dander IgE Allergen      <0.35 kU/l 3.12 (H)    Fungi/Mold Hormodendrum IgE Allergen      <0.35 kU/l <0.35    Grass Orchard IgE Allergen      <0.35 kU/l <0.35       Legend:  (H) High      ALLERGIES:   Allergen Reactions    Seasonal Other (See Comments) and Runny Nose     Itchy eyes, sneezing       Current Outpatient Medications   Medication Sig Dispense Refill    ZINC SULFATE PO       tobramycin-dexamethasone (TOBRADEX) ophthalmic suspension INSTILL 1 DROP INTO LEFT EYE 4 TIMES DAILY FOR 10 DAYS (Patient not taking: Reported on 7/19/2024)      levalbuterol (XOPENEX HFA) 45 MCG/ACT inhaler Inhale 1-2 puffs into the lungs every 4 hours as needed for Wheezing. 1 each 11    fluticasone propionate (FLOVENT HFA) 220 MCG/ACT inhaler Inhale 1 puff into the lungs in the morning and 1 puff in the evening. 12 g 12    fluticasone (FLONASE) 50 MCG/ACT nasal spray Spray 2 sprays in each nostril daily. 16 g 11    MULTIPLE VITAMIN PO       VITAMIN D PO       Ibuprofen (ADVIL PO)       AMOXICILLIN PO  (Patient not taking: Reported on 9/23/2024)       No current facility-administered medications for this visit.       Past Medical, Social, and Family History were reviewed and updated and are as listed in the EPIC electronic medical record chart at the time of the encounter as follows:    Past  History:  Patient Active Problem List   Diagnosis    Severe persistent asthma without complication  (CMD)    Thyromegaly    Obesity (BMI 30-39.9)       Past Medical History:   Diagnosis Date    Pneumonia due to COVID-19 virus 11/14/2021    SARS-CoV-2 by PCR + with pneumonia, 11/14/2024 CXR showed extensive ill-defined diffuse bilateral parenchymal lung infiltrates compatible with Covid 19 pneumonia and CT PE protocol showed Extensive bilateral pneumonia    Severe persistent asthma without complication  (CMD) 07/18/2024    Thyromegaly 07/19/2024       Past Surgical History:   Procedure Laterality Date    Appendectomy      Colonoscopy  01/26/2018    D and c  12/15/2008    Ectopic pregnancy surgery      Laparoscopic cholecystectomy  04/16/2013    Cholelithiasis and acute and chronic cholecystitis.    Mini-laparotomy Right 07/12/2007    Mini laparotomy with right distal salpingectomy for right tubal pregnancy    Total hip arthroplasty Left 10/29/2015    LEFT total hip/arthroplasty    Total hip replacement Right 07/15/2021    right total hip replacement       Family History   Problem Relation Age of Onset    Asthma Mother     Allergic Rhinitis Mother     Cancer, Lung Mother     Sudden Death Mother     Heart disease Father     Cancer Father     Allergic Rhinitis Brother     Osteoarthritis Brother     Cancer, Breast Paternal Aunt     Diabetes Maternal Grandmother     Cancer Maternal Grandfather        Social History     Socioeconomic History    Marital status: /Civil Union     Spouse name: Fawad    Number of children: 1    Years of education: 12+4    Highest education level: Bachelor's degree (e.g., BA, AB, BS)   Occupational History    Occupation: Homemaker   Tobacco Use    Smoking status: Never     Passive exposure: Never    Smokeless tobacco: Never   Vaping Use    Vaping status: never used   Substance and Sexual Activity    Alcohol use: Never    Drug use: Never    Sexual activity: Not on file   Other Topics  Concern    Not on file   Social History Narrative    Ortega lives I a house that was built in 2006 since 2021 with her , child and 2 adopted children. There is gas heat and central air conditioning. She sleeps on a mattress and box spring with non-feather pillows. She does not have any pets.      Social Determinants of Health     Financial Resource Strain: Low Risk  (7/17/2024)    Financial Resource Strain     Unable to Get: None   Food Insecurity: Low Risk  (7/17/2024)    Food Insecurity     Worried about Food: Never true     Food is Gone: Never true   Transportation Needs: Not At Risk (7/17/2024)    Transportation Needs     Lack of Reliable Transportation: No   Physical Activity: Low Risk  (7/17/2024)    Exercise Vital Sign     Days of Exercise per Week: 5 days     Minutes of Exercise per Session: 50 min   Stress: Patient Declined (7/17/2024)    Stress     How Stressed: Patient declined   Social Connections: Low Risk  (7/17/2024)    Social Connections     Social Connectivity: 5 or more times a week   Interpersonal Safety: Not on file       Review of Systems:   Pertinent for what is mentioned in HPI and  General:  [] fatigue, [] fevers, [] chills, [x] weight loss/gain.  Neurologic:  [] headaches.  Eyes:  [] itching/conjunctivitis.  ENT:  [] sneezing, [] runny nose, [x] congestion, [] post nasal drainage, [] bad breath.  Respiratory:  [] cough, [x] difficulty breathing, [x] wheezing, [x] shortness of breath.  Heart:  [] chest pain, [] palpitations.  Gastrointestinal:  [] heartburn, [] abd pain, [] nausea/vomiting/diarrhea, [] change in bowel habits.  Genitourinary:  [] dysuria or nocturia.  Musculoskeletal:  [x] joint aches, [x] muscle aches.  Hematologic/Lymphatic:  [] swollen lymph nodes, [] anemia.  Endocrine:  [] thyroid issues [] diabetes.  Psychiatric:  [] depression/anxiety.  Skin:  [x] eczema [] hives [] rash [] itching.  All other systems negative.     Objective:        Vitals:    09/23/24 1247   BP:  120/78   Pulse: 87   Weight: 89.2 kg (196 lb 9.6 oz)   Height: 5' 4\" (1.626 m)     Body mass index is 33.75 kg/m².    Physical Exam:  General:  Alert, active, in no acute distress.  Head:  Normocephalic  Eyes:  Conjunctivae clear.  Ears:  TM's normal, external auditory canals are clear.   Nose:  Congested mucosa, clear discharge  Throat:  Moist mucous membranes without erythema, exudates or petechiae, no thrush.  Lungs:  Clear to auscultation, no wheezing, crackles or rhonchi, breathing unlabored.  Heart:   Regular rate and rhythm, normal S1, S2, no murmurs or gallops.  Abdomen:  Bowel sounds normal.    Neurologic:  Normal mental status, speech normal, alert   Musculoskeletal:  No cyanosis, clubbing or edema.  Skin:  Skin color, texture and turgor are normal; no bruising, rashes or lesions noted.  Psychiatric:  Normal affect and mood.    Diagnostic studies:  Spirometry:   9/23/2024  12:48 PM   SPIROMETRY    FVC, Actual 3.29    FVC, Predicted 3.26    FVC, % Predicted 101    FEV1, Actual 2.79    FEV1, Predicted 2.58    FEV1, % Predicted 108    FEV1/FVC, Actual 85    POST BRONCH SPIROMETRY 4p Alb HFA    Post FVC, Actual 3.38    Post FVC, % Change 3    Post FEV1, Actual 2.87    Post FEV1, % Change 3      Spirometry Interpretation (9/23/2024):    Spirometry suggestive of being normal (FEV1>/= 80%).  Flow volume loops suggestive of variable extrathoracic obstruction. Artifact is noted in the first second of expiration.  FVC and FEV1 lower than baseline PFT 4/3/2024. Spirometry obtained on Flovent HFA 220mcg 1p BID.     Interpretation  Bronchodilator (4 puffs of own Xopenex HFA with demonstration of correct inhaler technique with use of Aerochamber):  There was no significant bronchodilator response and absence of response should not preclude use of bronchodilator if clinically indicated.      Latest Ref Rng 9/23/2024  12:49 PM   NITRIC OXIDE (SAMI)     Sami 25 PPB 19        FeNO:  19 ppb  FeNO Interpretation:  Fractional  exhaled nitric oxide (FeNO) level   <25 ppb suggests an absence of allergic airway inflammation.    25-50 ppb suggests intermediate allergic airway inflammation.  >50 suggests high allergic airway inflammation.     Allergy Skin Test      Prick Applied by: Niki Arguello RN 9/23/2024  1:32 PM   Prick Read by: Ariela Rivera MD 9/23/2024  1:52 PM   Site (prick): Back    : Hart      Is the patient on beta-blockers?: No Is the patient pregnant?: N/A   Is the patient on antihistamines?: No Is Asthma stable?: N/A     Allergy SkinTesting - Prick       ALLERGEN ROUTE REACTION WHEAL FLARE COMMENT    Negative Control Prick  0 0     B.Elder/Maple Prick  0 0     Elm Prick  0 0     [place] Prick  0 0     Justin Prick  0 0     Birch Prick  15 50     [place], Shagbark Prick  0 0     Histamine Prick  6 25       ALLERGEN ROUTE REACTION WHEAL FLARE COMMENT    [place], White Prick  3 30     [place], Red Prick  3 15     [place], Eastern Prick  4 12     [place], Red Prick  0 0     Rosangela Grass Prick  0 0     Orchard Grass Prick  3 10     Rye Grass Prick  3 30     Ragweed Mix Prick  20 50       ALLERGEN ROUTE REACTION WHEAL FLARE COMMENT    Cao Elder Prick  5 20     Cocklebur Prick  0 0     Lamb's Quarter Prick  5 20     Mugwort Prick  3 15     Pigweed Prick  0 0     Plantain Prick  0 0     Russian Thistle Prick  5 10     [place], Yellow Prick  5 30       ALLERGEN ROUTE REACTION WHEAL FLARE COMMENT    D. Farinae Prick  10 35     D. Pteronyssinus Prick  15 40     Cockroach Prick  4 30     Cat Dander Prick  20 30     Dog Prick  3 5     Mouse Prick  3 15     Alternaria Alternata Prick  0 0     Aspergillus Fumigatus Prick  0 0       ALLERGEN ROUTE REACTION WHEAL FLARE COMMENT    Acremonium Strictum (Cephalosporium) Prick  0 0     Helminthosporium Prick  3 10     Cladosporium Prick  0 0     Penicillium Mix Prick  4 20     Fusarium Prick  4 15     Mucor Prick  5 20     Rhizopus Prick  0 0     Aspergillus Niger Prick  0 0       ALLERGEN  ROUTE REACTION WHEAL FLARE COMMENT    Mixed Feathers Prick  0 0          Assessment:      Severe persistent asthma without complication  (CMD)  (primary encounter diagnosis)  Comment: High impairment, high risk, uncontrolled with frequent high risk use of Xopenex. T2 high (atopic/eosinophilic) endotype  Plan: SPIROMETRY W/ BRONCHODILATOR, NITRIC OXIDE          GAS DETERMINATION, SPACER AEROCHAMBER         W/ MDI W OR W/OUT MASK, budesonide-formoterol         (SYMBICORT) 160-4.5 MCG/ACT inhaler, ALLERGY         SKIN TESTS,ALLERGENS    Exercise-induced bronchospasm (CMD)  Plan: SPIROMETRY W/ BRONCHODILATOR, SPACER         AEROCHAMBER W/ MDI W OR W/OUT MASK    Allergic rhinoconjunctivitis of both eyes  Comment: tree, grass, weed, ragweed, mold, cat, dog, mouse, dust mites, cockroach  Plan: NITRIC OXIDE  GAS DETERMINATION, ALLERGY        SKIN TESTS,ALLERGENS    Medication side effect, initial encounter  Comment: Albuterol, tolerates Xopenex     Patient Active Problem List   Diagnosis    Severe persistent asthma without complication  (CMD)    Thyromegaly    Obesity (BMI 30-39.9)    Allergic rhinoconjunctivitis of both eyes    Exercise-induced bronchospasm (CMD)       Plan/Recommendations:     Orders Placed This Encounter   Procedures    SPIROMETRY W/ BRONCHODILATOR    NITRIC OXIDE  GAS DETERMINATION    ALLERGY SKIN TESTS,ALLERGENS       Reviewed the diagnosis and pathophysiology of Severe persistent asthma with increase in symptoms following COVID 19 pneumonia, post menopausal, complicated by side effects with Albuterol previously tolerated, in setting of frequent use of Xopenex with tachyphylaxis, exercise-induced bronchospasm, and uncontrolled allergic rhinoconjunctivitis   Reviewed history, exam and test findings  Reviewed inhaler technique  Reviewed side effect with Albuterol (last exposed ) previously tolerated  Reviewed over use of Xopenex and concern for tachyphylaxis; dose pre exercise  and prn  Stop Flovent  Start Symbicort /4.5 2p BID with spacer  If tolerated, consider Airsupra, not prescribed due to concern for side effect with Albuterol   She did not trial montelukast prescribed in 2019 which can be considered if symptoms are uncontrolled or if intolerant of Symbicort and needs to be combined with restarting Flovent.   Asthma Management Plan was prescribed in the [employer] activity based on symptoms.  In yellow zone pt was recommended to use Levalbuterol HFA 2 puffs every 4-6 hours prn and to contact clinic in 24-48 hours if no improvement.  A copy was provided and reviewed with the patient.   Reviewed prior and current allergy evaluation  Recommended scheduled use of intranasal steroids and prn use of antihistamines.   Reviewed OTC allergy medications.   Consider allergen immunotherapy if asthma controlled.   Recommended the seasonal influenza immunization this fall.   Reviewed the risks/benefits/alternatives of the treatment plan including medications.  Reviewed allergens to which the patient is reactive.    Teaching sheets on Albuterol, inhaler use, pollen, mold, dander, dust mite avoidance, use of intranasal steroids provided and reviewed.   Return in about 6 months (around 3/23/2025) for Asthma/allergies.       Patient Instructions   You have needed Xopenex/Levalbuterol frequently. Asthma control can change over the course of a lifetime.  Stop Flovent.  Start Symbicort /4.5 2 puffs 2 times daily with spacer.     Asthma is treated with 2 different medications:    1. RESCUE:  This is Albuterol HFA 2 puffs every 4-6 hours as needed.  How often this is used gives an indication of how controlled your asthma is.             2. CONTROLLER:  Asthma controllers are used every day.  Your controller(s) is   Symbicort /4.5 2 puffs 2 times daily - NEW       Please refer to your asthma action plan for up to date asthma medication recommendations.     You have allergies to tree,  grass, weed, ragweed, mold, cat, dog, mouse, dust mites, cockroach:    Allergy Skin Test      Prick Applied by: Niki Arguello RN 9/23/2024  1:32 PM   Prick Read by: Ariela Rivera MD 9/23/2024  1:52 PM   Site (prick): Back    : [place]      Is the patient on beta-blockers?: No Is the patient pregnant?: N/A   Is the patient on antihistamines?: No Is Asthma stable?: N/A     Allergy SkinTesting - Prick       ALLERGEN ROUTE REACTION WHEAL FLARE COMMENT    Negative Control Prick  0 0     B.Elder/Maple Prick  0 0     Elm Prick  0 0     [place] Prick  0 0     Justin Prick  0 0     Birch Prick  15 50     [place], Shagbark Prick  0 0     Histamine Prick  6 25       ALLERGEN ROUTE REACTION WHEAL FLARE COMMENT    [place], White Prick  3 30     [place], Red Prick  3 15     [place], Eastern Prick  4 12     [place], Red Prick  0 0     Rosangela Grass Prick  0 0     Orchard Grass Prick  3 10     Rye Grass Prick  3 30     Ragweed Mix Prick  20 50       ALLERGEN ROUTE REACTION WHEAL FLARE COMMENT    Cao Elder Prick  5 20     Cocklebur Prick  0 0     Lamb's Quarter Prick  5 20     Mugwort Prick  3 15     Pigweed Prick  0 0     Plantain Prick  0 0     Russian Thistle Prick  5 10     [place], Yellow Prick  5 30       ALLERGEN ROUTE REACTION WHEAL FLARE COMMENT    D. Farinae Prick  10 35     D. Pteronyssinus Prick  15 40     Cockroach Prick  4 30     Cat Dander Prick  20 30     Dog Prick  3 5     Mouse Prick  3 15     Alternaria Alternata Prick  0 0     Aspergillus Fumigatus Prick  0 0       ALLERGEN ROUTE REACTION WHEAL FLARE COMMENT    Acremonium Strictum (Cephalosporium) Prick  0 0     Helminthosporium Prick  3 10     Cladosporium Prick  0 0     Penicillium Mix Prick  4 20     Fusarium Prick  4 15     Mucor Prick  5 20     Rhizopus Prick  0 0     Aspergillus Niger Prick  0 0       ALLERGEN ROUTE REACTION WHEAL FLARE COMMENT    Mixed Feathers Prick  0 0        With allergy symptoms recommend   Flonase 1-2 sprays each nostril daily -  none takes 2 weeks to start working  Add on Astelin 1 spray each nostril 2 times daily as needed,  this is available as over the counter Astepro  Add on Claritin (or alternative antihistamine) 10mg daily as needed     Allergies can be treated with the following over the counter medications:    1. Antihistamines:   Allegra/fexofenadine 180 mg or   Zyrtec/cetirizine 10 mg or   Claritin/Alavert/loratadine 10 mg   Xyzal/levocetirizine 5 mg   Antihistamines are dosed daily to help with itching, runny nose.  These are used daily as needed.  Benadryl can be used as well but its effect lasts 4-6 hours.  Antihistamines can cause drowsiness, do not drive if tired.       2. Antihistamine nose spray:   Astepro Nasal Spray Can use once daily: use 2 sprays in each nostril; OR Twice daily: use 1 or 2 sprays in each nostril every 12 hours- NEW  Astepro is an antihistamine and steroid free nasal spray which is dosed as needed an works best in combination with a steroid nasal spray. It can be associated with a bad taste in the mouth. Antihistamines can cause drowsiness.        3. Steroid nose sprays:   Flonase, Flonase Sensimist, or ClariSpray (fluticasone)  Nasacort®  24 HR Allergy (triamcinolone)  RHINOCORT® Allergy Spray (budesonide)   Nasonex® 24HR Allergy (mometasone furoate monohydrate 50mcg)  These steroid nose sprays are available over the counter.  Used 2 sprays each nostril daily during symptomatic months, and 1 spray each nostril daily at other times, this will help with nasal congestion, and at times for eye symptoms.  These work better if used daily for 2 weeks or longer at a time.      4. Allergy eye drops.  Can be used as needed to decrease eye itching/redness/discomfort.   Alaway  Naphcon-A  OcuHist  Opcon-A  Vasocon-A  Visine A   Zaditor   Pataday - NEW as of 3/2/2020 - previously available with a prescription. Currently available at [facility], [facility] and [facility] pharmacies.    These eye drops are available over the counter.   Alternatively, a prescription eye drop can be provided.    Please call with questions, problems, or concerns.    Please call to tell us about:  Emergency Department visits  Hospital stays   Change in symptoms    [facility]  [place]:  Phone Monday-Friday 9am-5pm [phone]  Fax [phone]  If an emergency, call 911.  -Or use [employer] to contact our clinic - messages sent through [employer] go directly to our nursing staff inbox. Non-urgent messages will be responded to within 2 business days.

## 2024-10-28 NOTE — ED PROVIDER NOTE - NS ED MD DISPO DISCHARGE CCDA
The patient is a 58y Male complaining of lacerations.
Patient/Caregiver provided printed discharge information.

## 2024-12-26 NOTE — ED BEHAVIORAL HEALTH ASSESSMENT NOTE - JUDGMENT (REGARDING EVERYDAY EVENTS)
Writer attempted to contact patient, no answer; left VM message asking for a return call to [facility]. [employer] message sent with recommendations from Dr. Hargrove.     Lucila Guevara RN on 12/26/2024 at 10:16 AM      Poor

## 2024-12-29 NOTE — ED ADULT NURSE NOTE - OBJECTIVE STATEMENT
presents to ED after suspected alcohol intoxication, as per triage note found outside of liquor store next to empty vodka bottles. patients is well known to ED staff and presentation consistent with previous visits. responsive to painful stimuli, respirations even and unlabored, positioned upright for aspiration precautions and placed on cardiac, pulse oximetry, and CO2 monitoring.
[id number]

## 2025-01-24 NOTE — ED PROVIDER NOTE - PRINCIPAL DIAGNOSIS
ETOHism
179.8
Yes/spouse
Double Z Plasty Text: The lesion was extirpated to the level of the fat with a #15 scalpel blade. Given the location of the defect, shape of the defect and the proximity to free margins a double Z-plasty was deemed most appropriate for repair. Using a sterile surgical marker, the appropriate transposition arms of the double Z-plasty were drawn incorporating the defect and placing the expected incisions within the relaxed skin tension lines where possible. The area thus outlined was incised deep to adipose tissue with a #15 scalpel blade. The skin margins were undermined to an appropriate distance in all directions utilizing iris scissors. The opposing transposition arms were then transposed and carried over into place in opposite direction and anchored with interrupted buried subcutaneous sutures.

## 2025-01-27 NOTE — ED ADULT NURSE NOTE - CAS DISCH TRANSFER METHOD
Private car

## 2025-02-11 NOTE — ED PROVIDER NOTE - HIV OFFER
This clinician met with Maty to provide resources for alcohol detox per provider request. A full bsmart assessment was not needed.   Previously Declined (within the last year)

## 2025-03-06 NOTE — ED PROVIDER NOTE - RESPIRATORY, MLM
Breath sounds clear and equal bilaterally.
Detail Level: Detailed
General Sunscreen Counseling: I recommended a broad spectrum sunscreen with a SPF of 30 or higher.  I explained that SPF 30 sunscreens block approximately 97 percent of the sun's harmful rays.  Sunscreens should be applied at least 15 minutes prior to expected sun exposure and then every 2 hours after that as long as sun exposure continues. If swimming or exercising sunscreen should be reapplied every 45 minutes to an hour after getting wet or sweating.  One ounce, or the equivalent of a shot glass full of sunscreen, is adequate to protect the skin not covered by a bathing suit. I also recommended a lip balm with a sunscreen as well. Sun protective clothing can be used in lieu of sunscreen but must be worn the entire time you are exposed to the sun's rays.

## 2025-05-20 NOTE — ED CDU PROVIDER DISPOSITION NOTE - NS ED MD DISPO DISCHARGE
Detail Level: Detailed
Add 08855 Cpt? (Important Note: In 2017 The Use Of 65684 Is Being Tracked By Cms To Determine Future Global Period Reimbursement For Global Periods): yes
Home

## 2025-07-26 NOTE — ED ADULT NURSE NOTE - CAS EDN INTEG ASSESS
Lelia, notified.   WDL

## 2025-08-30 NOTE — ED ADULT TRIAGE NOTE - TEMPERATURE IN CELSIUS (DEGREES C)
Patient discharged at this time via cart with [facility].  2 belonging bags and backpack sent with patient.   36.7